# Patient Record
Sex: MALE | Race: WHITE | NOT HISPANIC OR LATINO | Employment: OTHER | URBAN - NONMETROPOLITAN AREA
[De-identification: names, ages, dates, MRNs, and addresses within clinical notes are randomized per-mention and may not be internally consistent; named-entity substitution may affect disease eponyms.]

---

## 2017-03-15 RX ORDER — BISOPROLOL FUMARATE 5 MG/1
5 TABLET, FILM COATED ORAL DAILY
Qty: 90 TABLET | Refills: 1 | Status: SHIPPED | OUTPATIENT
Start: 2017-03-15 | End: 2017-06-27 | Stop reason: SDUPTHER

## 2017-06-27 ENCOUNTER — OFFICE VISIT (OUTPATIENT)
Dept: CARDIOLOGY | Facility: CLINIC | Age: 82
End: 2017-06-27

## 2017-06-27 VITALS
HEART RATE: 59 BPM | WEIGHT: 200 LBS | DIASTOLIC BLOOD PRESSURE: 64 MMHG | HEIGHT: 69 IN | BODY MASS INDEX: 29.62 KG/M2 | SYSTOLIC BLOOD PRESSURE: 124 MMHG

## 2017-06-27 DIAGNOSIS — I25.10 CORONARY ARTERY DISEASE INVOLVING NATIVE CORONARY ARTERY OF NATIVE HEART WITHOUT ANGINA PECTORIS: Primary | ICD-10-CM

## 2017-06-27 DIAGNOSIS — I10 ESSENTIAL HYPERTENSION: ICD-10-CM

## 2017-06-27 DIAGNOSIS — Z79.899 HIGH RISK MEDICATION USE: ICD-10-CM

## 2017-06-27 DIAGNOSIS — E78.2 MIXED HYPERLIPIDEMIA: ICD-10-CM

## 2017-06-27 PROCEDURE — 99213 OFFICE O/P EST LOW 20 MIN: CPT | Performed by: INTERNAL MEDICINE

## 2017-06-27 RX ORDER — BISOPROLOL FUMARATE 5 MG/1
5 TABLET, FILM COATED ORAL DAILY
Qty: 90 TABLET | Refills: 3 | Status: SHIPPED | OUTPATIENT
Start: 2017-06-27 | End: 2018-07-31 | Stop reason: SDUPTHER

## 2017-06-27 RX ORDER — LISINOPRIL 2.5 MG/1
2.5 TABLET ORAL DAILY
Qty: 90 TABLET | Refills: 3 | Status: SHIPPED | OUTPATIENT
Start: 2017-06-27 | End: 2018-02-22 | Stop reason: SDUPTHER

## 2017-06-27 RX ORDER — TRIAMTERENE AND HYDROCHLOROTHIAZIDE 37.5; 25 MG/1; MG/1
1 TABLET ORAL DAILY
Qty: 90 TABLET | Refills: 3 | Status: SHIPPED | OUTPATIENT
Start: 2017-06-27 | End: 2019-09-18

## 2017-06-27 RX ORDER — SIMVASTATIN 20 MG
20 TABLET ORAL NIGHTLY
Qty: 90 TABLET | Refills: 2 | OUTPATIENT
Start: 2017-06-27 | End: 2021-03-18

## 2017-06-27 NOTE — PROGRESS NOTES
Ramin Zaragoza  1935  [phone]  [phone]    06/27/2017    Carolyn Sharp MD    Chief Complaint   Patient presents with   • Coronary Artery Disease     PROBLEM LIST:  1. Coronary artery disease:   a. History of stents to the LAD and PDA 1997, 2002, and 2003, data deficit.  b. Abnormal nuclear perfusion study, 04/11/2014, revealing a moderate reversible defect involving the apex and basal mid inferior wall.    c. Cardiac catheterization, 04/22/2014, by Dr. Emerson revealing normal LV systolic function of 55% to 60% with patent stents to the LAD and PDA.   2. History of PVCs.    3. Hypertension.    4. Hyperlipidemia.    5. Venous insufficiency:   a. Status post right lower extremity great saphenous vein ablation in 2013.    b. Negative DVT for thrombus, May 2015.  6. Hypothyroidism on chronic replacement therapy.    7. Lactose intolerance.    8. Lower extremity edema.    9. Surgical history:   a. Inguinal hernia repair.  b. Bilateral cataract extraction.  c. Melanoma removal from the back.    History of left elbow fracture with subsequent fixation.       Allergies   Allergen Reactions   • Lactose Intolerance (Gi)        Current Medications:      Current Outpatient Prescriptions:   •  aspirin 81 MG EC tablet, Take 81 mg by mouth daily., Disp: , Rfl:   •  bisoprolol (ZEBeta) 5 MG tablet, Take 1 tablet by mouth Daily., Disp: 90 tablet, Rfl: 1  •  fluticasone (FLONASE) 50 MCG/ACT nasal spray, 2 sprays into each nostril daily. Administer 2 sprays in each nostril for each dose., Disp: , Rfl:   •  levothyroxine (SYNTHROID, LEVOTHROID) 25 MCG tablet, Take 25 mcg by mouth daily., Disp: , Rfl:   •  lisinopril (PRINIVIL,ZESTRIL) 2.5 MG tablet, Take 2.5 mg by mouth daily., Disp: , Rfl:   •  Loratadine (CLARITIN PO), Take  by mouth as needed., Disp: , Rfl:   •  nitroglycerin (NITROSTAT) 0.4 MG SL tablet, Place 1 tablet under the tongue every 5 (five) minutes as needed for chest pain. Take no more than 3 doses in 15  "minutes., Disp: 25 tablet, Rfl: 11  •  simvastatin (ZOCOR) 20 MG tablet, Take 20 mg by mouth every night., Disp: , Rfl:     HPI    Mr. Zaragoza presents today for 12 month follow up of coronary artery disease, hypertension, and hyperlipidemia. Since last visit, patient has been feeling well overall from a cardiovascular standpoint. Patient has still been experiencing pedal edema, with his right leg being worse than his left. Patient denies chest pain, palpitations, shortness of breath, edema, PND, orthopnea, dizziness, and syncope. He reportedly has gained 13 pounds since last visit. He is remaining active by gardening. He follows up with Dr. Sharp in September.    The following portions of the patient's history were reviewed and updated as appropriate: allergies, current medications and problem list.    Pertinent positives as listed in the HPI.  All other systems reviewed are negative.    Vitals:    06/27/17 1323   BP: 124/64   BP Location: Right arm   Patient Position: Sitting   Pulse: 59   Weight: 200 lb (90.7 kg)   Height: 69\" (175.3 cm)       Physical Exam:  General: Alert and oriented  Neck: Jugular venous pressure is within normal limits. Carotids have normal upstrokes without bruits.   Cardiovascular: Regular rate and rhythm without gallop or rub.  Lungs: Clear without rales or wheezes. Equal expansion is noted.   Extremities: Show 1+ pitting edema bilateral LE, right worse than left.  Skin: warm and dry.  Neurologic: nonfocal    Diagnostic Data:    Procedures    Assessment:      ICD-10-CM ICD-9-CM   1. Coronary artery disease involving native coronary artery of native heart without angina pectoris I25.10 414.01   2. Essential hypertension I10 401.9   3. Mixed hyperlipidemia E78.2 272.2       Plan:    1. Start Maxzide 37.5-25 mg once daily.   2. Obtain labs in four weeks.  3. Continue current medications.  4. F/up in 12 months or sooner if needed.    Scribed for Vesta Marley MD by Nelson Wei. " 6/27/2017  1:30 PM        I Vesta Marley MD personally performed the services described in this documentation as scribed by the above individual in my presence, and it is both accurate and complete.    Vesta Marley MD, FACC

## 2018-02-22 RX ORDER — LISINOPRIL 2.5 MG/1
2.5 TABLET ORAL DAILY
Qty: 90 TABLET | Refills: 1 | Status: SHIPPED | OUTPATIENT
Start: 2018-02-22 | End: 2018-06-15 | Stop reason: SDUPTHER

## 2018-06-18 RX ORDER — LISINOPRIL 2.5 MG/1
TABLET ORAL
Qty: 90 TABLET | Refills: 1 | Status: SHIPPED | OUTPATIENT
Start: 2018-06-18 | End: 2023-01-21 | Stop reason: HOSPADM

## 2018-07-31 RX ORDER — BISOPROLOL FUMARATE 5 MG/1
TABLET, FILM COATED ORAL
Qty: 90 TABLET | Refills: 0 | Status: SHIPPED | OUTPATIENT
Start: 2018-07-31 | End: 2018-11-28 | Stop reason: SDUPTHER

## 2018-11-28 RX ORDER — BISOPROLOL FUMARATE 5 MG/1
5 TABLET, FILM COATED ORAL DAILY
Qty: 90 TABLET | Refills: 0 | Status: SHIPPED | OUTPATIENT
Start: 2018-11-28 | End: 2019-09-18

## 2019-09-16 NOTE — PROGRESS NOTES
[facility] Card[facility]    Subjective:     Encounter Date:09/18/2019      Patient ID: Ramin Zaragoza is a 83 y.o. male.    Reason for consultation: Coronary artery disease    Problem List:  1. Coronary artery disease:   a. History of stents to the LAD and PDA 1997, 2002, 2003. Data deficit.  b. Abnormal nuclear perfusion study, 04/11/2014, revealing a moderate reversible defect involving the apex and basal mid inferior wall.    c. Cardiac catheterization, 04/22/2014, by Dr. Emerson revealing normal LV systolic function of 55% to 60% with patent stents to the LAD and PDA.   2. History of PVCs.    3. Hypertension.    4. Hyperlipidemia.    a. FLP 8/6/19: Trig 77, , LDL 53, HDL 43.   5. Venous insufficiency:   a. Status post right lower extremity great saphenous vein ablation in 2013.    b. Negative DVT for thrombus, May 2015.  6. Hypothyroidism on chronic replacement therapy.    7. Lactose intolerance.    8. Lower extremity edema.    9. Surgical history:   a. Inguinal hernia repair.  b. Bilateral cataract extraction.  c. Melanoma removal from the back.    d. History of left elbow fracture with subsequent fixation.         No problems updated.    Allergies   Allergen Reactions   • Lactose Intolerance (Gi)          Current Outpatient Medications:   •  aspirin 81 MG EC tablet, Take 81 mg by mouth daily., Disp: , Rfl:   •  fluticasone (FLONASE) 50 MCG/ACT nasal spray, 2 sprays into each nostril daily. Administer 2 sprays in each nostril for each dose., Disp: , Rfl:   •  levothyroxine (SYNTHROID, LEVOTHROID) 25 MCG tablet, Take 25 mcg by mouth daily., Disp: , Rfl:   •  lisinopril (PRINIVIL,ZESTRIL) 2.5 MG tablet, TAKE 1 TABLET BY MOUTH ONCE DAILY, Disp: 90 tablet, Rfl: 1  •  Loratadine (CLARITIN PO), Take  by mouth as needed., Disp: , Rfl:   •  nitroglycerin (NITROSTAT) 0.4 MG SL tablet, Place 1 tablet under the tongue every 5 (five) minutes as needed for chest pain. Take no more than 3 doses  in 15 minutes., Disp: 25 tablet, Rfl: 11  •  simvastatin (ZOCOR) 20 MG tablet, Take 1 tablet by mouth Every Night., Disp: 90 tablet, Rfl: 2    HPI:      Ramin Zaragoza is a 83 y.o. male who present today to establish care for his CAD, HTN, HLD. His BP is pretty well controlled. Systolic is usually in the low 100's/60's. Denies chest pain, SOB, GM, PND, orthopnea. He walks 30 minutes a day. Very active. No issues.     Cardiac Risk Factors:    Social History     Socioeconomic History   • Marital status:      Spouse name: Not on file   • Number of children: Not on file   • Years of education: Not on file   • Highest education level: Not on file   Tobacco Use   • Smoking status: Never Smoker   • Smokeless tobacco: Never Used   Substance and Sexual Activity   • Alcohol use: No   • Drug use: No   • Sexual activity: Defer     Family History   Problem Relation Age of Onset   • No Known Problems Mother    • No Known Problems Father        Review of Systems:  The following portions of the patient's history were reviewed and updated as appropriate: allergies, current medications and problem list.    Pertinent positives as listed in the HPI.  All other systems reviewed are negative.    Objective:        Vitals:    09/18/19 1301   BP: 108/68   Pulse: 59   SpO2: 96%       GENERAL: This is a well-developed, well-nourished, male who is in no acute distress. Alert and oriented x3. Normal mood and affect.   SKIN: Pink and warm without rash or abnormality noted.   HEENT: Head is normocephalic and atraumatic. Pupils are equal and reactive to light bilaterally. Mucous membranes are pink and moist.   NECK: Supple without lymphadenopathy or thyromegaly. There is no jugular venous distention at 30°.  LUNGS: Clear to auscultation bilaterally without wheezing, rhonchi, or rales noted.   CARDIOVASCULAR: The heart has a regular rate with a normal S1 and S2. There is no murmur, gallop, rub, or click appreciated. The PMI is nondisplaced.  Carotid upstrokes are 2+ and symmetrical without bruits.   ABDOMEN: Soft and nondistended with positive bowel sounds x4. The patient denies tenderness of palpitation.   MUSCULOSKELETAL: There are no obvious bony abnormalities. Normal range of tenderness to palpation.   NEUROLOGICAL: Nonfocal.   PERIPHERAL VASCULAR: Femoral pulses are 2+ and symmetrical without bruits. Posterior tibial and dorsalis pedis pulses are 2+ and symmetrical. There is trace peripheral edema.        No results found for any previous visit.     Diagnostic Data:          ECG 12 Lead  Date/Time: 9/18/2019 1:31 PM  Performed by: Betty Whitaker APRN  Authorized by: Betty Whitaker APRN   Comparison: compared with previous ECG   Rhythm: sinus bradycardia  BPM: 59              Assessment:       ICD-10-CM ICD-9-CM   1. Coronary artery disease involving native coronary artery of native heart without angina pectoris I25.10 414.01   2. Premature ventricular contraction I49.3 427.69   3. Essential hypertension I10 401.9   4. Mixed hyperlipidemia E78.2 272.2          Plan:       1. Continue simvastatin, lisinopril and ASA. No BB secondary to bradycardia.    2. Follow-up in 1 year, sooner as needed.        Scribed for Vesta Marley MD by DARA Salguero. 9/18/2019  1:14 PM

## 2019-09-18 ENCOUNTER — OFFICE VISIT (OUTPATIENT)
Dept: CARDIOLOGY | Facility: CLINIC | Age: 84
End: 2019-09-18

## 2019-09-18 VITALS
WEIGHT: 180 LBS | SYSTOLIC BLOOD PRESSURE: 108 MMHG | HEART RATE: 59 BPM | HEIGHT: 70 IN | DIASTOLIC BLOOD PRESSURE: 68 MMHG | BODY MASS INDEX: 25.77 KG/M2 | OXYGEN SATURATION: 96 %

## 2019-09-18 DIAGNOSIS — I10 ESSENTIAL HYPERTENSION: ICD-10-CM

## 2019-09-18 DIAGNOSIS — I25.10 CORONARY ARTERY DISEASE INVOLVING NATIVE CORONARY ARTERY OF NATIVE HEART WITHOUT ANGINA PECTORIS: Primary | ICD-10-CM

## 2019-09-18 DIAGNOSIS — I49.3 PREMATURE VENTRICULAR CONTRACTION: ICD-10-CM

## 2019-09-18 DIAGNOSIS — E78.2 MIXED HYPERLIPIDEMIA: ICD-10-CM

## 2019-09-18 PROCEDURE — 93000 ELECTROCARDIOGRAM COMPLETE: CPT | Performed by: INTERNAL MEDICINE

## 2019-09-18 PROCEDURE — 99213 OFFICE O/P EST LOW 20 MIN: CPT | Performed by: INTERNAL MEDICINE

## 2020-02-12 ENCOUNTER — APPOINTMENT (OUTPATIENT)
Dept: CT IMAGING | Facility: HOSPITAL | Age: 85
End: 2020-02-12

## 2020-02-12 ENCOUNTER — HOSPITAL ENCOUNTER (EMERGENCY)
Facility: HOSPITAL | Age: 85
Discharge: HOME | End: 2020-02-12
Attending: EMERGENCY MEDICINE | Admitting: EMERGENCY MEDICINE

## 2020-02-12 VITALS
DIASTOLIC BLOOD PRESSURE: 65 MMHG | HEART RATE: 51 BPM | BODY MASS INDEX: 27.25 KG/M2 | SYSTOLIC BLOOD PRESSURE: 139 MMHG | OXYGEN SATURATION: 96 % | RESPIRATION RATE: 20 BRPM | TEMPERATURE: 97.5 F | HEIGHT: 69 IN | WEIGHT: 184 LBS

## 2020-02-12 DIAGNOSIS — K44.9 HIATAL HERNIA: ICD-10-CM

## 2020-02-12 DIAGNOSIS — K21.9 GASTROESOPHAGEAL REFLUX DISEASE, ESOPHAGITIS PRESENCE NOT SPECIFIED: Primary | ICD-10-CM

## 2020-02-12 LAB
ALBUMIN SERPL-MCNC: 3.8 G/DL (ref 3.5–5.2)
ALBUMIN/GLOB SERPL: 1.5 G/DL
ALP SERPL-CCNC: 58 U/L (ref 39–117)
ALT SERPL W P-5'-P-CCNC: 15 U/L (ref 1–41)
ANION GAP SERPL CALCULATED.3IONS-SCNC: 9 MMOL/L (ref 5–15)
AST SERPL-CCNC: 19 U/L (ref 1–40)
BACTERIA UR QL AUTO: NORMAL /HPF
BASOPHILS # BLD AUTO: 0.02 10*3/MM3 (ref 0–0.2)
BASOPHILS NFR BLD AUTO: 0.3 % (ref 0–1.5)
BILIRUB SERPL-MCNC: 0.4 MG/DL (ref 0.2–1.2)
BILIRUB UR QL STRIP: NEGATIVE
BUN BLD-MCNC: 16 MG/DL (ref 8–23)
BUN/CREAT SERPL: 20 (ref 7–25)
CALCIUM SPEC-SCNC: 9.4 MG/DL (ref 8.6–10.5)
CHLORIDE SERPL-SCNC: 99 MMOL/L (ref 98–107)
CLARITY UR: ABNORMAL
CLUMPED PLATELETS: PRESENT
CO2 SERPL-SCNC: 24 MMOL/L (ref 22–29)
COLOR UR: YELLOW
CREAT BLD-MCNC: 0.8 MG/DL (ref 0.76–1.27)
D-LACTATE SERPL-SCNC: 1.5 MMOL/L (ref 0.5–2)
DEPRECATED RDW RBC AUTO: 42.5 FL (ref 37–54)
EOSINOPHIL # BLD AUTO: 0.09 10*3/MM3 (ref 0–0.4)
EOSINOPHIL NFR BLD AUTO: 1.2 % (ref 0.3–6.2)
ERYTHROCYTE [DISTWIDTH] IN BLOOD BY AUTOMATED COUNT: 12.2 % (ref 12.3–15.4)
GFR SERPL CREATININE-BSD FRML MDRD: 92 ML/MIN/1.73
GLOBULIN UR ELPH-MCNC: 2.6 GM/DL
GLUCOSE BLD-MCNC: 166 MG/DL (ref 65–99)
GLUCOSE UR STRIP-MCNC: NEGATIVE MG/DL
HCT VFR BLD AUTO: 41.3 % (ref 37.5–51)
HGB BLD-MCNC: 14.2 G/DL (ref 13–17.7)
HGB UR QL STRIP.AUTO: NEGATIVE
HOLD SPECIMEN: NORMAL
HOLD SPECIMEN: NORMAL
HYALINE CASTS UR QL AUTO: NORMAL /LPF
IMM GRANULOCYTES # BLD AUTO: 0.03 10*3/MM3 (ref 0–0.05)
IMM GRANULOCYTES NFR BLD AUTO: 0.4 % (ref 0–0.5)
KETONES UR QL STRIP: NEGATIVE
LEUKOCYTE ESTERASE UR QL STRIP.AUTO: NEGATIVE
LIPASE SERPL-CCNC: 13 U/L (ref 13–60)
LYMPHOCYTES # BLD AUTO: 0.88 10*3/MM3 (ref 0.7–3.1)
LYMPHOCYTES NFR BLD AUTO: 11.3 % (ref 19.6–45.3)
MCH RBC QN AUTO: 32 PG (ref 26.6–33)
MCHC RBC AUTO-ENTMCNC: 34.4 G/DL (ref 31.5–35.7)
MCV RBC AUTO: 93 FL (ref 79–97)
MONOCYTES # BLD AUTO: 0.6 10*3/MM3 (ref 0.1–0.9)
MONOCYTES NFR BLD AUTO: 7.7 % (ref 5–12)
NEUTROPHILS # BLD AUTO: 6.15 10*3/MM3 (ref 1.7–7)
NEUTROPHILS NFR BLD AUTO: 79.1 % (ref 42.7–76)
NITRITE UR QL STRIP: NEGATIVE
NRBC BLD AUTO-RTO: 0 /100 WBC (ref 0–0.2)
PH UR STRIP.AUTO: 7 [PH] (ref 5–8)
PLATELET # BLD AUTO: 137 10*3/MM3 (ref 140–450)
PMV BLD AUTO: 9.9 FL (ref 6–12)
POTASSIUM BLD-SCNC: 4.2 MMOL/L (ref 3.5–5.2)
PROT SERPL-MCNC: 6.4 G/DL (ref 6–8.5)
PROT UR QL STRIP: NEGATIVE
RBC # BLD AUTO: 4.44 10*6/MM3 (ref 4.14–5.8)
RBC # UR: NORMAL /HPF
RBC MORPH BLD: NORMAL
REF LAB TEST METHOD: NORMAL
SODIUM BLD-SCNC: 132 MMOL/L (ref 136–145)
SP GR UR STRIP: 1.01 (ref 1–1.03)
SQUAMOUS #/AREA URNS HPF: NORMAL /HPF
TROPONIN T SERPL-MCNC: <0.01 NG/ML (ref 0–0.03)
UROBILINOGEN UR QL STRIP: ABNORMAL
WBC MORPH BLD: NORMAL
WBC NRBC COR # BLD: 7.77 10*3/MM3 (ref 3.4–10.8)
WBC UR QL AUTO: NORMAL /HPF
WHOLE BLOOD HOLD SPECIMEN: NORMAL
WHOLE BLOOD HOLD SPECIMEN: NORMAL

## 2020-02-12 PROCEDURE — 85025 COMPLETE CBC W/AUTO DIFF WBC: CPT | Performed by: EMERGENCY MEDICINE

## 2020-02-12 PROCEDURE — 83690 ASSAY OF LIPASE: CPT | Performed by: EMERGENCY MEDICINE

## 2020-02-12 PROCEDURE — 84484 ASSAY OF TROPONIN QUANT: CPT | Performed by: PHYSICIAN ASSISTANT

## 2020-02-12 PROCEDURE — 74176 CT ABD & PELVIS W/O CONTRAST: CPT

## 2020-02-12 PROCEDURE — 80053 COMPREHEN METABOLIC PANEL: CPT | Performed by: EMERGENCY MEDICINE

## 2020-02-12 PROCEDURE — 81001 URINALYSIS AUTO W/SCOPE: CPT | Performed by: EMERGENCY MEDICINE

## 2020-02-12 PROCEDURE — 96374 THER/PROPH/DIAG INJ IV PUSH: CPT

## 2020-02-12 PROCEDURE — 85007 BL SMEAR W/DIFF WBC COUNT: CPT | Performed by: EMERGENCY MEDICINE

## 2020-02-12 PROCEDURE — 99284 EMERGENCY DEPT VISIT MOD MDM: CPT

## 2020-02-12 PROCEDURE — 83605 ASSAY OF LACTIC ACID: CPT | Performed by: EMERGENCY MEDICINE

## 2020-02-12 PROCEDURE — 25010000002 ONDANSETRON PER 1 MG: Performed by: PHYSICIAN ASSISTANT

## 2020-02-12 PROCEDURE — 63710000001 ONDANSETRON ODT 4 MG TABLET DISPERSIBLE: Performed by: EMERGENCY MEDICINE

## 2020-02-12 PROCEDURE — 93005 ELECTROCARDIOGRAM TRACING: CPT | Performed by: PHYSICIAN ASSISTANT

## 2020-02-12 RX ORDER — ALUMINA, MAGNESIA, AND SIMETHICONE 2400; 2400; 240 MG/30ML; MG/30ML; MG/30ML
15 SUSPENSION ORAL ONCE
Status: COMPLETED | OUTPATIENT
Start: 2020-02-12 | End: 2020-02-12

## 2020-02-12 RX ORDER — ONDANSETRON 2 MG/ML
4 INJECTION INTRAMUSCULAR; INTRAVENOUS ONCE
Status: COMPLETED | OUTPATIENT
Start: 2020-02-12 | End: 2020-02-12

## 2020-02-12 RX ORDER — OMEPRAZOLE 20 MG/1
20 CAPSULE, DELAYED RELEASE ORAL DAILY
Qty: 14 CAPSULE | Refills: 0 | OUTPATIENT
Start: 2020-02-12 | End: 2021-03-18

## 2020-02-12 RX ORDER — LIDOCAINE HYDROCHLORIDE 20 MG/ML
15 SOLUTION OROPHARYNGEAL ONCE
Status: COMPLETED | OUTPATIENT
Start: 2020-02-12 | End: 2020-02-12

## 2020-02-12 RX ORDER — SODIUM CHLORIDE 0.9 % (FLUSH) 0.9 %
10 SYRINGE (ML) INJECTION AS NEEDED
Status: DISCONTINUED | OUTPATIENT
Start: 2020-02-12 | End: 2020-02-12 | Stop reason: HOSPADM

## 2020-02-12 RX ORDER — ONDANSETRON 4 MG/1
4 TABLET, ORALLY DISINTEGRATING ORAL ONCE
Status: COMPLETED | OUTPATIENT
Start: 2020-02-12 | End: 2020-02-12

## 2020-02-12 RX ADMIN — LIDOCAINE HYDROCHLORIDE 15 ML: 20 SOLUTION ORAL; TOPICAL at 17:07

## 2020-02-12 RX ADMIN — ALUMINUM HYDROXIDE, MAGNESIUM HYDROXIDE, AND DIMETHICONE 15 ML: 400; 400; 40 SUSPENSION ORAL at 17:07

## 2020-02-12 RX ADMIN — SODIUM CHLORIDE 500 ML: 9 INJECTION, SOLUTION INTRAVENOUS at 15:10

## 2020-02-12 RX ADMIN — ONDANSETRON 4 MG: 4 TABLET, ORALLY DISINTEGRATING ORAL at 15:39

## 2020-02-12 RX ADMIN — ONDANSETRON 4 MG: 2 INJECTION INTRAMUSCULAR; INTRAVENOUS at 13:59

## 2020-02-12 NOTE — PROGRESS NOTES
Discharge Planning Assessment  [facility]     Patient Name: Ramin Zaragoza  MRN: [id number]  Today's Date: 2/12/2020    Admit Date: 2/12/2020    Discharge Needs Assessment    No documentation.       Discharge Plan     Row Name 02/12/20 1742       Plan    Plan  DISCHARGE    Plan Comments  Pt came in by EMS is being discharged home  set-up LYFT transport to [facility] to  his car. His permission was obtained for ride. He was visiting his wife earlier today        Destination      Coordination has not been started for this encounter.      Durable Medical Equipment      Coordination has not been started for this encounter.      Dialysis/Infusion      Coordination has not been started for this encounter.      Home Medical Care      Coordination has not been started for this encounter.      Therapy      Coordination has not been started for this encounter.      Community Resources      Coordination has not been started for this encounter.          Demographic Summary    No documentation.       Functional Status    No documentation.       Psychosocial    No documentation.       Abuse/Neglect    No documentation.       Legal    No documentation.       Substance Abuse    No documentation.       Patient Forms    No documentation.           Geovanna Melendez RN

## 2020-02-12 NOTE — ED PROVIDER NOTES
"Rodolfo Zaragoza is a 84 y.o. male who presents to the ED with c/o nausea. Per EMS, the patient was at the nursing home visiting his wife when was brought to the ED due to feeling ill and \"not acting like himself.\" The patient complains of nausea for one day and states that he was not able to eat lunch today. He also complains of sleep disturbance and increased urinary frequency but he denies abdominal pain, chest pain, vomiting, and diarrhea. He notes that he has been feeling a lot of anxiety because his wife was moved to a nursing home and he states that \"they took my wife away from me.\" He reports a surgical history of two cardiac stents at [facility] but he denies any other surgeries. The patient notes that his last cardiac catheterization was approximately 10 years ago. The patient reportedly lives by himself in his house. There are no other acute complaints at this time.      History provided by:  Patient and EMS personnel  Nausea   The primary symptoms include nausea. Primary symptoms do not include abdominal pain, vomiting or diarrhea. The illness began yesterday. The onset was sudden. The problem has not changed since onset.  Nausea began yesterday.       Review of Systems   Cardiovascular: Negative for chest pain.   Gastrointestinal: Positive for nausea. Negative for abdominal pain, diarrhea and vomiting.   Genitourinary: Positive for frequency.   Psychiatric/Behavioral: Positive for sleep disturbance. The patient is nervous/anxious.    All other systems reviewed and are negative.      Past Medical History:   Diagnosis Date   • CAD (coronary artery disease)    • Hyperlipidemia    • Hypertension    • Hypothyroidism     on chronic replacement therapy   • Lactose intolerance    • Lower extremity edema    • PVC's (premature ventricular contractions)     History of   • Thyroid disorder    • Venous insufficiency        Allergies   Allergen Reactions   • Lactose Intolerance (Gi)        Past Surgical " History:   Procedure Laterality Date   • CARDIAC CATHETERIZATION     • CORONARY STENT PLACEMENT     • EYE SURGERY      Bilateral cataract extraction   • HERNIA REPAIR      Inguinal hernia repair   • OTHER SURGICAL HISTORY      Melanoma removed from back   • OTHER SURGICAL HISTORY      History of left elbow fracture with sunsequent fixation       Family History   Problem Relation Age of Onset   • No Known Problems Mother    • No Known Problems Father        Social History     Socioeconomic History   • Marital status:      Spouse name: Not on file   • Number of children: Not on file   • Years of education: Not on file   • Highest education level: Not on file   Tobacco Use   • Smoking status: Never Smoker   • Smokeless tobacco: Never Used   Substance and Sexual Activity   • Alcohol use: No   • Drug use: No   • Sexual activity: Defer         Objective   Physical Exam   Constitutional: He is oriented to person, place, and time. He appears well-developed and well-nourished. No distress.   HENT:   Head: Normocephalic and atraumatic.   Nose: Nose normal.   Eyes: Conjunctivae are normal. No scleral icterus.   Neck: Normal range of motion. Neck supple.   Cardiovascular: Regular rhythm and normal heart sounds. Bradycardia present.   Pulmonary/Chest: Effort normal and breath sounds normal. No respiratory distress.   Abdominal: Soft. Bowel sounds are normal. He exhibits no mass. There is tenderness. There is no rebound and no guarding.   Mild epigastric discomfort. No guarding, rebound, rigidity, or masses. Positive bowel sounds.   Musculoskeletal: Normal range of motion.   Neurological: He is alert and oriented to person, place, and time.   Skin: Skin is warm and dry.   Psychiatric: He has a normal mood and affect. His behavior is normal.   Nursing note and vitals reviewed.      Procedures         ED Course  ED Course as of Feb 14 2335 Wed Feb 12, 2020   1642 Kaushal Garcia is at bedside updating and reevaluating the  "patient.     [SK]      ED Course User Index  [SK] Arabella Huddleston     No results found for this or any previous visit (from the past 24 hour(s)).  Note: In addition to lab results from this visit, the labs listed above may include labs taken at another facility or during a different encounter within the last 24 hours. Please correlate lab times with ED admission and discharge times for further clarification of the services performed during this visit.    CT Abdomen Pelvis Without Contrast   Final Result   Patulous appearance of the partially visualized distal   esophagus with moderate hiatal hernia with fluid-filled distal esophagus   and hiatal hernia as well as moderate distention of the gastric lumen   with air-fluid level however small bowel is nondilated without evidence   for mechanical obstructive process, GI tract or focal inflammatory   thickening. No free fluid or loculated fluid collection.       DICTATED:   02/12/2020   EDITED/ls :   02/12/2020        This report was finalized on 2/13/2020 8:38 AM by Dr. Jitendra Pace.            Vitals:    02/12/20 1315 02/12/20 1505 02/12/20 1530 02/12/20 1630   BP: 159/70 137/67 150/69 139/65   BP Location: Right arm      Patient Position: Sitting      Pulse: (!) 45 (!) 49 51    Resp: 20      Temp: 97.5 °F (36.4 °C)      TempSrc: Oral      SpO2: 98% 96% 98% 96%   Weight: 83.5 kg (184 lb)      Height: 175.3 cm (69\")        Medications   ondansetron (ZOFRAN) injection 4 mg (4 mg Intravenous Given 2/12/20 1359)   sodium chloride 0.9 % bolus 500 mL (0 mL Intravenous Stopped 2/12/20 1559)   ondansetron ODT (ZOFRAN-ODT) disintegrating tablet 4 mg (4 mg Oral Given 2/12/20 1539)   aluminum-magnesium hydroxide-simethicone (MAALOX MAX) 400-400-40 MG/5ML suspension 15 mL (15 mL Oral Given 2/12/20 1707)   Lidocaine Viscous HCl (XYLOCAINE) 2 % mouth solution 15 mL (15 mL Mouth/Throat Given 2/12/20 1707)     ECG/EMG Results (last 24 hours)     ** No results found for the last 24 " hours. **        ECG 12 Lead   Final Result   Test Reason : nausea   Blood Pressure : **/** mmHG   Vent. Rate : 052 BPM     Atrial Rate : 052 BPM      P-R Int : 190 ms          QRS Dur : 096 ms       QT Int : 448 ms       P-R-T Axes : 038 -34 034 degrees      QTc Int : 416 ms      Sinus bradycardia   Left axis deviation   Abnormal ECG   No previous ECGs available   Confirmed by MD PINEDO CORY ([id number]) on 2/13/2020 5:03:32 PM      Referred By:  KHRIS           Confirmed By:CARLOS PINEDO MD                                                      MDM  Number of Diagnoses or Management Options  Gastroesophageal reflux disease, esophagitis presence not specified: new and requires workup  Hiatal hernia: new and requires workup     Amount and/or Complexity of Data Reviewed  Clinical lab tests: reviewed and ordered  Tests in the radiology section of CPT®: reviewed and ordered  Tests in the medicine section of CPT®: ordered and reviewed  Discuss the patient with other providers: yes    Patient Progress  Patient progress: stable      Final diagnoses:   Gastroesophageal reflux disease, esophagitis presence not specified   Hiatal hernia       Documentation assistance provided by jeremias Huddleston.  Information recorded by the jeremias was done at my direction and has been verified and validated by me.     Arabella Huddleston  02/12/20 1346       Arabella Huddleston  02/12/20 1700       Rubens Garcia PA  02/14/20 [id number]

## 2021-03-18 ENCOUNTER — APPOINTMENT (OUTPATIENT)
Dept: CT IMAGING | Facility: HOSPITAL | Age: 86
End: 2021-03-18

## 2021-03-18 ENCOUNTER — HOSPITAL ENCOUNTER (EMERGENCY)
Facility: HOSPITAL | Age: 86
Discharge: HOME | End: 2021-03-18
Attending: EMERGENCY MEDICINE | Admitting: EMERGENCY MEDICINE

## 2021-03-18 VITALS
OXYGEN SATURATION: 96 % | RESPIRATION RATE: 20 BRPM | BODY MASS INDEX: 27.11 KG/M2 | HEIGHT: 69 IN | WEIGHT: 183 LBS | HEART RATE: 60 BPM | TEMPERATURE: 98 F | DIASTOLIC BLOOD PRESSURE: 77 MMHG | SYSTOLIC BLOOD PRESSURE: 101 MMHG

## 2021-03-18 DIAGNOSIS — R10.31 CHRONIC RIGHT LOWER QUADRANT PAIN: Primary | ICD-10-CM

## 2021-03-18 DIAGNOSIS — G89.29 CHRONIC RIGHT LOWER QUADRANT PAIN: Primary | ICD-10-CM

## 2021-03-18 LAB
ALBUMIN SERPL-MCNC: 3.8 G/DL (ref 3.5–5.2)
ALBUMIN/GLOB SERPL: 1.4 G/DL
ALP SERPL-CCNC: 58 U/L (ref 39–117)
ALT SERPL W P-5'-P-CCNC: 18 U/L (ref 1–41)
ANION GAP SERPL CALCULATED.3IONS-SCNC: 5 MMOL/L (ref 5–15)
AST SERPL-CCNC: 24 U/L (ref 1–40)
BASOPHILS # BLD AUTO: 0.03 10*3/MM3 (ref 0–0.2)
BASOPHILS NFR BLD AUTO: 0.5 % (ref 0–1.5)
BILIRUB SERPL-MCNC: 0.5 MG/DL (ref 0–1.2)
BILIRUB UR QL STRIP: NEGATIVE
BUN SERPL-MCNC: 20 MG/DL (ref 8–23)
BUN/CREAT SERPL: 23.8 (ref 7–25)
CALCIUM SPEC-SCNC: 9.5 MG/DL (ref 8.6–10.5)
CHLORIDE SERPL-SCNC: 105 MMOL/L (ref 98–107)
CLARITY UR: CLEAR
CO2 SERPL-SCNC: 27 MMOL/L (ref 22–29)
COLOR UR: YELLOW
CREAT SERPL-MCNC: 0.84 MG/DL (ref 0.76–1.27)
D-LACTATE SERPL-SCNC: 1.3 MMOL/L (ref 0.5–2)
DEPRECATED RDW RBC AUTO: 43.2 FL (ref 37–54)
EOSINOPHIL # BLD AUTO: 0.18 10*3/MM3 (ref 0–0.4)
EOSINOPHIL NFR BLD AUTO: 2.9 % (ref 0.3–6.2)
ERYTHROCYTE [DISTWIDTH] IN BLOOD BY AUTOMATED COUNT: 12.5 % (ref 12.3–15.4)
GFR SERPL CREATININE-BSD FRML MDRD: 87 ML/MIN/1.73
GLOBULIN UR ELPH-MCNC: 2.8 GM/DL
GLUCOSE SERPL-MCNC: 101 MG/DL (ref 65–99)
GLUCOSE UR STRIP-MCNC: NEGATIVE MG/DL
HCT VFR BLD AUTO: 42.5 % (ref 37.5–51)
HGB BLD-MCNC: 14.4 G/DL (ref 13–17.7)
HGB UR QL STRIP.AUTO: NEGATIVE
HOLD SPECIMEN: NORMAL
IMM GRANULOCYTES # BLD AUTO: 0.02 10*3/MM3 (ref 0–0.05)
IMM GRANULOCYTES NFR BLD AUTO: 0.3 % (ref 0–0.5)
KETONES UR QL STRIP: NEGATIVE
LEUKOCYTE ESTERASE UR QL STRIP.AUTO: NEGATIVE
LIPASE SERPL-CCNC: 16 U/L (ref 13–60)
LYMPHOCYTES # BLD AUTO: 1.83 10*3/MM3 (ref 0.7–3.1)
LYMPHOCYTES NFR BLD AUTO: 29.9 % (ref 19.6–45.3)
MCH RBC QN AUTO: 31.9 PG (ref 26.6–33)
MCHC RBC AUTO-ENTMCNC: 33.9 G/DL (ref 31.5–35.7)
MCV RBC AUTO: 94.2 FL (ref 79–97)
MONOCYTES # BLD AUTO: 0.78 10*3/MM3 (ref 0.1–0.9)
MONOCYTES NFR BLD AUTO: 12.7 % (ref 5–12)
NEUTROPHILS NFR BLD AUTO: 3.28 10*3/MM3 (ref 1.7–7)
NEUTROPHILS NFR BLD AUTO: 53.7 % (ref 42.7–76)
NITRITE UR QL STRIP: NEGATIVE
NRBC BLD AUTO-RTO: 0 /100 WBC (ref 0–0.2)
PH UR STRIP.AUTO: 7.5 [PH] (ref 5–8)
PLATELET # BLD AUTO: 151 10*3/MM3 (ref 140–450)
PMV BLD AUTO: 8.7 FL (ref 6–12)
POTASSIUM SERPL-SCNC: 4.4 MMOL/L (ref 3.5–5.2)
PROT SERPL-MCNC: 6.6 G/DL (ref 6–8.5)
PROT UR QL STRIP: NEGATIVE
RBC # BLD AUTO: 4.51 10*6/MM3 (ref 4.14–5.8)
SODIUM SERPL-SCNC: 137 MMOL/L (ref 136–145)
SP GR UR STRIP: 1.01 (ref 1–1.03)
UROBILINOGEN UR QL STRIP: NORMAL
WBC # BLD AUTO: 6.12 10*3/MM3 (ref 3.4–10.8)
WHOLE BLOOD HOLD SPECIMEN: NORMAL
WHOLE BLOOD HOLD SPECIMEN: NORMAL

## 2021-03-18 PROCEDURE — 83690 ASSAY OF LIPASE: CPT

## 2021-03-18 PROCEDURE — 74177 CT ABD & PELVIS W/CONTRAST: CPT

## 2021-03-18 PROCEDURE — 80053 COMPREHEN METABOLIC PANEL: CPT

## 2021-03-18 PROCEDURE — 25010000002 IOPAMIDOL 61 % SOLUTION: Performed by: EMERGENCY MEDICINE

## 2021-03-18 PROCEDURE — 99283 EMERGENCY DEPT VISIT LOW MDM: CPT

## 2021-03-18 PROCEDURE — 83605 ASSAY OF LACTIC ACID: CPT

## 2021-03-18 PROCEDURE — 81003 URINALYSIS AUTO W/O SCOPE: CPT

## 2021-03-18 PROCEDURE — 85025 COMPLETE CBC W/AUTO DIFF WBC: CPT

## 2021-03-18 RX ORDER — SODIUM CHLORIDE 9 MG/ML
10 INJECTION INTRAVENOUS AS NEEDED
Status: DISCONTINUED | OUTPATIENT
Start: 2021-03-18 | End: 2021-03-18 | Stop reason: HOSPADM

## 2021-03-18 RX ADMIN — IOPAMIDOL 90 ML: 612 INJECTION, SOLUTION INTRAVENOUS at 12:28

## 2021-03-18 NOTE — DISCHARGE INSTRUCTIONS
Rest.  Avoid straining or heavy lifting.  Use a stool softener to help reduce straining with bowel movements.  Call Dr. Killian for follow up appointment.  Return to the ER if acutely worse.

## 2021-03-18 NOTE — ED PROVIDER NOTES
Subjective   85-year-old male presents to the emergency department with complaints of right lower quadrant abdominal pain.  The patient states that he has a hernia in the right lower abdomen that has been present for several months.  He states that when he strains, the hernia protrudes.  He has had some increasing symptoms in recent days.  No associated nausea or vomiting.  Normal bowel movements.  Normal urination.  No fever or chills.  The patient states that he just thinks it is time to get it checked out.  He has never been seen for it before.  He states that he had bilateral inguinal hernia repairs back in the 1990s.  He denies any other health issues.  No other complaints.  His PCP is Irene Coley at the [facility].          Review of Systems   Constitutional: Negative for chills and fever.   HENT: Negative for sore throat.    Respiratory: Negative for cough and shortness of breath.    Cardiovascular: Negative for chest pain.   Gastrointestinal: Positive for abdominal pain. Negative for blood in stool, constipation, diarrhea, nausea and vomiting.   Genitourinary: Negative for dysuria.   Musculoskeletal: Negative for back pain.   Skin: Negative for pallor and rash.   Neurological: Negative for light-headedness and headaches.   Hematological: Negative.    Psychiatric/Behavioral: Negative.        Past Medical History:   Diagnosis Date   • CAD (coronary artery disease)    • Hyperlipidemia    • Hypertension    • Hypothyroidism     on chronic replacement therapy   • Lactose intolerance    • Lower extremity edema    • PVC's (premature ventricular contractions)     History of   • Thyroid disorder    • Venous insufficiency        Allergies   Allergen Reactions   • Lactose Intolerance (Gi)        Past Surgical History:   Procedure Laterality Date   • CARDIAC CATHETERIZATION     • CORONARY STENT PLACEMENT     • EYE SURGERY      Bilateral cataract extraction   • HERNIA REPAIR      Inguinal hernia repair   •  OTHER SURGICAL HISTORY      Melanoma removed from back   • OTHER SURGICAL HISTORY      History of left elbow fracture with sunsequent fixation       Family History   Problem Relation Age of Onset   • No Known Problems Mother    • No Known Problems Father        Social History     Socioeconomic History   • Marital status:      Spouse name: Not on file   • Number of children: Not on file   • Years of education: Not on file   • Highest education level: Not on file   Tobacco Use   • Smoking status: Never Smoker   • Smokeless tobacco: Never Used   Substance and Sexual Activity   • Alcohol use: No   • Drug use: No   • Sexual activity: Defer           Objective   Physical Exam  Constitutional:       General: He is not in acute distress.     Appearance: He is well-developed.   HENT:      Head: Normocephalic.      Nose: Nose normal.      Mouth/Throat:      Mouth: Mucous membranes are moist.   Eyes:      General: No scleral icterus.     Extraocular Movements: Extraocular movements intact.      Conjunctiva/sclera: Conjunctivae normal.      Pupils: Pupils are equal, round, and reactive to light.   Cardiovascular:      Rate and Rhythm: Normal rate and regular rhythm.      Heart sounds: Normal heart sounds.   Pulmonary:      Effort: Pulmonary effort is normal. No respiratory distress.      Breath sounds: No wheezing, rhonchi or rales.   Abdominal:      Comments: Moderate tenderness in the right lower abdomen with some hernia defect felt.  No current incarceration.  There is also an umbilical hernia with no incarceration.  Normal bowel sounds.   Musculoskeletal:         General: Normal range of motion.      Cervical back: Normal range of motion and neck supple.   Skin:     General: Skin is dry.   Neurological:      General: No focal deficit present.      Mental Status: He is alert.   Psychiatric:         Mood and Affect: Mood normal.         Procedures           ED Course      No concerning labs.  CT shows fat-containing  stable umbilical hernia and a hiatal hernia but no evidence of hernia in the RLQ where the patient's pain is.                                      MDM    Final diagnoses:   Chronic right lower quadrant pain       ED Disposition  ED Disposition     ED Disposition Condition Comment    Discharge Stable           Aravind Killian MD  [address] [address]  [phone]      call tomorrow to schedule follow up in office to evaluate chronic right lower abdominal pain.    [facility] Emergency Department  [address] [address] [phone]    If symptoms worsen         Medication List      Stop    CLARITIN PO     omeprazole 20 MG capsule  Commonly known as: priLOSEC     simvastatin 20 MG tablet  Commonly known as: Mark Santana, PA  03/18/21 1373

## 2021-03-26 ENCOUNTER — APPOINTMENT (OUTPATIENT)
Dept: CT IMAGING | Facility: HOSPITAL | Age: 86
End: 2021-03-26

## 2021-03-26 ENCOUNTER — HOSPITAL ENCOUNTER (EMERGENCY)
Facility: HOSPITAL | Age: 86
Discharge: HOME | End: 2021-03-26
Attending: EMERGENCY MEDICINE | Admitting: EMERGENCY MEDICINE

## 2021-03-26 ENCOUNTER — APPOINTMENT (OUTPATIENT)
Dept: ULTRASOUND IMAGING | Facility: HOSPITAL | Age: 86
End: 2021-03-26

## 2021-03-26 VITALS
BODY MASS INDEX: 27.11 KG/M2 | SYSTOLIC BLOOD PRESSURE: 140 MMHG | HEART RATE: 53 BPM | RESPIRATION RATE: 18 BRPM | WEIGHT: 183 LBS | HEIGHT: 69 IN | TEMPERATURE: 98.1 F | DIASTOLIC BLOOD PRESSURE: 74 MMHG | OXYGEN SATURATION: 95 %

## 2021-03-26 DIAGNOSIS — S76.211D STRAIN OF GROIN, RIGHT, SUBSEQUENT ENCOUNTER: Primary | ICD-10-CM

## 2021-03-26 LAB
ALBUMIN SERPL-MCNC: 4 G/DL (ref 3.5–5.2)
ALBUMIN/GLOB SERPL: 1.6 G/DL
ALP SERPL-CCNC: 66 U/L (ref 39–117)
ALT SERPL W P-5'-P-CCNC: 17 U/L (ref 1–41)
ANION GAP SERPL CALCULATED.3IONS-SCNC: 8 MMOL/L (ref 5–15)
AST SERPL-CCNC: 24 U/L (ref 1–40)
BASOPHILS # BLD AUTO: 0.03 10*3/MM3 (ref 0–0.2)
BASOPHILS NFR BLD AUTO: 0.5 % (ref 0–1.5)
BILIRUB SERPL-MCNC: 0.5 MG/DL (ref 0–1.2)
BILIRUB UR QL STRIP: NEGATIVE
BUN SERPL-MCNC: 21 MG/DL (ref 8–23)
BUN/CREAT SERPL: 22.3 (ref 7–25)
CALCIUM SPEC-SCNC: 8.8 MG/DL (ref 8.6–10.5)
CHLORIDE SERPL-SCNC: 102 MMOL/L (ref 98–107)
CK SERPL-CCNC: 137 U/L (ref 20–200)
CLARITY UR: CLEAR
CO2 SERPL-SCNC: 25 MMOL/L (ref 22–29)
COLOR UR: YELLOW
CREAT SERPL-MCNC: 0.94 MG/DL (ref 0.76–1.27)
DEPRECATED RDW RBC AUTO: 43.5 FL (ref 37–54)
EOSINOPHIL # BLD AUTO: 0.13 10*3/MM3 (ref 0–0.4)
EOSINOPHIL NFR BLD AUTO: 2.4 % (ref 0.3–6.2)
ERYTHROCYTE [DISTWIDTH] IN BLOOD BY AUTOMATED COUNT: 12.6 % (ref 12.3–15.4)
GFR SERPL CREATININE-BSD FRML MDRD: 76 ML/MIN/1.73
GLOBULIN UR ELPH-MCNC: 2.5 GM/DL
GLUCOSE SERPL-MCNC: 101 MG/DL (ref 65–99)
GLUCOSE UR STRIP-MCNC: NEGATIVE MG/DL
HCT VFR BLD AUTO: 42.9 % (ref 37.5–51)
HGB BLD-MCNC: 14.6 G/DL (ref 13–17.7)
HGB UR QL STRIP.AUTO: NEGATIVE
HOLD SPECIMEN: NORMAL
IMM GRANULOCYTES # BLD AUTO: 0.02 10*3/MM3 (ref 0–0.05)
IMM GRANULOCYTES NFR BLD AUTO: 0.4 % (ref 0–0.5)
KETONES UR QL STRIP: NEGATIVE
LEUKOCYTE ESTERASE UR QL STRIP.AUTO: NEGATIVE
LIPASE SERPL-CCNC: 16 U/L (ref 13–60)
LYMPHOCYTES # BLD AUTO: 1.63 10*3/MM3 (ref 0.7–3.1)
LYMPHOCYTES NFR BLD AUTO: 29.5 % (ref 19.6–45.3)
MAGNESIUM SERPL-MCNC: 1.9 MG/DL (ref 1.6–2.4)
MCH RBC QN AUTO: 32.1 PG (ref 26.6–33)
MCHC RBC AUTO-ENTMCNC: 34 G/DL (ref 31.5–35.7)
MCV RBC AUTO: 94.3 FL (ref 79–97)
MONOCYTES # BLD AUTO: 0.71 10*3/MM3 (ref 0.1–0.9)
MONOCYTES NFR BLD AUTO: 12.8 % (ref 5–12)
NEUTROPHILS NFR BLD AUTO: 3.01 10*3/MM3 (ref 1.7–7)
NEUTROPHILS NFR BLD AUTO: 54.4 % (ref 42.7–76)
NITRITE UR QL STRIP: NEGATIVE
NRBC BLD AUTO-RTO: 0 /100 WBC (ref 0–0.2)
PH UR STRIP.AUTO: 7.5 [PH] (ref 5–8)
PLATELET # BLD AUTO: 162 10*3/MM3 (ref 140–450)
PMV BLD AUTO: 9.1 FL (ref 6–12)
POTASSIUM SERPL-SCNC: 4.3 MMOL/L (ref 3.5–5.2)
PROT SERPL-MCNC: 6.5 G/DL (ref 6–8.5)
PROT UR QL STRIP: NEGATIVE
RBC # BLD AUTO: 4.55 10*6/MM3 (ref 4.14–5.8)
SODIUM SERPL-SCNC: 135 MMOL/L (ref 136–145)
SP GR UR STRIP: 1.01 (ref 1–1.03)
UROBILINOGEN UR QL STRIP: NORMAL
WBC # BLD AUTO: 5.53 10*3/MM3 (ref 3.4–10.8)
WHOLE BLOOD HOLD SPECIMEN: NORMAL
WHOLE BLOOD HOLD SPECIMEN: NORMAL

## 2021-03-26 PROCEDURE — 99283 EMERGENCY DEPT VISIT LOW MDM: CPT

## 2021-03-26 PROCEDURE — 80053 COMPREHEN METABOLIC PANEL: CPT | Performed by: NURSE PRACTITIONER

## 2021-03-26 PROCEDURE — 83735 ASSAY OF MAGNESIUM: CPT | Performed by: NURSE PRACTITIONER

## 2021-03-26 PROCEDURE — 83690 ASSAY OF LIPASE: CPT | Performed by: NURSE PRACTITIONER

## 2021-03-26 PROCEDURE — 82550 ASSAY OF CK (CPK): CPT | Performed by: NURSE PRACTITIONER

## 2021-03-26 PROCEDURE — 25010000002 IOPAMIDOL 61 % SOLUTION: Performed by: EMERGENCY MEDICINE

## 2021-03-26 PROCEDURE — 76870 US EXAM SCROTUM: CPT

## 2021-03-26 PROCEDURE — 74177 CT ABD & PELVIS W/CONTRAST: CPT

## 2021-03-26 PROCEDURE — 85025 COMPLETE CBC W/AUTO DIFF WBC: CPT | Performed by: NURSE PRACTITIONER

## 2021-03-26 PROCEDURE — 81003 URINALYSIS AUTO W/O SCOPE: CPT | Performed by: NURSE PRACTITIONER

## 2021-03-26 RX ORDER — SODIUM CHLORIDE 0.9 % (FLUSH) 0.9 %
10 SYRINGE (ML) INJECTION AS NEEDED
Status: DISCONTINUED | OUTPATIENT
Start: 2021-03-26 | End: 2021-03-26 | Stop reason: HOSPADM

## 2021-03-26 RX ADMIN — IOPAMIDOL 85 ML: 612 INJECTION, SOLUTION INTRAVENOUS at 12:29

## 2021-03-26 NOTE — ED PROVIDER NOTES
"Rodolfo Zaragoza is an 85 y.o. male who presents to the ED with c/o abdominal pain. The patient reports he has a history of hernias in his abdomen for the past 30 years with it recurring intermittently since its initial diagnosis. For the past month he has been experiencing gradually worsening right lower quadrant abdominal pain. The patient presented to the ED on 3/18/2021 for his symptoms and was diagnosed with chronic right lower quadrant pain then discharged. He was referred to a surgeon for hernia repair upon discharged but he cancelled this appointment as he did not believe his diagnosis was accurate. The patient complains of right flank pain, right groin pain, and right inner thigh pain but denies abnormal bowel movements, abnormal urinary symptoms, nausea, and vomiting. He is not currently experiencing any pain while in the ED. The patient states \"my stomach is dumping acid into my colon.\" He has a past medical history of CAD, hyperlipidemia, hypertension, venous insufficiency, and hypothyroidism. His surgical history includes coronary stent placement, cardiac catheterization, and hernia repair. There are no other acute complaints at this time.      History provided by:  Patient  Abdominal Pain  Pain location:  RLQ  Pain quality: aching    Pain radiates to:  Does not radiate  Pain severity:  Moderate  Onset quality:  Gradual  Duration:  4 weeks  Timing:  Constant  Progression:  Worsening  Chronicity:  Recurrent  Context: not alcohol use, not eating, not sick contacts and not trauma    Relieved by:  None tried  Worsened by:  Nothing  Ineffective treatments:  None tried  Associated symptoms: no constipation, no diarrhea, no dysuria, no fever, no hematochezia, no hematuria, no melena, no nausea and no vomiting    Risk factors: being elderly    Risk factors: no alcohol abuse, has not had multiple surgeries and not obese        Review of Systems   Constitutional: Negative for fever.   Gastrointestinal: " Positive for abdominal pain. Negative for blood in stool, constipation, diarrhea, hematochezia, melena, nausea and vomiting.   Genitourinary: Positive for flank pain. Negative for decreased urine volume, difficulty urinating, dysuria, frequency, hematuria and urgency.        The patient complains of right groin pain.   Musculoskeletal:        He complains of right inner thigh pain.   All other systems reviewed and are negative.      Past Medical History:   Diagnosis Date   • CAD (coronary artery disease)    • Hyperlipidemia    • Hypertension    • Hypothyroidism     on chronic replacement therapy   • Lactose intolerance    • Lower extremity edema    • PVC's (premature ventricular contractions)     History of   • Thyroid disorder    • Venous insufficiency        Allergies   Allergen Reactions   • Lactose Intolerance (Gi)        Past Surgical History:   Procedure Laterality Date   • CARDIAC CATHETERIZATION     • CORONARY STENT PLACEMENT     • EYE SURGERY      Bilateral cataract extraction   • HERNIA REPAIR      Inguinal hernia repair   • OTHER SURGICAL HISTORY      Melanoma removed from back   • OTHER SURGICAL HISTORY      History of left elbow fracture with sunsequent fixation       Family History   Problem Relation Age of Onset   • No Known Problems Mother    • No Known Problems Father        Social History     Socioeconomic History   • Marital status:      Spouse name: Not on file   • Number of children: Not on file   • Years of education: Not on file   • Highest education level: Not on file   Tobacco Use   • Smoking status: Never Smoker   • Smokeless tobacco: Never Used   Substance and Sexual Activity   • Alcohol use: No   • Drug use: No   • Sexual activity: Defer         Objective   Physical Exam  Vitals and nursing note reviewed.   Constitutional:       General: He is not in acute distress.     Appearance: He is well-developed.      Comments: The patient is a good historian with normal vital signs.   HENT:       Head: Normocephalic and atraumatic.   Eyes:      General: No scleral icterus.     Conjunctiva/sclera: Conjunctivae normal.   Neck:      Vascular: No JVD.   Cardiovascular:      Rate and Rhythm: Normal rate and regular rhythm.      Heart sounds: Normal heart sounds. No murmur heard.        Comments: No peripheral edema.  Pulmonary:      Effort: Pulmonary effort is normal. No respiratory distress.      Breath sounds: Normal breath sounds.   Abdominal:      Palpations: Abdomen is soft.      Tenderness: There is abdominal tenderness.      Hernia: No hernia is present. There is no hernia in the left inguinal area or right inguinal area.      Comments: The abdomen is non-tender to palpation. Abdominal wall at a right inguinal hernia incision is mildly tender to palpation but there is no present inguinal hernia on the right or the left.   Genitourinary:     Testes:         Right: Tenderness not present.         Left: Tenderness not present.      Comments: Tenderness to palpation to the right groin area. No inguinal lymphadenopathy or erythema. No skin disruption.  No scrotal wall tenderness to palpation. No soft tissue swelling.  Musculoskeletal:         General: No swelling or tenderness. Normal range of motion.      Cervical back: Normal range of motion and neck supple.      Right lower leg: No edema.      Left lower leg: No edema.   Lymphadenopathy:      Lower Body: No right inguinal adenopathy. No left inguinal adenopathy.   Skin:     General: Skin is warm and dry.   Neurological:      Mental Status: He is alert and oriented to person, place, and time.   Psychiatric:         Behavior: Behavior normal.         Procedures         ED Course  ED Course as of Mar 26 1420   Fri Mar 26, 2021   1417 Mr. Zaragoza's work-up is reassuring.  His physical exam is not consistent with inguinal hernia strangulation.  His imaging is without acute symptoms.  Ultrasound of the testicles also negative.  I have spoken to him in detail  about potential etiologies of his discomfort which bothers him greatest in the mornings and this muscular type presentation of discomfort.  I encouraged him to make and keep an appointment with general surgery as he associates his discomfort in the site of his former inguinal surgery repair.  We discussed parameters for concern that would warrant return to the emergency department.  Mr. Zaragoza understands and concurs with this outpatient plan of care close follow-up    [MS]      ED Course User Index  [MS] Traci Matias, DARA     Recent Results (from the past 24 hour(s))   Light Blue Top    Collection Time: 03/26/21 10:47 AM   Result Value Ref Range    Extra Tube hold for add-on    Lavender Top    Collection Time: 03/26/21 10:47 AM   Result Value Ref Range    Extra Tube     Gold Top - SST    Collection Time: 03/26/21 10:47 AM   Result Value Ref Range    Extra Tube Hold for add-ons.    Gray Top - Ice    Collection Time: 03/26/21 10:47 AM   Result Value Ref Range    Extra Tube Hold for add-ons.    CBC Auto Differential    Collection Time: 03/26/21 10:47 AM    Specimen: Blood   Result Value Ref Range    WBC 5.53 3.40 - 10.80 10*3/mm3    RBC 4.55 4.14 - 5.80 10*6/mm3    Hemoglobin 14.6 13.0 - 17.7 g/dL    Hematocrit 42.9 37.5 - 51.0 %    MCV 94.3 79.0 - 97.0 fL    MCH 32.1 26.6 - 33.0 pg    MCHC 34.0 31.5 - 35.7 g/dL    RDW 12.6 12.3 - 15.4 %    RDW-SD 43.5 37.0 - 54.0 fl    MPV 9.1 6.0 - 12.0 fL    Platelets 162 140 - 450 10*3/mm3    Neutrophil % 54.4 42.7 - 76.0 %    Lymphocyte % 29.5 19.6 - 45.3 %    Monocyte % 12.8 (H) 5.0 - 12.0 %    Eosinophil % 2.4 0.3 - 6.2 %    Basophil % 0.5 0.0 - 1.5 %    Immature Grans % 0.4 0.0 - 0.5 %    Neutrophils, Absolute 3.01 1.70 - 7.00 10*3/mm3    Lymphocytes, Absolute 1.63 0.70 - 3.10 10*3/mm3    Monocytes, Absolute 0.71 0.10 - 0.90 10*3/mm3    Eosinophils, Absolute 0.13 0.00 - 0.40 10*3/mm3    Basophils, Absolute 0.03 0.00 - 0.20 10*3/mm3    Immature Grans, Absolute 0.02 0.00 -  0.05 10*3/mm3    nRBC 0.0 0.0 - 0.2 /100 WBC   Green Top (Gel)    Collection Time: 03/26/21 11:10 AM   Result Value Ref Range    Extra Tube Hold for add-ons.    Comprehensive Metabolic Panel    Collection Time: 03/26/21 11:10 AM    Specimen: Blood   Result Value Ref Range    Glucose 101 (H) 65 - 99 mg/dL    BUN 21 8 - 23 mg/dL    Creatinine 0.94 0.76 - 1.27 mg/dL    Sodium 135 (L) 136 - 145 mmol/L    Potassium 4.3 3.5 - 5.2 mmol/L    Chloride 102 98 - 107 mmol/L    CO2 25.0 22.0 - 29.0 mmol/L    Calcium 8.8 8.6 - 10.5 mg/dL    Total Protein 6.5 6.0 - 8.5 g/dL    Albumin 4.00 3.50 - 5.20 g/dL    ALT (SGPT) 17 1 - 41 U/L    AST (SGOT) 24 1 - 40 U/L    Alkaline Phosphatase 66 39 - 117 U/L    Total Bilirubin 0.5 0.0 - 1.2 mg/dL    eGFR Non African Amer 76 >60 mL/min/1.73    Globulin 2.5 gm/dL    A/G Ratio 1.6 g/dL    BUN/Creatinine Ratio 22.3 7.0 - 25.0    Anion Gap 8.0 5.0 - 15.0 mmol/L   Lipase    Collection Time: 03/26/21 11:10 AM    Specimen: Blood   Result Value Ref Range    Lipase 16 13 - 60 U/L   CK    Collection Time: 03/26/21 11:10 AM    Specimen: Blood   Result Value Ref Range    Creatine Kinase 137 20 - 200 U/L   Magnesium    Collection Time: 03/26/21 11:10 AM    Specimen: Blood   Result Value Ref Range    Magnesium 1.9 1.6 - 2.4 mg/dL   Urinalysis With Microscopic If Indicated (No Culture) - Urine, Clean Catch    Collection Time: 03/26/21 11:26 AM    Specimen: Urine, Clean Catch   Result Value Ref Range    Color, UA Yellow Yellow, Straw    Appearance, UA Clear Clear    pH, UA 7.5 5.0 - 8.0    Specific Gravity, UA 1.013 1.001 - 1.030    Glucose, UA Negative Negative    Ketones, UA Negative Negative    Bilirubin, UA Negative Negative    Blood, UA Negative Negative    Protein, UA Negative Negative    Leuk Esterase, UA Negative Negative    Nitrite, UA Negative Negative    Urobilinogen, UA 0.2 E.U./dL 0.2 - 1.0 E.U./dL     Note: In addition to lab results from this visit, the labs listed above may include labs  "taken at another facility or during a different encounter within the last 24 hours. Please correlate lab times with ED admission and discharge times for further clarification of the services performed during this visit.    US Scrotum & Testicles   Preliminary Result   Small hydrocele on the left. The remainder of the testicular   ultrasound is unremarkable in appearance. No underlying mass or lesion.   Small left hydrocele.       D:  03/26/2021   E:  03/26/2021              CT Abdomen Pelvis With Contrast   Preliminary Result   No CT evidence of acute intra-abdominal or pelvic   abnormality. The appendix is normal. There are cysts seen in the kidneys   bilaterally. Enlarged prostate and moderate size hiatal hernia. Small   umbilical hernia containing fat only. No new findings in the interval.       D:  03/26/2021   E:  03/26/2021            Vitals:    03/26/21 1028 03/26/21 1045 03/26/21 1200 03/26/21 1330   BP: 127/50  111/71 138/70   Pulse: 56      Resp: 18      Temp: 98.1 °F (36.7 °C)      SpO2: 95% 94% 94%    Weight: 83 kg (183 lb)      Height: 175.3 cm (69\")        Medications   sodium chloride 0.9 % flush 10 mL (has no administration in time range)   sodium chloride 0.9 % flush 10 mL (has no administration in time range)   iopamidol (ISOVUE-300) 61 % injection 100 mL (85 mL Intravenous Given 3/26/21 1229)     ECG/EMG Results (last 24 hours)     ** No results found for the last 24 hours. **        No orders to display                                              MDM    Final diagnoses:   Strain of groin, right, subsequent encounter     DISCHARGE    Patient discharged in stable condition.    Reviewed implications of results, diagnosis, meds, responsibility to follow up, warning signs and symptoms of possible worsening, potential complications and reasons to return to ER.    Patient/Family voiced understanding of above instructions.    Discussed plan for discharge, as there is no emergent indication for " admission.  Pt/family is agreeable and understands need for follow up and possible repeat testing.  Pt/family is aware that discharge does not mean that nothing is wrong but that it indicates no emergency is currently present that requires admission and they must continue care with follow-up as given below or with a physician of their choice.     FOLLOW-UP  Aravind Killian MD  [address] [address]  [phone]               Medication List      No changes were made to your prescriptions during this visit.         Documentation assistance provided by jeremias Medley.  Information recorded by the jeremias was done at my direction and has been verified and validated by me.     Alvarado Medley  03/26/21 1129       Traci Matias APRN  03/26/21 1426

## 2021-03-26 NOTE — DISCHARGE INSTRUCTIONS
Home to rest.  Warm compresses can be helpful.  Tylenol as needed.  Call the office of  to monitor your recovery.  Thank you

## 2021-11-23 ENCOUNTER — HOSPITAL ENCOUNTER (EMERGENCY)
Facility: HOSPITAL | Age: 86
Discharge: HOME | End: 2021-11-23
Attending: EMERGENCY MEDICINE | Admitting: EMERGENCY MEDICINE

## 2021-11-23 ENCOUNTER — APPOINTMENT (OUTPATIENT)
Dept: GENERAL RADIOLOGY | Facility: HOSPITAL | Age: 86
End: 2021-11-23

## 2021-11-23 VITALS
TEMPERATURE: 97.8 F | HEART RATE: 46 BPM | DIASTOLIC BLOOD PRESSURE: 59 MMHG | HEIGHT: 69 IN | BODY MASS INDEX: 28.58 KG/M2 | OXYGEN SATURATION: 98 % | SYSTOLIC BLOOD PRESSURE: 132 MMHG | RESPIRATION RATE: 18 BRPM | WEIGHT: 193 LBS

## 2021-11-23 DIAGNOSIS — R06.00 DYSPNEA, UNSPECIFIED TYPE: Primary | ICD-10-CM

## 2021-11-23 LAB
ALBUMIN SERPL-MCNC: 3.8 G/DL (ref 3.5–5.2)
ALBUMIN/GLOB SERPL: 1.7 G/DL
ALP SERPL-CCNC: 67 U/L (ref 39–117)
ALT SERPL W P-5'-P-CCNC: 28 U/L (ref 1–41)
ANION GAP SERPL CALCULATED.3IONS-SCNC: 8 MMOL/L (ref 5–15)
AST SERPL-CCNC: 33 U/L (ref 1–40)
B PARAPERT DNA SPEC QL NAA+PROBE: NOT DETECTED
B PERT DNA SPEC QL NAA+PROBE: NOT DETECTED
BASOPHILS # BLD AUTO: 0.01 10*3/MM3 (ref 0–0.2)
BASOPHILS NFR BLD AUTO: 0.2 % (ref 0–1.5)
BILIRUB SERPL-MCNC: 0.3 MG/DL (ref 0–1.2)
BUN SERPL-MCNC: 26 MG/DL (ref 8–23)
BUN/CREAT SERPL: 32.1 (ref 7–25)
C PNEUM DNA NPH QL NAA+NON-PROBE: NOT DETECTED
CALCIUM SPEC-SCNC: 9.1 MG/DL (ref 8.6–10.5)
CHLORIDE SERPL-SCNC: 101 MMOL/L (ref 98–107)
CO2 SERPL-SCNC: 27 MMOL/L (ref 22–29)
CREAT SERPL-MCNC: 0.81 MG/DL (ref 0.76–1.27)
DEPRECATED RDW RBC AUTO: 43.7 FL (ref 37–54)
EOSINOPHIL # BLD AUTO: 0.09 10*3/MM3 (ref 0–0.4)
EOSINOPHIL NFR BLD AUTO: 1.5 % (ref 0.3–6.2)
ERYTHROCYTE [DISTWIDTH] IN BLOOD BY AUTOMATED COUNT: 12.6 % (ref 12.3–15.4)
FLUAV SUBTYP SPEC NAA+PROBE: NOT DETECTED
FLUBV RNA ISLT QL NAA+PROBE: NOT DETECTED
GFR SERPL CREATININE-BSD FRML MDRD: 90 ML/MIN/1.73
GLOBULIN UR ELPH-MCNC: 2.2 GM/DL
GLUCOSE SERPL-MCNC: 102 MG/DL (ref 65–99)
HADV DNA SPEC NAA+PROBE: NOT DETECTED
HCOV 229E RNA SPEC QL NAA+PROBE: NOT DETECTED
HCOV HKU1 RNA SPEC QL NAA+PROBE: NOT DETECTED
HCOV NL63 RNA SPEC QL NAA+PROBE: NOT DETECTED
HCOV OC43 RNA SPEC QL NAA+PROBE: NOT DETECTED
HCT VFR BLD AUTO: 38 % (ref 37.5–51)
HGB BLD-MCNC: 13 G/DL (ref 13–17.7)
HMPV RNA NPH QL NAA+NON-PROBE: NOT DETECTED
HOLD SPECIMEN: NORMAL
HPIV1 RNA ISLT QL NAA+PROBE: NOT DETECTED
HPIV2 RNA SPEC QL NAA+PROBE: NOT DETECTED
HPIV3 RNA NPH QL NAA+PROBE: NOT DETECTED
HPIV4 P GENE NPH QL NAA+PROBE: NOT DETECTED
IMM GRANULOCYTES # BLD AUTO: 0.02 10*3/MM3 (ref 0–0.05)
IMM GRANULOCYTES NFR BLD AUTO: 0.3 % (ref 0–0.5)
LYMPHOCYTES # BLD AUTO: 1.13 10*3/MM3 (ref 0.7–3.1)
LYMPHOCYTES NFR BLD AUTO: 19.1 % (ref 19.6–45.3)
M PNEUMO IGG SER IA-ACNC: NOT DETECTED
MCH RBC QN AUTO: 32 PG (ref 26.6–33)
MCHC RBC AUTO-ENTMCNC: 34.2 G/DL (ref 31.5–35.7)
MCV RBC AUTO: 93.6 FL (ref 79–97)
MONOCYTES # BLD AUTO: 0.65 10*3/MM3 (ref 0.1–0.9)
MONOCYTES NFR BLD AUTO: 11 % (ref 5–12)
NEUTROPHILS NFR BLD AUTO: 4.02 10*3/MM3 (ref 1.7–7)
NEUTROPHILS NFR BLD AUTO: 67.9 % (ref 42.7–76)
NRBC BLD AUTO-RTO: 0 /100 WBC (ref 0–0.2)
NT-PROBNP SERPL-MCNC: 115.5 PG/ML (ref 0–1800)
PLATELET # BLD AUTO: 170 10*3/MM3 (ref 140–450)
PMV BLD AUTO: 8.7 FL (ref 6–12)
POTASSIUM SERPL-SCNC: 4.4 MMOL/L (ref 3.5–5.2)
PROT SERPL-MCNC: 6 G/DL (ref 6–8.5)
QT INTERVAL: 414 MS
QTC INTERVAL: 392 MS
RBC # BLD AUTO: 4.06 10*6/MM3 (ref 4.14–5.8)
RHINOVIRUS RNA SPEC NAA+PROBE: NOT DETECTED
RSV RNA NPH QL NAA+NON-PROBE: NOT DETECTED
SARS-COV-2 RNA NPH QL NAA+NON-PROBE: NOT DETECTED
SODIUM SERPL-SCNC: 136 MMOL/L (ref 136–145)
TROPONIN T SERPL-MCNC: <0.01 NG/ML (ref 0–0.03)
WBC NRBC COR # BLD: 5.92 10*3/MM3 (ref 3.4–10.8)
WHOLE BLOOD HOLD SPECIMEN: NORMAL
WHOLE BLOOD HOLD SPECIMEN: NORMAL

## 2021-11-23 PROCEDURE — 93005 ELECTROCARDIOGRAM TRACING: CPT | Performed by: EMERGENCY MEDICINE

## 2021-11-23 PROCEDURE — 85025 COMPLETE CBC W/AUTO DIFF WBC: CPT | Performed by: EMERGENCY MEDICINE

## 2021-11-23 PROCEDURE — 83880 ASSAY OF NATRIURETIC PEPTIDE: CPT | Performed by: EMERGENCY MEDICINE

## 2021-11-23 PROCEDURE — 80053 COMPREHEN METABOLIC PANEL: CPT | Performed by: EMERGENCY MEDICINE

## 2021-11-23 PROCEDURE — 71045 X-RAY EXAM CHEST 1 VIEW: CPT

## 2021-11-23 PROCEDURE — 93005 ELECTROCARDIOGRAM TRACING: CPT

## 2021-11-23 PROCEDURE — 0202U NFCT DS 22 TRGT SARS-COV-2: CPT | Performed by: EMERGENCY MEDICINE

## 2021-11-23 PROCEDURE — 99283 EMERGENCY DEPT VISIT LOW MDM: CPT

## 2021-11-23 PROCEDURE — 84484 ASSAY OF TROPONIN QUANT: CPT | Performed by: EMERGENCY MEDICINE

## 2021-11-23 RX ORDER — SODIUM CHLORIDE 0.9 % (FLUSH) 0.9 %
10 SYRINGE (ML) INJECTION AS NEEDED
Status: DISCONTINUED | OUTPATIENT
Start: 2021-11-23 | End: 2021-11-23 | Stop reason: HOSPADM

## 2021-11-23 RX ORDER — ARIPIPRAZOLE 2 MG/1
2 TABLET ORAL DAILY
COMMUNITY
End: 2021-12-13 | Stop reason: HOSPADM

## 2021-11-23 RX ORDER — SIMVASTATIN 20 MG
20 TABLET ORAL NIGHTLY
COMMUNITY
End: 2022-11-30

## 2021-11-23 RX ORDER — OMEPRAZOLE 20 MG/1
20 CAPSULE, DELAYED RELEASE ORAL DAILY
COMMUNITY

## 2021-11-23 RX ORDER — SUCRALFATE 1 G/1
1 TABLET ORAL 2 TIMES DAILY
COMMUNITY
End: 2022-03-02

## 2021-11-23 RX ORDER — TAMSULOSIN HYDROCHLORIDE 0.4 MG/1
1 CAPSULE ORAL DAILY
COMMUNITY
End: 2021-12-13 | Stop reason: HOSPADM

## 2021-11-23 NOTE — ED PROVIDER NOTES
Subjective   86 year old male with history of intermittent dyspnea for last 6 months. More prominent over last 2 weeks, and per patient reports was really significant last night. The patients son was made aware today and called 911 to bring patient to ER. The patient denies fever, body aches, or chills. No reported chest pain. No abdominal pain or change in bowel or urinary function. He reports baseline urinary frequentcy and gets up 4-5 times a night due to prostate problems. The patient reports intermittently he feels the urge to take a deep breath. This urge has been more prominent for last month.  That he does not typically feel increase shortness of breath with activity.  He is more likely to feel his episodes of shortness of breath in the middle the night or with rest.  He states that he has been sleeping with his head elevated over the last 1 to 2 months.          Review of Systems   Constitutional: Positive for fatigue. Negative for chills and fever.   HENT: Negative for congestion, ear pain, postnasal drip, sinus pressure and sore throat.    Eyes: Negative for pain, redness and visual disturbance.   Respiratory: Positive for shortness of breath. Negative for cough and chest tightness.    Cardiovascular: Negative for chest pain, palpitations and leg swelling.   Gastrointestinal: Negative for abdominal pain, anal bleeding, blood in stool, diarrhea, nausea and vomiting.   Endocrine: Negative for polydipsia and polyuria.   Genitourinary: Negative for difficulty urinating, dysuria, frequency and urgency.   Musculoskeletal: Negative for arthralgias, back pain and neck pain.   Skin: Negative for pallor and rash.   Allergic/Immunologic: Negative for environmental allergies and immunocompromised state.   Neurological: Negative for dizziness, weakness and headaches.   Hematological: Negative for adenopathy.   Psychiatric/Behavioral: Negative for confusion, self-injury and suicidal ideas. The patient is not  nervous/anxious.    All other systems reviewed and are negative.      Past Medical History:   Diagnosis Date   • CAD (coronary artery disease)    • Hyperlipidemia    • Hypertension    • Hypothyroidism     on chronic replacement therapy   • Lactose intolerance    • Lower extremity edema    • PVC's (premature ventricular contractions)     History of   • Thyroid disorder    • Venous insufficiency        Allergies   Allergen Reactions   • Lactose Intolerance (Gi)        Past Surgical History:   Procedure Laterality Date   • CARDIAC CATHETERIZATION     • CORONARY STENT PLACEMENT     • EYE SURGERY      Bilateral cataract extraction   • HERNIA REPAIR      Inguinal hernia repair   • OTHER SURGICAL HISTORY      Melanoma removed from back   • OTHER SURGICAL HISTORY      History of left elbow fracture with sunsequent fixation       Family History   Problem Relation Age of Onset   • No Known Problems Mother    • No Known Problems Father        Social History     Socioeconomic History   • Marital status:    Tobacco Use   • Smoking status: Never Smoker   • Smokeless tobacco: Never Used   Substance and Sexual Activity   • Alcohol use: No   • Drug use: No   • Sexual activity: Defer           Objective   Physical Exam  Vitals and nursing note reviewed.   Constitutional:       General: He is not in acute distress.     Appearance: Normal appearance. He is well-developed. He is not toxic-appearing or diaphoretic.   HENT:      Head: Normocephalic and atraumatic.      Right Ear: External ear normal.      Left Ear: External ear normal.      Nose: Nose normal.   Eyes:      General: Lids are normal.      Pupils: Pupils are equal, round, and reactive to light.   Neck:      Trachea: No tracheal deviation.   Cardiovascular:      Rate and Rhythm: Normal rate and regular rhythm.      Pulses: No decreased pulses.      Heart sounds: Normal heart sounds. No murmur heard.  No friction rub. No gallop.    Pulmonary:      Effort: Pulmonary  effort is normal. No respiratory distress.      Breath sounds: Normal breath sounds. No decreased breath sounds, wheezing, rhonchi or rales.   Abdominal:      General: Bowel sounds are normal.      Palpations: Abdomen is soft.      Tenderness: There is no abdominal tenderness. There is no guarding or rebound.   Musculoskeletal:         General: No deformity. Normal range of motion.      Cervical back: Normal range of motion and neck supple.   Lymphadenopathy:      Cervical: No cervical adenopathy.   Skin:     General: Skin is warm and dry.      Findings: No rash.   Neurological:      Mental Status: He is alert and oriented to person, place, and time.      Cranial Nerves: No cranial nerve deficit.      Sensory: No sensory deficit.   Psychiatric:         Speech: Speech normal.         Behavior: Behavior normal.         Thought Content: Thought content normal.         Judgment: Judgment normal.         Procedures           ED Course                                           MDM  Number of Diagnoses or Management Options  Dyspnea, unspecified type: new and requires workup  Diagnosis management comments: The patient reports intermittent episodes of feeling short of breath as if he has to take an big deep breath.  He has always appeared well throughout the ER course lungs are clear diffusely.  Oxygen saturations have remained 97% or better.  BNP is normal.  Covid and other respiratory panel testing is negative for acute infection.  Chest x-ray shows chronic findings but does not show any acute abnormalities.    Vital signs show bradycardia which is consistent with the patient's previous known baseline.    When discussing his results on follow-up he reports feeling well and strongly desires to go home at this given time.    I will discharge the patient with the advice to follow-up primary care physician for recheck in 1 week and return to the ER with any further concern.       Amount and/or Complexity of Data  Reviewed  Clinical lab tests: ordered and reviewed  Tests in the radiology section of CPT®: ordered and reviewed  Decide to obtain previous medical records or to obtain history from someone other than the patient: yes  Review and summarize past medical records: yes  Independent visualization of images, tracings, or specimens: yes        Final diagnoses:   Dyspnea, unspecified type       ED Disposition  ED Disposition     ED Disposition Condition Comment    Discharge Stable           Irene Coley MD  [address] [facility] [id number] [phone]    In 1 week           Medication List      No changes were made to your prescriptions during this visit.          Rashad Dejesus MD  11/23/21 7228

## 2021-11-23 NOTE — DISCHARGE INSTRUCTIONS
Patient is advised to keep outpatient follow-up with primary care physician within the next week.    Return to the ER with any further concern.

## 2021-11-27 ENCOUNTER — APPOINTMENT (OUTPATIENT)
Dept: GENERAL RADIOLOGY | Facility: HOSPITAL | Age: 86
End: 2021-11-27

## 2021-11-27 ENCOUNTER — APPOINTMENT (OUTPATIENT)
Dept: CT IMAGING | Facility: HOSPITAL | Age: 86
End: 2021-11-27

## 2021-11-27 ENCOUNTER — HOSPITAL ENCOUNTER (INPATIENT)
Facility: HOSPITAL | Age: 86
LOS: 12 days | Discharge: NURSING, REHAB OR LONG-TERM CARE | End: 2021-12-13
Attending: EMERGENCY MEDICINE | Admitting: HOSPITALIST

## 2021-11-27 DIAGNOSIS — R41.82 ALTERED MENTAL STATUS, UNSPECIFIED ALTERED MENTAL STATUS TYPE: ICD-10-CM

## 2021-11-27 DIAGNOSIS — R41.0 CONFUSION: ICD-10-CM
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
## 2021-11-27 DIAGNOSIS — R53.1 WEAKNESS: Primary | ICD-10-CM

## 2021-11-27 LAB
ALBUMIN SERPL-MCNC: 4.2 G/DL (ref 3.5–5.2)
ALBUMIN/GLOB SERPL: 1.4 G/DL
ALP SERPL-CCNC: 66 U/L (ref 39–117)
ALT SERPL W P-5'-P-CCNC: 26 U/L (ref 1–41)
ANION GAP SERPL CALCULATED.3IONS-SCNC: 8 MMOL/L (ref 5–15)
AST SERPL-CCNC: 33 U/L (ref 1–40)
BASOPHILS # BLD AUTO: 0.02 10*3/MM3 (ref 0–0.2)
BASOPHILS NFR BLD AUTO: 0.3 % (ref 0–1.5)
BILIRUB SERPL-MCNC: 0.5 MG/DL (ref 0–1.2)
BILIRUB UR QL STRIP: NEGATIVE
BUN SERPL-MCNC: 18 MG/DL (ref 8–23)
BUN/CREAT SERPL: 26.1 (ref 7–25)
CALCIUM SPEC-SCNC: 9.7 MG/DL (ref 8.6–10.5)
CHLORIDE SERPL-SCNC: 105 MMOL/L (ref 98–107)
CK SERPL-CCNC: 157 U/L (ref 20–200)
CLARITY UR: CLEAR
CO2 SERPL-SCNC: 28 MMOL/L (ref 22–29)
COLOR UR: YELLOW
CREAT SERPL-MCNC: 0.69 MG/DL (ref 0.76–1.27)
DEPRECATED RDW RBC AUTO: 45.2 FL (ref 37–54)
EOSINOPHIL # BLD AUTO: 0.23 10*3/MM3 (ref 0–0.4)
EOSINOPHIL NFR BLD AUTO: 3.3 % (ref 0.3–6.2)
ERYTHROCYTE [DISTWIDTH] IN BLOOD BY AUTOMATED COUNT: 12.7 % (ref 12.3–15.4)
FLUAV RNA RESP QL NAA+PROBE: NOT DETECTED
FLUBV RNA RESP QL NAA+PROBE: NOT DETECTED
GFR SERPL CREATININE-BSD FRML MDRD: 109 ML/MIN/1.73
GLOBULIN UR ELPH-MCNC: 3 GM/DL
GLUCOSE SERPL-MCNC: 93 MG/DL (ref 65–99)
GLUCOSE UR STRIP-MCNC: NEGATIVE MG/DL
HCT VFR BLD AUTO: 43.6 % (ref 37.5–51)
HGB BLD-MCNC: 14.5 G/DL (ref 13–17.7)
HGB UR QL STRIP.AUTO: NEGATIVE
HOLD SPECIMEN: NORMAL
IMM GRANULOCYTES # BLD AUTO: 0.02 10*3/MM3 (ref 0–0.05)
IMM GRANULOCYTES NFR BLD AUTO: 0.3 % (ref 0–0.5)
KETONES UR QL STRIP: NEGATIVE
LEUKOCYTE ESTERASE UR QL STRIP.AUTO: NEGATIVE
LYMPHOCYTES # BLD AUTO: 1.66 10*3/MM3 (ref 0.7–3.1)
LYMPHOCYTES NFR BLD AUTO: 23.5 % (ref 19.6–45.3)
MAGNESIUM SERPL-MCNC: 2.1 MG/DL (ref 1.6–2.4)
MCH RBC QN AUTO: 31.9 PG (ref 26.6–33)
MCHC RBC AUTO-ENTMCNC: 33.3 G/DL (ref 31.5–35.7)
MCV RBC AUTO: 96 FL (ref 79–97)
MONOCYTES # BLD AUTO: 0.97 10*3/MM3 (ref 0.1–0.9)
MONOCYTES NFR BLD AUTO: 13.8 % (ref 5–12)
NEUTROPHILS NFR BLD AUTO: 4.15 10*3/MM3 (ref 1.7–7)
NEUTROPHILS NFR BLD AUTO: 58.8 % (ref 42.7–76)
NITRITE UR QL STRIP: NEGATIVE
NRBC BLD AUTO-RTO: 0 /100 WBC (ref 0–0.2)
PH UR STRIP.AUTO: 6 [PH] (ref 5–8)
PLATELET # BLD AUTO: 152 10*3/MM3 (ref 140–450)
PMV BLD AUTO: 8.7 FL (ref 6–12)
POTASSIUM SERPL-SCNC: 4.2 MMOL/L (ref 3.5–5.2)
PROT SERPL-MCNC: 7.2 G/DL (ref 6–8.5)
PROT UR QL STRIP: NEGATIVE
QT INTERVAL: 430 MS
QTC INTERVAL: 414 MS
RBC # BLD AUTO: 4.54 10*6/MM3 (ref 4.14–5.8)
SARS-COV-2 RNA RESP QL NAA+PROBE: NOT DETECTED
SODIUM SERPL-SCNC: 141 MMOL/L (ref 136–145)
SP GR UR STRIP: 1.02 (ref 1–1.03)
TROPONIN T SERPL-MCNC: <0.01 NG/ML (ref 0–0.03)
UROBILINOGEN UR QL STRIP: NORMAL
WBC NRBC COR # BLD: 7.05 10*3/MM3 (ref 3.4–10.8)
WHOLE BLOOD HOLD SPECIMEN: NORMAL
WHOLE BLOOD HOLD SPECIMEN: NORMAL

## 2021-11-27 PROCEDURE — 82550 ASSAY OF CK (CPK): CPT | Performed by: NURSE PRACTITIONER

## 2021-11-27 PROCEDURE — 99222 1ST HOSP IP/OBS MODERATE 55: CPT | Performed by: INTERNAL MEDICINE

## 2021-11-27 PROCEDURE — G0378 HOSPITAL OBSERVATION PER HR: HCPCS

## 2021-11-27 PROCEDURE — 85025 COMPLETE CBC W/AUTO DIFF WBC: CPT | Performed by: EMERGENCY MEDICINE

## 2021-11-27 PROCEDURE — 70450 CT HEAD/BRAIN W/O DYE: CPT

## 2021-11-27 PROCEDURE — 84484 ASSAY OF TROPONIN QUANT: CPT | Performed by: EMERGENCY MEDICINE

## 2021-11-27 PROCEDURE — 93005 ELECTROCARDIOGRAM TRACING: CPT | Performed by: EMERGENCY MEDICINE

## 2021-11-27 PROCEDURE — 87636 SARSCOV2 & INF A&B AMP PRB: CPT | Performed by: NURSE PRACTITIONER

## 2021-11-27 PROCEDURE — 83735 ASSAY OF MAGNESIUM: CPT | Performed by: EMERGENCY MEDICINE

## 2021-11-27 PROCEDURE — 80053 COMPREHEN METABOLIC PANEL: CPT | Performed by: EMERGENCY MEDICINE

## 2021-11-27 PROCEDURE — 36415 COLL VENOUS BLD VENIPUNCTURE: CPT

## 2021-11-27 PROCEDURE — 81003 URINALYSIS AUTO W/O SCOPE: CPT | Performed by: NURSE PRACTITIONER

## 2021-11-27 PROCEDURE — 71045 X-RAY EXAM CHEST 1 VIEW: CPT

## 2021-11-27 PROCEDURE — P9612 CATHETERIZE FOR URINE SPEC: HCPCS

## 2021-11-27 PROCEDURE — 99285 EMERGENCY DEPT VISIT HI MDM: CPT

## 2021-11-27 RX ORDER — SODIUM CHLORIDE 0.9 % (FLUSH) 0.9 %
10 SYRINGE (ML) INJECTION AS NEEDED
Status: DISCONTINUED | OUTPATIENT
Start: 2021-11-27 | End: 2021-12-13 | Stop reason: HOSPADM

## 2021-11-28 ENCOUNTER — APPOINTMENT (OUTPATIENT)
Dept: MRI IMAGING | Facility: HOSPITAL | Age: 86
End: 2021-11-28

## 2021-11-28 LAB
ANION GAP SERPL CALCULATED.3IONS-SCNC: 11 MMOL/L (ref 5–15)
BASOPHILS # BLD AUTO: 0.02 10*3/MM3 (ref 0–0.2)
BASOPHILS NFR BLD AUTO: 0.3 % (ref 0–1.5)
BUN SERPL-MCNC: 17 MG/DL (ref 8–23)
BUN/CREAT SERPL: 23.9 (ref 7–25)
CALCIUM SPEC-SCNC: 8.5 MG/DL (ref 8.6–10.5)
CHLORIDE SERPL-SCNC: 105 MMOL/L (ref 98–107)
CO2 SERPL-SCNC: 23 MMOL/L (ref 22–29)
CREAT SERPL-MCNC: 0.71 MG/DL (ref 0.76–1.27)
DEPRECATED RDW RBC AUTO: 44.1 FL (ref 37–54)
EOSINOPHIL # BLD AUTO: 0.26 10*3/MM3 (ref 0–0.4)
EOSINOPHIL NFR BLD AUTO: 4.3 % (ref 0.3–6.2)
ERYTHROCYTE [DISTWIDTH] IN BLOOD BY AUTOMATED COUNT: 12.5 % (ref 12.3–15.4)
GFR SERPL CREATININE-BSD FRML MDRD: 105 ML/MIN/1.73
GLUCOSE SERPL-MCNC: 91 MG/DL (ref 65–99)
HBA1C MFR BLD: 5.9 % (ref 4.8–5.6)
HCT VFR BLD AUTO: 38.1 % (ref 37.5–51)
HGB BLD-MCNC: 13 G/DL (ref 13–17.7)
IMM GRANULOCYTES # BLD AUTO: 0.02 10*3/MM3 (ref 0–0.05)
IMM GRANULOCYTES NFR BLD AUTO: 0.3 % (ref 0–0.5)
LYMPHOCYTES # BLD AUTO: 1.44 10*3/MM3 (ref 0.7–3.1)
LYMPHOCYTES NFR BLD AUTO: 23.8 % (ref 19.6–45.3)
MAGNESIUM SERPL-MCNC: 1.8 MG/DL (ref 1.6–2.4)
MCH RBC QN AUTO: 32.9 PG (ref 26.6–33)
MCHC RBC AUTO-ENTMCNC: 34.1 G/DL (ref 31.5–35.7)
MCV RBC AUTO: 96.5 FL (ref 79–97)
MONOCYTES # BLD AUTO: 0.8 10*3/MM3 (ref 0.1–0.9)
MONOCYTES NFR BLD AUTO: 13.2 % (ref 5–12)
NEUTROPHILS NFR BLD AUTO: 3.5 10*3/MM3 (ref 1.7–7)
NEUTROPHILS NFR BLD AUTO: 58.1 % (ref 42.7–76)
NRBC BLD AUTO-RTO: 0 /100 WBC (ref 0–0.2)
PLATELET # BLD AUTO: 133 10*3/MM3 (ref 140–450)
PMV BLD AUTO: 9.3 FL (ref 6–12)
POTASSIUM SERPL-SCNC: 3.5 MMOL/L (ref 3.5–5.2)
RBC # BLD AUTO: 3.95 10*6/MM3 (ref 4.14–5.8)
SODIUM SERPL-SCNC: 139 MMOL/L (ref 136–145)
T3FREE SERPL-MCNC: 2.52 PG/ML (ref 2–4.4)
T4 FREE SERPL-MCNC: 0.83 NG/DL (ref 0.93–1.7)
TSH SERPL DL<=0.05 MIU/L-ACNC: 7.83 UIU/ML (ref 0.27–4.2)
VIT B12 BLD-MCNC: 465 PG/ML (ref 211–946)
WBC NRBC COR # BLD: 6.04 10*3/MM3 (ref 3.4–10.8)

## 2021-11-28 PROCEDURE — 97530 THERAPEUTIC ACTIVITIES: CPT

## 2021-11-28 PROCEDURE — 82607 VITAMIN B-12: CPT | Performed by: NURSE PRACTITIONER

## 2021-11-28 PROCEDURE — 25010000002 LORAZEPAM PER 2 MG: Performed by: NURSE PRACTITIONER

## 2021-11-28 PROCEDURE — 97166 OT EVAL MOD COMPLEX 45 MIN: CPT

## 2021-11-28 PROCEDURE — 99222 1ST HOSP IP/OBS MODERATE 55: CPT | Performed by: PSYCHIATRY & NEUROLOGY

## 2021-11-28 PROCEDURE — 97162 PT EVAL MOD COMPLEX 30 MIN: CPT

## 2021-11-28 PROCEDURE — 70551 MRI BRAIN STEM W/O DYE: CPT

## 2021-11-28 PROCEDURE — 83036 HEMOGLOBIN GLYCOSYLATED A1C: CPT | Performed by: NURSE PRACTITIONER

## 2021-11-28 PROCEDURE — G0378 HOSPITAL OBSERVATION PER HR: HCPCS

## 2021-11-28 PROCEDURE — 84481 FREE ASSAY (FT-3): CPT | Performed by: INTERNAL MEDICINE

## 2021-11-28 PROCEDURE — 97116 GAIT TRAINING THERAPY: CPT

## 2021-11-28 PROCEDURE — 83735 ASSAY OF MAGNESIUM: CPT | Performed by: NURSE PRACTITIONER

## 2021-11-28 PROCEDURE — 0 MAGNESIUM SULFATE 4 GM/100ML SOLUTION: Performed by: INTERNAL MEDICINE

## 2021-11-28 PROCEDURE — 84443 ASSAY THYROID STIM HORMONE: CPT | Performed by: NURSE PRACTITIONER

## 2021-11-28 PROCEDURE — 99233 SBSQ HOSP IP/OBS HIGH 50: CPT | Performed by: INTERNAL MEDICINE

## 2021-11-28 PROCEDURE — 85025 COMPLETE CBC W/AUTO DIFF WBC: CPT | Performed by: NURSE PRACTITIONER

## 2021-11-28 PROCEDURE — 84439 ASSAY OF FREE THYROXINE: CPT | Performed by: INTERNAL MEDICINE

## 2021-11-28 PROCEDURE — 25010000002 ENOXAPARIN PER 10 MG: Performed by: NURSE PRACTITIONER

## 2021-11-28 PROCEDURE — 80048 BASIC METABOLIC PNL TOTAL CA: CPT | Performed by: NURSE PRACTITIONER

## 2021-11-28 PROCEDURE — 97110 THERAPEUTIC EXERCISES: CPT

## 2021-11-28 RX ORDER — ASPIRIN 81 MG/1
81 TABLET ORAL DAILY
Status: DISCONTINUED | OUTPATIENT
Start: 2021-11-28 | End: 2021-12-13 | Stop reason: HOSPADM

## 2021-11-28 RX ORDER — LEVOTHYROXINE SODIUM 0.05 MG/1
50 TABLET ORAL
Status: DISCONTINUED | OUTPATIENT
Start: 2021-11-29 | End: 2021-12-13 | Stop reason: HOSPADM

## 2021-11-28 RX ORDER — FLUTICASONE PROPIONATE 50 MCG
2 SPRAY, SUSPENSION (ML) NASAL DAILY
Status: DISCONTINUED | OUTPATIENT
Start: 2021-11-28 | End: 2021-12-13 | Stop reason: HOSPADM

## 2021-11-28 RX ORDER — POTASSIUM CHLORIDE 750 MG/1
40 CAPSULE, EXTENDED RELEASE ORAL AS NEEDED
Status: DISCONTINUED | OUTPATIENT
Start: 2021-11-28 | End: 2021-12-13 | Stop reason: HOSPADM

## 2021-11-28 RX ORDER — POLYETHYLENE GLYCOL 3350 17 G/17G
17 POWDER, FOR SOLUTION ORAL DAILY PRN
Status: DISCONTINUED | OUTPATIENT
Start: 2021-11-28 | End: 2021-12-13 | Stop reason: HOSPADM

## 2021-11-28 RX ORDER — MAGNESIUM SULFATE HEPTAHYDRATE 40 MG/ML
4 INJECTION, SOLUTION INTRAVENOUS AS NEEDED
Status: DISCONTINUED | OUTPATIENT
Start: 2021-11-28 | End: 2021-12-13 | Stop reason: HOSPADM

## 2021-11-28 RX ORDER — SODIUM CHLORIDE 0.9 % (FLUSH) 0.9 %
10 SYRINGE (ML) INJECTION AS NEEDED
Status: DISCONTINUED | OUTPATIENT
Start: 2021-11-28 | End: 2021-12-13 | Stop reason: HOSPADM

## 2021-11-28 RX ORDER — ACETAMINOPHEN 325 MG/1
650 TABLET ORAL EVERY 4 HOURS PRN
Status: DISCONTINUED | OUTPATIENT
Start: 2021-11-28 | End: 2021-12-13 | Stop reason: HOSPADM

## 2021-11-28 RX ORDER — QUETIAPINE FUMARATE 25 MG/1
12.5 TABLET, FILM COATED ORAL NIGHTLY
Status: DISCONTINUED | OUTPATIENT
Start: 2021-11-28 | End: 2021-11-29

## 2021-11-28 RX ORDER — ACETAMINOPHEN 650 MG/1
650 SUPPOSITORY RECTAL EVERY 4 HOURS PRN
Status: DISCONTINUED | OUTPATIENT
Start: 2021-11-28 | End: 2021-12-13 | Stop reason: HOSPADM

## 2021-11-28 RX ORDER — PANTOPRAZOLE SODIUM 40 MG/1
40 TABLET, DELAYED RELEASE ORAL DAILY
Status: DISCONTINUED | OUTPATIENT
Start: 2021-11-28 | End: 2021-12-13 | Stop reason: HOSPADM

## 2021-11-28 RX ORDER — SUCRALFATE 1 G/1
1 TABLET ORAL
Status: DISCONTINUED | OUTPATIENT
Start: 2021-11-28 | End: 2021-12-13 | Stop reason: HOSPADM

## 2021-11-28 RX ORDER — MAGNESIUM SULFATE HEPTAHYDRATE 40 MG/ML
2 INJECTION, SOLUTION INTRAVENOUS AS NEEDED
Status: DISCONTINUED | OUTPATIENT
Start: 2021-11-28 | End: 2021-12-13 | Stop reason: HOSPADM

## 2021-11-28 RX ORDER — LORAZEPAM 2 MG/ML
0.5 INJECTION INTRAMUSCULAR ONCE
Status: COMPLETED | OUTPATIENT
Start: 2021-11-28 | End: 2021-11-28

## 2021-11-28 RX ORDER — AMOXICILLIN 250 MG
2 CAPSULE ORAL 2 TIMES DAILY PRN
Status: DISCONTINUED | OUTPATIENT
Start: 2021-11-28 | End: 2021-12-13 | Stop reason: HOSPADM

## 2021-11-28 RX ORDER — POTASSIUM CHLORIDE 7.45 MG/ML
10 INJECTION INTRAVENOUS
Status: DISCONTINUED | OUTPATIENT
Start: 2021-11-28 | End: 2021-12-13 | Stop reason: HOSPADM

## 2021-11-28 RX ORDER — BISACODYL 10 MG
10 SUPPOSITORY, RECTAL RECTAL DAILY PRN
Status: DISCONTINUED | OUTPATIENT
Start: 2021-11-28 | End: 2021-12-13 | Stop reason: HOSPADM

## 2021-11-28 RX ORDER — DIVALPROEX SODIUM 250 MG/1
250 TABLET, DELAYED RELEASE ORAL EVERY 12 HOURS SCHEDULED
Status: DISCONTINUED | OUTPATIENT
Start: 2021-11-29 | End: 2021-12-13 | Stop reason: HOSPADM

## 2021-11-28 RX ORDER — LISINOPRIL 2.5 MG/1
2.5 TABLET ORAL DAILY
Status: DISCONTINUED | OUTPATIENT
Start: 2021-11-28 | End: 2021-12-13 | Stop reason: HOSPADM

## 2021-11-28 RX ORDER — ARIPIPRAZOLE 2 MG/1
2 TABLET ORAL DAILY
Status: DISCONTINUED | OUTPATIENT
Start: 2021-11-28 | End: 2021-11-28

## 2021-11-28 RX ORDER — ACETAMINOPHEN 160 MG/5ML
650 SOLUTION ORAL EVERY 4 HOURS PRN
Status: DISCONTINUED | OUTPATIENT
Start: 2021-11-28 | End: 2021-12-13 | Stop reason: HOSPADM

## 2021-11-28 RX ORDER — BISACODYL 5 MG/1
5 TABLET, DELAYED RELEASE ORAL DAILY PRN
Status: DISCONTINUED | OUTPATIENT
Start: 2021-11-28 | End: 2021-12-13 | Stop reason: HOSPADM

## 2021-11-28 RX ORDER — ATORVASTATIN CALCIUM 10 MG/1
10 TABLET, FILM COATED ORAL DAILY
Status: DISCONTINUED | OUTPATIENT
Start: 2021-11-28 | End: 2021-12-13 | Stop reason: HOSPADM

## 2021-11-28 RX ORDER — LORAZEPAM 2 MG/ML
0.5 INJECTION INTRAMUSCULAR ONCE
Status: DISCONTINUED | OUTPATIENT
Start: 2021-11-28 | End: 2021-11-28

## 2021-11-28 RX ORDER — POTASSIUM CHLORIDE 1.5 G/1.77G
40 POWDER, FOR SOLUTION ORAL AS NEEDED
Status: DISCONTINUED | OUTPATIENT
Start: 2021-11-28 | End: 2021-12-13 | Stop reason: HOSPADM

## 2021-11-28 RX ORDER — SODIUM CHLORIDE 0.9 % (FLUSH) 0.9 %
10 SYRINGE (ML) INJECTION EVERY 12 HOURS SCHEDULED
Status: DISCONTINUED | OUTPATIENT
Start: 2021-11-28 | End: 2021-12-13 | Stop reason: HOSPADM

## 2021-11-28 RX ORDER — CHOLECALCIFEROL (VITAMIN D3) 125 MCG
5 CAPSULE ORAL NIGHTLY PRN
Status: DISCONTINUED | OUTPATIENT
Start: 2021-11-28 | End: 2021-12-13 | Stop reason: HOSPADM

## 2021-11-28 RX ADMIN — SUCRALFATE 1 G: 1 TABLET ORAL at 10:03

## 2021-11-28 RX ADMIN — SODIUM CHLORIDE, PRESERVATIVE FREE 10 ML: 5 INJECTION INTRAVENOUS at 20:28

## 2021-11-28 RX ADMIN — QUETIAPINE FUMARATE 12.5 MG: 25 TABLET ORAL at 20:28

## 2021-11-28 RX ADMIN — LORAZEPAM 0.5 MG: 2 INJECTION INTRAMUSCULAR; INTRAVENOUS at 22:09

## 2021-11-28 RX ADMIN — ATORVASTATIN CALCIUM 10 MG: 10 TABLET, FILM COATED ORAL at 10:03

## 2021-11-28 RX ADMIN — FLUTICASONE PROPIONATE 2 SPRAY: 50 SPRAY, METERED NASAL at 10:04

## 2021-11-28 RX ADMIN — POTASSIUM CHLORIDE 40 MEQ: 750 CAPSULE, EXTENDED RELEASE ORAL at 18:04

## 2021-11-28 RX ADMIN — ARIPIPRAZOLE 2 MG: 2 TABLET ORAL at 10:03

## 2021-11-28 RX ADMIN — ASPIRIN 81 MG: 81 TABLET, COATED ORAL at 10:03

## 2021-11-28 RX ADMIN — LISINOPRIL 2.5 MG: 2.5 TABLET ORAL at 10:03

## 2021-11-28 RX ADMIN — SODIUM CHLORIDE, PRESERVATIVE FREE 10 ML: 5 INJECTION INTRAVENOUS at 03:36

## 2021-11-28 RX ADMIN — PANTOPRAZOLE SODIUM 40 MG: 40 TABLET, DELAYED RELEASE ORAL at 10:03

## 2021-11-28 RX ADMIN — SODIUM CHLORIDE, PRESERVATIVE FREE 10 ML: 5 INJECTION INTRAVENOUS at 10:04

## 2021-11-28 RX ADMIN — ENOXAPARIN SODIUM 40 MG: 40 INJECTION SUBCUTANEOUS at 10:02

## 2021-11-28 RX ADMIN — MAGNESIUM SULFATE HEPTAHYDRATE 4 G: 40 INJECTION, SOLUTION INTRAVENOUS at 18:04

## 2021-11-29 PROCEDURE — 25010000002 ENOXAPARIN PER 10 MG: Performed by: NURSE PRACTITIONER

## 2021-11-29 PROCEDURE — G0378 HOSPITAL OBSERVATION PER HR: HCPCS

## 2021-11-29 PROCEDURE — 99232 SBSQ HOSP IP/OBS MODERATE 35: CPT | Performed by: PSYCHIATRY & NEUROLOGY

## 2021-11-29 PROCEDURE — 99232 SBSQ HOSP IP/OBS MODERATE 35: CPT | Performed by: INTERNAL MEDICINE

## 2021-11-29 RX ORDER — QUETIAPINE FUMARATE 25 MG/1
50 TABLET, FILM COATED ORAL NIGHTLY
Status: DISCONTINUED | OUTPATIENT
Start: 2021-11-29 | End: 2021-12-13 | Stop reason: HOSPADM

## 2021-11-29 RX ADMIN — ENOXAPARIN SODIUM 40 MG: 40 INJECTION SUBCUTANEOUS at 08:13

## 2021-11-29 RX ADMIN — DIVALPROEX SODIUM 250 MG: 250 TABLET, DELAYED RELEASE ORAL at 20:35

## 2021-11-29 RX ADMIN — QUETIAPINE FUMARATE 50 MG: 25 TABLET ORAL at 20:35

## 2021-11-29 RX ADMIN — SUCRALFATE 1 G: 1 TABLET ORAL at 08:12

## 2021-11-29 RX ADMIN — ATORVASTATIN CALCIUM 10 MG: 10 TABLET, FILM COATED ORAL at 08:12

## 2021-11-29 RX ADMIN — DIVALPROEX SODIUM 250 MG: 250 TABLET, DELAYED RELEASE ORAL at 08:12

## 2021-11-29 RX ADMIN — ASPIRIN 81 MG: 81 TABLET, COATED ORAL at 08:12

## 2021-11-29 RX ADMIN — FLUTICASONE PROPIONATE 2 SPRAY: 50 SPRAY, METERED NASAL at 08:14

## 2021-11-29 RX ADMIN — SODIUM CHLORIDE, PRESERVATIVE FREE 10 ML: 5 INJECTION INTRAVENOUS at 20:35

## 2021-11-29 RX ADMIN — SODIUM CHLORIDE, PRESERVATIVE FREE 10 ML: 5 INJECTION INTRAVENOUS at 08:13

## 2021-11-29 RX ADMIN — PANTOPRAZOLE SODIUM 40 MG: 40 TABLET, DELAYED RELEASE ORAL at 08:12

## 2021-11-29 RX ADMIN — Medication 5 MG: at 20:35

## 2021-11-29 RX ADMIN — SUCRALFATE 1 G: 1 TABLET ORAL at 17:56

## 2021-11-29 RX ADMIN — LISINOPRIL 2.5 MG: 2.5 TABLET ORAL at 08:12

## 2021-11-30 ENCOUNTER — APPOINTMENT (OUTPATIENT)
Dept: GENERAL RADIOLOGY | Facility: HOSPITAL | Age: 86
End: 2021-11-30

## 2021-11-30 PROCEDURE — 97530 THERAPEUTIC ACTIVITIES: CPT | Performed by: OCCUPATIONAL THERAPIST

## 2021-11-30 PROCEDURE — G0378 HOSPITAL OBSERVATION PER HR: HCPCS

## 2021-11-30 PROCEDURE — 73610 X-RAY EXAM OF ANKLE: CPT

## 2021-11-30 PROCEDURE — 99232 SBSQ HOSP IP/OBS MODERATE 35: CPT | Performed by: INTERNAL MEDICINE

## 2021-11-30 PROCEDURE — 97535 SELF CARE MNGMENT TRAINING: CPT | Performed by: OCCUPATIONAL THERAPIST

## 2021-11-30 PROCEDURE — 99232 SBSQ HOSP IP/OBS MODERATE 35: CPT | Performed by: PSYCHIATRY & NEUROLOGY

## 2021-11-30 PROCEDURE — 25010000002 ENOXAPARIN PER 10 MG: Performed by: NURSE PRACTITIONER

## 2021-11-30 RX ORDER — DONEPEZIL HYDROCHLORIDE 10 MG/1
5 TABLET, FILM COATED ORAL NIGHTLY
Status: DISCONTINUED | OUTPATIENT
Start: 2021-11-30 | End: 2021-12-02

## 2021-11-30 RX ADMIN — DIVALPROEX SODIUM 250 MG: 250 TABLET, DELAYED RELEASE ORAL at 09:33

## 2021-11-30 RX ADMIN — FLUTICASONE PROPIONATE 2 SPRAY: 50 SPRAY, METERED NASAL at 09:34

## 2021-11-30 RX ADMIN — Medication 5 MG: at 20:05

## 2021-11-30 RX ADMIN — PANTOPRAZOLE SODIUM 40 MG: 40 TABLET, DELAYED RELEASE ORAL at 09:33

## 2021-11-30 RX ADMIN — ENOXAPARIN SODIUM 40 MG: 40 INJECTION SUBCUTANEOUS at 09:32

## 2021-11-30 RX ADMIN — LISINOPRIL 2.5 MG: 2.5 TABLET ORAL at 09:33

## 2021-11-30 RX ADMIN — QUETIAPINE FUMARATE 50 MG: 25 TABLET ORAL at 20:05

## 2021-11-30 RX ADMIN — SUCRALFATE 1 G: 1 TABLET ORAL at 17:41

## 2021-11-30 RX ADMIN — DONEPEZIL HYDROCHLORIDE 5 MG: 10 TABLET, FILM COATED ORAL at 20:05

## 2021-11-30 RX ADMIN — ASPIRIN 81 MG: 81 TABLET, COATED ORAL at 09:33

## 2021-11-30 RX ADMIN — SODIUM CHLORIDE, PRESERVATIVE FREE 10 ML: 5 INJECTION INTRAVENOUS at 20:06

## 2021-11-30 RX ADMIN — DIVALPROEX SODIUM 250 MG: 250 TABLET, DELAYED RELEASE ORAL at 20:05

## 2021-11-30 RX ADMIN — SODIUM CHLORIDE, PRESERVATIVE FREE 10 ML: 5 INJECTION INTRAVENOUS at 09:33

## 2021-11-30 RX ADMIN — ATORVASTATIN CALCIUM 10 MG: 10 TABLET, FILM COATED ORAL at 09:33

## 2021-11-30 RX ADMIN — LEVOTHYROXINE SODIUM 50 MCG: 50 TABLET ORAL at 05:16

## 2021-11-30 RX ADMIN — SUCRALFATE 1 G: 1 TABLET ORAL at 05:16

## 2021-12-01 LAB — POTASSIUM SERPL-SCNC: 4.9 MMOL/L (ref 3.5–5.2)

## 2021-12-01 PROCEDURE — 99231 SBSQ HOSP IP/OBS SF/LOW 25: CPT | Performed by: PSYCHIATRY & NEUROLOGY

## 2021-12-01 PROCEDURE — 84132 ASSAY OF SERUM POTASSIUM: CPT | Performed by: INTERNAL MEDICINE

## 2021-12-01 PROCEDURE — 0 MAGNESIUM SULFATE 4 GM/100ML SOLUTION: Performed by: INTERNAL MEDICINE

## 2021-12-01 PROCEDURE — 25010000002 ENOXAPARIN PER 10 MG: Performed by: NURSE PRACTITIONER

## 2021-12-01 PROCEDURE — 99232 SBSQ HOSP IP/OBS MODERATE 35: CPT | Performed by: INTERNAL MEDICINE

## 2021-12-01 RX ADMIN — QUETIAPINE FUMARATE 50 MG: 25 TABLET ORAL at 18:20

## 2021-12-01 RX ADMIN — MAGNESIUM SULFATE HEPTAHYDRATE 4 G: 40 INJECTION, SOLUTION INTRAVENOUS at 09:34

## 2021-12-01 RX ADMIN — SUCRALFATE 1 G: 1 TABLET ORAL at 18:20

## 2021-12-01 RX ADMIN — POTASSIUM CHLORIDE 40 MEQ: 750 CAPSULE, EXTENDED RELEASE ORAL at 09:32

## 2021-12-01 RX ADMIN — FLUTICASONE PROPIONATE 2 SPRAY: 50 SPRAY, METERED NASAL at 09:32

## 2021-12-01 RX ADMIN — DICLOFENAC 2 G: 10 GEL TOPICAL at 15:29

## 2021-12-01 RX ADMIN — ASPIRIN 81 MG: 81 TABLET, COATED ORAL at 09:33

## 2021-12-01 RX ADMIN — ENOXAPARIN SODIUM 40 MG: 40 INJECTION SUBCUTANEOUS at 09:32

## 2021-12-01 RX ADMIN — SUCRALFATE 1 G: 1 TABLET ORAL at 06:39

## 2021-12-01 RX ADMIN — DICLOFENAC 2 G: 10 GEL TOPICAL at 18:20

## 2021-12-01 RX ADMIN — POTASSIUM CHLORIDE 40 MEQ: 750 CAPSULE, EXTENDED RELEASE ORAL at 13:46

## 2021-12-01 RX ADMIN — DONEPEZIL HYDROCHLORIDE 5 MG: 10 TABLET, FILM COATED ORAL at 21:12

## 2021-12-01 RX ADMIN — PANTOPRAZOLE SODIUM 40 MG: 40 TABLET, DELAYED RELEASE ORAL at 09:33

## 2021-12-01 RX ADMIN — SODIUM CHLORIDE, PRESERVATIVE FREE 10 ML: 5 INJECTION INTRAVENOUS at 09:36

## 2021-12-01 RX ADMIN — SODIUM CHLORIDE, PRESERVATIVE FREE 10 ML: 5 INJECTION INTRAVENOUS at 21:12

## 2021-12-01 RX ADMIN — ATORVASTATIN CALCIUM 10 MG: 10 TABLET, FILM COATED ORAL at 09:34

## 2021-12-01 RX ADMIN — DIVALPROEX SODIUM 250 MG: 250 TABLET, DELAYED RELEASE ORAL at 21:12

## 2021-12-01 RX ADMIN — DICLOFENAC 2 G: 10 GEL TOPICAL at 21:21

## 2021-12-01 RX ADMIN — LEVOTHYROXINE SODIUM 50 MCG: 50 TABLET ORAL at 06:39

## 2021-12-01 RX ADMIN — Medication 5 MG: at 21:12

## 2021-12-01 RX ADMIN — DIVALPROEX SODIUM 250 MG: 250 TABLET, DELAYED RELEASE ORAL at 09:32

## 2021-12-01 RX ADMIN — LISINOPRIL 2.5 MG: 2.5 TABLET ORAL at 09:33

## 2021-12-01 RX ADMIN — ACETAMINOPHEN 650 MG: 325 TABLET, FILM COATED ORAL at 15:29

## 2021-12-01 NOTE — CASE MANAGEMENT/SOCIAL WORK
Continued Stay Note  [facility]     Patient Name: Ramin Zaragoza  MRN: [id number]  Today's Date: 12/1/2021    Admit Date: 11/27/2021     Discharge Plan     Row Name 12/01/21 1700       Plan    Plan SNF    Patient/Family in Agreement with Plan yes    Plan Comments Spoke with patient's daughter at bedside. Plan is for discharge to a SNF, possibly long term. Daughter stated [facility] was able to accept, left VM with them to confirm. Will continue to place referrals. Updated AUGUSTO Wilkins regarding daughter working on guardianship.     Final Discharge Disposition Code 03 - skilled nursing facility (SNF)               Discharge Codes    No documentation.                     Carlota Chavez RN

## 2021-12-01 NOTE — PAYOR COMM NOTE
"Id # [id number]  12/01/21 0917  Inpatient Admission  Once     Completed     Level of Care: Telemetry    Diagnosis: Weakness [569771]    Admitting Physician: YASMIN RICHMOND [[id number]]    Attending Physician: YASMIN RICHMOND [[id number]]    Certification: I Certify That Inpatient Hospital Services Are Medically Necessary For Greater Than 2 Midnights        12/01/21 0917       Meg Yoder RN, BSN  Phone # [phone]  Fax # [phone]  Raymond Zaragoza (86 y.o. Male)             Date of Birth Social Security Number Address Home Phone MRN    1935  [address] [facility]   [facility] [address] [phone] [id number]    [place] Marital Status             Religious        Admission Date Admission Type Admitting Provider Attending Provider Department, Room/Bed     Emergency Yasmin Richmond MD Mini, Jocelyn, MD [facility] 4H, S482/1    Discharge Date Discharge Disposition Discharge Destination                         Attending Provider: Yasmin Richmond MD    Allergies: Lactose Intolerance (Gi)    Isolation: None   Infection: None   Code Status: CPR   Advance Care Planning Activity    Ht: 175.3 cm (69\")   Wt: 86.2 kg (190 lb)    Admission Cmt: None   Principal Problem: None                Active Insurance as of 11/27/2021     Primary Coverage     Payor Plan Insurance Group Employer/Plan Group    [facility] MEDICARE REPLACEMENT WELL[employer] MEDICARE REPLACEMENT      Payor Plan Address Payor Plan Phone Number Payor Plan Fax Number Effective Dates    [address] [address] [phone]  11/1/2021 - None Entered    [facility] [address]       Subscriber Name Subscriber Birth Date Member ID       RAYMOND ZARAGOZA 1935 [id number]           Secondary Coverage     Payor Plan Insurance Group Employer/Plan Group    ANTHEM BLUE CROSS ANTHEM [facility] SUPP [id number]     Payor Plan Address Payor Plan Phone Number Payor Plan Fax Number Effective Dates    [address]   12/1/2016 - None Entered    [facility] [id number]       " Subscriber Name Subscriber Birth Date Member ID       RAYMOND OLIVER 1935 [id number]                   Current Facility-Administered Medications   Medication Dose Route Frequency Provider Last Rate Last Admin   • acetaminophen (TYLENOL) tablet 650 mg  650 mg Oral Q4H PRN Leslie Batres APRN        Or   • acetaminophen (TYLENOL) 160 MG/5ML solution 650 mg  650 mg Oral Q4H PRN Leslie Batres APRN        Or   • acetaminophen (TYLENOL) suppository 650 mg  650 mg Rectal Q4H PRN Leslie Batres APRN       • aspirin EC tablet 81 mg  81 mg Oral Daily Leslie Batres APRN   81 mg at 11/30/21 0933   • atorvastatin (LIPITOR) tablet 10 mg  10 mg Oral Daily Leslie Batres APRN   10 mg at 11/30/21 0933   • sennosides-docusate (PERICOLACE) 8.6-50 MG per tablet 2 tablet  2 tablet Oral BID PRN Leslie Batres APRN        And   • polyethylene glycol (MIRALAX) packet 17 g  17 g Oral Daily PRN Leslie Batres APRN        And   • bisacodyl (DULCOLAX) EC tablet 5 mg  5 mg Oral Daily PRN Leslie Batres APRN        And   • bisacodyl (DULCOLAX) suppository 10 mg  10 mg Rectal Daily PRN Leslie Batres APRN       • divalproex (DEPAKOTE) DR tablet 250 mg  250 mg Oral Q12H Mehreen Muhammad MD   250 mg at 11/30/21 2005   • donepezil (ARICEPT) tablet 5 mg  5 mg Oral Nightly Cody Singh MD   5 mg at 11/30/21 2005   • enoxaparin (LOVENOX) syringe 40 mg  40 mg Subcutaneous Q24H Leslie Batres APRN   40 mg at 11/30/21 0932   • fluticasone (FLONASE) 50 MCG/ACT nasal spray 2 spray  2 spray Nasal Daily Leslie Batres APRN   2 spray at 11/30/21 0934   • levothyroxine (SYNTHROID, LEVOTHROID) tablet 50 mcg  50 mcg Oral Q AM Ming Rodriguez MD   50 mcg at 12/01/21 0639   • lisinopril (PRINIVIL,ZESTRIL) tablet 2.5 mg  2.5 mg Oral Daily Leslie Batres, DARA   2.5 mg at 11/30/21 0980   • Magnesium Sulfate 2 gram Bolus, followed by 8 gram infusion (total Mg dose 10 grams)- Mg less than or equal to 1mg/dL   2 g Intravenous PRN Ming Rodriguez MD        Or   • Magnesium Sulfate 2 gram / 50mL Infusion (GIVE X 3 BAGS TO EQUAL 6GM TOTAL DOSE) - Mg 1.1 - 1.5 mg/dl  2 g Intravenous PRN Ming Rodriguez MD        Or   • Magnesium Sulfate 4 gram infusion- Mg 1.6-1.9 mg/dL  4 g Intravenous PRN Ming Rodriguez MD 25 mL/hr at 11/28/21 1804 4 g at 11/28/21 1804   • melatonin tablet 5 mg  5 mg Oral Nightly PRN Leslie Batres APRN   5 mg at 11/30/21 2005   • pantoprazole (PROTONIX) EC tablet 40 mg  40 mg Oral Daily Leslie Batres APRN   40 mg at 11/30/21 0933   • potassium chloride (MICRO-K) CR capsule 40 mEq  40 mEq Oral PRN Ming Rodriguez MD   40 mEq at 11/28/21 1804    Or   • potassium chloride (KLOR-CON) packet 40 mEq  40 mEq Oral PRN Ming Rodriguez MD        Or   • potassium chloride 10 mEq in 100 mL IVPB  10 mEq Intravenous Q1H PRN Ming Rodriguez MD       • QUEtiapine (SEROquel) tablet 50 mg  50 mg Oral Nightly Mehreen Muhammad MD   50 mg at 11/30/21 2005   • sodium chloride 0.9 % flush 10 mL  10 mL Intravenous PRN Octavio Maciel MD       • sodium chloride 0.9 % flush 10 mL  10 mL Intravenous Q12H Leslie Batres APRN   10 mL at 11/30/21 2006   • sodium chloride 0.9 % flush 10 mL  10 mL Intravenous PRN Leslie Batres APRN       • sucralfate (CARAFATE) tablet 1 g  1 g Oral BID AC Leslie Batres APRN   1 g at 12/01/21 0639     Lab Results (last 72 hours)     Procedure Component Value Units Date/Time    T3, Free [[id number]]  (Normal) Collected: 11/28/21 0353    Specimen: Blood Updated: 11/28/21 2205     T3, Free 2.52 pg/mL     Narrative:      Results may be falsely increased if patient taking Biotin.      Vitamin B12 [[id number]]  (Normal) Collected: 11/28/21 0353    Specimen: Blood Updated: 11/28/21 1308     Vitamin B-12 465 pg/mL     Narrative:      Results may be falsely increased if patient taking Biotin.      T4, Free [[id number]]  (Abnormal) Collected: 11/28/21 0353    Specimen:  Blood Updated: 11/28/21 1056     Free T4 0.83 ng/dL     Narrative:      Results may be falsely increased if patient taking Biotin.            Imaging Results (Last 72 Hours)     Procedure Component Value Units Date/Time    XR Ankle 3+ View Right [[id number]] Collected: 12/01/21 0611     Updated: 12/01/21 0617    Narrative:      EXAMINATION: XR ANKLE 3+ VW RIGHT-      INDICATION: pain; R53.1-Weakness; W19.XXXA-Unspecified fall, initial  encounter; R41.0-Disorientation, unspecified; R41.82-Altered mental  status, unspecified     TECHNIQUE: 3 images of the right ankle     COMPARISON: NONE     FINDINGS:      There is no conspicuous soft tissue swelling. No acute fracture or  malalignment. Ankle mortise joint space is symmetric and preserved. The  talar dome appears unremarkable. Minimal ossification within the distal  Achilles tendon and proximal plantar fascia.       Impression:         No acute osseous findings.     This report was finalized on 12/1/2021 6:14 AM by Octavio Adamson MD.       CT Head Without Contrast [[id number]] Collected: 11/27/21 1716     Updated: 11/29/21 2154    Narrative:      EXAMINATION: CT HEAD WO CONTRAST- 11/27/2021     INDICATION: ataxia     TECHNIQUE: 5 mm unenhanced images through the brain     The radiation dose reduction device was turned on for each scan per the  ALARA (As Low as Reasonably Achievable) protocol.     COMPARISON: NONE     FINDINGS: The calvarium appears intact. Included paranasal sinuses and  mastoids appear clear. Soft tissue window images show advanced  generalized cerebral atrophy, and ventriculomegaly. Ventricles are  relatively large for the degree of atrophy present, and in addition to  central brain atrophy as etiology, the possibility of normal pressure  hydrocephalus should be considered.     There is no evidence of hemorrhage, contusion, or edema, no evidence of  mass or mass effect, or abnormal extra-axial collection. No evidence of  acute or old infarct is  seen.       Impression:      Advanced generalized cerebral atrophy, and ventriculomegaly.  Ventriculomegaly may reflect central brain atrophy, or possibly normal  pressure hydrocephalus. No other evidence of acute intracranial disease  is seen.     D: 2021  E: 2021     This report was finalized on 2021 9:51 PM by Dr. Alonzo Mcguire MD.           Operative/Procedure Notes (last 72 hours)  Notes from 21 09 through 21   No notes of this type exist for this encounter.            Physician Progress Notes (last 48 hours)      Ming Rodriguez MD at 21 2152              [facility] Medicine Services  PROGRESS NOTE    Patient Name: Ramin Zaragoza  : 1935  MRN: [id number]    Date of Admission: 2021  Primary Care Physician: Irene Coley MD    Subjective   Subjective     CC:  Weakness, falls  HPI:  Resting in a chair in no acute distress. Denies any fever or chills.  No chest pain or palpitation or shortness of breath.  No nausea or vomiting or diarrhea or abdominal pain.    ROS:  As above    Objective   Objective     Vital Signs:   Temp:  [98 °F (36.7 °C)-98.7 °F (37.1 °C)] 98.7 °F (37.1 °C)  Heart Rate:  [51-77] 62  Resp:  [16-18] 16  BP: (117-147)/(57-71) 147/71     Physical Exam:  Constitutional: No acute distress, looks comfortable  HENT: NCAT, mucous membranes moist  Respiratory: Clear to auscultation bilaterally, respiratory effort normal   Cardiovascular: RRR, no murmurs, rubs, or gallops  Gastrointestinal: Positive bowel sounds, soft, nontender, nondistended  Musculoskeletal:  bilateral ankle edema  Psychiatric: Appropriate affect, cooperative  Neurologic: Awake, alert, confused but follows commands, no focality appreciated, speech clear  Skin: No rashes    Results Reviewed:  LAB RESULTS:      Lab 21  0353 21  1601   WBC 6.04 7.05   HEMOGLOBIN 13.0 14.5   HEMATOCRIT 38.1 43.6   PLATELETS 133* 152   NEUTROS ABS 3.50 4.15    IMMATURE GRANS (ABS) 0.02 0.02   LYMPHS ABS 1.44 1.66   MONOS ABS 0.80 0.97*   EOS ABS 0.26 0.23   MCV 96.5 96.0         Lab 11/28/21  0353 11/27/21  1601   SODIUM 139 141   POTASSIUM 3.5 4.2   CHLORIDE 105 105   CO2 23.0 28.0   ANION GAP 11.0 8.0   BUN 17 18   CREATININE 0.71* 0.69*   GLUCOSE 91 93   CALCIUM 8.5* 9.7   MAGNESIUM 1.8 2.1   HEMOGLOBIN A1C 5.90*  --    TSH 7.830*  --          Lab 11/27/21  1601   TOTAL PROTEIN 7.2   ALBUMIN 4.20   GLOBULIN 3.0   ALT (SGPT) 26   AST (SGOT) 33   BILIRUBIN 0.5   ALK PHOS 66         Lab 11/27/21  1601   TROPONIN T <0.010             Lab 11/28/21  0353   VITAMIN B 12 465         Brief Urine Lab Results  (Last result in the past 365 days)      Color   Clarity   Blood   Leuk Est   Nitrite   Protein   CREAT   Urine HCG        11/27/21 1931 Yellow   Clear   Negative   Negative   Negative   Negative                 Microbiology Results Abnormal     Procedure Component Value - Date/Time    COVID PRE-OP / PRE-PROCEDURE SCREENING ORDER (NO ISOLATION) - Swab, Nasopharynx [[id number]]  (Normal) Collected: 11/27/21 1724    Lab Status: Final result Specimen: Swab from Nasopharynx Updated: 11/27/21 1801    Narrative:      The following orders were created for panel order COVID PRE-OP / PRE-PROCEDURE SCREENING ORDER (NO ISOLATION) - Swab, Nasopharynx.  Procedure                               Abnormality         Status                     ---------                               -----------         ------                     COVID-19 and FLU A/B PCR...[[id number]]  Normal              Final result                 Please view results for these tests on the individual orders.    COVID-19 and FLU A/B PCR - Swab, Nasopharynx [[id number]]  (Normal) Collected: 11/27/21 1724    Lab Status: Final result Specimen: Swab from Nasopharynx Updated: 11/27/21 1801     COVID19 Not Detected     Influenza A PCR Not Detected     Influenza B PCR Not Detected    Narrative:      Fact sheet for providers:  https://www.fda.gov/media/043947/download    Fact sheet for patients: https://www.fda.gov/media/411401/download    Test performed by PCR.          No radiology results from the last 24 hrs        I have reviewed the medications:  Scheduled Meds:aspirin, 81 mg, Oral, Daily  atorvastatin, 10 mg, Oral, Daily  divalproex, 250 mg, Oral, Q12H  donepezil, 5 mg, Oral, Nightly  enoxaparin, 40 mg, Subcutaneous, Q24H  fluticasone, 2 spray, Nasal, Daily  levothyroxine, 50 mcg, Oral, Q AM  lisinopril, 2.5 mg, Oral, Daily  pantoprazole, 40 mg, Oral, Daily  QUEtiapine, 50 mg, Oral, Nightly  sodium chloride, 10 mL, Intravenous, Q12H  sucralfate, 1 g, Oral, BID AC      Continuous Infusions:   PRN Meds:.•  acetaminophen **OR** acetaminophen **OR** acetaminophen  •  senna-docusate sodium **AND** polyethylene glycol **AND** bisacodyl **AND** bisacodyl  •  magnesium sulfate **OR** magnesium sulfate **OR** magnesium sulfate  •  melatonin  •  potassium chloride **OR** potassium chloride **OR** potassium chloride  •  sodium chloride  •  sodium chloride    Assessment/Plan   Assessment & Plan     Active Hospital Problems    Diagnosis  POA   • Weakness [R53.1]  Yes   • Fall [W19.XXXA]  Yes   • Hypothyroidism [E03.9]  Yes   • Mixed hyperlipidemia [E78.2]  Yes   • Coronary artery disease involving native coronary artery of native heart without angina pectoris [I25.10]  Yes   • Essential hypertension [I10]  Yes      Resolved Hospital Problems   No resolved problems to display.        Brief Hospital Course to date:  Ramin Zaragoza is a 86 y.o. male  with PMH significant for CAD, HLD, HTN, hypothyroid, chronic lower extremity edema, who presents to the ED with complaint of weakness and fall.     Weakness and multiple Fall  -Neurology is following.  --B12 is normal     Cognitive impairment patient with episodes of agitation and aggressive behavior.  -PCP has noted concern for dementia.  Patient does have dementia.  -Patient recently started  Abilify  -Daughter has started process for emergency guardianship  --Currently on Seroquel at night     Hypothyroid  -Reports no longer taking levothyroxine  -TSH, free T4 or decreased.  Currently TSH is at 7.830  -started low-dose Synthroid     Hyperlipidemia  Hypertension  CAD  -Continue daily ASA  -Continue lisinopril  -Continue simvastatin     BPH  -No longer taking Flomax     GERD  -Continue sucralfate    PLAN:  - continue current care  - rehab placement        DVT prophylaxis:  Medical DVT prophylaxis orders are present.       AM-PAC 6 Clicks Score (PT): 14 (11/30/21 0805)    Disposition: TBD.    CODE STATUS:   Code Status and Medical Interventions:   Ordered at: 11/27/21 8135     Code Status (Patient has no pulse and is not breathing):    CPR (Attempt to Resuscitate)     Medical Interventions (Patient has pulse or is breathing):    Full Support       Ming Rodriguez MD  11/30/21                Electronically signed by Ming Rodriguez MD at 11/30/21 0265     oCdy Singh MD at 11/30/21 1512          Neurology       Patient Care Team:  Irene Coley MD as PCP - General (Family Medicine)    Chief complaint: Dementia    History: Patient is slept well and is behaving nicely after Seroquel 50 mg at bedtime.    Talk to his daughter who indicated he was taking Aricept to the point in the past but has not been taking it lately reasons that seem to be related to the patient's inability to care for himself.    He fell and injured his right ankle which was x-rayed today and pending results.      Past Medical History:   Diagnosis Date   • CAD (coronary artery disease)    • Hyperlipidemia    • Hypertension    • Hypothyroidism     on chronic replacement therapy   • Lactose intolerance    • Lower extremity edema    • PVC's (premature ventricular contractions)     History of   • Thyroid disorder    • Venous insufficiency        Vital Signs   Vitals:    11/30/21 0600 11/30/21 0726 11/30/21 0933 11/30/21 1130    BP:  117/57 117/57 132/59   BP Location:  Left arm  Left arm   Patient Position:  Lying  Sitting   Pulse: 56 58  66   Resp:  18  18   Temp:  98.6 °F (37 °C)  98 °F (36.7 °C)   TempSrc:  Oral  Oral   SpO2: (!) 89%   91%   Weight:       Height:           Physical Exam:   General: Is right ankle shows minimal swelling and little if any the hematoma.              Neuro: Pleasant and alert good remote memory short-term memory is deficient.    Motor testing shows normal power and tone no cogwheel rigidity.        Results Review:  Reviewed  Results from last 7 days   Lab Units 11/28/21  0353   WBC 10*3/mm3 6.04   HEMOGLOBIN g/dL 13.0   HEMATOCRIT % 38.1   PLATELETS 10*3/mm3 133*     Results from last 7 days   Lab Units 11/28/21  0353 11/27/21  1601   SODIUM mmol/L 139 141   POTASSIUM mmol/L 3.5 4.2   CHLORIDE mmol/L 105 105   CO2 mmol/L 23.0 28.0   BUN mg/dL 17 18   CREATININE mg/dL 0.71* 0.69*   CALCIUM mg/dL 8.5* 9.7   BILIRUBIN mg/dL  --  0.5   ALK PHOS U/L  --  66   ALT (SGPT) U/L  --  26   AST (SGOT) U/L  --  33   GLUCOSE mg/dL 91 93       Imaging Results (Last 24 Hours)     Procedure Component Value Units Date/Time    XR Ankle 3+ View Right [[id number]] Resulted: 11/30/21 1445     Updated: 11/30/21 1503    CT Head Without Contrast [[id number]] Collected: 11/27/21 1716     Updated: 11/29/21 2154    Narrative:      EXAMINATION: CT HEAD WO CONTRAST- 11/27/2021     INDICATION: ataxia     TECHNIQUE: 5 mm unenhanced images through the brain     The radiation dose reduction device was turned on for each scan per the  ALARA (As Low as Reasonably Achievable) protocol.     COMPARISON: NONE     FINDINGS: The calvarium appears intact. Included paranasal sinuses and  mastoids appear clear. Soft tissue window images show advanced  generalized cerebral atrophy, and ventriculomegaly. Ventricles are  relatively large for the degree of atrophy present, and in addition to  central brain atrophy as etiology, the possibility of normal  pressure  hydrocephalus should be considered.     There is no evidence of hemorrhage, contusion, or edema, no evidence of  mass or mass effect, or abnormal extra-axial collection. No evidence of  acute or old infarct is seen.       Impression:      Advanced generalized cerebral atrophy, and ventriculomegaly.  Ventriculomegaly may reflect central brain atrophy, or possibly normal  pressure hydrocephalus. No other evidence of acute intracranial disease  is seen.     D: 2021  E: 2021     This report was finalized on 2021 9:51 PM by Dr. Alonzo Mcguire MD.             Assessment:  Dementia    Fall    Plan:  Add Aricept 5 mg which the patient said helped in the past and increase to 10 mg as tolerated.    Likely skilled nursing in institutionalization long-term.    Comment:  Doing reasonably well         I discussed the patients findings and my recommendations with patient, family and nursing staff    Cody Singh MD  21  15:12 EST          Electronically signed by Cody Singh MD at 21 1514     Ming Rodriguez MD at 21 1638              [facility] Medicine Services  PROGRESS NOTE    Patient Name: Ramin Zaragoza  : 1935  MRN: [id number]    Date of Admission: 2021  Primary Care Physician: Irene Coley MD    Subjective   Subjective     CC:  Weakness, falls    HPI: Last night patient was agitated and had to be restrained.  Resting in bed in no acute distress.  He is very confused and is in restraints.  Denies any fever or chills.  No chest pain or palpitation or shortness of breath.  No nausea or vomiting or diarrhea or abdominal pain.  No headache.   ROS:  As above    Objective   Objective     Vital Signs:   Temp:  [97.2 °F (36.2 °C)-98.2 °F (36.8 °C)] 97.7 °F (36.5 °C)  Heart Rate:  [50-80] 56  Resp:  [16-18] 16  BP: (123-157)/(57-94) 132/76     Physical Exam:  Constitutional: No acute distress, looks comfortable  HENT: NCAT, mucous  membranes moist  Respiratory: Clear to auscultation bilaterally, respiratory effort normal   Cardiovascular: RRR, no murmurs, rubs, or gallops  Gastrointestinal: Positive bowel sounds, soft, nontender, nondistended  Musculoskeletal:  bilateral ankle edema  Psychiatric: Appropriate affect, cooperative  Neurologic: Awake, alert, follows commands, no focality appreciated, speech clear  Skin: No rashes    Results Reviewed:  LAB RESULTS:      Lab 11/28/21  0353 11/27/21  1601 11/23/21  1240   WBC 6.04 7.05 5.92   HEMOGLOBIN 13.0 14.5 13.0   HEMATOCRIT 38.1 43.6 38.0   PLATELETS 133* 152 170   NEUTROS ABS 3.50 4.15 4.02   IMMATURE GRANS (ABS) 0.02 0.02 0.02   LYMPHS ABS 1.44 1.66 1.13   MONOS ABS 0.80 0.97* 0.65   EOS ABS 0.26 0.23 0.09   MCV 96.5 96.0 93.6         Lab 11/28/21  0353 11/27/21  1601 11/23/21  1240   SODIUM 139 141 136   POTASSIUM 3.5 4.2 4.4   CHLORIDE 105 105 101   CO2 23.0 28.0 27.0   ANION GAP 11.0 8.0 8.0   BUN 17 18 26*   CREATININE 0.71* 0.69* 0.81   GLUCOSE 91 93 102*   CALCIUM 8.5* 9.7 9.1   MAGNESIUM 1.8 2.1  --    HEMOGLOBIN A1C 5.90*  --   --    TSH 7.830*  --   --          Lab 11/27/21  1601 11/23/21  1240   TOTAL PROTEIN 7.2 6.0   ALBUMIN 4.20 3.80   GLOBULIN 3.0 2.2   ALT (SGPT) 26 28   AST (SGOT) 33 33   BILIRUBIN 0.5 0.3   ALK PHOS 66 67         Lab 11/27/21  1601 11/23/21  1240   PROBNP  --  115.5   TROPONIN T <0.010 <0.010             Lab 11/28/21  0353   VITAMIN B 12 465         Brief Urine Lab Results  (Last result in the past 365 days)      Color   Clarity   Blood   Leuk Est   Nitrite   Protein   CREAT   Urine HCG        11/27/21 1931 Yellow   Clear   Negative   Negative   Negative   Negative                 Microbiology Results Abnormal     Procedure Component Value - Date/Time    COVID PRE-OP / PRE-PROCEDURE SCREENING ORDER (NO ISOLATION) - Swab, Nasopharynx [[id number]]  (Normal) Collected: 11/27/21 4644    Lab Status: Final result Specimen: Swab from Nasopharynx Updated: 11/27/21  1801    Narrative:      The following orders were created for panel order COVID PRE-OP / PRE-PROCEDURE SCREENING ORDER (NO ISOLATION) - Swab, Nasopharynx.  Procedure                               Abnormality         Status                     ---------                               -----------         ------                     COVID-19 and FLU A/B PCR...[[id number]]  Normal              Final result                 Please view results for these tests on the individual orders.    COVID-19 and FLU A/B PCR - Swab, Nasopharynx [[id number]]  (Normal) Collected: 11/27/21 1724    Lab Status: Final result Specimen: Swab from Nasopharynx Updated: 11/27/21 1801     COVID19 Not Detected     Influenza A PCR Not Detected     Influenza B PCR Not Detected    Narrative:      Fact sheet for providers: https://www.fda.gov/media/418625/download    Fact sheet for patients: https://www.fda.gov/media/611836/download    Test performed by PCR.          MRI Brain Without Contrast    Result Date: 11/28/2021  MRI Brain WO INDICATION: Weakness and worsening confusion. TECHNIQUE: MRI of the brain without IV contrast. COMPARISON:  CT head 11/27/2021 FINDINGS: There is some artifact on the diffusion series in the inferior posterior fossa. Allowing for this, there is no evidence of restricted diffusion identified to suggest acute to subacute infarct. There is generalized atrophy. Ventricular size is prominent, possibly due to centralized atrophy. Normal pressure hydrocephalus is not excluded. Mild chronic small vessel ischemic changes are present in the white matter. No acute hemorrhage is seen. There is a small round focus of hemosiderin in the right frontal lobe subcortical white matter, as well as in the right side of the superior cerebellum. These may reflect small cavernoma's or old hemorrhagic lacunar infarcts. Craniovertebral junction is normal. No masses are identified. Major intracranial flow voids are maintained. Bilateral mastoid  effusions are present.     Impression: 1. Allowing for some artifact in the posterior fossa, no evidence of acute or subacute infarct. No acute hemorrhage. 2. Atrophy with mild chronic small vessel ischemic disease in the white matter. 3. Ventriculomegaly which may be due to prominent central atrophy or possibly normal pressure hydrocephalus. 4. Bilateral mastoid effusions. Signer Name: Nacho Jimenez MD  Signed: 11/28/2021 12:47 AM  Workstation Name: [facility]  [facility]    XR Chest 1 View    Result Date: 11/27/2021  EXAMINATION: XR CHEST 1 VW-  INDICATION: Weak/Dizzy/AMS triage protocol  TECHNIQUE: Frontal view of the chest  COMPARISON: Chest x-ray 11/23/2021, CT abdomen and pelvis 3/26/2021  FINDINGS:  Stable cardiomediastinal silhouette within normal limits. Mild decrease in lung volumes from prior. Similar vague bibasilar parenchymal opacities from prior exam; these may reflect chronic interstitial appearing changes and peripheral reticulations seen at the lung bases on CT abdomen and pelvis from 3/26/2021. No pneumothorax. No pleural effusion.      Impression:  Decreased lung volumes from most recent chest x-ray. Otherwise stable chest with redemonstration of vague interstitial bibasilar parenchymal opacities possibly corresponding to chronic interstitial changes and peripheral reticulations noted on lung bases from CT abdomen and pelvis dated 3/26/2021.  This report was finalized on 11/27/2021 6:17 PM by Octavio Adamson MD.            I have reviewed the medications:  Scheduled Meds:aspirin, 81 mg, Oral, Daily  atorvastatin, 10 mg, Oral, Daily  divalproex, 250 mg, Oral, Q12H  enoxaparin, 40 mg, Subcutaneous, Q24H  fluticasone, 2 spray, Nasal, Daily  levothyroxine, 50 mcg, Oral, Q AM  lisinopril, 2.5 mg, Oral, Daily  pantoprazole, 40 mg, Oral, Daily  QUEtiapine, 50 mg, Oral, Nightly  sodium chloride, 10 mL, Intravenous, Q12H  sucralfate, 1 g, Oral, BID AC      Continuous Infusions:    PRN Meds:.•  acetaminophen **OR** acetaminophen **OR** acetaminophen  •  senna-docusate sodium **AND** polyethylene glycol **AND** bisacodyl **AND** bisacodyl  •  magnesium sulfate **OR** magnesium sulfate **OR** magnesium sulfate  •  melatonin  •  potassium chloride **OR** potassium chloride **OR** potassium chloride  •  sodium chloride  •  sodium chloride    Assessment/Plan   Assessment & Plan     Active Hospital Problems    Diagnosis  POA   • Weakness [R53.1]  Yes   • Fall [W19.XXXA]  Yes   • Hypothyroidism [E03.9]  Yes   • Mixed hyperlipidemia [E78.2]  Yes   • Coronary artery disease involving native coronary artery of native heart without angina pectoris [I25.10]  Yes   • Essential hypertension [I10]  Yes      Resolved Hospital Problems   No resolved problems to display.        Brief Hospital Course to date:  Ramin Zaragoza is a 86 y.o. male  with PMH significant for CAD, HLD, HTN, hypothyroid, chronic lower extremity edema, who presents to the ED with complaint of weakness and fall.     Weakness and multiple Fall  -Neurology is following.  --B12 is normal     Cognitive impairment patient with episodes of agitation and aggressive behavior.  -PCP has noted concern for dementia  -Patient recently started Abilify  -Daughter has started process for emergency guardianship  --Currently on Seroquel at night     Hypothyroid  -Reports no longer taking levothyroxine  -TSH, free T4 or decreased.  Currently TSH is at 7.830  -Will start low-dose Synthroid     Hyperlipidemia  Hypertension  CAD  -Continue daily ASA  -Continue lisinopril  -Continue simvastatin     BPH  -No longer taking Flomax     GERD  -Continue sucralfate        DVT prophylaxis:  Medical DVT prophylaxis orders are present.       AM-PAC 6 Clicks Score (PT): 19 (11/29/21 0485)    Disposition: TBD.    CODE STATUS:   Code Status and Medical Interventions:   Ordered at: 11/27/21 7614     Code Status (Patient has no pulse and is not breathing):    CPR (Attempt to  "Resuscitate)     Medical Interventions (Patient has pulse or is breathing):    Full Support       Ming Rodriguez MD  11/29/21                Electronically signed by Ming Rodriguez MD at 11/29/21 1641     Mehreen Muhammad MD at 11/29/21 1556        Neurology       Patient Care Team:  Irene Coley MD as PCP - General (Family Medicine)    Chief complaint weakness, falls, dementia      Subjective .     History:    Patient was agitated overnight, had to be put in restraints after he was lashing out at staff    This afternoon he is quite calm and there is a sitter at bedside    Objective     Vital Signs   Blood pressure 132/76, pulse 56, temperature 97.7 °F (36.5 °C), temperature source Axillary, resp. rate 16, height 175.3 cm (69\"), weight 86.2 kg (190 lb), SpO2 96 %.    Physical Exam:  Elderly man, sitting in bed.  He is out of restraints but they were still wrapped around his hand in case they are needed.  He is oriented to place, self.  He is able to follow commands.  Speech is clear.  He is able to track me around the room, has no facial weakness.  On room air, no distress.      Results Review:    B12 485, TSH 7.8, free T4 0.83  Assessment/Plan   85 y/o man with history of dementia, mood swings, htn, hld, cad, who is admitted for weakness/falls.     Per report, patient had recently started abilify which can cause excessive sedation and this medication is on hold. It is unclear why he stopped aricept; he has an appt tomorrow with his neurologist Dr Michael. Dr Michael has not seen patient in years according to dgtr but would defer medication changes to him as he will follow in clinic.     MRI brain is reassuring, no stroke. While he does have enlarged ventricles, this is due to atrophy. He does not have clinical symptoms of NPH (no psychomotor slowing, magnetic gait, incontinence).      There is no evidence of u/l infection or metabolic derangement on labs.     Overall, patient seems to be improving " in level of alertness. Suspect he was oversedated and falling due to new medication.      I do not believe he has capacity to make his own medical decisions. He has no memory of reason for admission, has poor short term memory, and needs supervision for his medication. I do not believe he is safe to return home alone.     He is at high risk for delirium, will start seroquel tonight.     11/29  Patient was very agitated overnight, hitting out at staff.  He had to be restrained and a sitter is now at bedside.  We will increase Seroquel tonight to 50.  Continue to monitor.         I discussed the patient's findings and my recommendations with patient, patient's dgtr and primary team            Mehreen Muhammad MD  11/29/21  15:54 EST      Electronically signed by Mehreen Muhammad MD at 11/29/21 6840

## 2021-12-01 NOTE — PROGRESS NOTES
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  Notes that his R ankle is bruised from his fall at home.      ROS:  No fevers or chills  No dyspnea  No chest pain  No nausea     Objective   Objective     Vital Signs:   Temp:  [98 °F (36.7 °C)-98.7 °F (37.1 °C)] 98.2 °F (36.8 °C)  Heart Rate:  [54-77] 62  Resp:  [16-18] 18  BP: (117-147)/(57-71) 141/70     Physical Exam:  Constitutional: No acute distress, looks comfortable in bed, pleasantly greets me and tells me about his work.   HENT: NCAT, mucous membranes moist  Respiratory: Clear to auscultation bilaterally, respiratory effort normal   Cardiovascular: RRR, no murmurs, rubs, or gallops  Gastrointestinal: Positive bowel sounds, soft, nontender, nondistended  Musculoskeletal:  Trace edema bilaterally.  R ankle w some ROM but tender to palpation.  Minimal bruising.   Psychiatric: Appropriate affect, cooperative  Neurologic: Awake, alert, converses appropriately about his career, follows commands  Skin: No rashes    Results Reviewed:  LAB RESULTS:      Lab 21  0353 21  1601   WBC 6.04 7.05   HEMOGLOBIN 13.0 14.5   HEMATOCRIT 38.1 43.6   PLATELETS 133* 152   NEUTROS ABS 3.50 4.15   IMMATURE GRANS (ABS) 0.02 0.02   LYMPHS ABS 1.44 1.66   MONOS ABS 0.80 0.97*   EOS ABS 0.26 0.23   MCV 96.5 96.0         Lab 21  0353 21  1601   SODIUM 139 141   POTASSIUM 3.5 4.2   CHLORIDE 105 105   CO2 23.0 28.0   ANION GAP 11.0 8.0   BUN 17 18   CREATININE 0.71* 0.69*   GLUCOSE 91 93   CALCIUM 8.5* 9.7   MAGNESIUM 1.8 2.1   HEMOGLOBIN A1C 5.90*  --    TSH 7.830*  --          Lab 21  1601   TOTAL PROTEIN 7.2   ALBUMIN 4.20   GLOBULIN 3.0   ALT (SGPT) 26   AST  (SGOT) 33   BILIRUBIN 0.5   ALK PHOS 66         Lab 11/27/21  1601   TROPONIN T <0.010             Lab 11/28/21  0353   VITAMIN B 12 465         Brief Urine Lab Results  (Last result in the past 365 days)      Color   Clarity   Blood   Leuk Est   Nitrite   Protein   CREAT   Urine HCG        11/27/21 1931 Yellow   Clear   Negative   Negative   Negative   Negative                 Microbiology Results Abnormal     Procedure Component Value - Date/Time    COVID PRE-OP / PRE-PROCEDURE SCREENING ORDER (NO ISOLATION) - Swab, Nasopharynx [[id number]]  (Normal) Collected: 11/27/21 1724    Lab Status: Final result Specimen: Swab from Nasopharynx Updated: 11/27/21 1801    Narrative:      The following orders were created for panel order COVID PRE-OP / PRE-PROCEDURE SCREENING ORDER (NO ISOLATION) - Swab, Nasopharynx.  Procedure                               Abnormality         Status                     ---------                               -----------         ------                     COVID-19 and FLU A/B PCR...[[id number]]  Normal              Final result                 Please view results for these tests on the individual orders.    COVID-19 and FLU A/B PCR - Swab, Nasopharynx [[id number]]  (Normal) Collected: 11/27/21 1724    Lab Status: Final result Specimen: Swab from Nasopharynx Updated: 11/27/21 1801     COVID19 Not Detected     Influenza A PCR Not Detected     Influenza B PCR Not Detected    Narrative:      Fact sheet for providers: https://www.fda.gov/media/186432/download    Fact sheet for patients: https://www.fda.gov/media/289537/download    Test performed by PCR.          XR Ankle 3+ View Right    Result Date: 12/1/2021  EXAMINATION: XR ANKLE 3+ VW RIGHT-  INDICATION: pain; R53.1-Weakness; W19.XXXA-Unspecified fall, initial encounter; R41.0-Disorientation, unspecified; R41.82-Altered mental status, unspecified  TECHNIQUE: 3 images of the right ankle  COMPARISON: NONE  FINDINGS:  There is no conspicuous soft  tissue swelling. No acute fracture or malalignment. Ankle mortise joint space is symmetric and preserved. The talar dome appears unremarkable. Minimal ossification within the distal Achilles tendon and proximal plantar fascia.      Impression:  No acute osseous findings.  This report was finalized on 12/1/2021 6:14 AM by Octavio Adamson MD.            I have reviewed the medications:  Scheduled Meds:aspirin, 81 mg, Oral, Daily  atorvastatin, 10 mg, Oral, Daily  divalproex, 250 mg, Oral, Q12H  donepezil, 5 mg, Oral, Nightly  enoxaparin, 40 mg, Subcutaneous, Q24H  fluticasone, 2 spray, Nasal, Daily  levothyroxine, 50 mcg, Oral, Q AM  lisinopril, 2.5 mg, Oral, Daily  pantoprazole, 40 mg, Oral, Daily  QUEtiapine, 50 mg, Oral, Nightly  sodium chloride, 10 mL, Intravenous, Q12H  sucralfate, 1 g, Oral, BID AC      Continuous Infusions:   PRN Meds:.•  acetaminophen **OR** acetaminophen **OR** acetaminophen  •  senna-docusate sodium **AND** polyethylene glycol **AND** bisacodyl **AND** bisacodyl  •  magnesium sulfate **OR** magnesium sulfate **OR** magnesium sulfate  •  melatonin  •  potassium chloride **OR** potassium chloride **OR** potassium chloride  •  sodium chloride  •  sodium chloride    Assessment/Plan   Assessment & Plan     Active Hospital Problems    Diagnosis  POA   • Weakness [R53.1]  Yes   • Fall [W19.XXXA]  Yes   • Hypothyroidism [E03.9]  Yes   • Mixed hyperlipidemia [E78.2]  Yes   • Coronary artery disease involving native coronary artery of native heart without angina pectoris [I25.10]  Yes   • Essential hypertension [I10]  Yes      Resolved Hospital Problems   No resolved problems to display.        Brief Hospital Course to date:  Ramin Zaragoza is a 86 y.o. male  with PMH significant for CAD, HLD, HTN, hypothyroid, chronic lower extremity edema, who presents to the ED with complaint of weakness and fall.     Weakness and multiple Fall  -Neurology is following.  --B12 is normal     Cognitive impairment  patient with episodes of agitation and aggressive behavior.  -dementia, neurologist consulted.  Aricept.  -Patient recently started Abilify  -Daughter has started process for emergency guardianship  --Currently on Seroquel at night     Hypothyroid  -Reports no longer taking levothyroxine  -TSH, free T4 or decreased.  Currently TSH is at 7.830  -started low-dose Synthroid     Hyperlipidemia  Hypertension  CAD  -Continue daily ASA  -Continue lisinopril  -Continue simvastatin     BPH  -No longer taking Flomax     GERD  -Continue sucralfate    PLAN:  - continue current care  - rehab placement        DVT prophylaxis:  Medical DVT prophylaxis orders are present.       AM-PAC 6 Clicks Score (PT): 14 (11/30/21 0805)    Disposition: TBD.    CODE STATUS:   Code Status and Medical Interventions:   Ordered at: 11/27/21 1650     Code Status (Patient has no pulse and is not breathing):    CPR (Attempt to Resuscitate)     Medical Interventions (Patient has pulse or is breathing):    Full Support       Valery Richmond MD  12/01/21

## 2021-12-01 NOTE — PROGRESS NOTES
Neurology       Patient Care Team:  Irene Coley MD as PCP - General (Family Medicine)    Chief complaint: Dementia    History: Patient tells me that he feels better but is having trouble walking because of his right ankle.    He denies any GI distress with the Aricept.      Past Medical History:   Diagnosis Date   • CAD (coronary artery disease)    • Hyperlipidemia    • Hypertension    • Hypothyroidism     on chronic replacement therapy   • Lactose intolerance    • Lower extremity edema    • PVC's (premature ventricular contractions)     History of   • Thyroid disorder    • Venous insufficiency        Vital Signs   Vitals:    12/01/21 0600 12/01/21 0714 12/01/21 0900 12/01/21 0933   BP:  141/70     BP Location:  Left arm     Patient Position:  Lying     Pulse: 58 62 66 70   Resp:  18     Temp:  98.2 °F (36.8 °C)     TempSrc:  Oral     SpO2: 93% 93%     Weight:       Height:           Physical Exam:   General: Awake and alert              Neuro: Oriented to person.        Results Review:  Ankle x-ray was unremarkable for fracture  Results from last 7 days   Lab Units 11/28/21  0353   WBC 10*3/mm3 6.04   HEMOGLOBIN g/dL 13.0   HEMATOCRIT % 38.1   PLATELETS 10*3/mm3 133*     Results from last 7 days   Lab Units 11/28/21  0353 11/27/21  1601   SODIUM mmol/L 139 141   POTASSIUM mmol/L 3.5 4.2   CHLORIDE mmol/L 105 105   CO2 mmol/L 23.0 28.0   BUN mg/dL 17 18   CREATININE mg/dL 0.71* 0.69*   CALCIUM mg/dL 8.5* 9.7   BILIRUBIN mg/dL  --  0.5   ALK PHOS U/L  --  66   ALT (SGPT) U/L  --  26   AST (SGOT) U/L  --  33   GLUCOSE mg/dL 91 93       Imaging Results (Last 24 Hours)     Procedure Component Value Units Date/Time    XR Ankle 3+ View Right [[id number]] Collected: 12/01/21 0611     Updated: 12/01/21 0617    Narrative:      EXAMINATION: XR ANKLE 3+ VW RIGHT-      INDICATION: pain; R53.1-Weakness; W19.XXXA-Unspecified fall, initial  encounter; R41.0-Disorientation, unspecified; R41.82-Altered mental  status,  unspecified     TECHNIQUE: 3 images of the right ankle     COMPARISON: NONE     FINDINGS:      There is no conspicuous soft tissue swelling. No acute fracture or  malalignment. Ankle mortise joint space is symmetric and preserved. The  talar dome appears unremarkable. Minimal ossification within the distal  Achilles tendon and proximal plantar fascia.       Impression:         No acute osseous findings.     This report was finalized on 12/1/2021 6:14 AM by Octavio Adamson MD.             Assessment:  Dementia    Falls.    Right ankle pain.      Plan:  Diclofenac gel right ankle 4 times daily    Increase Aricept tomorrow if continues to tolerate it well    Comment:  His rehab is limited by his sore ankle which is probably a mild sprain based on the x-ray.         I discussed the patients findings and my recommendations with patient    Cody Singh MD  12/01/21  11:27 EST

## 2021-12-01 NOTE — PROGRESS NOTES
[facility] Medicine Services  PROGRESS NOTE    Patient Name: Ramin Zaragoza  : 1935  MRN: [id number]    Date of Admission: 2021  Primary Care Physician: Irene Coley MD    Subjective   Subjective     CC:  Weakness, falls  HPI:  Resting in a chair in no acute distress. Denies any fever or chills.  No chest pain or palpitation or shortness of breath.  No nausea or vomiting or diarrhea or abdominal pain.    ROS:  As above    Objective   Objective     Vital Signs:   Temp:  [98 °F (36.7 °C)-98.7 °F (37.1 °C)] 98.7 °F (37.1 °C)  Heart Rate:  [51-77] 62  Resp:  [16-18] 16  BP: (117-147)/(57-71) 147/71     Physical Exam:  Constitutional: No acute distress, looks comfortable  HENT: NCAT, mucous membranes moist  Respiratory: Clear to auscultation bilaterally, respiratory effort normal   Cardiovascular: RRR, no murmurs, rubs, or gallops  Gastrointestinal: Positive bowel sounds, soft, nontender, nondistended  Musculoskeletal:  bilateral ankle edema  Psychiatric: Appropriate affect, cooperative  Neurologic: Awake, alert, confused but follows commands, no focality appreciated, speech clear  Skin: No rashes    Results Reviewed:  LAB RESULTS:      Lab 21  0353 21  1601   WBC 6.04 7.05   HEMOGLOBIN 13.0 14.5   HEMATOCRIT 38.1 43.6   PLATELETS 133* 152   NEUTROS ABS 3.50 4.15   IMMATURE GRANS (ABS) 0.02 0.02   LYMPHS ABS 1.44 1.66   MONOS ABS 0.80 0.97*   EOS ABS 0.26 0.23   MCV 96.5 96.0         Lab 21  0353 21  1601   SODIUM 139 141   POTASSIUM 3.5 4.2   CHLORIDE 105 105   CO2 23.0 28.0   ANION GAP 11.0 8.0   BUN 17 18   CREATININE 0.71* 0.69*   GLUCOSE 91 93   CALCIUM 8.5* 9.7   MAGNESIUM 1.8 2.1   HEMOGLOBIN A1C 5.90*  --    TSH 7.830*  --          Lab 21  1601   TOTAL PROTEIN 7.2   ALBUMIN 4.20   GLOBULIN 3.0   ALT (SGPT) 26   AST (SGOT) 33   BILIRUBIN 0.5   ALK PHOS 66         Lab 21  1601   TROPONIN T <0.010             Lab 21  7443    VITAMIN B 12 465         Brief Urine Lab Results  (Last result in the past 365 days)      Color   Clarity   Blood   Leuk Est   Nitrite   Protein   CREAT   Urine HCG        11/27/21 1931 Yellow   Clear   Negative   Negative   Negative   Negative                 Microbiology Results Abnormal     Procedure Component Value - Date/Time    COVID PRE-OP / PRE-PROCEDURE SCREENING ORDER (NO ISOLATION) - Swab, Nasopharynx [[id number]]  (Normal) Collected: 11/27/21 1724    Lab Status: Final result Specimen: Swab from Nasopharynx Updated: 11/27/21 1801    Narrative:      The following orders were created for panel order COVID PRE-OP / PRE-PROCEDURE SCREENING ORDER (NO ISOLATION) - Swab, Nasopharynx.  Procedure                               Abnormality         Status                     ---------                               -----------         ------                     COVID-19 and FLU A/B PCR...[[id number]]  Normal              Final result                 Please view results for these tests on the individual orders.    COVID-19 and FLU A/B PCR - Swab, Nasopharynx [[id number]]  (Normal) Collected: 11/27/21 1724    Lab Status: Final result Specimen: Swab from Nasopharynx Updated: 11/27/21 1801     COVID19 Not Detected     Influenza A PCR Not Detected     Influenza B PCR Not Detected    Narrative:      Fact sheet for providers: https://www.fda.gov/media/530785/download    Fact sheet for patients: https://www.fda.gov/media/462310/download    Test performed by PCR.          No radiology results from the last 24 hrs        I have reviewed the medications:  Scheduled Meds:aspirin, 81 mg, Oral, Daily  atorvastatin, 10 mg, Oral, Daily  divalproex, 250 mg, Oral, Q12H  donepezil, 5 mg, Oral, Nightly  enoxaparin, 40 mg, Subcutaneous, Q24H  fluticasone, 2 spray, Nasal, Daily  levothyroxine, 50 mcg, Oral, Q AM  lisinopril, 2.5 mg, Oral, Daily  pantoprazole, 40 mg, Oral, Daily  QUEtiapine, 50 mg, Oral, Nightly  sodium chloride, 10 mL,  Intravenous, Q12H  sucralfate, 1 g, Oral, BID AC      Continuous Infusions:   PRN Meds:.•  acetaminophen **OR** acetaminophen **OR** acetaminophen  •  senna-docusate sodium **AND** polyethylene glycol **AND** bisacodyl **AND** bisacodyl  •  magnesium sulfate **OR** magnesium sulfate **OR** magnesium sulfate  •  melatonin  •  potassium chloride **OR** potassium chloride **OR** potassium chloride  •  sodium chloride  •  sodium chloride    Assessment/Plan   Assessment & Plan     Active Hospital Problems    Diagnosis  POA   • Weakness [R53.1]  Yes   • Fall [W19.XXXA]  Yes   • Hypothyroidism [E03.9]  Yes   • Mixed hyperlipidemia [E78.2]  Yes   • Coronary artery disease involving native coronary artery of native heart without angina pectoris [I25.10]  Yes   • Essential hypertension [I10]  Yes      Resolved Hospital Problems   No resolved problems to display.        Brief Hospital Course to date:  Ramin Zaragoza is a 86 y.o. male  with PMH significant for CAD, HLD, HTN, hypothyroid, chronic lower extremity edema, who presents to the ED with complaint of weakness and fall.     Weakness and multiple Fall  -Neurology is following.  --B12 is normal     Cognitive impairment patient with episodes of agitation and aggressive behavior.  -PCP has noted concern for dementia.  Patient does have dementia.  -Patient recently started Abilify  -Daughter has started process for emergency guardianship  --Currently on Seroquel at night     Hypothyroid  -Reports no longer taking levothyroxine  -TSH, free T4 or decreased.  Currently TSH is at 7.830  -started low-dose Synthroid     Hyperlipidemia  Hypertension  CAD  -Continue daily ASA  -Continue lisinopril  -Continue simvastatin     BPH  -No longer taking Flomax     GERD  -Continue sucralfate    PLAN:  - continue current care  - rehab placement        DVT prophylaxis:  Medical DVT prophylaxis orders are present.       AM-PAC 6 Clicks Score (PT): 14 (11/30/21 0805)    Disposition: TBD.    CODE  STATUS:   Code Status and Medical Interventions:   Ordered at: 11/27/21 2335     Code Status (Patient has no pulse and is not breathing):    CPR (Attempt to Resuscitate)     Medical Interventions (Patient has pulse or is breathing):    Full Support       Ming Rodriguez MD  11/30/21

## 2021-12-02 LAB — MAGNESIUM SERPL-MCNC: 2.1 MG/DL (ref 1.6–2.4)

## 2021-12-02 PROCEDURE — 97116 GAIT TRAINING THERAPY: CPT

## 2021-12-02 PROCEDURE — 99232 SBSQ HOSP IP/OBS MODERATE 35: CPT | Performed by: NURSE PRACTITIONER

## 2021-12-02 PROCEDURE — 97535 SELF CARE MNGMENT TRAINING: CPT

## 2021-12-02 PROCEDURE — 83735 ASSAY OF MAGNESIUM: CPT | Performed by: INTERNAL MEDICINE

## 2021-12-02 PROCEDURE — 99231 SBSQ HOSP IP/OBS SF/LOW 25: CPT | Performed by: PSYCHIATRY & NEUROLOGY

## 2021-12-02 PROCEDURE — 25010000002 ENOXAPARIN PER 10 MG: Performed by: NURSE PRACTITIONER

## 2021-12-02 PROCEDURE — 97110 THERAPEUTIC EXERCISES: CPT

## 2021-12-02 RX ORDER — DONEPEZIL HYDROCHLORIDE 10 MG/1
10 TABLET, FILM COATED ORAL NIGHTLY
Status: DISCONTINUED | OUTPATIENT
Start: 2021-12-02 | End: 2021-12-13 | Stop reason: HOSPADM

## 2021-12-02 RX ADMIN — SODIUM CHLORIDE, PRESERVATIVE FREE 10 ML: 5 INJECTION INTRAVENOUS at 09:11

## 2021-12-02 RX ADMIN — DIVALPROEX SODIUM 250 MG: 250 TABLET, DELAYED RELEASE ORAL at 21:17

## 2021-12-02 RX ADMIN — PANTOPRAZOLE SODIUM 40 MG: 40 TABLET, DELAYED RELEASE ORAL at 09:10

## 2021-12-02 RX ADMIN — ATORVASTATIN CALCIUM 10 MG: 10 TABLET, FILM COATED ORAL at 09:10

## 2021-12-02 RX ADMIN — ASPIRIN 81 MG: 81 TABLET, COATED ORAL at 09:10

## 2021-12-02 RX ADMIN — ENOXAPARIN SODIUM 40 MG: 40 INJECTION SUBCUTANEOUS at 09:10

## 2021-12-02 RX ADMIN — DICLOFENAC 2 G: 10 GEL TOPICAL at 20:00

## 2021-12-02 RX ADMIN — DIVALPROEX SODIUM 250 MG: 250 TABLET, DELAYED RELEASE ORAL at 09:10

## 2021-12-02 RX ADMIN — SUCRALFATE 1 G: 1 TABLET ORAL at 06:41

## 2021-12-02 RX ADMIN — Medication 5 MG: at 21:17

## 2021-12-02 RX ADMIN — ACETAMINOPHEN 650 MG: 325 TABLET, FILM COATED ORAL at 21:17

## 2021-12-02 RX ADMIN — DONEPEZIL HYDROCHLORIDE 10 MG: 10 TABLET, FILM COATED ORAL at 21:18

## 2021-12-02 RX ADMIN — SODIUM CHLORIDE, PRESERVATIVE FREE 10 ML: 5 INJECTION INTRAVENOUS at 21:18

## 2021-12-02 RX ADMIN — DICLOFENAC 2 G: 10 GEL TOPICAL at 21:18

## 2021-12-02 RX ADMIN — LISINOPRIL 2.5 MG: 2.5 TABLET ORAL at 09:10

## 2021-12-02 RX ADMIN — FLUTICASONE PROPIONATE 2 SPRAY: 50 SPRAY, METERED NASAL at 09:11

## 2021-12-02 RX ADMIN — QUETIAPINE FUMARATE 50 MG: 25 TABLET ORAL at 21:17

## 2021-12-02 RX ADMIN — DICLOFENAC 2 G: 10 GEL TOPICAL at 09:11

## 2021-12-02 RX ADMIN — LEVOTHYROXINE SODIUM 50 MCG: 50 TABLET ORAL at 06:41

## 2021-12-02 NOTE — THERAPY TREATMENT NOTE
Patient Name: Ramin Zaragoza  : 1935    MRN: [id number]                              Today's Date: 2021       Admit Date: 2021    Visit Dx:     ICD-10-CM ICD-9-CM   1. Weakness  R53.1 780.79   2. Fall, initial encounter  W19.XXXA E888.9   3. Confusion  R41.0 298.9   4. Altered mental status, unspecified altered mental status type  R41.82 780.97     Patient Active Problem List   Diagnosis   • Coronary artery disease involving native coronary artery of native heart without angina pectoris   • Premature ventricular contraction   • Essential hypertension   • Mixed hyperlipidemia   • Venous insufficiency   • Hypothyroidism   • Lactose intolerance   • Weakness   • Fall     Past Medical History:   Diagnosis Date   • CAD (coronary artery disease)    • Hyperlipidemia    • Hypertension    • Hypothyroidism     on chronic replacement therapy   • Lactose intolerance    • Lower extremity edema    • PVC's (premature ventricular contractions)     History of   • Thyroid disorder    • Venous insufficiency      Past Surgical History:   Procedure Laterality Date   • CARDIAC CATHETERIZATION     • CORONARY STENT PLACEMENT     • EYE SURGERY      Bilateral cataract extraction   • HERNIA REPAIR      Inguinal hernia repair   • OTHER SURGICAL HISTORY      Melanoma removed from back   • OTHER SURGICAL HISTORY      History of left elbow fracture with sunsequent fixation      General Information     Row Name 21 1025          Physical Therapy Time and Intention    Document Type therapy note (daily note)  -KG     Mode of Treatment physical therapy  -KG     Row Name 21 1025          General Information    Patient Profile Reviewed yes  -KG     Existing Precautions/Restrictions fall; other (see comments)  Bilateral ankle pain  -KG     Row Name 21 1025          Cognition    Orientation Status (Cognition) oriented to; person; place; situation; time  -KG     Row Name 21 1025          Safety Issues, Functional  Mobility    Impairments Affecting Function (Mobility) balance; cognition; endurance/activity tolerance; strength; postural/trunk control; pain  -KG           User Key  (r) = Recorded By, (t) = Taken By, (c) = Cosigned By    Initials Name Provider Type    Emily Montoya Physical Therapist               Mobility     Row Name 12/02/21 1046          Bed Mobility    Comment (Bed Mobility) sitting EOB  -KG     Row Name 12/02/21 1046          Transfers    Comment (Transfers) cues safe HP  -KG     Row Name 12/02/21 1046          Sit-Stand Transfer    Sit-Stand Angelina (Transfers) minimum assist (75% patient effort); verbal cues; 1 person assist  -KG     Assistive Device (Sit-Stand Transfers) walker, front-wheeled  -KG     Row Name 12/02/21 1046          Gait/Stairs (Locomotion)    Angelina Level (Gait) verbal cues; minimum assist (75% patient effort)  -KG     Assistive Device (Gait) walker, front-wheeled  -KG     Distance in Feet (Gait) 15, 10  -KG     Deviations/Abnormal Patterns (Gait) gait speed decreased; festinating/shuffling; carley decreased  -KG     Bilateral Gait Deviations forward flexed posture; heel strike decreased  -KG     Comment (Gait/Stairs) Pt able to ambulate 15' in room, then standing activities at sink, then 10' further with RW, min-A for AD management.  Pt c/o bilateral ankle pain that improves with increased ankle mobility.  -KG           User Key  (r) = Recorded By, (t) = Taken By, (c) = Cosigned By    Initials Name Provider Type    [place] Emily Quiroz Physical Therapist               Obj/Interventions     Row Name 12/02/21 1050          Motor Skills    Therapeutic Exercise knee; ankle  -KG     Row Name 12/02/21 1050          Knee (Therapeutic Exercise)    Knee AROM (Therapeutic Exercise) bilateral; SLR (straight leg raise); LAQ (long arc quad); 10 repetitions; 2 sets  -KG     Row Name 12/02/21 1050          Ankle (Therapeutic Exercise)    Ankle AROM (Therapeutic Exercise) bilateral;  dorsiflexion; plantarflexion; 2 sets; 10 repetitions  -KG     Row Name 12/02/21 1050          Balance    Static Standing Balance supported; standing; mild impairment  -KG     Dynamic Standing Balance mild impairment; supported; standing  -KG     Balance Interventions standing; sit to stand; weight shifting activity  -KG           User Key  (r) = Recorded By, (t) = Taken By, (c) = Cosigned By    Initials Name Provider Type    Emily Montoya Physical Therapist               Goals/Plan    No documentation.                Clinical Impression     Row Name 12/02/21 1052          Pain Scale: Numbers Pre/Post-Treatment    Pretreatment Pain Rating 0/10 - no pain  -KG     Posttreatment Pain Rating 0/10 - no pain  -KG     Pre/Posttreatment Pain Comment bilateral ankle pain with mobility  -KG     Row Name 12/02/21 1052          Plan of Care Review    Plan of Care Reviewed With patient  Simultaneous filing. User may be unaware of other data.  -KG     Progress improving  Simultaneous filing. User may be unaware of other data.  -KG     Outcome Summary Pt with improving functional mobility.  Pt able to ambulate 15' in room, then standing activities at sink, then 10' further with RW, min-A for AD management.  Pt c/o bilateral ankle pain that improves with increased ankle mobility.  Simultaneous filing. User may be unaware of other data.  -KG     Row Name 12/02/21 1052          Positioning and Restraints    Pre-Treatment Position in bed  Simultaneous filing. User may be unaware of other data.  -KG     Post Treatment Position chair  Simultaneous filing. User may be unaware of other data.  -KG     In Chair call light within reach; encouraged to call for assist; exit alarm on; waffle cushion; legs elevated  Simultaneous filing. User may be unaware of other data.  -KG           User Key  (r) = Recorded By, (t) = Taken By, (c) = Cosigned By    Initials Name Provider Type    Emily Montoya Physical Therapist                Outcome Measures     Row Name 12/02/21 1053          How much help from another person do you currently need...    Turning from your back to your side while in flat bed without using bedrails? 4  -KG     Moving from lying on back to sitting on the side of a flat bed without bedrails? 3  -KG     Moving to and from a bed to a chair (including a wheelchair)? 2  -KG     Standing up from a chair using your arms (e.g., wheelchair, bedside chair)? 3  -KG     Climbing 3-5 steps with a railing? 2  -KG     To walk in hospital room? 3  -KG     AM-PAC 6 Clicks Score (PT) 17  -KG     Row Name 12/02/21 1054 12/02/21 1053       Functional Assessment    Outcome Measure Options AM-PAC 6 Clicks Daily Activity (OT)  -KF AM-PAC 6 Clicks Basic Mobility (PT)  -KG          User Key  (r) = Recorded By, (t) = Taken By, (c) = Cosigned By    Initials Name Provider Type    Yuliet Tony OT Occupational Therapist    Emily Montoya Physical Therapist                             Physical Therapy Education                 Title: PT OT SLP Therapies (In Progress)     Topic: Physical Therapy (Done)     Point: Mobility training (Done)     Learning Progress Summary           Patient Acceptance, E,TB, VU by KG at 12/2/2021 1056    Eager, E,D, NR by DM at 11/28/2021 1116                   Point: Home exercise program (Done)     Learning Progress Summary           Patient Acceptance, E,TB, VU by KG at 12/2/2021 1056    Eager, E,D, NR by DM at 11/28/2021 1116                   Point: Body mechanics (Done)     Learning Progress Summary           Patient Acceptance, E,TB, VU by KG at 12/2/2021 1056    Eager, E,D, NR by DM at 11/28/2021 1116                   Point: Precautions (Done)     Learning Progress Summary           Patient Acceptance, E,TB, VU by KG at 12/2/2021 1056    Eager, E,D, NR by DM at 11/28/2021 1116                               User Key     Initials Effective Dates Name Provider Type Discipline    KASH 06/16/21 -  Tr  Yakelin HARRIS, PT Physical Therapist PT    KG 06/16/21 -  Emily Quiroz Physical Therapist PT              PT Recommendation and Plan     Plan of Care Reviewed With: patient (Simultaneous filing. User may be unaware of other data.)  Progress: improving (Simultaneous filing. User may be unaware of other data.)  Outcome Summary: Pt with improving functional mobility.  Pt able to ambulate 15' in room, then standing activities at sink, then 10' further with RW, min-A for AD management.  Pt c/o bilateral ankle pain that improves with increased ankle mobility. (Simultaneous filing. User may be unaware of other data.)     Time Calculation:    PT Charges     Row Name 12/02/21 1058             Time Calculation    Start Time 1020  -KG      PT Received On 12/02/21  -KG      PT Goal Re-Cert Due Date 12/08/21  -KG              Time Calculation- PT    Total Timed Code Minutes- PT 25 minute(s)  -KG              Timed Charges    40027 - PT Therapeutic Exercise Minutes 10  -KG      69745 - Gait Training Minutes  15  -KG              Total Minutes    Timed Charges Total Minutes 25  -KG       Total Minutes 25  -KG            User Key  (r) = Recorded By, (t) = Taken By, (c) = Cosigned By    Initials Name Provider Type    CAMILLE Emily Quiroz Physical Therapist              Therapy Charges for Today     Code Description Service Date Service Provider Modifiers Qty    60085500127 HC PT THER PROC EA 15 MIN 12/2/2021 Emily Quiroz GP 1    53888657520 HC GAIT TRAINING EA 15 MIN 12/2/2021 Emily Quiroz GP 1          PT G-Codes  Outcome Measure Options: AM-PAC 6 Clicks Daily Activity (OT)  AM-PAC 6 Clicks Score (PT): 17  AM-PAC 6 Clicks Score (OT): 17    Emily Quiroz  12/2/2021

## 2021-12-02 NOTE — PROGRESS NOTES
Neurology       Patient Care Team:  Irene Coley MD as PCP - General (Family Medicine)    Chief complaint: Fall    History: The patient is feeling a bit better regarding his sore ankle with diclofenac.    He was able to walk 15feet.    He is apparently been accepted to skilled nursing pending insurance approval.    He remains mildly confused.      Past Medical History:   Diagnosis Date   • CAD (coronary artery disease)    • Hyperlipidemia    • Hypertension    • Hypothyroidism     on chronic replacement therapy   • Lactose intolerance    • Lower extremity edema    • PVC's (premature ventricular contractions)     History of   • Thyroid disorder    • Venous insufficiency        Vital Signs   Vitals:    12/02/21 1100 12/02/21 1200 12/02/21 1400 12/02/21 1500   BP: 148/69   139/77   BP Location: Left arm   Left arm   Patient Position: Sitting   Sitting   Pulse: 68 65 76 80   Resp: 18   16   Temp: 97.4 °F (36.3 °C)   98.3 °F (36.8 °C)   TempSrc: Oral   Oral   SpO2: 97% 96% 96% 97%   Weight:       Height:           Physical Exam:   General: Pleasant and alert              Neuro: Somewhat rambling with failure to recognize that he see me for the last several days    Results Review:  No new results  Results from last 7 days   Lab Units 11/28/21  0353   WBC 10*3/mm3 6.04   HEMOGLOBIN g/dL 13.0   HEMATOCRIT % 38.1   PLATELETS 10*3/mm3 133*     Results from last 7 days   Lab Units 12/01/21  1923 11/28/21  0353 11/27/21  1601   SODIUM mmol/L  --  139 141   POTASSIUM mmol/L 4.9 3.5 4.2   CHLORIDE mmol/L  --  105 105   CO2 mmol/L  --  23.0 28.0   BUN mg/dL  --  17 18   CREATININE mg/dL  --  0.71* 0.69*   CALCIUM mg/dL  --  8.5* 9.7   BILIRUBIN mg/dL  --   --  0.5   ALK PHOS U/L  --   --  66   ALT (SGPT) U/L  --   --  26   AST (SGOT) U/L  --   --  33   GLUCOSE mg/dL  --  91 93       Imaging Results (Last 24 Hours)     ** No results found for the last 24 hours. **          Assessment:  Dementia    Falls    Right ankle  pain    Plan:  Increase Aricept to 10 mg daily    Comment:  Better overall         I discussed the patients findings and my recommendations with patient and family    Cody Singh MD  12/02/21  15:31 EST

## 2021-12-02 NOTE — CASE MANAGEMENT/SOCIAL WORK
Continued Stay Note  [facility]     Patient Name: Ramin Zaragoza  MRN: [id number]  Today's Date: 12/2/2021    Admit Date: 11/27/2021     Discharge Plan     Row Name 12/02/21 1628       Plan    Plan SW    Plan Comments SW called daughter Isatu [phone] to discuss questions regarding a POA/Guardianship documentation, no answer, left a message. Awaiting return call. POLLO available               Discharge Codes    No documentation.                     AUGUSTO Parikh (Kay)

## 2021-12-02 NOTE — CASE MANAGEMENT/SOCIAL WORK
Continued Stay Note  [facility]     Patient Name: Ramin Zaragoza  MRN: [id number]  Today's Date: 12/2/2021    Admit Date: 11/27/2021     Discharge Plan     Row Name 12/02/21 1659       Plan    Plan SW    Plan Comments SW received a call from daughter Isatu [phone]. Discuss questions regarding a POA/Guardianship documentation. SW available                   Discharge Codes    No documentation.                     AUGUSTO Parikh (Kay)

## 2021-12-02 NOTE — THERAPY TREATMENT NOTE
Patient Name: Ramin Zaragoza  : 1935    MRN: [id number]                              Today's Date: 2021       Admit Date: 2021    Visit Dx:     ICD-10-CM ICD-9-CM   1. Weakness  R53.1 780.79   2. Fall, initial encounter  W19.XXXA E888.9   3. Confusion  R41.0 298.9   4. Altered mental status, unspecified altered mental status type  R41.82 780.97     Patient Active Problem List   Diagnosis   • Coronary artery disease involving native coronary artery of native heart without angina pectoris   • Premature ventricular contraction   • Essential hypertension   • Mixed hyperlipidemia   • Venous insufficiency   • Hypothyroidism   • Lactose intolerance   • Weakness   • Fall     Past Medical History:   Diagnosis Date   • CAD (coronary artery disease)    • Hyperlipidemia    • Hypertension    • Hypothyroidism     on chronic replacement therapy   • Lactose intolerance    • Lower extremity edema    • PVC's (premature ventricular contractions)     History of   • Thyroid disorder    • Venous insufficiency      Past Surgical History:   Procedure Laterality Date   • CARDIAC CATHETERIZATION     • CORONARY STENT PLACEMENT     • EYE SURGERY      Bilateral cataract extraction   • HERNIA REPAIR      Inguinal hernia repair   • OTHER SURGICAL HISTORY      Melanoma removed from back   • OTHER SURGICAL HISTORY      History of left elbow fracture with sunsequent fixation      General Information     Row Name 21 1048          OT Time and Intention    Document Type therapy note (daily note)  -KF     Mode of Treatment occupational therapy  -KF     Row Name 21 1048          General Information    Patient Profile Reviewed yes  -KF     Existing Precautions/Restrictions fall; other (see comments)  B ankle pain  -KF     Barriers to Rehab medically complex; hearing deficit; cognitive status  -KF     Row Name 21 1048          Cognition    Orientation Status (Cognition) oriented to; person; place; situation; disoriented  to; time; verbal cues/prompts needed for orientation  -KF     Row Name 12/02/21 1048          Safety Issues, Functional Mobility    Safety Issues Affecting Function (Mobility) insight into deficits/self-awareness; awareness of need for assistance; safety precaution awareness  -KF     Impairments Affecting Function (Mobility) balance; cognition; endurance/activity tolerance; strength; postural/trunk control; pain  -KF     Cognitive Impairments, Mobility Safety/Performance awareness, need for assistance; insight into deficits/self-awareness; judgment; problem-solving/reasoning  -KF           User Key  (r) = Recorded By, (t) = Taken By, (c) = Cosigned By    Initials Name Provider Type    KF Yuliet Juarez, OT Occupational Therapist                 Mobility/ADL's     Row Name 12/02/21 1049          Bed Mobility    Bed Mobility scooting/bridging; supine-sit  -KF     Scooting/Bridging [place] (Bed Mobility) contact guard; verbal cues; 1 person assist  -KF     Supine-Sit [place] (Bed Mobility) contact guard; 1 person assist; verbal cues  -KF     Assistive Device (Bed Mobility) bed rails; head of bed elevated  -KF     Comment (Bed Mobility) posterior lean intermittently requires CGA  -KF     Row Name 12/02/21 1049          Transfers    Transfers sit-stand transfer; bed-chair transfer  -KF     Comment (Transfers) cues for seq and HP  -KF     Bed-Chair [place] (Transfers) minimum assist (75% patient effort); verbal cues; 1 person assist  -KF     Assistive Device (Bed-Chair Transfers) walker, front-wheeled  -KF     Sit-Stand [place] (Transfers) minimum assist (75% patient effort); verbal cues; 1 person assist  -KF     Row Name 12/02/21 1049          Sit-Stand Transfer    Assistive Device (Sit-Stand Transfers) walker, front-wheeled  -KF     Row Name 12/02/21 1049          Functional Mobility    Functional Mobility- Ind. Level minimum assist (75% patient effort); verbal cues required; 1 person  -KF      Functional Mobility- Device rolling walker  -KF     Functional Mobility-Distance (Feet) 15  -KF     Functional Mobility- Safety Issues sequencing ability decreased; weight-shifting ability decreased  -KF     Functional Mobility- Comment steps to sinkside for ADL completion extended time required  -     Row Name 12/02/21 1049          Activities of Daily Living    BADL Assessment/Intervention lower body dressing; grooming; bathing  -[facility] Name 12/02/21 1049          Lower Body Dressing Assessment/Training    [place] Level (Lower Body Dressing) pants/bottoms; don; maximum assist (25% patient effort)  -KF     Position (Lower Body Dressing) supported standing  -[facility] Name 12/02/21 1049          Upper Body Dressing Assessment/Training    Treasure Level (Upper Body Dressing) doff; don; front opening garment; minimum assist (75% patient effort); verbal cues; nonverbal cues (demo/gesture)  -KF     Position (Upper Body Dressing) edge of bed sitting  -[facility] Name 12/02/21 1049          Grooming Assessment/Training    [place] Level (Grooming) hair care, combing/brushing; wash face, hands; contact guard assist; verbal cues  -KF     Position (Grooming) sink side; supported standing  -[facility] Name 12/02/21 1049          Bathing Assessment/Intervention    Tre[place] Level (Bathing) bathing skills; upper body; maximum assist (25% patient effort)  -KF     Position (Bathing) sink side; supported standing  -KF     Comment (Bathing) hair at sink side in supported standing  -KF           User Key  (r) = Recorded By, (t) = Taken By, (c) = Cosigned By    Initials Name Provider Type     Yuliet Juarez, OT Occupational Therapist               Obj/Interventions     [facility] Name 12/02/21 1052          Balance    Balance Assessment sitting static balance; sitting dynamic balance; standing static balance; standing dynamic balance  -     Static Sitting Balance unsupported; mild impairment; WFL; sitting,  edge of bed  -KF     Dynamic Sitting Balance unsupported; mild impairment; sitting, edge of bed  -KF     Static Standing Balance WFL; supported  -KF     Dynamic Standing Balance mild impairment; supported  -KF     Balance Interventions sit to stand; standing; sitting; occupation based/functional task  -KF     Comment, Balance posterior lean intermittently in sitting improved with cues  -KF           User Key  (r) = Recorded By, (t) = Taken By, (c) = Cosigned By    Initials Name Provider Type    KF Yuliet Juarez, OT Occupational Therapist               Goals/Plan    No documentation.                Clinical Impression     Row Name 12/02/21 1052          Pain Assessment    Additional Documentation Pain Scale: FACES Pre/Post-Treatment (Group)  -KF     Row Name 12/02/21 1052          Pain Scale: FACES Pre/Post-Treatment    Pain: FACES Scale, Pretreatment 4-->hurts little more  -KF     Posttreatment Pain Rating 4-->hurts little more  -KF     Pain Location - Side Bilateral  -KF     Pain Location - Orientation generalized  -KF     Pain Location ankle  -KF     Pre/Posttreatment Pain Comment tolerated  -KF     Row Name 12/02/21 1055          Plan of Care Review    Plan of Care Reviewed With patient  -KF     Progress improving  -KF     Outcome Summary Pt completed washing hair maxA standing sink side, completed 15 ft functional mob for sinkside ADL completion with improving standing tolerance, CGA hair care, Reji washing hands in standing, progressed with time, cont IPOT per POC  -KF     Row Name 12/02/21 1055 12/02/21 1052       Therapy Assessment/Plan (OT)    Rehab Potential (OT) good, to achieve stated therapy goals  -KF good, to achieve stated therapy goals  -KF    Criteria for Skilled Therapeutic Interventions Met (OT) yes; meets criteria; skilled treatment is necessary  -KF yes; meets criteria; skilled treatment is necessary  -KF    Therapy Frequency (OT) daily  -KF daily  -KF    Row Name 12/02/21 1055 12/02/21  1052       Therapy Plan Review/Discharge Plan (OT)    Anticipated Discharge Disposition (OT) skilled nursing facility  -KF skilled nursing facility  -KF    Row Name 12/02/21 1055 12/02/21 1052       Vital Signs    Pre Systolic BP Rehab -- --  RN cleared VSS  -KF    Pre Patient Position Supine  -KF Supine  -KF    Intra Patient Position Standing  -KF Standing  -KF    Post Patient Position Sitting  -KF Sitting  -KF    Rest Breaks  -- 1  -KF    Row Name 12/02/21 1055          Positioning and Restraints    Pre-Treatment Position in bed  -KF     Post Treatment Position chair  -KF     In Chair notified nsg; reclined; sitting; call light within reach; encouraged to call for assist; exit alarm on; with PT; waffle cushion; legs elevated  -KF           User Key  (r) = Recorded By, (t) = Taken By, (c) = Cosigned By    Initials Name Provider Type    KF Yuliet Juarez, OT Occupational Therapist               Outcome Measures     Row Name 12/02/21 1054          How much help from another is currently needed...    Putting on and taking off regular lower body clothing? 2  -KF     Bathing (including washing, rinsing, and drying) 2  -KF     Toileting (which includes using toilet bed pan or urinal) 3  -KF     Putting on and taking off regular upper body clothing 3  -KF     Taking care of personal grooming (such as brushing teeth) 3  -KF     Eating meals 4  -KF     AM-PAC 6 Clicks Score (OT) 17  -KF     Row Name 12/02/21 1053          How much help from another person do you currently need...    Turning from your back to your side while in flat bed without using bedrails? 4  -KG     Moving from lying on back to sitting on the side of a flat bed without bedrails? 3  -KG     Moving to and from a bed to a chair (including a wheelchair)? 2  -KG     Standing up from a chair using your arms (e.g., wheelchair, bedside chair)? 3  -KG     Climbing 3-5 steps with a railing? 2  -KG     To walk in hospital room? 3  -KG     AM-PAC 6 Clicks Score  (PT) 17  -KG     Row Name 12/02/21 1054 12/02/21 1053       Functional Assessment    Outcome Measure Options AM-PAC 6 Clicks Daily Activity (OT)  -KF AM-PAC 6 Clicks Basic Mobility (PT)  -KG          User Key  (r) = Recorded By, (t) = Taken By, (c) = Cosigned By    Initials Name Provider Type    KF Yuliet Juarez, OT Occupational Therapist    Emily Montoya Physical Therapist                Occupational Therapy Education                 Title: PT OT SLP Therapies (In Progress)     Topic: Occupational Therapy (In Progress)     Point: ADL training (Done)     Description:   Instruct learner(s) on proper safety adaptation and remediation techniques during self care or transfers.   Instruct in proper use of assistive devices.              Learning Progress Summary           Patient Acceptance, E,TB,D, VU,DU by  at 12/2/2021 1055    Acceptance, E,TB,D, VU,DU,NR by  at 11/28/2021 1422   Family Acceptance, E,TB,D, VU,DU,NR by  at 11/28/2021 1422                   Point: Home exercise program (Not Started)     Description:   Instruct learner(s) on appropriate technique for monitoring, assisting and/or progressing therapeutic exercises/activities.              Learner Progress:  Not documented in this visit.          Point: Precautions (Done)     Description:   Instruct learner(s) on prescribed precautions during self-care and functional transfers.              Learning Progress Summary           Patient Acceptance, E,TB,D, VU,DU by  at 12/2/2021 1055    Acceptance, E,TB,D, VU,DU,NR by  at 11/28/2021 1422   Family Acceptance, E,TB,D, VU,DU,NR by  at 11/28/2021 1422                   Point: Body mechanics (Done)     Description:   Instruct learner(s) on proper positioning and spine alignment during self-care, functional mobility activities and/or exercises.              Learning Progress Summary           Patient Acceptance, E,TB,D, VU,DU by  at 12/2/2021 1055    Acceptance, E,TB,D, VU,DU,NR by  at  11/28/2021 1422   Family Acceptance, E,TB,D, VU,DU,NR by KF at 11/28/2021 1422                               User Key     Initials Effective Dates Name Provider Type Discipline     06/16/21 -  Yuliet Juarez OT Occupational Therapist OT              OT Recommendation and Plan  Planned Therapy Interventions (OT): activity tolerance training, adaptive equipment training, BADL retraining, cognitive/visual perception retraining, occupation/activity based interventions, strengthening exercise, ROM/therapeutic exercise, patient/caregiver education/training, transfer/mobility retraining, functional balance retraining  Therapy Frequency (OT): daily  Plan of Care Review  Plan of Care Reviewed With: patient  Progress: improving  Outcome Summary: Pt completed washing hair maxA standing sink side, completed 15 ft functional mob for sinkside ADL completion with improving standing tolerance, CGA hair care, Reji washing hands in standing, progressed with time, cont IPOT per POC     Time Calculation:    Time Calculation- OT     Row Name 12/02/21 1058 12/02/21 0952          Time Calculation- OT    OT Start Time -- 0952  -KF     OT Received On -- 12/02/21  -KF            Timed Charges    35302 - Gait Training Minutes  15  -KG --     30975 - OT Self Care/Mgmt Minutes -- 23  -KF            Total Minutes    Timed Charges Total Minutes 15  -KG 23  -KF      Total Minutes 15  -KG 23  -KF           User Key  (r) = Recorded By, (t) = Taken By, (c) = Cosigned By    Initials Name Provider Type     Yuliet Juarez, OT Occupational Therapist    KG Emily Quiroz Physical Therapist              Therapy Charges for Today     Code Description Service Date Service Provider Modifiers Qty    [id number]  OT SELF CARE/MGMT/TRAIN EA 15 MIN 12/2/2021 Yuliet Juarez OT GO 2               Yuliet Juarez OT  12/2/2021

## 2021-12-02 NOTE — PLAN OF CARE
Goal Outcome Evaluation:  Plan of Care Reviewed With: patient (Simultaneous filing. User may be unaware of other data.)        Progress: improving (Simultaneous filing. User may be unaware of other data.)  Outcome Summary: Pt with improving functional mobility.  Pt able to ambulate 15' in room, then standing activities at sink, then 10' further with RW, min-A for AD management.  Pt c/o bilateral ankle pain that improves with increased ankle mobility. (Simultaneous filing. User may be unaware of other data.)

## 2021-12-02 NOTE — DISCHARGE PLACEMENT REQUEST
"Raymond Zaragoza (86 y.o. Male)             Date of Birth Social Security Number Address Home Phone MRN    [id number]  [id number]   [facility] [address] [phone] [id number]    Caodaism Marital Status             Anglican        Admission Date Admission Type Admitting Provider Attending Provider Department, Room/Bed    21 Emergency Valery Richmond MD Mini, Jocelyn, MD [facility] 4H, S482/1    Discharge Date Discharge Disposition Discharge Destination                         Attending Provider: Valery Richmond MD    Allergies: Lactose Intolerance (Gi)    Isolation: None   Infection: None   Code Status: CPR   Advance Care Planning Activity    Ht: 175.3 cm (69\")   Wt: 86.2 kg (190 lb)    Admission Cmt: None   Principal Problem: None                Active Insurance as of 2021     Primary Coverage     Payor Plan Insurance Group Employer/Plan Group    [facility] MEDICARE REPLACEMENT WELLCARE MEDICARE REPLACEMENT      Payor Plan Address Payor Plan Phone Number Payor Plan Fax Number Effective Dates    [address] [phone]  2021 - None Entered    [facility] [id number]       Subscriber Name Subscriber Birth Date Member ID       RAYMOND ZARAGOZA 1935 [id number]           Secondary Coverage     Payor Plan Insurance Group Employer/Plan Group    [employer] BLUE CROSS [facility] SUPP [id number]     Payor Plan Address Payor Plan Phone Number Payor Plan Fax Number Effective Dates    [address]   2016 - None Entered    [facility] [id number]       Subscriber Name Subscriber Birth Date Member ID       RAYMOND ZARAGOZA 1935 [id number]                 Emergency Contacts      (Rel.) Home Phone Work Phone Mobile Phone    ISABELLA MEYER (Daughter) [phone] -- --               History & Physical      Shaun Murray MD at 21 [facility]              [facility] Medicine Services  HISTORY AND PHYSICAL    Patient Name: Raymond Zaragoza  : " 1935  MRN: [id number]  Primary Care Physician: Irene Coley MD  Date of admission: 11/27/2021    Subjective   Subjective     Chief Complaint:  Fall, weakness    HPI:  Ramin Zaragoza is a 86 y.o. male with PMH significant for CAD, HLD, HTN, hypothyroid, chronic lower extremity edema, who presents to the ED with complaint of weakness and fall.  Daughter who is at bedside helps provide HPI.  She notes that he has been having general decline with an acute decreased over the past week.  She states that he has had multiple falls.  He is unable to safely stay by himself.  She notes that they have recently tried to get patient into assisted living and at the last minute he refused to go.  She states that she is started the process for emergency guardianship.  Upon arrival to the ED, he does note that he fell today.  He states that he uses 2 canes to walk.  He states that he just feels unsteady.  He denies dizziness or lightheadedness prior to his fall.  He also denies any falls prior to today.  CT head notes advanced generalized cerebral atrophy in ventriculomegaly.  MRI is pending.  Labs are benign.  He will be admitted to hospital medicine for further evaluation.      COVID Details:        Symptoms: [x] NONE [] Fever []  Cough [] Shortness of breath [] Change in taste or smell  The patient has a COVID HM Topic on their chart, and they are fully vaccinated.    Review of Systems   Constitutional: Negative for activity change, appetite change, chills, diaphoresis, fatigue, fever and unexpected weight change.   HENT: Negative.    Eyes: Negative.  Negative for visual disturbance.   Respiratory: Negative.  Negative for cough, chest tightness, shortness of breath and wheezing.    Cardiovascular: Positive for leg swelling. Negative for chest pain and palpitations.   Gastrointestinal: Negative.  Negative for abdominal distention, abdominal pain, blood in stool, constipation, diarrhea, nausea and vomiting.   Endocrine:  Negative.    Genitourinary: Positive for frequency. Negative for difficulty urinating, dysuria and urgency.   Musculoskeletal: Positive for gait problem. Negative for arthralgias, back pain and myalgias.   Skin: Negative for color change, pallor, rash and wound.   Allergic/Immunologic: Negative.    Neurological: Positive for weakness. Negative for dizziness, syncope, speech difficulty, light-headedness, numbness and headaches.   Hematological: Negative.    Psychiatric/Behavioral: Positive for confusion. The patient is not nervous/anxious.       All other systems reviewed and are negative.     Personal History     Past Medical History:   Diagnosis Date   • CAD (coronary artery disease)    • Hyperlipidemia    • Hypertension    • Hypothyroidism     on chronic replacement therapy   • Lactose intolerance    • Lower extremity edema    • PVC's (premature ventricular contractions)     History of   • Thyroid disorder    • Venous insufficiency        Past Surgical History:   Procedure Laterality Date   • CARDIAC CATHETERIZATION     • CORONARY STENT PLACEMENT     • EYE SURGERY      Bilateral cataract extraction   • HERNIA REPAIR      Inguinal hernia repair   • OTHER SURGICAL HISTORY      Melanoma removed from back   • OTHER SURGICAL HISTORY      History of left elbow fracture with sunsequent fixation       Family History:  family history includes No Known Problems in his father and mother. Otherwise pertinent FHx was reviewed and unremarkable.     Social History:  reports that he has never smoked. He has never used smokeless tobacco. He reports that he does not drink alcohol and does not use drugs.  Social History     Social History Narrative   • Not on file       Medications:  ARIPiprazole, aspirin, fluticasone, levothyroxine, lisinopril, nitroglycerin, omeprazole, simvastatin, sucralfate, and tamsulosin    Allergies   Allergen Reactions   • Lactose Intolerance (Gi)        Objective   Objective     Vital Signs:   Temp:  [98.8  °F (37.1 °C)] 98.8 °F (37.1 °C)  Heart Rate:  [53-58] 53  Resp:  [16-18] 16  BP: (133-144)/(77-81) 139/81    Physical Exam   Constitutional: Awake, alert  Eyes: PERRLA, sclerae anicteric, no conjunctival injection  HENT: NCAT, mucous membranes moist  Neck: Supple, no thyromegaly, no lymphadenopathy, trachea midline  Respiratory: Clear to auscultation bilaterally, nonlabored respirations   Cardiovascular: Bradycardic, no murmurs, rubs, or gallops, palpable pedal pulses bilaterally  Gastrointestinal: Positive bowel sounds, soft, nontender, nondistended  Musculoskeletal:  bilateral ankle edema R>L, no clubbing or cyanosis to extremities  Psychiatric: Appropriate affect, cooperative  Neurologic: Oriented to person and place, poor historian, strength symmetric in all extremities, Cranial Nerves grossly intact to confrontation, speech clear  Skin: No rashes      Result Review:  I have personally reviewed the results from the time of this admission to 11/27/21 11:15 PM EST and agree with these findings:  [x]  Laboratory  []  Microbiology  [x]  Radiology  [x]  EKG/Telemetry   []  Cardiology/Vascular   []  Pathology  [x]  Old records  []  Other:  Most notable findings include:       LAB RESULTS:      Lab 11/27/21  1601 11/23/21  1240   WBC 7.05 5.92   HEMOGLOBIN 14.5 13.0   HEMATOCRIT 43.6 38.0   PLATELETS 152 170   NEUTROS ABS 4.15 4.02   IMMATURE GRANS (ABS) 0.02 0.02   LYMPHS ABS 1.66 1.13   MONOS ABS 0.97* 0.65   EOS ABS 0.23 0.09   MCV 96.0 93.6         Lab 11/27/21  1601 11/23/21  1240   SODIUM 141 136   POTASSIUM 4.2 4.4   CHLORIDE 105 101   CO2 28.0 27.0   ANION GAP 8.0 8.0   BUN 18 26*   CREATININE 0.69* 0.81   GLUCOSE 93 102*   CALCIUM 9.7 9.1   MAGNESIUM 2.1  --          Lab 11/27/21  1601 11/23/21  1240   TOTAL PROTEIN 7.2 6.0   ALBUMIN 4.20 3.80   GLOBULIN 3.0 2.2   ALT (SGPT) 26 28   AST (SGOT) 33 33   BILIRUBIN 0.5 0.3   ALK PHOS 66 67         Lab 11/27/21  1601 11/23/21  1240   PROBNP  --  115.5   TROPONIN T  <0.010 <0.010                 UA    Urinalysis 3/18/21 3/26/21 11/27/21   Specific [place], UA 1.014 1.013 1.022   Ketones, UA Negative Negative Negative   Blood, UA Negative Negative Negative   Leukocytes, UA Negative Negative Negative   Nitrite, UA Negative Negative Negative           Microbiology Results (last 10 days)     Procedure Component Value - Date/Time    COVID PRE-OP / PRE-PROCEDURE SCREENING ORDER (NO ISOLATION) - Swab, Nasopharynx [[id number]]  (Normal) Collected: 11/27/21 1724    Lab Status: Final result Specimen: Swab from Nasopharynx Updated: 11/27/21 1801    Narrative:      The following orders were created for panel order COVID PRE-OP / PRE-PROCEDURE SCREENING ORDER (NO ISOLATION) - Swab, Nasopharynx.  Procedure                               Abnormality         Status                     ---------                               -----------         ------                     COVID-19 and FLU A/B PCR...[[id number]]  Normal              Final result                 Please view results for these tests on the individual orders.    COVID-19 and FLU A/B PCR - Swab, Nasopharynx [[id number]]  (Normal) Collected: 11/27/21 1724    Lab Status: Final result Specimen: Swab from Nasopharynx Updated: 11/27/21 1801     COVID19 Not Detected     Influenza A PCR Not Detected     Influenza B PCR Not Detected    Narrative:      Fact sheet for providers: https://www.fda.gov/media/334800/download    Fact sheet for patients: https://www.fda.gov/media/685313/download    Test performed by PCR.    COVID PRE-OP / PRE-PROCEDURE SCREENING ORDER (NO ISOLATION) - Swab, Nasopharynx [[id number]]  (Normal) Collected: 11/23/21 1350    Lab Status: Final result Specimen: Swab from Nasopharynx Updated: 11/23/21 1453    Narrative:      The following orders were created for panel order COVID PRE-OP / PRE-PROCEDURE SCREENING ORDER (NO ISOLATION) - Swab, Nasopharynx.  Procedure                               Abnormality         Status                      ---------                               -----------         ------                     Respiratory Panel PCR w/...[[id number]]  Normal              Final result                 Please view results for these tests on the individual orders.    Respiratory Panel PCR w/COVID-19(SARS-CoV-2) DEEP/JOSEPH/ESTHER/PAD/COR/MAD/MARLINE In-House, NP Swab in UTM/VTM, 3-4 HR TAT - Swab, Nasopharynx [[id number]]  (Normal) Collected: 11/23/21 1350    Lab Status: Final result Specimen: Swab from Nasopharynx Updated: 11/23/21 6568     ADENOVIRUS, PCR Not Detected     Coronavirus 229E Not Detected     Coronavirus HKU1 Not Detected     Coronavirus NL63 Not Detected     Coronavirus OC43 Not Detected     COVID19 Not Detected     Human Metapneumovirus Not Detected     Human Rhinovirus/Enterovirus Not Detected     Influenza A PCR Not Detected     Influenza B PCR Not Detected     Parainfluenza Virus 1 Not Detected     Parainfluenza Virus 2 Not Detected     Parainfluenza Virus 3 Not Detected     Parainfluenza Virus 4 Not Detected     RSV, PCR Not Detected     Bordetella pertussis pcr Not Detected     Bordetella parapertussis PCR Not Detected     Chlamydophila pneumoniae PCR Not Detected     Mycoplasma pneumo by PCR Not Detected    Narrative:      In the setting of a positive respiratory panel with a viral infection PLUS a negative procalcitonin without other underlying concern for bacterial infection, consider observing off antibiotics or discontinuation of antibiotics and continue supportive care. If the respiratory panel is positive for atypical bacterial infection (Bordetella pertussis, Chlamydophila pneumoniae, or Mycoplasma pneumoniae), consider antibiotic de-escalation to target atypical bacterial infection.          CT Head Without Contrast    Result Date: 11/27/2021  EXAMINATION: CT HEAD WO CONTRAST-  INDICATION: ataxia  TECHNIQUE: 5 mm unenhanced images through the brain  The radiation dose reduction device was turned on for each scan per  the ALARA (As Low as Reasonably Achievable) protocol.  COMPARISON: NONE  FINDINGS: The calvarium appears intact. Included paranasal sinuses and mastoids appear clear. Soft tissue images show advanced generalized cerebral atrophy, and ventriculomegaly. Ventricles are relatively large for the degree of atrophy present, and in addition to central brain atrophy as in etiology, the possibility of normal pressure hydrocephalus should be considered.  There is no evidence of hemorrhage, contusion, or edema, no evidence of mass or mass effect, or abnormal extra-axial collection. No evidence of acute or old infarct is seen.        Impression: Advanced generalized cerebral atrophy, and ventriculomegaly. Ventriculomegaly may reflect central brain atrophy, or possibly normal pressure hydrocephalus. No other evidence of acute intracranial disease is seen.       XR Chest 1 View    Result Date: 11/27/2021  EXAMINATION: XR CHEST 1 VW-  INDICATION: Weak/Dizzy/AMS triage protocol  TECHNIQUE: Frontal view of the chest  COMPARISON: Chest x-ray 11/23/2021, CT abdomen and pelvis 3/26/2021  FINDINGS:  Stable cardiomediastinal silhouette within normal limits. Mild decrease in lung volumes from prior. Similar vague bibasilar parenchymal opacities from prior exam; these may reflect chronic interstitial appearing changes and peripheral reticulations seen at the lung bases on CT abdomen and pelvis from 3/26/2021. No pneumothorax. No pleural effusion.      Impression:  Decreased lung volumes from most recent chest x-ray. Otherwise stable chest with redemonstration of vague interstitial bibasilar parenchymal opacities possibly corresponding to chronic interstitial changes and peripheral reticulations noted on lung bases from CT abdomen and pelvis dated 3/26/2021.  This report was finalized on 11/27/2021 6:17 PM by Octavio Adamson MD.            Assessment/Plan   Assessment & Plan       Coronary artery disease involving native coronary artery of  native heart without angina pectoris    Essential hypertension    Mixed hyperlipidemia    Hypothyroidism    Weakness    Fall   86 y.o. male with PMH significant for CAD, HLD, HTN, hypothyroid, chronic lower extremity edema, who presents to the ED with complaint of weakness and fall.    Weakness  Fall  -PT/OT consult in the a.m.  -B12 in the a.m.  -Fall precautions    Cognitive impairment  -PCP has noted concern for dementia  -Patient recently started Abilify  -Daughter has started process for emergency guardianship  -Case management consult in the a.m. for assistance with discharge planning  -Consider neurology consult in the a.m.    Hypothyroid  -Reports no longer taking levothyroxine  -TSH    Hyperlipidemia  Hypertension  CAD  -Continue daily ASA  -Continue lisinopril  -Continue simvastatin    BPH  -No longer taking Flomax    GERD  -Continue sucralfate      DVT prophylaxis: Lovenox    CODE STATUS:    Code Status (Patient has no pulse and is not breathing): CPR (Attempt to Resuscitate)  Medical Interventions (Patient has pulse or is breathing): Full Support      This note has been completed as part of a split-shared workflow.   Signature:   Electronically signed by DARA Cody, 11/27/21, 11:31 PM EST.      Brief Attending Admission Attestation     I have seen and examined the patient, performing an independent face-to-face diagnostic evaluation with plan of care reviewed and developed with the advanced practice clinician (APC).    Old records reviewed and summarized in PM hx.    Brief Summary Statement:  86 Y M, from home(lives alone), here with second ED visit over last 1 week with complaint of generalized decline, patient requiring now walker-from his cane, with fall today am.  No lightheadedness.    No complaint of syncope noted.  No focal weakness noted.  Do not complain of any overt pain.  Recently started on Abilify over past week.  On ED work-up patient , troponin less than  0.01  Glucose-93  BUN/creatinine 18/0.6, albumin 4.2, magnesium 2.1  WBC-7, hemoglobin 14, platelet 152, UA-negative Covid-negative, chest x-ray-no acute abnormality  UA-negative  MRI-no acute hemorrhage, ventriculomegaly,  Normal pressure hydrocephalus bilateral mastoid effusion.    Remainder of detailed HPI is as noted above and has been reviewed and/or edited by me for completeness.    PM HX:  Dementia/with some behavioral disturbances, mood disorder-Abilify 2 mg daily  CAD-multiple stents, last cardiac cath in 2014 with patent stents, aspirin 81 mg  Hypertension-lisinopril 2.5 mg daily  GERD/HH-Prilosec 20 mg daily  Hyperlipidemia-Zocor 20 mg daily  BPH-Flomax 0.4 mg,   hypothyroid-Synthroid 25 mcg daily    Chronic lower extremity edema.    Vitals:    11/27/21 2315   BP: 125/72   Pulse: 53   Resp: 16   Temp:  Afebrile   SpO2: 95%       Attending Physical Exam:  RS- CTA-BL, ,  No wheezing , no crackles, good effort.  CVS- s1s2 regular, no murmur.  ABD- soft, non tender, not distended,no ABD tenderness, no organomegaly.  EXT- no edema.  NEURO- AAO-to place, person, no focal defecit.      LABS:  As above   EKG-sinus bradycardia 56 bpm,  no acute ST-T wave changes.poor R wave progression on Lateral leads.    Brief Assessment/Plan  ??2nd to Abilify,  Normal pressure hydrocephalus -? On MRI- neurology consult.  Pt/ot consult    See above for further detailed assessment and plan developed with APC which I have reviewed and/or edited for completeness.      Admission Status: I believe that this patient meets observation status.            Electronically signed by Shaun Murray MD at 11/28/21 0142          Physician Progress Notes (most recent note)      Cody Singh MD at 12/02/21 1531          Neurology       Patient Care Team:  Irene Coley MD as PCP - General (Family Medicine)    Chief complaint: Fall    History: The patient is feeling a bit better regarding his sore ankle with  diclofenac.    He was able to walk 15feet.    He is apparently been accepted to skilled nursing pending insurance approval.    He remains mildly confused.      Past Medical History:   Diagnosis Date   • CAD (coronary artery disease)    • Hyperlipidemia    • Hypertension    • Hypothyroidism     on chronic replacement therapy   • Lactose intolerance    • Lower extremity edema    • PVC's (premature ventricular contractions)     History of   • Thyroid disorder    • Venous insufficiency        Vital Signs   Vitals:    12/02/21 1100 12/02/21 1200 12/02/21 1400 12/02/21 1500   BP: 148/69   139/77   BP Location: Left arm   Left arm   Patient Position: Sitting   Sitting   Pulse: 68 65 76 80   Resp: 18   16   Temp: 97.4 °F (36.3 °C)   98.3 °F (36.8 °C)   TempSrc: Oral   Oral   SpO2: 97% 96% 96% 97%   Weight:       Height:           Physical Exam:   General: Pleasant and alert              Neuro: Somewhat rambling with failure to recognize that he see me for the last several days    Results Review:  No new results  Results from last 7 days   Lab Units 11/28/21  0353   WBC 10*3/mm3 6.04   HEMOGLOBIN g/dL 13.0   HEMATOCRIT % 38.1   PLATELETS 10*3/mm3 133*     Results from last 7 days   Lab Units 12/01/21  1923 11/28/21  0353 11/27/21  1601   SODIUM mmol/L  --  139 141   POTASSIUM mmol/L 4.9 3.5 4.2   CHLORIDE mmol/L  --  105 105   CO2 mmol/L  --  23.0 28.0   BUN mg/dL  --  17 18   CREATININE mg/dL  --  0.71* 0.69*   CALCIUM mg/dL  --  8.5* 9.7   BILIRUBIN mg/dL  --   --  0.5   ALK PHOS U/L  --   --  66   ALT (SGPT) U/L  --   --  26   AST (SGOT) U/L  --   --  33   GLUCOSE mg/dL  --  91 93       Imaging Results (Last 24 Hours)     ** No results found for the last 24 hours. **          Assessment:  Dementia    Falls    Right ankle pain    Plan:  Increase Aricept to 10 mg daily    Comment:  Better overall         I discussed the patients findings and my recommendations with patient and family    Cody Singh  MD  12/02/21  15:31 EST          Electronically signed by Cody Singh MD at 12/02/21 1532          Consult Notes (most recent note)      Mehreen Muhammad MD at 11/28/21 1358      Consult Orders    1. Inpatient Neurology Consult General [[id number]] ordered by Shaun Murray MD at 11/28/21 0112                   Referring Provider: Trevor  Reason for Consultation: weakness, falls    Patient Care Team:  Irene Coley MD as PCP - General (Family Medicine)    Chief complaint weakness,falls     Subjective .     History of present illness:   Mr Ramin Zaragoza is an 86-year-old man who has a history of dementia, coronary artery disease, untreated hypothyroidism, hypertension, hyperlipidemia, urinary frequency who was admitted for weakness and falls.    Daughter helps with the history as patient is a very poor historian.  He does not remember why he was brought to the hospital.  According to the daughter she and her brother found him at home on the ground.  He has been falling lately and has had to call the 911 twice in the last 2 weeks for shortness of breath.    Daughter and son went to check on patient yesterday and found him on the floor of his kitchen. They were able to get him into a chair but he was very lethargic. She reports patient is improving since admission and not getting abilify.    Daughter tells me that patient has had a history of cognitive decline and has been diagnosed with dementia.  Back in 2018 she was awarded conservatorship after he was scammed out of thousands of dollars.  She said he did not understand he was being scammed and will just give money to people.  She now takes care of his bills.         He had been on aricept but stopped taking it for some reason. She tells me he was recently started on Abilify to help with mood swings a couple of weeks ago.  She tells me that he is always had a quick temper.    Patient lives alone and is about 45 minutes away from near his  family.    I have reviewed his last available note from [facility] dated 10/14/2021. Dr Coley saw patient and note states that he had been concerned about his uncontrolled blood pressure. She was able to determine he had been out his lisinopril for months. She also noted that he was found urinating in a trash can in the exam room when left alone.         Review of Systems  He denies any pain, nausea, vomiting, fevers, chills, but does endorse mild back pain. He denies feeling depressed. Remaining ROS is negative except as noted in HPI     History  Past Medical History:   Diagnosis Date   • CAD (coronary artery disease)    • Hyperlipidemia    • Hypertension    • Hypothyroidism     on chronic replacement therapy   • Lactose intolerance    • Lower extremity edema    • PVC's (premature ventricular contractions)     History of   • Thyroid disorder    • Venous insufficiency    ,   Past Surgical History:   Procedure Laterality Date   • CARDIAC CATHETERIZATION     • CORONARY STENT PLACEMENT     • EYE SURGERY      Bilateral cataract extraction   • HERNIA REPAIR      Inguinal hernia repair   • OTHER SURGICAL HISTORY      Melanoma removed from back   • OTHER SURGICAL HISTORY      History of left elbow fracture with sunsequent fixation   ,   Family History   Problem Relation Age of Onset   • No Known Problems Mother    • No Known Problems Father    ,   Social History     Socioeconomic History   • Marital status:    Tobacco Use   • Smoking status: Never Smoker   • Smokeless tobacco: Never Used   Substance and Sexual Activity   • Alcohol use: No   • Drug use: No   • Sexual activity: Defer     E-cigarette/Vaping     E-cigarette/Vaping Substances     E-cigarette/Vaping Devices       ,   Medications Prior to Admission   Medication Sig Dispense Refill Last Dose   • aspirin 81 MG EC tablet Take 81 mg by mouth daily.      • fluticasone (FLONASE) 50 MCG/ACT nasal spray 2 sprays into each nostril daily. Administer 2  "sprays in each nostril for each dose.      • lisinopril (PRINIVIL,ZESTRIL) 2.5 MG tablet TAKE 1 TABLET BY MOUTH ONCE DAILY 90 tablet 1    • nitroglycerin (NITROSTAT) 0.4 MG SL tablet Place 1 tablet under the tongue every 5 (five) minutes as needed for chest pain. Take no more than 3 doses in 15 minutes. 25 tablet 11    • omeprazole (priLOSEC) 20 MG capsule Take 20 mg by mouth Daily.      • simvastatin (Zocor) 20 MG tablet Take 20 mg by mouth Every Night.      • sucralfate (CARAFATE) 1 g tablet Take 1 g by mouth 2 (Two) Times a Day.      • ARIPiprazole (ABILIFY) 2 MG tablet Take 2 mg by mouth Daily.   Unknown at Unknown time   • levothyroxine (SYNTHROID, LEVOTHROID) 25 MCG tablet Take 25 mcg by mouth daily.      • tamsulosin (FLOMAX) 0.4 MG capsule 24 hr capsule Take 1 capsule by mouth Daily.      , Scheduled Meds:  ARIPiprazole, 2 mg, Oral, Daily  aspirin, 81 mg, Oral, Daily  atorvastatin, 10 mg, Oral, Daily  enoxaparin, 40 mg, Subcutaneous, Q24H  fluticasone, 2 spray, Nasal, Daily  lisinopril, 2.5 mg, Oral, Daily  pantoprazole, 40 mg, Oral, Daily  sodium chloride, 10 mL, Intravenous, Q12H  sucralfate, 1 g, Oral, BID AC    , Continuous Infusions:   , PRN Meds:  •  acetaminophen **OR** acetaminophen **OR** acetaminophen  •  senna-docusate sodium **AND** polyethylene glycol **AND** bisacodyl **AND** bisacodyl  •  melatonin  •  sodium chloride  •  sodium chloride and Allergies:  Lactose intolerance (gi)    Objective     Vital Signs   Blood pressure 127/68, pulse 54, temperature 97.3 °F (36.3 °C), temperature source Oral, resp. rate 18, height 175.3 cm (69\"), weight 86.2 kg (190 lb), SpO2 99 %.    Physical Exam:  Elderly man, sitting in recliner  Head/eyes: atraumatic, perrl  ENT poor dentition, moist membrane  Neck supple, trachea midline  Respiratory on room air, even respirations  Cardiac rrr, mild edema in LE  Extremities flat feet, normal temperature  Skin dry, intact  Speech no dysarthria or aphasia  CN perrl, " eomi, vf intact, no facial weakness, hard of hearing, sensation intact   Mental status oriented to person, place time but has no memory of events leading up to admission   Cognitive status attends to me, responds quickly, no psychomotor slowing, has poor short term memory but has exquisite long term memory, follows commands  Motor nml tone and bulk, globally weak but mild 4+ throughout  Sensation intact to light touch and vibration  Reflexes down going toes, patellar 2+ bilaterally   Cerebellar fnf nml, no nystagmus  Gait shuffling gait but with encouragement can walk with long strides     Results Review:  TSH 7.83  Ft4 0.83  Cr 0.71   MRI brain personally reviewed/ atrophy causing ventriculomegaly, no acute changes, chronic ischemic changes  UA negative for infection     Assessment/Plan       Coronary artery disease involving native coronary artery of native heart without angina pectoris    Essential hypertension    Mixed hyperlipidemia    Hypothyroidism    Weakness    Fall      87 y/o man with history of dementia, mood swings, htn, hld, cad, who is admitted for weakness/falls.    Per report, patient had recently started abilify which can cause excessive sedation and this medication is on hold. It is unclear why he stopped aricept; he has an appt tomorrow with his neurologist Dr Michael. Dr Michael has not seen patient in years according to dgtr but would defer medication changes to him as he will follow in clinic.    MRI brain is reassuring, no stroke. While he does have enlarged ventricles, this is due to atrophy. He does not have clinical symptoms of NPH (no psychomotor slowing, magnetic gait, incontinence).     There is no evidence of u/l infection or metabolic derangement on labs.    Overall, patient seems to be improving in level of alertness. Suspect he was oversedated and falling due to new medication.     I do not believe he has capacity to make his own medical decisions. He has no memory of reason for  admission, has poor short term memory, and needs supervision for his medication. I do not believe he is safe to return home alone.    He is at high risk for delirium, will start seroquel tonight.     I discussed the patient's findings and my recommendations with patient, patient's dgtr and primary team     Mehreen Muhammad MD  21  13:58 EST    Update 245pm  Spoke w/ dgtr again. Advised he does not have capacity to make medical decisions. She understands; will change his appt w/ Dr Michael for later in the next week or so. Will have case management help her find a SNF/LTACH.         Electronically signed by Mehreen Muhammad MD at 21 0659          Physical Therapy Notes (most recent note)      Emily Quiroz at 21 1020  Version 1 of 1         Patient Name: Ramin Zaragoza  : 1935    MRN: [id number]                              Today's Date: 2021       Admit Date: 2021    Visit Dx:     ICD-10-CM ICD-9-CM   1. Weakness  R53.1 780.79   2. Fall, initial encounter  W19.XXXA E888.9   3. Confusion  R41.0 298.9   4. Altered mental status, unspecified altered mental status type  R41.82 780.97     Patient Active Problem List   Diagnosis   • Coronary artery disease involving native coronary artery of native heart without angina pectoris   • Premature ventricular contraction   • Essential hypertension   • Mixed hyperlipidemia   • Venous insufficiency   • Hypothyroidism   • Lactose intolerance   • Weakness   • Fall     Past Medical History:   Diagnosis Date   • CAD (coronary artery disease)    • Hyperlipidemia    • Hypertension    • Hypothyroidism     on chronic replacement therapy   • Lactose intolerance    • Lower extremity edema    • PVC's (premature ventricular contractions)     History of   • Thyroid disorder    • Venous insufficiency      Past Surgical History:   Procedure Laterality Date   • CARDIAC CATHETERIZATION     • CORONARY STENT PLACEMENT     • EYE SURGERY       Bilateral cataract extraction   • HERNIA REPAIR      Inguinal hernia repair   • OTHER SURGICAL HISTORY      Melanoma removed from back   • OTHER SURGICAL HISTORY      History of left elbow fracture with sunsequent fixation      General Information     Row Name 12/02/21 1025          Physical Therapy Time and Intention    Document Type therapy note (daily note)  -KG     Mode of Treatment physical therapy  -KG     Row Name 12/02/21 1025          General Information    Patient Profile Reviewed yes  -KG     Existing Precautions/Restrictions fall; other (see comments)  Bilateral ankle pain  -KG     Row Name 12/02/21 1025          Cognition    Orientation Status (Cognition) oriented to; person; place; situation; time  -KG     Row Name 12/02/21 1025          Safety Issues, Functional Mobility    Impairments Affecting Function (Mobility) balance; cognition; endurance/activity tolerance; strength; postural/trunk control; pain  -KG           User Key  (r) = Recorded By, (t) = Taken By, (c) = Cosigned By    Initials Name Provider Type    [place] Emily Quiroz Physical Therapist               Mobility     Row Name 12/02/21 1046          Bed Mobility    Comment (Bed Mobility) sitting EOB  -KG     Row Name 12/02/21 1046          Transfers    Comment (Transfers) cues safe HP  -KG     Row Name 12/02/21 1046          Sit-Stand Transfer    Sit-Stand [place] (Transfers) minimum assist (75% patient effort); verbal cues; 1 person assist  -KG     Assistive Device (Sit-Stand Transfers) walker, front-wheeled  -KG     Row Name 12/02/21 1046          Gait/Stairs (Locomotion)    [place] Level (Gait) verbal cues; minimum assist (75% patient effort)  -KG     Assistive Device (Gait) walker, front-wheeled  -KG     Distance in Feet (Gait) 15, 10  -KG     Deviations/Abnormal Patterns (Gait) gait speed decreased; festinating/shuffling; [place]ley decreased  -KG     Bilateral Gait Deviations forward flexed posture; heel strike decreased  -KG      Comment (Gait/Stairs) Pt able to ambulate 15' in room, then standing activities at sink, then 10' further with RW, min-A for AD management.  Pt c/o bilateral ankle pain that improves with increased ankle mobility.  -KG           User Key  (r) = Recorded By, (t) = Taken By, (c) = Cosigned By    Initials Name Provider Type    Emily Montoya Physical Therapist               Obj/Interventions     Row Name 12/02/21 1050          Motor Skills    Therapeutic Exercise knee; ankle  -KG     Row Name 12/02/21 1050          Knee (Therapeutic Exercise)    Knee AROM (Therapeutic Exercise) bilateral; SLR (straight leg raise); LAQ (long arc quad); 10 repetitions; 2 sets  -KG     Row Name 12/02/21 1050          Ankle (Therapeutic Exercise)    Ankle AROM (Therapeutic Exercise) bilateral; dorsiflexion; plantarflexion; 2 sets; 10 repetitions  -KG     Row Name 12/02/21 1050          Balance    Static Standing Balance supported; standing; mild impairment  -KG     Dynamic Standing Balance mild impairment; supported; standing  -KG     Balance Interventions standing; sit to stand; weight shifting activity  -KG           User Key  (r) = Recorded By, (t) = Taken By, (c) = Cosigned By    Initials Name Provider Type    Emily Montoya Physical Therapist               Goals/Plan    No documentation.                Clinical Impression     Row Name 12/02/21 1052          Pain Scale: Numbers Pre/Post-Treatment    Pretreatment Pain Rating 0/10 - no pain  -KG     Posttreatment Pain Rating 0/10 - no pain  -KG     Pre/Posttreatment Pain Comment bilateral ankle pain with mobility  -KG     Row Name 12/02/21 1052          Plan of Care Review    Plan of Care Reviewed With patient  Simultaneous filing. User may be unaware of other data.  -KG     Progress improving  Simultaneous filing. User may be unaware of other data.  -KG     Outcome Summary Pt with improving functional mobility.  Pt able to ambulate 15' in room, then standing activities at  sink, then 10' further with RW, min-A for AD management.  Pt c/o bilateral ankle pain that improves with increased ankle mobility.  Simultaneous filing. User may be unaware of other data.  -KG     Row Name 12/02/21 1052          Positioning and Restraints    Pre-Treatment Position in bed  Simultaneous filing. User may be unaware of other data.  -KG     Post Treatment Position chair  Simultaneous filing. User may be unaware of other data.  -KG     In Chair call light within reach; encouraged to call for assist; exit alarm on; waffle cushion; legs elevated  Simultaneous filing. User may be unaware of other data.  -KG           User Key  (r) = Recorded By, (t) = Taken By, (c) = Cosigned By    Initials Name Provider Type    Emily Montoya Physical Therapist               Outcome Measures     Row Name 12/02/21 1053          How much help from another person do you currently need...    Turning from your back to your side while in flat bed without using bedrails? 4  -KG     Moving from lying on back to sitting on the side of a flat bed without bedrails? 3  -KG     Moving to and from a bed to a chair (including a wheelchair)? 2  -KG     Standing up from a chair using your arms (e.g., wheelchair, bedside chair)? 3  -KG     Climbing 3-5 steps with a railing? 2  -KG     To walk in hospital room? 3  -KG     AM-PAC 6 Clicks Score (PT) 17  -KG     Row Name 12/02/21 1054 12/02/21 1053       Functional Assessment    Outcome Measure Options AM-PAC 6 Clicks Daily Activity (OT)  -KF AM-PAC 6 Clicks Basic Mobility (PT)  -KG          User Key  (r) = Recorded By, (t) = Taken By, (c) = Cosigned By    Initials Name Provider Type    Yuliet Tony OT Occupational Therapist    Emily Montoya Physical Therapist                             Physical Therapy Education                 Title: PT OT SLP Therapies (In Progress)     Topic: Physical Therapy (Done)     Point: Mobility training (Done)     Learning Progress Summary            Patient Acceptance, E,TB, VU by KG at 12/2/2021 1056    Eager, E,D, NR by DM at 11/28/2021 1116                   Point: Home exercise program (Done)     Learning Progress Summary           Patient Acceptance, E,TB, VU by KG at 12/2/2021 1056    Eager, E,D, NR by DM at 11/28/2021 1116                   Point: Body mechanics (Done)     Learning Progress Summary           Patient Acceptance, E,TB, VU by KG at 12/2/2021 1056    Eager, E,D, NR by DM at 11/28/2021 1116                   Point: Precautions (Done)     Learning Progress Summary           Patient Acceptance, E,TB, VU by KG at 12/2/2021 1056    Eager, E,D, NR by DM at 11/28/2021 1116                               User Key     Initials Effective Dates Name Provider Type Discipline    DM 06/16/21 -  Yakelin Armando, PT Physical Therapist PT    KG 06/16/21 -  Emily Quiroz Physical Therapist PT              PT Recommendation and Plan     Plan of Care Reviewed With: patient (Simultaneous filing. User may be unaware of other data.)  Progress: improving (Simultaneous filing. User may be unaware of other data.)  Outcome Summary: Pt with improving functional mobility.  Pt able to ambulate 15' in room, then standing activities at sink, then 10' further with RW, min-A for AD management.  Pt c/o bilateral ankle pain that improves with increased ankle mobility. (Simultaneous filing. User may be unaware of other data.)     Time Calculation:    PT Charges     Row Name 12/02/21 1058             Time Calculation    Start Time 1020  -KG      PT Received On 12/02/21  -KG      PT Goal Re-Cert Due Date 12/08/21  -KG              Time Calculation- PT    Total Timed Code Minutes- PT 25 minute(s)  -KG              Timed Charges    58198 - PT Therapeutic Exercise Minutes 10  -KG      43281 - Gait Training Minutes  15  -KG              Total Minutes    Timed Charges Total Minutes 25  -KG       Total Minutes 25  -KG            User Key  (r) = Recorded By, (t) = Taken By,  (c) = Cosigned By    Initials Name Provider Type    KG Emily Quiroz Physical Therapist              Therapy Charges for Today     Code Description Service Date Service Provider Modifiers Qty    92224581837 HC PT THER PROC EA 15 MIN 2021 Emily Quiroz GP 1    34127116181 HC GAIT TRAINING EA 15 MIN 2021 Emily Quiroz GP 1          PT G-Codes  Outcome Measure Options: AM-PAC 6 Clicks Daily Activity (OT)  AM-PAC 6 Clicks Score (PT): 17  AM-PAC 6 Clicks Score (OT): 17    Emily Quiroz  2021      Electronically signed by Emily Quiroz at 21 1059          Occupational Therapy Notes (most recent note)      Yuliet Juarez, OT at 21 0952          Patient Name: Ramin Zaragoza  : 1935    MRN: [id number]                              Today's Date: 2021       Admit Date: 2021    Visit Dx:     ICD-10-CM ICD-9-CM   1. Weakness  R53.1 780.79   2. Fall, initial encounter  W19.XXXA E888.9   3. Confusion  R41.0 298.9   4. Altered mental status, unspecified altered mental status type  R41.82 780.97     Patient Active Problem List   Diagnosis   • Coronary artery disease involving native coronary artery of native heart without angina pectoris   • Premature ventricular contraction   • Essential hypertension   • Mixed hyperlipidemia   • Venous insufficiency   • Hypothyroidism   • Lactose intolerance   • Weakness   • Fall     Past Medical History:   Diagnosis Date   • CAD (coronary artery disease)    • Hyperlipidemia    • Hypertension    • Hypothyroidism     on chronic replacement therapy   • Lactose intolerance    • Lower extremity edema    • PVC's (premature ventricular contractions)     History of   • Thyroid disorder    • Venous insufficiency      Past Surgical History:   Procedure Laterality Date   • CARDIAC CATHETERIZATION     • CORONARY STENT PLACEMENT     • EYE SURGERY      Bilateral cataract extraction   • HERNIA REPAIR      Inguinal hernia repair   • OTHER SURGICAL  HISTORY      Melanoma removed from back   • OTHER SURGICAL HISTORY      History of left elbow fracture with sunsequent fixation      General Information     Row Name 12/02/21 1048          OT Time and Intention    Document Type therapy note (daily note)  -KF     Mode of Treatment occupational therapy  -     Row Name 12/02/21 1048          General Information    Patient Profile Reviewed yes  -KF     Existing Precautions/Restrictions fall; other (see comments)  B ankle pain  -KF     Barriers to Rehab medically complex; hearing deficit; cognitive status  -     Row Name 12/02/21 1048          Cognition    Orientation Status (Cognition) oriented to; person; place; situation; disoriented to; time; verbal cues/prompts needed for orientation  -KF     Row Name 12/02/21 1048          Safety Issues, Functional Mobility    Safety Issues Affecting Function (Mobility) insight into deficits/self-awareness; awareness of need for assistance; safety precaution awareness  -KF     Impairments Affecting Function (Mobility) balance; cognition; endurance/activity tolerance; strength; postural/trunk control; pain  -KF     Cognitive Impairments, Mobility Safety/Performance awareness, need for assistance; insight into deficits/self-awareness; judgment; problem-solving/reasoning  -KF           User Key  (r) = Recorded By, (t) = Taken By, (c) = Cosigned By    Initials Name Provider Type    KF Yuliet Juarez OT Occupational Therapist                 Mobility/ADL's     Row Name 12/02/21 1049          Bed Mobility    Bed Mobility scooting/bridging; supine-sit  -KF     Scooting/Bridging [place] (Bed Mobility) contact guard; verbal cues; 1 person assist  -KF     Supine-Sit [place] (Bed Mobility) contact guard; 1 person assist; verbal cues  -KF     Assistive Device (Bed Mobility) bed rails; head of bed elevated  -KF     Comment (Bed Mobility) posterior lean intermittently requires CGA  -KF     Row Name 12/02/21 1044           Transfers    Transfers sit-stand transfer; bed-chair transfer  -KF     Comment (Transfers) cues for seq and HP  -KF     Bed-Chair [place] (Transfers) minimum assist (75% patient effort); verbal cues; 1 person assist  -KF     Assistive Device (Bed-Chair Transfers) walker, front-wheeled  -KF     Sit-Stand [place] (Transfers) minimum assist (75% patient effort); verbal cues; 1 person assist  -KF     Row Name 12/02/21 1049          Sit-Stand Transfer    Assistive Device (Sit-Stand Transfers) walker, front-wheeled  -KF     Row Name 12/02/21 1049          Functional Mobility    Functional Mobility- Ind. Level minimum assist (75% patient effort); verbal cues required; 1 person  -KF     Functional Mobility- Device rolling walker  -KF     Functional Mobility-Distance (Feet) 15  -KF     Functional Mobility- Safety Issues sequencing ability decreased; weight-shifting ability decreased  -KF     Functional Mobility- Comment steps to sinkside for ADL completion extended time required  -     Row Name 12/02/21 1049          Activities of Daily Living    BADL Assessment/Intervention lower body dressing; grooming; bathing  -     Row Name 12/02/21 1049          Lower Body Dressing Assessment/Training    [place] Level (Lower Body Dressing) pants/bottoms; don; maximum assist (25% patient effort)  -KF     Position (Lower Body Dressing) supported standing  -     Row Name 12/02/21 1049          Upper Body Dressing Assessment/Training    [place] Level (Upper Body Dressing) doff; don; front opening garment; minimum assist (75% patient effort); verbal cues; nonverbal cues (demo/gesture)  -     Position (Upper Body Dressing) edge of bed sitting  -     Row Name 12/02/21 1049          Grooming Assessment/Training    [place] Level (Grooming) hair care, combing/brushing; wash face, hands; contact guard assist; verbal cues  -     Position (Grooming) sink side; supported standing  -     Row Name 12/02/21 1049           Bathing Assessment/Intervention    [place] Level (Bathing) bathing skills; upper body; maximum assist (25% patient effort)  -KF     Position (Bathing) sink side; supported standing  -KF     Comment (Bathing) hair at sink side in supported standing  -KF           User Key  (r) = Recorded By, (t) = Taken By, (c) = Cosigned By    Initials Name Provider Type    Yuliet Tony OT Occupational Therapist               Obj/Interventions     Row Name 12/02/21 1052          Balance    Balance Assessment sitting static balance; sitting dynamic balance; standing static balance; standing dynamic balance  -KF     Static Sitting Balance unsupported; mild impairment; WFL; sitting, edge of bed  -KF     Dynamic Sitting Balance unsupported; mild impairment; sitting, edge of bed  -KF     Static Standing Balance WFL; supported  -KF     Dynamic Standing Balance mild impairment; supported  -KF     Balance Interventions sit to stand; standing; sitting; occupation based/functional task  -KF     Comment, Balance posterior lean intermittently in sitting improved with cues  -KF           User Key  (r) = Recorded By, (t) = Taken By, (c) = Cosigned By    Initials Name Provider Type    Yuliet Tony, MACRINA Occupational Therapist               Goals/Plan    No documentation.                Clinical Impression     Row Name 12/02/21 1052          Pain Assessment    Additional Documentation Pain Scale: FACES Pre/Post-Treatment (Group)  -KF     Row Name 12/02/21 1052          Pain Scale: FACES Pre/Post-Treatment    Pain: FACES Scale, Pretreatment 4-->hurts little more  -KF     Posttreatment Pain Rating 4-->hurts little more  -KF     Pain Location - Side Bilateral  -KF     Pain Location - Orientation generalized  -KF     Pain Location ankle  -KF     Pre/Posttreatment Pain Comment tolerated  -KF     Row Name 12/02/21 1053          Plan of Care Review    Plan of Care Reviewed With patient  -KF     Progress improving  -KF      Outcome Summary Pt completed washing hair maxA standing sink side, completed 15 ft functional mob for sinkside ADL completion with improving standing tolerance, CGA hair care, Reji washing hands in standing, progressed with time, cont IPOT per POC  -KF     Row Name 12/02/21 1055 12/02/21 1052       Therapy Assessment/Plan (OT)    Rehab Potential (OT) good, to achieve stated therapy goals  -KF good, to achieve stated therapy goals  -KF    Criteria for Skilled Therapeutic Interventions Met (OT) yes; meets criteria; skilled treatment is necessary  -KF yes; meets criteria; skilled treatment is necessary  -KF    Therapy Frequency (OT) daily  -KF daily  -KF    Row Name 12/02/21 1055 12/02/21 1052       Therapy Plan Review/Discharge Plan (OT)    Anticipated Discharge Disposition (OT) skilled nursing facility  -KF skilled nursing facility  -KF    Row Name 12/02/21 1055 12/02/21 1052       Vital Signs    Pre Systolic BP Rehab -- --  RN cleared VSS  -KF    Pre Patient Position Supine  -KF Supine  -KF    Intra Patient Position Standing  -KF Standing  -KF    Post Patient Position Sitting  -KF Sitting  -KF    Rest Breaks  -- 1  -KF    Row Name 12/02/21 1055          Positioning and Restraints    Pre-Treatment Position in bed  -KF     Post Treatment Position chair  -KF     In Chair notified nsg; reclined; sitting; call light within reach; encouraged to call for assist; exit alarm on; with PT; waffle cushion; legs elevated  -KF           User Key  (r) = Recorded By, (t) = Taken By, (c) = Cosigned By    Initials Name Provider Type    KF Yuliet Juarez, OT Occupational Therapist               Outcome Measures     Row Name 12/02/21 1054          How much help from another is currently needed...    Putting on and taking off regular lower body clothing? 2  -KF     Bathing (including washing, rinsing, and drying) 2  -KF     Toileting (which includes using toilet bed pan or urinal) 3  -KF     Putting on and taking off regular upper  body clothing 3  -KF     Taking care of personal grooming (such as brushing teeth) 3  -KF     Eating meals 4  -KF     AM-PAC 6 Clicks Score (OT) 17  -KF     Row Name 12/02/21 1053          How much help from another person do you currently need...    Turning from your back to your side while in flat bed without using bedrails? 4  -KG     Moving from lying on back to sitting on the side of a flat bed without bedrails? 3  -KG     Moving to and from a bed to a chair (including a wheelchair)? 2  -KG     Standing up from a chair using your arms (e.g., wheelchair, bedside chair)? 3  -KG     Climbing 3-5 steps with a railing? 2  -KG     To walk in hospital room? 3  -KG     AM-PAC 6 Clicks Score (PT) 17  -KG     Row Name 12/02/21 1054 12/02/21 1053       Functional Assessment    Outcome Measure Options AM-PAC 6 Clicks Daily Activity (OT)  -KF AM-PAC 6 Clicks Basic Mobility (PT)  -KG          User Key  (r) = Recorded By, (t) = Taken By, (c) = Cosigned By    Initials Name Provider Type    KF Yuliet Juarez, OT Occupational Therapist    KG Emily Quiroz Physical Therapist                Occupational Therapy Education                 Title: PT OT SLP Therapies (In Progress)     Topic: Occupational Therapy (In Progress)     Point: ADL training (Done)     Description:   Instruct learner(s) on proper safety adaptation and remediation techniques during self care or transfers.   Instruct in proper use of assistive devices.              Learning Progress Summary           Patient Acceptance, E,TB,D, VU,DU by  at 12/2/2021 1055    Acceptance, E,TB,D, VU,DU,NR by  at 11/28/2021 1422   Family Acceptance, E,TB,D, VU,DU,NR by  at 11/28/2021 1422                   Point: Home exercise program (Not Started)     Description:   Instruct learner(s) on appropriate technique for monitoring, assisting and/or progressing therapeutic exercises/activities.              Learner Progress:  Not documented in this visit.          Point:  Precautions (Done)     Description:   Instruct learner(s) on prescribed precautions during self-care and functional transfers.              Learning Progress Summary           Patient Acceptance, E,TB,D, VU,DU by  at 12/2/2021 1055    Acceptance, E,TB,D, VU,DU,NR by  at 11/28/2021 1422   Family Acceptance, E,TB,D, VU,DU,NR by  at 11/28/2021 1422                   Point: Body mechanics (Done)     Description:   Instruct learner(s) on proper positioning and spine alignment during self-care, functional mobility activities and/or exercises.              Learning Progress Summary           Patient Acceptance, E,TB,D, VU,DU by  at 12/2/2021 1055    Acceptance, E,TB,D, VU,DU,NR by  at 11/28/2021 1422   Family Acceptance, E,TB,D, VU,DU,NR by  at 11/28/2021 1422                               User Key     Initials Effective Dates Name Provider Type Discipline     06/16/21 -  Yuliet Juarez, OT Occupational Therapist OT              OT Recommendation and Plan  Planned Therapy Interventions (OT): activity tolerance training, adaptive equipment training, BADL retraining, cognitive/visual perception retraining, occupation/activity based interventions, strengthening exercise, ROM/therapeutic exercise, patient/caregiver education/training, transfer/mobility retraining, functional balance retraining  Therapy Frequency (OT): daily  Plan of Care Review  Plan of Care Reviewed With: patient  Progress: improving  Outcome Summary: Pt completed washing hair maxA standing sink side, completed 15 ft functional mob for sinkside ADL completion with improving standing tolerance, CGA hair care, Reji washing hands in standing, progressed with time, cont IPOT per POC     Time Calculation:    Time Calculation- OT     Row Name 12/02/21 1058 12/02/21 0952          Time Calculation- OT    OT Start Time -- 0952  -     OT Received On -- 12/02/21  -            Timed Charges    19019 - Gait Training Minutes  15  -KG --     03691 - OT  Self Care/Mgmt Minutes -- 23  -KF            Total Minutes    Timed Charges Total Minutes 15  -KG 23  -KF      Total Minutes 15  -KG 23  -KF           User Key  (r) = Recorded By, (t) = Taken By, (c) = Cosigned By    Initials Name Provider Type    Yuliet Tony, OT Occupational Therapist    KG Emily Quiroz Physical Therapist              Therapy Charges for Today     Code Description Service Date Service Provider Modifiers Qty    [id number]  OT SELF CARE/MGMT/TRAIN EA 15 MIN 12/2/2021 Yuliet Juarez OT GO 2               Yuliet Juarez OT  12/2/2021    Electronically signed by Yuliet Juarez OT at 12/02/21 1102

## 2021-12-02 NOTE — PLAN OF CARE
Problem: Adult Inpatient Plan of Care  Goal: Plan of Care Review  Recent Flowsheet Documentation  Taken 12/2/2021 1055 by Yuliet Juarez, OT  Progress: improving  Plan of Care Reviewed With: patient  Outcome Summary: Pt completed washing hair maxA standing sink side, completed 15 ft functional mob for sinkside ADL completion with improving standing tolerance, CGA hair care, Reji washing hands in standing, progressed with time, cont IPOT per POC   Goal Outcome Evaluation:  Plan of Care Reviewed With: patient        Progress: improving  Outcome Summary: Pt completed washing hair maxA standing sink side, completed 15 ft functional mob for sinkside ADL completion with improving standing tolerance, CGA hair care, Reji washing hands in standing, progressed with time, cont IPOT per POC

## 2021-12-02 NOTE — PROGRESS NOTES
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 No overnight issues. Feels well overall. Ankle better with voltaren gel.  Anxious to get started with rehab      ROS:  No fevers or chills  No dyspnea  No chest pain  No nausea     Objective   Objective     Vital Signs:   Temp:  [97.4 °F (36.3 °C)-98 °F (36.7 °C)] 97.4 °F (36.3 °C)  Heart Rate:  [65-95] 65  Resp:  [18-20] 18  BP: (138-157)/(57-79) 148/69     Physical Exam:  Constitutional: No acute distress, sitting up in chair. Daughter at   HENT: NCAT, mucous membranes moist  Respiratory: Clear to auscultation bilaterally, respiratory effort normal   Cardiovascular: RRR, no murmurs, rubs, or gallops  Gastrointestinal: Positive bowel sounds, soft, nontender, nondistended  Musculoskeletal:  no edema bilaterally.  R ankle w some Minimal bruising.   Psychiatric: Appropriate affect, cooperative  Neurologic: Awake, alert, converses appropriately about his career, follows commands  Skin: No rashes    Results Reviewed:  LAB RESULTS:      Lab 21  0353 21  1601   WBC 6.04 7.05   HEMOGLOBIN 13.0 14.5   HEMATOCRIT 38.1 43.6   PLATELETS 133* 152   NEUTROS ABS 3.50 4.15   IMMATURE GRANS (ABS) 0.02 0.02   LYMPHS ABS 1.44 1.66   MONOS ABS 0.80 0.97*   EOS ABS 0.26 0.23   MCV 96.5 96.0         Lab 21  0328 21  1923 21  0353 21  1601   SODIUM  --   --  139 141   POTASSIUM  --  4.9 3.5 4.2   CHLORIDE  --   --  105 105   CO2  --   --  23.0 28.0   ANION GAP  --   --  11.0 8.0   BUN  --   --  17 18   CREATININE  --   --  0.71* 0.69*   GLUCOSE  --   --  91 93   CALCIUM  --   --  8.5* 9.7   MAGNESIUM 2.1  --  1.8 2.1   HEMOGLOBIN A1C  --   --  5.90*  --    TSH  --   --  7.830*  --          Lab  11/27/21  1601   TOTAL PROTEIN 7.2   ALBUMIN 4.20   GLOBULIN 3.0   ALT (SGPT) 26   AST (SGOT) 33   BILIRUBIN 0.5   ALK PHOS 66         Lab 11/27/21  1601   TROPONIN T <0.010             Lab 11/28/21  0353   VITAMIN B 12 465         Brief Urine Lab Results  (Last result in the past 365 days)      Color   Clarity   Blood   Leuk Est   Nitrite   Protein   CREAT   Urine HCG        11/27/21 1931 Yellow   Clear   Negative   Negative   Negative   Negative                 Microbiology Results Abnormal     Procedure Component Value - Date/Time    COVID PRE-OP / PRE-PROCEDURE SCREENING ORDER (NO ISOLATION) - Swab, Nasopharynx [[id number]]  (Normal) Collected: 11/27/21 1724    Lab Status: Final result Specimen: Swab from Nasopharynx Updated: 11/27/21 1801    Narrative:      The following orders were created for panel order COVID PRE-OP / PRE-PROCEDURE SCREENING ORDER (NO ISOLATION) - Swab, Nasopharynx.  Procedure                               Abnormality         Status                     ---------                               -----------         ------                     COVID-19 and FLU A/B PCR...[[id number]]  Normal              Final result                 Please view results for these tests on the individual orders.    COVID-19 and FLU A/B PCR - Swab, Nasopharynx [[id number]]  (Normal) Collected: 11/27/21 1724    Lab Status: Final result Specimen: Swab from Nasopharynx Updated: 11/27/21 1801     COVID19 Not Detected     Influenza A PCR Not Detected     Influenza B PCR Not Detected    Narrative:      Fact sheet for providers: https://www.fda.gov/media/668754/download    Fact sheet for patients: https://www.fda.gov/media/314395/download    Test performed by PCR.          XR Ankle 3+ View Right    Result Date: 12/1/2021  EXAMINATION: XR ANKLE 3+ VW RIGHT-  INDICATION: pain; R53.1-Weakness; W19.XXXA-Unspecified fall, initial encounter; R41.0-Disorientation, unspecified; R41.82-Altered mental status, unspecified  TECHNIQUE: 3  images of the right ankle  COMPARISON: NONE  FINDINGS:  There is no conspicuous soft tissue swelling. No acute fracture or malalignment. Ankle mortise joint space is symmetric and preserved. The talar dome appears unremarkable. Minimal ossification within the distal Achilles tendon and proximal plantar fascia.      Impression:  No acute osseous findings.  This report was finalized on 12/1/2021 6:14 AM by Octavio Adamson MD.            I have reviewed the medications:  Scheduled Meds:aspirin, 81 mg, Oral, Daily  atorvastatin, 10 mg, Oral, Daily  Diclofenac Sodium, 2 g, Topical, 4x Daily  divalproex, 250 mg, Oral, Q12H  donepezil, 5 mg, Oral, Nightly  enoxaparin, 40 mg, Subcutaneous, Q24H  fluticasone, 2 spray, Nasal, Daily  levothyroxine, 50 mcg, Oral, Q AM  lisinopril, 2.5 mg, Oral, Daily  pantoprazole, 40 mg, Oral, Daily  QUEtiapine, 50 mg, Oral, Nightly  sodium chloride, 10 mL, Intravenous, Q12H  sucralfate, 1 g, Oral, BID AC      Continuous Infusions:   PRN Meds:.•  acetaminophen **OR** acetaminophen **OR** acetaminophen  •  senna-docusate sodium **AND** polyethylene glycol **AND** bisacodyl **AND** bisacodyl  •  magnesium sulfate **OR** magnesium sulfate **OR** magnesium sulfate  •  melatonin  •  potassium chloride **OR** potassium chloride **OR** potassium chloride  •  sodium chloride  •  sodium chloride    Assessment/Plan   Assessment & Plan     Active Hospital Problems    Diagnosis  POA   • Weakness [R53.1]  Yes   • Fall [W19.XXXA]  Yes   • Hypothyroidism [E03.9]  Yes   • Mixed hyperlipidemia [E78.2]  Yes   • Coronary artery disease involving native coronary artery of native heart without angina pectoris [I25.10]  Yes   • Essential hypertension [I10]  Yes      Resolved Hospital Problems   No resolved problems to display.        Brief Hospital Course to date:  Ramin Zaragoza is a 86 y.o. male  with PMH significant for CAD, HLD, HTN, hypothyroid, chronic lower extremity edema, who presents to the ED with  complaint of weakness and fall.    This patient's problems and plans were partially entered by my partner and updated as appropriate by me 12/02/21.      Weakness and multiple Fall  -Neurology is following.  --B12 is normal  -- planning rehab     Cognitive impairment patient with episodes of agitation and aggressive behavior.  -dementia, aricept started per Dr. Singh. Will increase today to 10mg as he is tolerating ok  -Patient recently started Abilify- stopped this admission  -Daughter has started process for emergency guardianship  --Currently on Seroquel at night- continue     Hypothyroid  -Reports no longer taking levothyroxine PTA  -TSH, free T4 or decreased.  Currently TSH is at 7.830  -started low-dose Synthroid. Will need recheck ~ 1 month     Hyperlipidemia  Hypertension  CAD  -Continue daily ASA  -Continue lisinopril  -Continue simvastatin     BPH  -No longer taking Flomax     GERD  -Continue sucralfate      DVT prophylaxis:  Medical DVT prophylaxis orders are present.       AM-PAC 6 Clicks Score (PT): 17 (12/02/21 1053)    Disposition: To rehab when bed available.    CODE STATUS:   Code Status and Medical Interventions:   Ordered at: 11/27/21 4006     Code Status (Patient has no pulse and is not breathing):    CPR (Attempt to Resuscitate)     Medical Interventions (Patient has pulse or is breathing):    Full Support       Geovanna Price, APRN  12/02/21

## 2021-12-02 NOTE — CASE MANAGEMENT/SOCIAL WORK
Discharge Planning Assessment  [facility]     Patient Name: Ramin Zaragoza  MRN: [id number]  Today's Date: 12/2/2021    Admit Date: 11/27/2021     Discharge Needs Assessment    No documentation.                Discharge Plan     Row Name 12/02/21 1633       Plan    Plan SNF - long term care    Patient/Family in Agreement with Plan yes    Plan Comments Spoke with patient's daughter over the phone. Plan is for short term rehab, transitioning to LTC. Spoke with Sissy [phone] at [facility] memory care. Daughter has been trying to get patient to memory care prior to hospitalization but he refused. [facility] is on admissions hold, Sissy to discuss with admissions coordinator at [facility] skilled nursing and follow up with me. More referrals placed.    Final Discharge Disposition Code 03 - skilled nursing facility (SNF)    Row Name 12/02/21 1628       Plan    Plan SW    Plan Comments  called daughter Isatu [phone] to discuss questions regarding a POA/Guardianship documentation, no answer, left a message. Awaiting return call. SW available              Continued Care and Services - Admitted Since 11/27/2021     Destination     Service Provider Request Status Selected Services Address Phone Fax Patient Preferred    [facility]  Pending - Request Sent N/A [address], [facility] [address] [phone] --    [place] [place]  Pending - Request Sent N/A [address] [address] [phone] --    [facility]  Pending - No Request Sent N/A [address] [facility] [address] [phone] --    [facility] [place]  Declined  No Bed Available N/A [address] [facility] [address] [phone] --    [facility]  Declined  Patient Insurance Not Accepted N/A [address], [facility] [address] [phone] --    [facility]   Declined  Patient Insurance Not Accepted N/A [address], [facility] [address] [phone] --    [facility] [place]  Declined  Patient Insurance Not Accepted N/A [address], [facility] [address] [phone] --                 Demographic Summary    No documentation.                Functional Status    No documentation.                Psychosocial    No documentation.                Abuse/Neglect    No documentation.                Legal    No documentation.                Substance Abuse    No documentation.                Patient Forms    No documentation.                   Carlota Chavez RN

## 2021-12-03 PROCEDURE — 25010000002 ENOXAPARIN PER 10 MG: Performed by: NURSE PRACTITIONER

## 2021-12-03 PROCEDURE — 99231 SBSQ HOSP IP/OBS SF/LOW 25: CPT | Performed by: PSYCHIATRY & NEUROLOGY

## 2021-12-03 PROCEDURE — 99232 SBSQ HOSP IP/OBS MODERATE 35: CPT | Performed by: NURSE PRACTITIONER

## 2021-12-03 RX ADMIN — SODIUM CHLORIDE, PRESERVATIVE FREE 10 ML: 5 INJECTION INTRAVENOUS at 10:58

## 2021-12-03 RX ADMIN — ENOXAPARIN SODIUM 40 MG: 40 INJECTION SUBCUTANEOUS at 10:58

## 2021-12-03 RX ADMIN — ASPIRIN 81 MG: 81 TABLET, COATED ORAL at 10:57

## 2021-12-03 RX ADMIN — ACETAMINOPHEN 650 MG: 325 TABLET, FILM COATED ORAL at 10:57

## 2021-12-03 RX ADMIN — PANTOPRAZOLE SODIUM 40 MG: 40 TABLET, DELAYED RELEASE ORAL at 10:56

## 2021-12-03 RX ADMIN — SUCRALFATE 1 G: 1 TABLET ORAL at 10:57

## 2021-12-03 RX ADMIN — DICLOFENAC 2 G: 10 GEL TOPICAL at 10:58

## 2021-12-03 RX ADMIN — ACETAMINOPHEN 650 MG: 325 TABLET, FILM COATED ORAL at 05:49

## 2021-12-03 RX ADMIN — DIVALPROEX SODIUM 250 MG: 250 TABLET, DELAYED RELEASE ORAL at 21:50

## 2021-12-03 RX ADMIN — LEVOTHYROXINE SODIUM 50 MCG: 50 TABLET ORAL at 05:46

## 2021-12-03 RX ADMIN — QUETIAPINE FUMARATE 50 MG: 25 TABLET ORAL at 21:52

## 2021-12-03 RX ADMIN — DICLOFENAC 2 G: 10 GEL TOPICAL at 18:33

## 2021-12-03 RX ADMIN — DICLOFENAC 2 G: 10 GEL TOPICAL at 21:51

## 2021-12-03 RX ADMIN — ATORVASTATIN CALCIUM 10 MG: 10 TABLET, FILM COATED ORAL at 10:56

## 2021-12-03 RX ADMIN — DICLOFENAC 2 G: 10 GEL TOPICAL at 13:17

## 2021-12-03 RX ADMIN — DIVALPROEX SODIUM 250 MG: 250 TABLET, DELAYED RELEASE ORAL at 11:00

## 2021-12-03 RX ADMIN — SODIUM CHLORIDE, PRESERVATIVE FREE 10 ML: 5 INJECTION INTRAVENOUS at 21:51

## 2021-12-03 RX ADMIN — DONEPEZIL HYDROCHLORIDE 10 MG: 10 TABLET, FILM COATED ORAL at 21:50

## 2021-12-03 NOTE — CASE MANAGEMENT/SOCIAL WORK
Continued Stay Note  [facility]     Patient Name: Ramin Zaragoza  MRN: [id number]  Today's Date: 12/3/2021    Admit Date: 11/27/2021     Discharge Plan     Row Name 12/03/21 1558       Plan    Plan [facility] vs SNF    Patient/Family in Agreement with Plan yes    Plan Comments Spoke with patient's daughter over the phone and patient at bedside. Patient has intermittent confusion, frequently repeating his professional and educational history.  Regardless of the topic of conversation he repeats his history. Per Sissy with [facility], they will need to assess the patient before he can come there. Social work helping daughter obtain emergency guardianship. It would be ideal to discharge to [facility] to minimize changes in staff and facilities. CM will follow up Monday.    Final Discharge Disposition Code 30 - still a patient               Discharge Codes    No documentation.                     Carlota Chavez RN

## 2021-12-03 NOTE — PROGRESS NOTES
[facility] Medicine Services  PROGRESS NOTE    Patient Name: Ramin Zaragoza  : 1935  MRN: [id number]    Date of Admission: 2021  Primary Care Physician: Irene Coley MD    Subjective   Subjective     CC:  Weakness, falls    HPI: Patient up in bed. Wiggling toes and tells me feeling much better. More mobility. No overnight issues. Feels ready for rehab and asking when he's going      ROS:  Gen- No fevers, chills  CV- No chest pain, palpitations  Resp- No cough, dyspnea  GI- No N/V/D, abd pain        Objective   Objective     Vital Signs:   Temp:  [97.2 °F (36.2 °C)-98.7 °F (37.1 °C)] 97.2 °F (36.2 °C)  Heart Rate:  [56-90] 56  Resp:  [16-18] 16  BP: (116-141)/(58-77) 127/62     Physical Exam:  Constitutional: No acute distress, sitting up in bed   HENT: NCAT, mucous membranes moist  Respiratory: Clear to auscultation bilaterally, respiratory effort normal   Cardiovascular: RRR, no murmurs, rubs, or gallops  Gastrointestinal: Positive bowel sounds, soft, nontender, nondistended  Musculoskeletal:  no edema bilaterally.  R ankle w resolving bruising.   Psychiatric: Appropriate affect, cooperative  Neurologic: Awake, alert,  follows commands, MC freely, speech clear  Skin: No rashes    Results Reviewed:  LAB RESULTS:      Lab 21  0353 21  1601   WBC 6.04 7.05   HEMOGLOBIN 13.0 14.5   HEMATOCRIT 38.1 43.6   PLATELETS 133* 152   NEUTROS ABS 3.50 4.15   IMMATURE GRANS (ABS) 0.02 0.02   LYMPHS ABS 1.44 1.66   MONOS ABS 0.80 0.97*   EOS ABS 0.26 0.23   MCV 96.5 96.0         Lab 21  0328 21  1923 21  0353 21  1601   SODIUM  --   --  139 141   POTASSIUM  --  4.9 3.5 4.2   CHLORIDE  --   --  105 105   CO2  --   --  23.0 28.0   ANION GAP  --   --  11.0 8.0   BUN  --   --  17 18   CREATININE  --   --  0.71* 0.69*   GLUCOSE  --   --  91 93   CALCIUM  --   --  8.5* 9.7   MAGNESIUM 2.1  --  1.8 2.1   HEMOGLOBIN A1C  --   --  5.90*  --    TSH  --    --  7.830*  --          Lab 11/27/21  1601   TOTAL PROTEIN 7.2   ALBUMIN 4.20   GLOBULIN 3.0   ALT (SGPT) 26   AST (SGOT) 33   BILIRUBIN 0.5   ALK PHOS 66         Lab 11/27/21  1601   TROPONIN T <0.010             Lab 11/28/21  0353   VITAMIN B 12 465         Brief Urine Lab Results  (Last result in the past 365 days)      Color   Clarity   Blood   Leuk Est   Nitrite   Protein   CREAT   Urine HCG        11/27/21 1931 Yellow   Clear   Negative   Negative   Negative   Negative                 Microbiology Results Abnormal     Procedure Component Value - Date/Time    COVID PRE-OP / PRE-PROCEDURE SCREENING ORDER (NO ISOLATION) - Swab, Nasopharynx [[id number]]  (Normal) Collected: 11/27/21 1724    Lab Status: Final result Specimen: Swab from Nasopharynx Updated: 11/27/21 1801    Narrative:      The following orders were created for panel order COVID PRE-OP / PRE-PROCEDURE SCREENING ORDER (NO ISOLATION) - Swab, Nasopharynx.  Procedure                               Abnormality         Status                     ---------                               -----------         ------                     COVID-19 and FLU A/B PCR...[[id number]]  Normal              Final result                 Please view results for these tests on the individual orders.    COVID-19 and FLU A/B PCR - Swab, Nasopharynx [[id number]]  (Normal) Collected: 11/27/21 1724    Lab Status: Final result Specimen: Swab from Nasopharynx Updated: 11/27/21 1801     COVID19 Not Detected     Influenza A PCR Not Detected     Influenza B PCR Not Detected    Narrative:      Fact sheet for providers: https://www.fda.gov/media/388479/download    Fact sheet for patients: https://www.fda.gov/media/198264/download    Test performed by PCR.          No radiology results from the last 24 hrs        I have reviewed the medications:  Scheduled Meds:aspirin, 81 mg, Oral, Daily  atorvastatin, 10 mg, Oral, Daily  Diclofenac Sodium, 2 g, Topical, 4x Daily  divalproex, 250 mg, Oral,  Q12H  donepezil, 10 mg, Oral, Nightly  enoxaparin, 40 mg, Subcutaneous, Q24H  fluticasone, 2 spray, Nasal, Daily  levothyroxine, 50 mcg, Oral, Q AM  lisinopril, 2.5 mg, Oral, Daily  pantoprazole, 40 mg, Oral, Daily  QUEtiapine, 50 mg, Oral, Nightly  sodium chloride, 10 mL, Intravenous, Q12H  sucralfate, 1 g, Oral, BID AC      Continuous Infusions:   PRN Meds:.•  acetaminophen **OR** acetaminophen **OR** acetaminophen  •  senna-docusate sodium **AND** polyethylene glycol **AND** bisacodyl **AND** bisacodyl  •  magnesium sulfate **OR** magnesium sulfate **OR** magnesium sulfate  •  melatonin  •  potassium chloride **OR** potassium chloride **OR** potassium chloride  •  sodium chloride  •  sodium chloride    Assessment/Plan   Assessment & Plan     Active Hospital Problems    Diagnosis  POA   • Weakness [R53.1]  Yes   • Fall [W19.XXXA]  Yes   • Hypothyroidism [E03.9]  Yes   • Mixed hyperlipidemia [E78.2]  Yes   • Coronary artery disease involving native coronary artery of native heart without angina pectoris [I25.10]  Yes   • Essential hypertension [I10]  Yes      Resolved Hospital Problems   No resolved problems to display.        Brief Hospital Course to date:  Ramin Zaragoza is a 86 y.o. male  with PMH significant for CAD, HLD, HTN, hypothyroid, chronic lower extremity edema, who presents to the ED with complaint of weakness and fall.    This patient's problems and plans were partially entered by my partner and updated as appropriate by me 12/03/21.      Weakness and multiple Fall  -Neurology has followed, now s/o  --B12 is normal  -- planning rehab     Cognitive impairment patient with episodes of agitation and aggressive behavior.  -dementia, aricept started per Dr. Singh. Increased to 10mg 12/2  -Patient recently started Abilify- stopped this admission  -Daughter has started process for emergency guardianship. Papers signed by Dr. Singh  --Currently on Seroquel at night- continue     Hypothyroid  -Reports no  longer taking levothyroxine PTA  -TSH, free T4 or decreased.  Currently TSH is at 7.830  -started low-dose Synthroid. Will need recheck ~ 1 month     Hyperlipidemia  Hypertension  CAD  -Continue daily ASA  -Continue lisinopril  -Continue simvastatin     BPH  -No longer taking Flomax     GERD  -Continue sucralfate      DVT prophylaxis:  Medical DVT prophylaxis orders are present.       AM-PAC 6 Clicks Score (PT): 17 (12/02/21 1053)    Disposition: To rehab when bed available.    CODE STATUS:   Code Status and Medical Interventions:   Ordered at: 11/27/21 6509     Code Status (Patient has no pulse and is not breathing):    CPR (Attempt to Resuscitate)     Medical Interventions (Patient has pulse or is breathing):    Full Support       Geovanna Price, APRN  12/03/21

## 2021-12-03 NOTE — CASE MANAGEMENT/SOCIAL WORK
Continued Stay Note  [facility]     Patient Name: Ramin Zaragoza  MRN: [id number]  Today's Date: 12/3/2021    Admit Date: 11/27/2021     Discharge Plan     Row Name 12/03/21 1134       Plan    Plan SW    Plan Comments POLLO obtained guardianship letter signed by Neurology Physician: Cody Singh MD. POLLO will place letter in patients chart for daughter to . SW attempted to contact patients daughter, no answer, left a message awaiting return call. SW available.               Discharge Codes    No documentation.                     AUGUSTO Parikh (Kay)

## 2021-12-03 NOTE — PROGRESS NOTES
Neurology       Patient Care Team:  Irene Coley MD as PCP - General (Family Medicine)    Chief complaint: Confusion    History: Patient denies problems with nausea vomiting or diarrhea.    He is tolerating the Aricept 10 mg daily without difficulty.      Past Medical History:   Diagnosis Date   • CAD (coronary artery disease)    • Hyperlipidemia    • Hypertension    • Hypothyroidism     on chronic replacement therapy   • Lactose intolerance    • Lower extremity edema    • PVC's (premature ventricular contractions)     History of   • Thyroid disorder    • Venous insufficiency        Vital Signs   Vitals:    12/02/21 1907 12/02/21 2100 12/03/21 0351 12/03/21 0700   BP: 141/70  134/71 116/58   BP Location: Left arm  Right arm Left arm   Patient Position: Lying  Lying Lying   Pulse: 88 86 59 58   Resp: 18  16 16   Temp: 98.7 °F (37.1 °C)  98.1 °F (36.7 °C) 97.4 °F (36.3 °C)   TempSrc: Oral  Oral Axillary   SpO2: 96%  93% 96%   Weight:       Height:           Physical Exam:   General: Awake and alert              Neuro: Confused about how to turn the television on.    Results Review:  Reviewed  Results from last 7 days   Lab Units 11/28/21  0353   WBC 10*3/mm3 6.04   HEMOGLOBIN g/dL 13.0   HEMATOCRIT % 38.1   PLATELETS 10*3/mm3 133*     Results from last 7 days   Lab Units 12/01/21  1923 11/28/21  0353 11/27/21  1601   SODIUM mmol/L  --  139 141   POTASSIUM mmol/L 4.9 3.5 4.2   CHLORIDE mmol/L  --  105 105   CO2 mmol/L  --  23.0 28.0   BUN mg/dL  --  17 18   CREATININE mg/dL  --  0.71* 0.69*   CALCIUM mg/dL  --  8.5* 9.7   BILIRUBIN mg/dL  --   --  0.5   ALK PHOS U/L  --   --  66   ALT (SGPT) U/L  --   --  26   AST (SGOT) U/L  --   --  33   GLUCOSE mg/dL  --  91 93       Imaging Results (Last 24 Hours)     ** No results found for the last 24 hours. **          Assessment:  Alzheimer's disease    Plan:  Continue current medication schedule.    Inpatient rehab.    Comment:  We will see the patient on request          I discussed the patients findings and my recommendations with patient and primary care team    Cody Singh MD  12/03/21  10:13 EST

## 2021-12-04 PROCEDURE — 25010000002 ENOXAPARIN PER 10 MG: Performed by: NURSE PRACTITIONER

## 2021-12-04 PROCEDURE — 99232 SBSQ HOSP IP/OBS MODERATE 35: CPT | Performed by: NURSE PRACTITIONER

## 2021-12-04 RX ADMIN — DIVALPROEX SODIUM 250 MG: 250 TABLET, DELAYED RELEASE ORAL at 12:30

## 2021-12-04 RX ADMIN — PANTOPRAZOLE SODIUM 40 MG: 40 TABLET, DELAYED RELEASE ORAL at 08:16

## 2021-12-04 RX ADMIN — ENOXAPARIN SODIUM 40 MG: 40 INJECTION SUBCUTANEOUS at 08:16

## 2021-12-04 RX ADMIN — DICLOFENAC 2 G: 10 GEL TOPICAL at 17:04

## 2021-12-04 RX ADMIN — DONEPEZIL HYDROCHLORIDE 10 MG: 10 TABLET, FILM COATED ORAL at 20:19

## 2021-12-04 RX ADMIN — LEVOTHYROXINE SODIUM 50 MCG: 50 TABLET ORAL at 06:00

## 2021-12-04 RX ADMIN — SODIUM CHLORIDE, PRESERVATIVE FREE 10 ML: 5 INJECTION INTRAVENOUS at 20:19

## 2021-12-04 RX ADMIN — ATORVASTATIN CALCIUM 10 MG: 10 TABLET, FILM COATED ORAL at 08:16

## 2021-12-04 RX ADMIN — SODIUM CHLORIDE, PRESERVATIVE FREE 10 ML: 5 INJECTION INTRAVENOUS at 08:16

## 2021-12-04 RX ADMIN — DICLOFENAC 2 G: 10 GEL TOPICAL at 08:16

## 2021-12-04 RX ADMIN — DICLOFENAC 2 G: 10 GEL TOPICAL at 20:19

## 2021-12-04 RX ADMIN — SUCRALFATE 1 G: 1 TABLET ORAL at 08:16

## 2021-12-04 RX ADMIN — FLUTICASONE PROPIONATE 2 SPRAY: 50 SPRAY, METERED NASAL at 08:16

## 2021-12-04 RX ADMIN — LISINOPRIL 2.5 MG: 2.5 TABLET ORAL at 08:16

## 2021-12-04 RX ADMIN — SUCRALFATE 1 G: 1 TABLET ORAL at 17:04

## 2021-12-04 RX ADMIN — DIVALPROEX SODIUM 250 MG: 250 TABLET, DELAYED RELEASE ORAL at 20:19

## 2021-12-04 RX ADMIN — QUETIAPINE FUMARATE 50 MG: 25 TABLET ORAL at 20:19

## 2021-12-04 RX ADMIN — ASPIRIN 81 MG: 81 TABLET, COATED ORAL at 08:16

## 2021-12-04 NOTE — PROGRESS NOTES
[facility] Medicine Services  PROGRESS NOTE    Patient Name: Ramin Zaragoza  : 1935  MRN: [id number]    Date of Admission: 2021  Primary Care Physician: Irene Coley MD    Subjective   Subjective     CC:  Weakness, falls    HPI: Patient up in bed. States he's coming along and doing better. Asking about leaving soon. States mobility and pain are better. No overnight issues reported.     ROS:  Gen- No fevers, chills  CV- No chest pain, palpitations  Resp- No cough, dyspnea  GI- No N/V/D, abd pain        Objective   Objective     Vital Signs:   Temp:  [97.5 °F (36.4 °C)-98 °F (36.7 °C)] 97.8 °F (36.6 °C)  Heart Rate:  [56-86] 77  Resp:  [16-18] 16  BP: (118-139)/(66-85) 124/85     Physical Exam:  Constitutional: No acute distress, sitting up in bed   HENT: NCAT, mucous membranes moist  Respiratory: Clear to auscultation bilaterally, respiratory effort normal   Cardiovascular: RRR, no murmurs, rubs, or gallops  Gastrointestinal: Positive bowel sounds, soft, nontender, nondistended  Musculoskeletal:  no edema bilaterally.  R ankle no edema/ redness or bruising  Psychiatric: Appropriate affect, cooperative  Neurologic: Awake, alert,  follows commands, appropriate conversation. Mild confusion, MC freely, speech clear  Skin: No rashes    Results Reviewed:  LAB RESULTS:      Lab 21  0353 21  1601   WBC 6.04 7.05   HEMOGLOBIN 13.0 14.5   HEMATOCRIT 38.1 43.6   PLATELETS 133* 152   NEUTROS ABS 3.50 4.15   IMMATURE GRANS (ABS) 0.02 0.02   LYMPHS ABS 1.44 1.66   MONOS ABS 0.80 0.97*   EOS ABS 0.26 0.23   MCV 96.5 96.0         Lab 21  0328 21  1923 21  0353 21  1601   SODIUM  --   --  139 141   POTASSIUM  --  4.9 3.5 4.2   CHLORIDE  --   --  105 105   CO2  --   --  23.0 28.0   ANION GAP  --   --  11.0 8.0   BUN  --   --  17 18   CREATININE  --   --  0.71* 0.69*   GLUCOSE  --   --  91 93   CALCIUM  --   --  8.5* 9.7   MAGNESIUM 2.1  --  1.8 2.1    HEMOGLOBIN A1C  --   --  5.90*  --    TSH  --   --  7.830*  --          Lab 11/27/21  1601   TOTAL PROTEIN 7.2   ALBUMIN 4.20   GLOBULIN 3.0   ALT (SGPT) 26   AST (SGOT) 33   BILIRUBIN 0.5   ALK PHOS 66         Lab 11/27/21  1601   TROPONIN T <0.010             Lab 11/28/21  0353   VITAMIN B 12 465         Brief Urine Lab Results  (Last result in the past 365 days)      Color   Clarity   Blood   Leuk Est   Nitrite   Protein   CREAT   Urine HCG        11/27/21 1931 Yellow   Clear   Negative   Negative   Negative   Negative                 Microbiology Results Abnormal     Procedure Component Value - Date/Time    COVID PRE-OP / PRE-PROCEDURE SCREENING ORDER (NO ISOLATION) - Swab, Nasopharynx [[id number]]  (Normal) Collected: 11/27/21 1724    Lab Status: Final result Specimen: Swab from Nasopharynx Updated: 11/27/21 1801    Narrative:      The following orders were created for panel order COVID PRE-OP / PRE-PROCEDURE SCREENING ORDER (NO ISOLATION) - Swab, Nasopharynx.  Procedure                               Abnormality         Status                     ---------                               -----------         ------                     COVID-19 and FLU A/B PCR...[[id number]]  Normal              Final result                 Please view results for these tests on the individual orders.    COVID-19 and FLU A/B PCR - Swab, Nasopharynx [[id number]]  (Normal) Collected: 11/27/21 1724    Lab Status: Final result Specimen: Swab from Nasopharynx Updated: 11/27/21 1801     COVID19 Not Detected     Influenza A PCR Not Detected     Influenza B PCR Not Detected    Narrative:      Fact sheet for providers: https://www.fda.gov/media/837128/download    Fact sheet for patients: https://www.fda.gov/media/526732/download    Test performed by PCR.          No radiology results from the last 24 hrs        I have reviewed the medications:  Scheduled Meds:aspirin, 81 mg, Oral, Daily  atorvastatin, 10 mg, Oral, Daily  Diclofenac Sodium, 2  g, Topical, 4x Daily  divalproex, 250 mg, Oral, Q12H  donepezil, 10 mg, Oral, Nightly  enoxaparin, 40 mg, Subcutaneous, Q24H  fluticasone, 2 spray, Nasal, Daily  levothyroxine, 50 mcg, Oral, Q AM  lisinopril, 2.5 mg, Oral, Daily  pantoprazole, 40 mg, Oral, Daily  QUEtiapine, 50 mg, Oral, Nightly  sodium chloride, 10 mL, Intravenous, Q12H  sucralfate, 1 g, Oral, BID AC      Continuous Infusions:   PRN Meds:.•  acetaminophen **OR** acetaminophen **OR** acetaminophen  •  senna-docusate sodium **AND** polyethylene glycol **AND** bisacodyl **AND** bisacodyl  •  magnesium sulfate **OR** magnesium sulfate **OR** magnesium sulfate  •  melatonin  •  potassium chloride **OR** potassium chloride **OR** potassium chloride  •  sodium chloride  •  sodium chloride    Assessment/Plan   Assessment & Plan     Active Hospital Problems    Diagnosis  POA   • Weakness [R53.1]  Yes   • Fall [W19.XXXA]  Yes   • Hypothyroidism [E03.9]  Yes   • Mixed hyperlipidemia [E78.2]  Yes   • Coronary artery disease involving native coronary artery of native heart without angina pectoris [I25.10]  Yes   • Essential hypertension [I10]  Yes      Resolved Hospital Problems   No resolved problems to display.        Brief Hospital Course to date:  Ramin Zaragoza is a 86 y.o. male  with PMH significant for CAD, HLD, HTN, hypothyroid, chronic lower extremity edema, who presents to the ED with complaint of weakness and fall.    This patient's problems and plans were partially entered by my partner and updated as appropriate by me 12/04/21.      Weakness and multiple Fall  -Neurology has followed, now s/o  --B12 is normal  --tsh elevated- started on synthroid  --ankle (right) and foot pain (left)- voltaren gel helping  -- planning rehab, continue PT/OT inpatient     Cognitive impairment patient with episodes of agitation and aggressive behavior.  -dementia, aricept started per Dr. Singh. Increased to 10mg 12/2  -Patient recently started Abilify- stopped this  admission  -Daughter has started process for emergency guardianship. Papers signed by Dr. Singh  --Currently on Seroquel at night- continue     Hypothyroid  -Reports no longer taking levothyroxine PTA  -TSH, free T4 or decreased.  Currently TSH is at 7.830  -started low-dose Synthroid. Will need recheck ~ 1 month     Hyperlipidemia  Hypertension  CAD  -Continue daily ASA  -Continue lisinopril  -Continue simvastatin     BPH  -No longer taking Flomax     GERD  -Continue sucralfate      DVT prophylaxis:  Medical DVT prophylaxis orders are present.       AM-PAC 6 Clicks Score (PT): 17 (12/04/21 6918)    Disposition: To rehab when bed available. ? Monday    CODE STATUS:   Code Status and Medical Interventions:   Ordered at: 11/27/21 7473     Code Status (Patient has no pulse and is not breathing):    CPR (Attempt to Resuscitate)     Medical Interventions (Patient has pulse or is breathing):    Full Support       Geovanna Price, APRN  12/04/21

## 2021-12-05 LAB
ANION GAP SERPL CALCULATED.3IONS-SCNC: 11 MMOL/L (ref 5–15)
BUN SERPL-MCNC: 14 MG/DL (ref 8–23)
BUN/CREAT SERPL: 17.9 (ref 7–25)
CALCIUM SPEC-SCNC: 8.9 MG/DL (ref 8.6–10.5)
CHLORIDE SERPL-SCNC: 98 MMOL/L (ref 98–107)
CO2 SERPL-SCNC: 23 MMOL/L (ref 22–29)
CREAT SERPL-MCNC: 0.78 MG/DL (ref 0.76–1.27)
DEPRECATED RDW RBC AUTO: 42.5 FL (ref 37–54)
ERYTHROCYTE [DISTWIDTH] IN BLOOD BY AUTOMATED COUNT: 12.2 % (ref 12.3–15.4)
GFR SERPL CREATININE-BSD FRML MDRD: 94 ML/MIN/1.73
GLUCOSE SERPL-MCNC: 123 MG/DL (ref 65–99)
HCT VFR BLD AUTO: 44.2 % (ref 37.5–51)
HGB BLD-MCNC: 15 G/DL (ref 13–17.7)
MCH RBC QN AUTO: 32.1 PG (ref 26.6–33)
MCHC RBC AUTO-ENTMCNC: 33.9 G/DL (ref 31.5–35.7)
MCV RBC AUTO: 94.4 FL (ref 79–97)
PLATELET # BLD AUTO: 176 10*3/MM3 (ref 140–450)
PMV BLD AUTO: 8.8 FL (ref 6–12)
POTASSIUM SERPL-SCNC: 4.1 MMOL/L (ref 3.5–5.2)
RBC # BLD AUTO: 4.68 10*6/MM3 (ref 4.14–5.8)
SODIUM SERPL-SCNC: 132 MMOL/L (ref 136–145)
WBC NRBC COR # BLD: 11.31 10*3/MM3 (ref 3.4–10.8)

## 2021-12-05 PROCEDURE — 99232 SBSQ HOSP IP/OBS MODERATE 35: CPT | Performed by: NURSE PRACTITIONER

## 2021-12-05 PROCEDURE — 85027 COMPLETE CBC AUTOMATED: CPT | Performed by: NURSE PRACTITIONER

## 2021-12-05 PROCEDURE — 80048 BASIC METABOLIC PNL TOTAL CA: CPT | Performed by: NURSE PRACTITIONER

## 2021-12-05 PROCEDURE — 25010000002 ENOXAPARIN PER 10 MG: Performed by: NURSE PRACTITIONER

## 2021-12-05 RX ADMIN — LISINOPRIL 2.5 MG: 2.5 TABLET ORAL at 09:51

## 2021-12-05 RX ADMIN — DICLOFENAC 2 G: 10 GEL TOPICAL at 17:04

## 2021-12-05 RX ADMIN — DIVALPROEX SODIUM 250 MG: 250 TABLET, DELAYED RELEASE ORAL at 11:10

## 2021-12-05 RX ADMIN — DICLOFENAC 2 G: 10 GEL TOPICAL at 12:27

## 2021-12-05 RX ADMIN — LEVOTHYROXINE SODIUM 50 MCG: 50 TABLET ORAL at 05:06

## 2021-12-05 RX ADMIN — PANTOPRAZOLE SODIUM 40 MG: 40 TABLET, DELAYED RELEASE ORAL at 09:51

## 2021-12-05 RX ADMIN — DONEPEZIL HYDROCHLORIDE 10 MG: 10 TABLET, FILM COATED ORAL at 21:11

## 2021-12-05 RX ADMIN — SODIUM CHLORIDE, PRESERVATIVE FREE 10 ML: 5 INJECTION INTRAVENOUS at 21:11

## 2021-12-05 RX ADMIN — ATORVASTATIN CALCIUM 10 MG: 10 TABLET, FILM COATED ORAL at 09:50

## 2021-12-05 RX ADMIN — FLUTICASONE PROPIONATE 2 SPRAY: 50 SPRAY, METERED NASAL at 11:10

## 2021-12-05 RX ADMIN — SUCRALFATE 1 G: 1 TABLET ORAL at 17:04

## 2021-12-05 RX ADMIN — DICLOFENAC 2 G: 10 GEL TOPICAL at 21:11

## 2021-12-05 RX ADMIN — SUCRALFATE 1 G: 1 TABLET ORAL at 09:50

## 2021-12-05 RX ADMIN — ASPIRIN 81 MG: 81 TABLET, COATED ORAL at 09:51

## 2021-12-05 RX ADMIN — ENOXAPARIN SODIUM 40 MG: 40 INJECTION SUBCUTANEOUS at 09:50

## 2021-12-05 RX ADMIN — DIVALPROEX SODIUM 250 MG: 250 TABLET, DELAYED RELEASE ORAL at 21:11

## 2021-12-05 RX ADMIN — QUETIAPINE FUMARATE 50 MG: 25 TABLET ORAL at 21:11

## 2021-12-05 NOTE — PROGRESS NOTES
[facility] Medicine Services  PROGRESS NOTE    Patient Name: Ramin Zaragoza  : 1935  MRN: [id number]    Date of Admission: 2021  Primary Care Physician: Irene Coley MD    Subjective   Subjective     CC:  Weakness, falls    HPI:   Doing well this morning. NAD. Ate very few bites of his breakfast. Complains of neck pain.      ROS:  Gen- No fevers, chills  CV- No chest pain, palpitations  Resp- No cough, dyspnea  GI- No N/V/D, abd pain        Objective   Objective     Vital Signs:   Temp:  [97.9 °F (36.6 °C)-99.5 °F (37.5 °C)] 98.4 °F (36.9 °C)  Heart Rate:  [] 98  Resp:  [16-18] 16  BP: (126-154)/(70-76) 154/76     Physical Exam:  Constitutional: No acute distress, awake, alert  HENT: NCAT, mucous membranes moist  Respiratory: Clear to auscultation bilaterally, respiratory effort normal   Cardiovascular: RRR, no murmurs, rubs, or gallops  Gastrointestinal: Positive bowel sounds, soft, nontender, nondistended  Musculoskeletal: No bilateral ankle edema  Psychiatric: Appropriate affect, cooperative  Neurologic: Oriented to person, strength symmetric in all extremities, Cranial Nerves grossly intact to confrontation, speech clear  Skin: No rashes     Results Reviewed:  LAB RESULTS:      Lab 21   WBC 11.31*   HEMOGLOBIN 15.0   HEMATOCRIT 44.2   PLATELETS 176   MCV 94.4         Lab 21  0330 218 21   SODIUM 132*  --   --    POTASSIUM 4.1  --  4.9   CHLORIDE 98  --   --    CO2 23.0  --   --    ANION GAP 11.0  --   --    BUN 14  --   --    CREATININE 0.78  --   --    GLUCOSE 123*  --   --    CALCIUM 8.9  --   --    MAGNESIUM  --  2.1  --                          Brief Urine Lab Results  (Last result in the past 365 days)      Color   Clarity   Blood   Leuk Est   Nitrite   Protein   CREAT   Urine HCG        21 Yellow   Clear   Negative   Negative   Negative   Negative                 Microbiology Results Abnormal      Procedure Component Value - Date/Time    COVID PRE-OP / PRE-PROCEDURE SCREENING ORDER (NO ISOLATION) - Swab, Nasopharynx [[id number]]  (Normal) Collected: 11/27/21 1724    Lab Status: Final result Specimen: Swab from Nasopharynx Updated: 11/27/21 1801    Narrative:      The following orders were created for panel order COVID PRE-OP / PRE-PROCEDURE SCREENING ORDER (NO ISOLATION) - Swab, Nasopharynx.  Procedure                               Abnormality         Status                     ---------                               -----------         ------                     COVID-19 and FLU A/B PCR...[[id number]]  Normal              Final result                 Please view results for these tests on the individual orders.    COVID-19 and FLU A/B PCR - Swab, Nasopharynx [[id number]]  (Normal) Collected: 11/27/21 1724    Lab Status: Final result Specimen: Swab from Nasopharynx Updated: 11/27/21 1801     COVID19 Not Detected     Influenza A PCR Not Detected     Influenza B PCR Not Detected    Narrative:      Fact sheet for providers: https://www.fda.gov/media/863224/download    Fact sheet for patients: https://www.fda.gov/media/562835/download    Test performed by PCR.          No radiology results from the last 24 hrs        I have reviewed the medications:  Scheduled Meds:aspirin, 81 mg, Oral, Daily  atorvastatin, 10 mg, Oral, Daily  Diclofenac Sodium, 2 g, Topical, 4x Daily  divalproex, 250 mg, Oral, Q12H  donepezil, 10 mg, Oral, Nightly  enoxaparin, 40 mg, Subcutaneous, Q24H  fluticasone, 2 spray, Nasal, Daily  levothyroxine, 50 mcg, Oral, Q AM  lisinopril, 2.5 mg, Oral, Daily  pantoprazole, 40 mg, Oral, Daily  QUEtiapine, 50 mg, Oral, Nightly  sodium chloride, 10 mL, Intravenous, Q12H  sucralfate, 1 g, Oral, BID AC      Continuous Infusions:   PRN Meds:.•  acetaminophen **OR** acetaminophen **OR** acetaminophen  •  senna-docusate sodium **AND** polyethylene glycol **AND** bisacodyl **AND** bisacodyl  •  magnesium  sulfate **OR** magnesium sulfate **OR** magnesium sulfate  •  melatonin  •  potassium chloride **OR** potassium chloride **OR** potassium chloride  •  sodium chloride  •  sodium chloride    Assessment/Plan   Assessment & Plan     Active Hospital Problems    Diagnosis  POA   • Weakness [R53.1]  Yes   • Fall [W19.XXXA]  Yes   • Hypothyroidism [E03.9]  Yes   • Mixed hyperlipidemia [E78.2]  Yes   • Coronary artery disease involving native coronary artery of native heart without angina pectoris [I25.10]  Yes   • Essential hypertension [I10]  Yes      Resolved Hospital Problems   No resolved problems to display.        Brief Hospital Course to date:  Ramin Zaragoza is a 86 y.o. male  with PMH significant for CAD, HLD, HTN, hypothyroid, chronic lower extremity edema, who presents to the ED with complaint of weakness and fall.    This patient's problems and plans were partially entered by my partner and updated as appropriate by me 12/05/21.      Weakness and multiple Fall  -Neurology has followed, now s/o  --B12 is normal  --tsh elevated- started on synthroid  --ankle (right) and foot pain (left)- voltaren gel helping  -- planning rehab, continue PT/OT inpatient     Cognitive impairment patient with episodes of agitation and aggressive behavior.  -dementia, aricept started per Dr. Singh. Increased to 10mg 12/2  -Patient recently started Abilify- stopped this admission  -Daughter has started process for emergency guardianship. Papers signed by Dr. Singh  --Currently on Seroquel at night- continue     Hypothyroid  -Reports no longer taking levothyroxine PTA  -TSH, free T4 or decreased.  Currently TSH is at 7.830  -started low-dose Synthroid. Will need recheck ~ 1 month     Hyperlipidemia  Hypertension  CAD  -Continue daily ASA  -Continue lisinopril  -Continue simvastatin     BPH  -No longer taking Flomax     GERD  -Continue sucralfate      DVT prophylaxis:  Medical DVT prophylaxis orders are present.       AM-PAC 6 Clicks  Score (PT): 17 (12/05/21 0950)    Disposition: To rehab when bed available. ? Monday    CODE STATUS:   Code Status and Medical Interventions:   Ordered at: 11/27/21 4872     Code Status (Patient has no pulse and is not breathing):    CPR (Attempt to Resuscitate)     Medical Interventions (Patient has pulse or is breathing):    Full Support       Regine Loja, DARA  12/05/21

## 2021-12-05 NOTE — PLAN OF CARE
"Goal Outcome Evaluation:              Outcome Summary: VSS on RA. Pt very confused through the night pulling off tele wires and pulse ox. Pt says he thinks his left hip is broken, he said the thought of it being broken \"just came to me.\" He also had some hallucinations. Will continue to monitor.  "

## 2021-12-06 PROCEDURE — 25010000002 ENOXAPARIN PER 10 MG: Performed by: NURSE PRACTITIONER

## 2021-12-06 PROCEDURE — 97530 THERAPEUTIC ACTIVITIES: CPT

## 2021-12-06 PROCEDURE — 99232 SBSQ HOSP IP/OBS MODERATE 35: CPT | Performed by: NURSE PRACTITIONER

## 2021-12-06 PROCEDURE — 97110 THERAPEUTIC EXERCISES: CPT

## 2021-12-06 RX ADMIN — LISINOPRIL 2.5 MG: 2.5 TABLET ORAL at 08:45

## 2021-12-06 RX ADMIN — LEVOTHYROXINE SODIUM 50 MCG: 50 TABLET ORAL at 08:45

## 2021-12-06 RX ADMIN — FLUTICASONE PROPIONATE 2 SPRAY: 50 SPRAY, METERED NASAL at 08:46

## 2021-12-06 RX ADMIN — SUCRALFATE 1 G: 1 TABLET ORAL at 08:45

## 2021-12-06 RX ADMIN — SUCRALFATE 1 G: 1 TABLET ORAL at 17:35

## 2021-12-06 RX ADMIN — DICLOFENAC 2 G: 10 GEL TOPICAL at 17:35

## 2021-12-06 RX ADMIN — DIVALPROEX SODIUM 250 MG: 250 TABLET, DELAYED RELEASE ORAL at 20:32

## 2021-12-06 RX ADMIN — DIVALPROEX SODIUM 250 MG: 250 TABLET, DELAYED RELEASE ORAL at 08:45

## 2021-12-06 RX ADMIN — DICLOFENAC 2 G: 10 GEL TOPICAL at 20:32

## 2021-12-06 RX ADMIN — ASPIRIN 81 MG: 81 TABLET, COATED ORAL at 08:45

## 2021-12-06 RX ADMIN — SODIUM CHLORIDE, PRESERVATIVE FREE 10 ML: 5 INJECTION INTRAVENOUS at 20:32

## 2021-12-06 RX ADMIN — ATORVASTATIN CALCIUM 10 MG: 10 TABLET, FILM COATED ORAL at 08:45

## 2021-12-06 RX ADMIN — SODIUM CHLORIDE, PRESERVATIVE FREE 10 ML: 5 INJECTION INTRAVENOUS at 08:46

## 2021-12-06 RX ADMIN — PANTOPRAZOLE SODIUM 40 MG: 40 TABLET, DELAYED RELEASE ORAL at 08:45

## 2021-12-06 RX ADMIN — QUETIAPINE FUMARATE 50 MG: 25 TABLET ORAL at 20:32

## 2021-12-06 RX ADMIN — ACETAMINOPHEN 650 MG: 325 TABLET, FILM COATED ORAL at 14:06

## 2021-12-06 RX ADMIN — DICLOFENAC 2 G: 10 GEL TOPICAL at 08:46

## 2021-12-06 RX ADMIN — DICLOFENAC 2 G: 10 GEL TOPICAL at 12:31

## 2021-12-06 RX ADMIN — DONEPEZIL HYDROCHLORIDE 10 MG: 10 TABLET, FILM COATED ORAL at 20:32

## 2021-12-06 RX ADMIN — ENOXAPARIN SODIUM 40 MG: 40 INJECTION SUBCUTANEOUS at 08:45

## 2021-12-06 NOTE — PLAN OF CARE
Goal Outcome Evaluation:  Plan of Care Reviewed With: patient        Progress: declining  Outcome Summary: Pt with decreased activity tolerance and joint pain in BLE limiting sitting EOB and standing attempts today, maxAx2 bed mob, tolerated BLE/BUE brief TA>TE supine, maxA use of urinal in sitting, recom SNF cont IPOT per POC

## 2021-12-06 NOTE — THERAPY TREATMENT NOTE
Patient Name: Ramin Zaragoza  : 1935    MRN: [id number]                              Today's Date: 2021       Admit Date: 2021    Visit Dx:     ICD-10-CM ICD-9-CM   1. Weakness  R53.1 780.79   2. Fall, initial encounter  W19.XXXA E888.9   3. Confusion  R41.0 298.9   4. Altered mental status, unspecified altered mental status type  R41.82 780.97     Patient Active Problem List   Diagnosis   • Coronary artery disease involving native coronary artery of native heart without angina pectoris   • Premature ventricular contraction   • Essential hypertension   • Mixed hyperlipidemia   • Venous insufficiency   • Hypothyroidism   • Lactose intolerance   • Weakness   • Fall     Past Medical History:   Diagnosis Date   • CAD (coronary artery disease)    • Hyperlipidemia    • Hypertension    • Hypothyroidism     on chronic replacement therapy   • Lactose intolerance    • Lower extremity edema    • PVC's (premature ventricular contractions)     History of   • Thyroid disorder    • Venous insufficiency      Past Surgical History:   Procedure Laterality Date   • CARDIAC CATHETERIZATION     • CORONARY STENT PLACEMENT     • EYE SURGERY      Bilateral cataract extraction   • HERNIA REPAIR      Inguinal hernia repair   • OTHER SURGICAL HISTORY      Melanoma removed from back   • OTHER SURGICAL HISTORY      History of left elbow fracture with sunsequent fixation      General Information     Row Name 21 1505          OT Time and Intention    Document Type therapy note (daily note)  -KF     Mode of Treatment occupational therapy  -KF     Row Name 21 1503          General Information    Patient Profile Reviewed yes  -KF     Existing Precautions/Restrictions fall  BLE pain  -KF     Barriers to Rehab medically complex; hearing deficit; cognitive status  -KF     Row Name 21 1508          Cognition    Orientation Status (Cognition) oriented to; person; disoriented to; place; time; situation  -KF     Row Name  12/06/21 1505          Safety Issues, Functional Mobility    Safety Issues Affecting Function (Mobility) awareness of need for assistance; friction/shear risk; insight into deficits/self-awareness; judgment; safety precaution awareness; safety precautions follow-through/compliance  -KF     Impairments Affecting Function (Mobility) balance; cognition; endurance/activity tolerance; strength; postural/trunk control; pain  -KF     Cognitive Impairments, Mobility Safety/Performance attention; awareness, need for assistance  -KF     Comment, Safety Issues/Impairments (Mobility) increased pain today with all BLE movements despite TE/TA for BLE supine prior which did decrease joint pain gradually however unable to tolerate more than 3 reps  -KF           User Key  (r) = Recorded By, (t) = Taken By, (c) = Cosigned By    Initials Name Provider Type    Yuliet Tony OT Occupational Therapist                 Mobility/ADL's     Row Name 12/06/21 1506          Bed Mobility    Bed Mobility scooting/bridging; supine-sit; sit-supine  -KF     Rolling Left [place] (Bed Mobility) maximum assist (25% patient effort); 2 person assist  -KF     Rolling Right [place] (Bed Mobility) maximum assist (25% patient effort); 2 person assist  -KF     Scooting/Bridging [place] (Bed Mobility) maximum assist (25% patient effort); 2 person assist  -KF     Supine-Sit [place] (Bed Mobility) maximum assist (25% patient effort); 2 person assist  -KF     Sit-Supine [place] (Bed Mobility) maximum assist (25% patient effort); 2 person assist  -KF     Bed Mobility, Safety Issues decreased use of legs for bridging/pushing; impaired trunk control for bed mobility  -KF     Assistive Device (Bed Mobility) draw sheet; bed rails; head of bed elevated  -KF     Comment (Bed Mobility) cues for seq and HP, unable to tolerate prolonged sitting 2/2 pain in hips and knees deferred STS 2/2 pain  -KF     Row Name 12/06/21 0538           Transfers    Comment (Transfers) unable at this time 2/2 pain  -KF     Bed-Chair [place] (Transfers) unable to assess  -     Sit-Stand [place] (Transfers) not tested; unable to assess  -[facility] Name 12/06/21 1506          Functional Mobility    Functional Mobility- Ind. Level unable to perform  -[facility] Name 12/06/21 1506          Activities of Daily Living    BADL Assessment/Intervention lower body dressing; toileting  -[facility] Name 12/06/21 1506          Lower Body Dressing Assessment/Training    [place] Level (Lower Body Dressing) don; doff; socks; dependent (less than 25% patient effort)  -     Position (Lower Body Dressing) supine  -[facility] Name 12/06/21 1506          Upper Body Dressing Assessment/Training    [place] Level (Upper Body Dressing) don; front opening garment; minimum assist (75% patient effort)  -     Position (Upper Body Dressing) edge of bed sitting  -[facility] Name 12/06/21 1506          Toileting Assessment/Training    [place] Level (Toileting) adjust/manage clothing; perform perineal hygiene; maximum assist (25% patient effort)  -     Assistive Devices (Toileting) urinal  -     Position (Toileting) edge of bed sitting  -           User Key  (r) = Recorded By, (t) = Taken By, (c) = Cosigned By    Initials Name Provider Type    KF Yuliet Juarez, OT Occupational Therapist               Obj/Interventions     [facility] Name 12/06/21 1512          Shoulder (Therapeutic Exercise)    Shoulder (Therapeutic Exercise) AROM (active range of motion)  -     Shoulder AROM (Therapeutic Exercise) bilateral; extension; flexion; supine; 5 repetitions; horizontal aBduction/aDduction  -[facility] Name 12/06/21 1512          Balance    Balance Assessment sitting static balance; sitting dynamic balance  -KF     Static Sitting Balance mild impairment  -     Dynamic Sitting Balance mild impairment; moderate impairment  -     Balance Interventions occupation  based/functional task; sitting; weight shifting activity; UE activity with balance activity  -     Comment, Balance limited by pain and posterior lean in sitting  -     Row Name 12/06/21 1512          Therapeutic Exercise    Therapeutic Exercise other (see comments)  BLE knee flexion/extension; BLE hip flexion/extension; 5 reps  -KF           User Key  (r) = Recorded By, (t) = Taken By, (c) = Cosigned By    Initials Name Provider Type    KF Yuliet Juarez, OT Occupational Therapist               Goals/Plan    No documentation.                Clinical Impression     Row Name 12/06/21 1517          Pain Assessment    Additional Documentation Pain Scale: FACES Pre/Post-Treatment (Group)  -     Row Name 12/06/21 1517          Pain Scale: FACES Pre/Post-Treatment    Pain: FACES Scale, Pretreatment 6-->hurts even more  -KF     Posttreatment Pain Rating 6-->hurts even more  -KF     Pain Location - Side Bilateral  -KF     Pain Location - Orientation lower  -KF     Pain Location extremity  -KF     Pre/Posttreatment Pain Comment RN notified  -     Row Name 12/06/21 1517          Plan of Care Review    Plan of Care Reviewed With patient  -KF     Progress declining  -     Outcome Summary Pt with decreased activity tolerance and joint pain in BLE limiting sitting EOB and standing attempts today, maxAx2 bed mob, tolerated BLE/BUE brief TA>TE supine, maxA use of urinal in sitting, recom SNF cont IPOT per POC  -     Row Name 12/06/21 1517          Therapy Assessment/Plan (OT)    Rehab Potential (OT) good, to achieve stated therapy goals  -     Criteria for Skilled Therapeutic Interventions Met (OT) yes; meets criteria; skilled treatment is necessary  -     Therapy Frequency (OT) daily  -     Row Name 12/06/21 1517          Therapy Plan Review/Discharge Plan (OT)    Anticipated Discharge Disposition (OT) skilled nursing facility  -     Row Name 12/06/21 1517          Vital Signs    Pre Systolic BP Rehab --   RN cleared VSS  -KF     O2 Delivery Pre Treatment room air  -KF     Pre Patient Position Supine  -KF     Intra Patient Position Sitting  -KF     Post Patient Position Supine  -KF     Rest Breaks  1  -KF     Row Name 12/06/21 1517          Positioning and Restraints    Pre-Treatment Position in bed  -KF     Post Treatment Position bed  -KF     In Bed notified nsg; supine; fowlers; call light within reach; encouraged to call for assist; exit alarm on; side rails up x2; waffle boots/both; legs elevated; with family/caregiver; with other staff  -KF           User Key  (r) = Recorded By, (t) = Taken By, (c) = Cosigned By    Initials Name Provider Type    KF Yuliet Juarez, OT Occupational Therapist               Outcome Measures     Row Name 12/06/21 1520          How much help from another is currently needed...    Putting on and taking off regular lower body clothing? 1  -KF     Bathing (including washing, rinsing, and drying) 1  -KF     Toileting (which includes using toilet bed pan or urinal) 2  -KF     Putting on and taking off regular upper body clothing 2  -KF     Taking care of personal grooming (such as brushing teeth) 3  -KF     Eating meals 3  -KF     AM-PAC 6 Clicks Score (OT) 12  -KF     Row Name 12/06/21 0915 12/06/21 0845       How much help from another person do you currently need...    Turning from your back to your side while in flat bed without using bedrails? 2  -AS 2  -KR    Moving from lying on back to sitting on the side of a flat bed without bedrails? 2  -AS 2  -KR    Moving to and from a bed to a chair (including a wheelchair)? 2  -AS 2  -KR    Standing up from a chair using your arms (e.g., wheelchair, bedside chair)? 2  -AS 2  -KR    Climbing 3-5 steps with a railing? 1  -AS 1  -KR    To walk in hospital room? 2  -AS 2  -KR    AM-PAC 6 Clicks Score (PT) 11  -AS 11  -KR    Row Name 12/06/21 1520 12/06/21 0915       Functional Assessment    Outcome Measure Options AM-PAC 6 Clicks Daily  Activity (OT)  -KF AM-PAC 6 Clicks Basic Mobility (PT)  -AS          User Key  (r) = Recorded By, (t) = Taken By, (c) = Cosigned By    Initials Name Provider Type    AS Meg Khalil PTA Physical Therapy Assistant    Yuliet Tony, OT Occupational Therapist    Pat Abdullahi RN Registered Nurse                Occupational Therapy Education                 Title: PT OT SLP Therapies (In Progress)     Topic: Occupational Therapy (Done)     Point: ADL training (Done)     Description:   Instruct learner(s) on proper safety adaptation and remediation techniques during self care or transfers.   Instruct in proper use of assistive devices.              Learning Progress Summary           Patient Acceptance, E,TB,D, VU,DU,NR by  at 12/6/2021 1520    Acceptance, E,TB,D, VU,DU by  at 12/2/2021 1055    Acceptance, E,TB,D, VU,DU,NR by  at 11/28/2021 1422   Family Acceptance, E,TB,D, VU,DU,NR by  at 11/28/2021 1422                   Point: Home exercise program (Done)     Description:   Instruct learner(s) on appropriate technique for monitoring, assisting and/or progressing therapeutic exercises/activities.              Learning Progress Summary           Patient Acceptance, E,TB,D, VU,DU,NR by  at 12/6/2021 1520                   Point: Precautions (Done)     Description:   Instruct learner(s) on prescribed precautions during self-care and functional transfers.              Learning Progress Summary           Patient Acceptance, E,TB,D, VU,DU,NR by  at 12/6/2021 1520    Acceptance, E,TB,D, VU,DU by  at 12/2/2021 1055    Acceptance, E,TB,D, VU,DU,NR by  at 11/28/2021 1422   Family Acceptance, E,TB,D, VU,DU,NR by  at 11/28/2021 1422                   Point: Body mechanics (Done)     Description:   Instruct learner(s) on proper positioning and spine alignment during self-care, functional mobility activities and/or exercises.              Learning Progress Summary           Patient  Acceptance, E,TB,D, VU,DU,NR by  at 12/6/2021 1520    Acceptance, E,TB,D, VU,DU by KF at 12/2/2021 1055    Acceptance, E,TB,D, VU,DU,NR by KF at 11/28/2021 1422   Family Acceptance, E,TB,D, VU,DU,NR by  at 11/28/2021 1422                               User Key     Initials Effective Dates Name Provider Type [facility] 06/16/21 -  Yuliet Juarez OT Occupational Therapist OT              OT Recommendation and Plan  Planned Therapy Interventions (OT): activity tolerance training, adaptive equipment training, BADL retraining, cognitive/visual perception retraining, occupation/activity based interventions, strengthening exercise, ROM/therapeutic exercise, patient/caregiver education/training, transfer/mobility retraining, functional balance retraining  Therapy Frequency (OT): daily  Plan of Care Review  Plan of Care Reviewed With: patient  Progress: declining  Outcome Summary: Pt with decreased activity tolerance and joint pain in BLE limiting sitting EOB and standing attempts today, maxAx2 bed mob, tolerated BLE/BUE brief TA>TE supine, maxA use of urinal in sitting, recom SNF cont IPOT per POC     Time Calculation:    Time Calculation- OT     Row Name 12/06/21 1417             Time Calculation- OT    OT Start Time 1417  -KF      OT Received On 12/06/21  -KF              Timed Charges    63853 - OT Therapeutic Exercise Minutes 14  -KF      20008 - OT Therapeutic Activity Minutes 10  -KF              Total Minutes    Timed Charges Total Minutes 24  -KF       Total Minutes 24  -KF            User Key  (r) = Recorded By, (t) = Taken By, (c) = Cosigned By    Initials Name Provider Type     Yuliet Juarez OT Occupational Therapist              Therapy Charges for Today     Code Description Service Date Service Provider Modifiers Qty    20144867129 HC OT THERAPEUTIC ACT EA 15 MIN 12/6/2021 Yuliet Juarez OT GO 1    25758266073 HC OT THER PROC EA 15 MIN 12/6/2021 Yuliet Juarez OT GO 1                Yuliet Juarez, OT  12/6/2021

## 2021-12-06 NOTE — PLAN OF CARE
Goal Outcome Evaluation:         Pt pleasant and cooperative, rested off and on during the night. Pt very [place] and confused to place/situation. Pulled tele leads and gown off a few times during shift- trying to get gown up to use urinal. After that, pt was able to rest. Poss d/c today to [facility] Helio Calderon, NARCISA following. VSS on RA, will cont to monitor.

## 2021-12-06 NOTE — PROGRESS NOTES
[facility] Medicine Services  PROGRESS NOTE    Patient Name: Ramin Zaragoza  : 1935  MRN: [id number]    Date of Admission: 2021  Primary Care Physician: Irene Coley MD    Subjective   Subjective     CC:  Weakness, falls    HPI:   Patient sitting up in bed. Daughter at bedside.   Patient states he is tired. Weak  No soa, LEE, f/c/s or urinary difficulty    ROS:  Gen- No fevers, chills  CV- No chest pain, palpitations  Resp- No cough, dyspnea  GI- No N/V/D, abd pain        Objective   Objective     Vital Signs:   Temp:  [97 °F (36.1 °C)-98 °F (36.7 °C)] 97 °F (36.1 °C)  Heart Rate:  [63-95] 85  Resp:  [16] 16  BP: (132-153)/(71-76) 153/76     Physical Exam:  Constitutional: No acute distress, awake, alert, sitting up in bed  HENT: NCAT, mucous membranes moist  Respiratory: Clear to auscultation bilaterally, respiratory effort normal   Cardiovascular: RRR, no murmurs, rubs, or gallops  Gastrointestinal: Positive bowel sounds, soft, nontender, nondistended  Musculoskeletal: No bilateral ankle edema  Psychiatric: Appropriate affect, cooperative  Neurologic: Oriented to person, strength symmetric in all extremities, Cranial Nerves grossly intact to confrontation, speech clear  Skin: No rashes   No change in exam from     Results Reviewed:  LAB RESULTS:      Lab 21   WBC 11.31*   HEMOGLOBIN 15.0   HEMATOCRIT 44.2   PLATELETS 176   MCV 94.4         Lab 21  0330 21   SODIUM 132*  --   --    POTASSIUM 4.1  --  4.9   CHLORIDE 98  --   --    CO2 23.0  --   --    ANION GAP 11.0  --   --    BUN 14  --   --    CREATININE 0.78  --   --    GLUCOSE 123*  --   --    CALCIUM 8.9  --   --    MAGNESIUM  --  2.1  --                          Brief Urine Lab Results  (Last result in the past 365 days)      Color   Clarity   Blood   Leuk Est   Nitrite   Protein   CREAT   Urine HCG        21 Yellow   Clear   Negative   Negative    Negative   Negative                 Microbiology Results Abnormal     Procedure Component Value - Date/Time    COVID PRE-OP / PRE-PROCEDURE SCREENING ORDER (NO ISOLATION) - Swab, Nasopharynx [[id number]]  (Normal) Collected: 11/27/21 1724    Lab Status: Final result Specimen: Swab from Nasopharynx Updated: 11/27/21 1801    Narrative:      The following orders were created for panel order COVID PRE-OP / PRE-PROCEDURE SCREENING ORDER (NO ISOLATION) - Swab, Nasopharynx.  Procedure                               Abnormality         Status                     ---------                               -----------         ------                     COVID-19 and FLU A/B PCR...[[id number]]  Normal              Final result                 Please view results for these tests on the individual orders.    COVID-19 and FLU A/B PCR - Swab, Nasopharynx [[id number]]  (Normal) Collected: 11/27/21 1724    Lab Status: Final result Specimen: Swab from Nasopharynx Updated: 11/27/21 1801     COVID19 Not Detected     Influenza A PCR Not Detected     Influenza B PCR Not Detected    Narrative:      Fact sheet for providers: https://www.fda.gov/media/346157/download    Fact sheet for patients: https://www.fda.gov/media/820044/download    Test performed by PCR.          No radiology results from the last 24 hrs        I have reviewed the medications:  Scheduled Meds:aspirin, 81 mg, Oral, Daily  atorvastatin, 10 mg, Oral, Daily  Diclofenac Sodium, 2 g, Topical, 4x Daily  divalproex, 250 mg, Oral, Q12H  donepezil, 10 mg, Oral, Nightly  enoxaparin, 40 mg, Subcutaneous, Q24H  fluticasone, 2 spray, Nasal, Daily  levothyroxine, 50 mcg, Oral, Q AM  lisinopril, 2.5 mg, Oral, Daily  pantoprazole, 40 mg, Oral, Daily  QUEtiapine, 50 mg, Oral, Nightly  sodium chloride, 10 mL, Intravenous, Q12H  sucralfate, 1 g, Oral, BID AC      Continuous Infusions:   PRN Meds:.•  acetaminophen **OR** acetaminophen **OR** acetaminophen  •  senna-docusate sodium **AND**  polyethylene glycol **AND** bisacodyl **AND** bisacodyl  •  magnesium sulfate **OR** magnesium sulfate **OR** magnesium sulfate  •  melatonin  •  potassium chloride **OR** potassium chloride **OR** potassium chloride  •  sodium chloride  •  sodium chloride    Assessment/Plan   Assessment & Plan     Active Hospital Problems    Diagnosis  POA   • Weakness [R53.1]  Yes   • Fall [W19.XXXA]  Yes   • Hypothyroidism [E03.9]  Yes   • Mixed hyperlipidemia [E78.2]  Yes   • Coronary artery disease involving native coronary artery of native heart without angina pectoris [I25.10]  Yes   • Essential hypertension [I10]  Yes      Resolved Hospital Problems   No resolved problems to display.        Brief Hospital Course to date:  Ramin Zaragoza is a 86 y.o. male  with PMH significant for CAD, HLD, HTN, hypothyroid, chronic lower extremity edema, who presents to the ED with complaint of weakness and fall.    This patient's problems and plans were partially entered by my partner and updated as appropriate by me 12/06/21.      Weakness and multiple Fall  -Neurology has followed, now s/o  --B12 is normal  --tsh elevated- started on synthroid  --ankle (right) and foot pain (left)- voltaren gel helping  -- planning rehab, continue PT/OT inpatient     Cognitive impairment patient with episodes of agitation and aggressive behavior.  -dementia, aricept started per Dr. Singh. Increased to 10mg 12/2  -Patient recently started Abilify- stopped this admission  -Daughter has started process for emergency guardianship. Papers signed by Dr. Singh  --Currently on Seroquel at night- continue     Hypothyroid  -Reports no longer taking levothyroxine PTA  -TSH, free T4 or decreased.  Currently TSH is at 7.830  -started low-dose Synthroid. Will need recheck ~ 1 month     Hyperlipidemia  Hypertension  CAD  -Continue daily ASA  -Continue lisinopril  -Continue simvastatin     BPH  -No longer taking Flomax     GERD  -Continue  sucralfate    Leukocytosis  --mild  -- afebrile and no s/s of infection  --start IS with decreased mobility    DVT prophylaxis:  Medical DVT prophylaxis orders are present.       AM-PAC 6 Clicks Score (PT): 11 (12/06/21 0915)    Disposition: To rehab/ [facility] ? Tuesday    CODE STATUS:   Code Status and Medical Interventions:   Ordered at: 11/27/21 5971     Code Status (Patient has no pulse and is not breathing):    CPR (Attempt to Resuscitate)     Medical Interventions (Patient has pulse or is breathing):    Full Support       Geovanna Price, APRN  12/06/21

## 2021-12-06 NOTE — THERAPY TREATMENT NOTE
Patient Name: Ramin Zaragoza  : 1935    MRN: [id number]                              Today's Date: 2021       Admit Date: 2021    Visit Dx:     ICD-10-CM ICD-9-CM   1. Weakness  R53.1 780.79   2. Fall, initial encounter  W19.XXXA E888.9   3. Confusion  R41.0 298.9   4. Altered mental status, unspecified altered mental status type  R41.82 780.97     Patient Active Problem List   Diagnosis   • Coronary artery disease involving native coronary artery of native heart without angina pectoris   • Premature ventricular contraction   • Essential hypertension   • Mixed hyperlipidemia   • Venous insufficiency   • Hypothyroidism   • Lactose intolerance   • Weakness   • Fall     Past Medical History:   Diagnosis Date   • CAD (coronary artery disease)    • Hyperlipidemia    • Hypertension    • Hypothyroidism     on chronic replacement therapy   • Lactose intolerance    • Lower extremity edema    • PVC's (premature ventricular contractions)     History of   • Thyroid disorder    • Venous insufficiency      Past Surgical History:   Procedure Laterality Date   • CARDIAC CATHETERIZATION     • CORONARY STENT PLACEMENT     • EYE SURGERY      Bilateral cataract extraction   • HERNIA REPAIR      Inguinal hernia repair   • OTHER SURGICAL HISTORY      Melanoma removed from back   • OTHER SURGICAL HISTORY      History of left elbow fracture with sunsequent fixation      General Information     Row Name 21          Physical Therapy Time and Intention    Document Type therapy note (daily note)  -AS     Mode of Treatment physical therapy  -AS     Row Name 21          General Information    Patient Profile Reviewed yes  -AS     Existing Precautions/Restrictions fall  B ankle pain  -AS     Barriers to Rehab medically complex; hearing deficit; cognitive status  -AS     Row Name 21          Cognition    Orientation Status (Cognition) oriented to; person; disoriented to; place; time; situation   -AS     Row Name 12/06/21 0903          Safety Issues, Functional Mobility    Safety Issues Affecting Function (Mobility) insight into deficits/self-awareness; safety precaution awareness; safety precautions follow-through/compliance; awareness of need for assistance; sequencing abilities  -AS     Impairments Affecting Function (Mobility) balance; cognition; endurance/activity tolerance; strength; postural/trunk control; pain  -AS     Cognitive Impairments, Mobility Safety/Performance awareness, need for assistance; insight into deficits/self-awareness; safety precaution follow-through; sequencing abilities; judgment; problem-solving/reasoning  -AS     Comment, Safety Issues/Impairments (Mobility) patient required increased time and effort to paticipate in therapy, increased complaints of bilateral ankle and LE pain  -AS           User Key  (r) = Recorded By, (t) = Taken By, (c) = Cosigned By    Initials Name Provider Type    AS Meg Khalil PTA Physical Therapy Assistant               Mobility     Row Name 12/06/21 0906          Bed Mobility    Scooting/Bridging [place] (Bed Mobility) verbal cues; maximum assist (25% patient effort); 2 person assist  to reposition to HOB  -AS     Supine-Sit [place] (Bed Mobility) verbal cues; moderate assist (50% patient effort); 1 person assist  -AS     Sit-Supine [place] (Bed Mobility) verbal cues; maximum assist (25% patient effort); 1 person assist  -AS     Assistive Device (Bed Mobility) draw sheet; bed rails; head of bed elevated  -AS     Comment (Bed Mobility) Patient required mod/max assist for bed mobility this date, increased time and effort required this date. deferred OOB to chair due to increased assist needed for sit<>stand transfer and increased c/o pain/stiffness.  -AS     Row Name 12/06/21 0906          Transfers    Comment (Transfers) increased cues for sequencing and hand placement. Assist to block B LE from sliding. Verbal and tactile cues  to correct posterior lean. Patient unable to take steps to recliner this date due to weakness, pain and impaired balance.  -AS     [facility] Name 12/06/21 0906          Bed-Chair Transfer    Bed-Chair [place] (Transfers) unable to assess  -AS     Row Name 12/06/21 0906          Sit-Stand Transfer    Sit-Stand [place] (Transfers) verbal cues; moderate assist (50% patient effort); maximum assist (25% patient effort); 2 person assist  -AS     Assistive Device (Sit-Stand Transfers) walker, front-wheeled  -AS     [facility] Name 12/06/21 0906          Gait/Stairs (Locomotion)    [place] (Gait) unable to assess  -AS           User Key  (r) = Recorded By, (t) = Taken By, (c) = Cosigned By    Initials Name Provider Type    AS Meg Khalil PTA Physical Therapy Assistant               Obj/Interventions     Row Name 12/06/21 0911          Motor Skills    Therapeutic Exercise knee; ankle  -AS     Row Name 12/06/21 0911          Knee (Therapeutic Exercise)    Knee (Therapeutic Exercise) strengthening exercise  -AS     Knee Strengthening (Therapeutic Exercise) bilateral; marching while seated; LAQ (long arc quad); sitting; 10 repetitions  mutliple rest breaks due to pain, patient moves slowly with all movements.  -AS     Row Name 12/06/21 0911          Ankle (Therapeutic Exercise)    Ankle (Therapeutic Exercise) AROM (active range of motion)  -AS     Ankle AROM (Therapeutic Exercise) bilateral; dorsiflexion; plantarflexion; sitting; 10 repetitions  -AS     Row Name 12/06/21 0911          Balance    Static Standing Balance moderate impairment; severe impairment; supported; standing  -AS     Dynamic Standing Balance not tested  -AS     Comment, Balance increased posterior lean in sitting and standing positions  -AS           User Key  (r) = Recorded By, (t) = Taken By, (c) = Cosigned By    Initials Name Provider Type    AS Meg Khalil PTA Physical Therapy Assistant               Goals/Plan    No  documentation.                Clinical Impression     Row Name 12/06/21 0913          Pain    Additional Documentation Pain Scale: FACES Pre/Post-Treatment (Group)  -AS     Row Name 12/06/21 0913          Pain Scale: FACES Pre/Post-Treatment    Pain: FACES Scale, Pretreatment 8-->hurts whole lot  -AS     Posttreatment Pain Rating 8-->hurts whole lot  -AS     Pain Location - Side Bilateral  -AS     Pain Location ankle  -AS     Pre/Posttreatment Pain Comment patient did not tolerate activity this date due to increased c/o pain  -AS     Row Name 12/06/21 0913          Plan of Care Review    Plan of Care Reviewed With patient  -AS     Progress no change  -AS     Outcome Summary patient required increased assist for bed mobility and transfers this date, performed sit<>stand transfers with mod/max assist x2, increased cues for sequencing and hand placement. Assist to block B LE from sliding. Verbal and tactile cues to correct posterior lean in both sitting and standing positions. Patient unable to take steps to recliner this date due to weakness, pain and impaired balance.  -AS     Row Name 12/06/21 0913          Positioning and Restraints    Pre-Treatment Position in bed  -AS     Post Treatment Position bed  -AS     In Bed supine; call light within reach; encouraged to call for assist; exit alarm on; waffle boots/both  -AS           User Key  (r) = Recorded By, (t) = Taken By, (c) = Cosigned By    Initials Name Provider Type    AS Meg Khalil PTA Physical Therapy Assistant               Outcome Measures     Row Name 12/06/21 0915          How much help from another person do you currently need...    Turning from your back to your side while in flat bed without using bedrails? 2  -AS     Moving from lying on back to sitting on the side of a flat bed without bedrails? 2  -AS     Moving to and from a bed to a chair (including a wheelchair)? 2  -AS     Standing up from a chair using your arms (e.g., wheelchair,  bedside chair)? 2  -AS     Climbing 3-5 steps with a railing? 1  -AS     To walk in hospital room? 2  -AS     AM-PAC 6 Clicks Score (PT) 11  -AS     Row Name 12/06/21 0915          Functional Assessment    Outcome Measure Options AM-PAC 6 Clicks Basic Mobility (PT)  -AS           User Key  (r) = Recorded By, (t) = Taken By, (c) = Cosigned By    Initials Name Provider Type    AS Meg Khalil, TROY Physical Therapy Assistant                             Physical Therapy Education                 Title: PT OT SLP Therapies (In Progress)     Topic: Physical Therapy (In Progress)     Point: Mobility training (In Progress)     Learning Progress Summary           Patient Acceptance, E, NR by AS at 12/6/2021 0916    Acceptance, E,TB, VU by KG at 12/2/2021 1056    Eager, E,D, NR by DM at 11/28/2021 1116                   Point: Home exercise program (In Progress)     Learning Progress Summary           Patient Acceptance, E, NR by AS at 12/6/2021 0916    Acceptance, E,TB, VU by KG at 12/2/2021 1056    Eager, E,D, NR by DM at 11/28/2021 1116                   Point: Body mechanics (In Progress)     Learning Progress Summary           Patient Acceptance, E, NR by AS at 12/6/2021 0916    Acceptance, E,TB, VU by KG at 12/2/2021 1056    Eager, E,D, NR by DM at 11/28/2021 1116                   Point: Precautions (In Progress)     Learning Progress Summary           Patient Acceptance, E, NR by AS at 12/6/2021 0916    Acceptance, E,TB, VU by KG at 12/2/2021 1056    Eager, E,D, NR by DM at 11/28/2021 1116                               User Key     Initials Effective Dates Name Provider Type Discipline    DM 06/16/21 -  Yakelin Armando, PT Physical Therapist PT    AS 06/16/21 -  Meg Khalil, TROY Physical Therapy Assistant PT    KG 06/16/21 -  Emily Quiroz Physical Therapist PT              PT Recommendation and Plan     Plan of Care Reviewed With: patient  Progress: no change  Outcome Summary: patient required  increased assist for bed mobility and transfers this date, performed sit<>stand transfers with mod/max assist x2, increased cues for sequencing and hand placement. Assist to block B LE from sliding. Verbal and tactile cues to correct posterior lean in both sitting and standing positions. Patient unable to take steps to recliner this date due to weakness, pain and impaired balance.     Time Calculation:    PT Charges     Row Name 12/06/21 0916             Time Calculation    Start Time 0803  -AS      PT Received On 12/06/21  -AS      PT Goal Re-Cert Due Date 12/08/21  -AS              Timed Charges    10293 - PT Therapeutic Exercise Minutes 15  -AS      56934 - PT Therapeutic Activity Minutes 15  -AS              Total Minutes    Timed Charges Total Minutes 30  -AS       Total Minutes 30  -AS            User Key  (r) = Recorded By, (t) = Taken By, (c) = Cosigned By    Initials Name Provider Type    AS Meg Khalil PTA Physical Therapy Assistant              Therapy Charges for Today     Code Description Service Date Service Provider Modifiers Qty    62879795657 HC PT THER PROC EA 15 MIN 12/6/2021 Meg Khalil PTA GP 1    02084082445 HC PT THERAPEUTIC ACT EA 15 MIN 12/6/2021 Meg Khalil PTA GP 1          PT G-Codes  Outcome Measure Options: AM-PAC 6 Clicks Basic Mobility (PT)  AM-PAC 6 Clicks Score (PT): 11  AM-PAC 6 Clicks Score (OT): 17    Meg Khalil PTA  12/6/2021

## 2021-12-06 NOTE — CASE MANAGEMENT/SOCIAL WORK
Continued Stay Note  [facility]     Patient Name: Ramin Zaragoza  MRN: [id number]  Today's Date: 12/6/2021    Admit Date: 11/27/2021     Discharge Plan     Row Name 12/06/21 1523       Plan    Plan SNF    Patient/Family in Agreement with Plan yes    Plan Comments Spoke with patient and his daughter at bedside. Sissy with [facility] unable to accept patient at this time as he is not able to participate with therapy due to pain. Tylenol was given prior to OT working with patient but he had little relief. Plan is now SNF, will need to get patient's pain under control for him to work with therapy. Continuing to place referrals, [facility] [place] [facility] reviewing to accept patient as well. Daughter is in agreement.    Final Discharge Disposition Code 03 - skilled nursing facility (SNF)               Discharge Codes    No documentation.                     Carlota Chavez RN

## 2021-12-06 NOTE — PLAN OF CARE
Goal Outcome Evaluation:  Plan of Care Reviewed With: patient        Progress: no change  Outcome Summary: patient required increased assist for bed mobility and transfers this date, performed sit<>stand transfers with mod/max assist x2, increased cues for sequencing and hand placement. Assist to block B LE from sliding. Verbal and tactile cues to correct posterior lean in both sitting and standing positions. Patient unable to take steps to recliner this date due to weakness, pain and impaired balance.

## 2021-12-06 NOTE — DISCHARGE PLACEMENT REQUEST
"From Carlota Chavez RN Case Manager [phone]    Raymond Zaragoza (86 y.o. Male)             Date of Birth Social Security Number Address Home Phone MRN    1935  [id number] [place] [address] [phone] [id number]    [employer] Marital Status             Judaism        Admission Date Admission Type Admitting Provider Attending Provider Department, Room/Bed    11/27/21 Emergency Ming Rodriguez MD Kalantar, Masoud, MD [facility] 4H, S482/1    Discharge Date Discharge Disposition Discharge Destination                         Attending Provider: Ming Rodriguez MD    Allergies: Lactose Intolerance (Gi)    Isolation: None   Infection: None   Code Status: CPR   Advance Care Planning Activity    Ht: 175.3 cm (69\")   Wt: 86.2 kg (190 lb)    Admission Cmt: None   Principal Problem: None                Active Insurance as of 11/27/2021     Primary Coverage     Payor Plan Insurance Group Employer/Plan Group    [facility] MEDICARE REPLACEMENT [facility] MEDICARE REPLACEMENT      Payor Plan Address Payor Plan Phone Number Payor Plan Fax Number Effective Dates    [address] [phone]  11/1/2021 - None Entered    [facility] [address]       Subscriber Name Subscriber Birth Date Member ID       RAYMOND ZARAGOZA 1935 [id number]           Secondary Coverage     Payor Plan Insurance Group Employer/Plan Group    [employer] BLUE CROSS [facility] SUPP [id number]     Payor Plan Address Payor Plan Phone Number Payor Plan Fax Number Effective Dates    [address]   12/1/2016 - None Entered    [facility] [id number]       Subscriber Name Subscriber Birth Date Member ID       RAYMOND ZARAGOZA 1935 [id number]                 Emergency Contacts      (Rel.) Home Phone Work Phone Mobile Phone    ISABELLA MEYER (Daughter) [phone] -- --               History & Physical      Shaun Murray MD at 11/27/21 2315              [facility] Medicine " Services  HISTORY AND PHYSICAL    Patient Name: Ramin Zaragoza  : 1935  MRN: [id number]  Primary Care Physician: Irene Coley MD  Date of admission: 2021    Subjective   Subjective     Chief Complaint:  Fall, weakness    HPI:  Ramin Zaragoza is a 86 y.o. male with PMH significant for CAD, HLD, HTN, hypothyroid, chronic lower extremity edema, who presents to the ED with complaint of weakness and fall.  Daughter who is at bedside helps provide HPI.  She notes that he has been having general decline with an acute decreased over the past week.  She states that he has had multiple falls.  He is unable to safely stay by himself.  She notes that they have recently tried to get patient into assisted living and at the last minute he refused to go.  She states that she is started the process for emergency guardianship.  Upon arrival to the ED, he does note that he fell today.  He states that he uses 2 canes to walk.  He states that he just feels unsteady.  He denies dizziness or lightheadedness prior to his fall.  He also denies any falls prior to today.  CT head notes advanced generalized cerebral atrophy in ventriculomegaly.  MRI is pending.  Labs are benign.  He will be admitted to hospital medicine for further evaluation.      COVID Details:        Symptoms: [x] NONE [] Fever []  Cough [] Shortness of breath [] Change in taste or smell  The patient has a COVID HM Topic on their chart, and they are fully vaccinated.    Review of Systems   Constitutional: Negative for activity change, appetite change, chills, diaphoresis, fatigue, fever and unexpected weight change.   HENT: Negative.    Eyes: Negative.  Negative for visual disturbance.   Respiratory: Negative.  Negative for cough, chest tightness, shortness of breath and wheezing.    Cardiovascular: Positive for leg swelling. Negative for chest pain and palpitations.   Gastrointestinal: Negative.  Negative for abdominal distention, abdominal pain, blood  in stool, constipation, diarrhea, nausea and vomiting.   Endocrine: Negative.    Genitourinary: Positive for frequency. Negative for difficulty urinating, dysuria and urgency.   Musculoskeletal: Positive for gait problem. Negative for arthralgias, back pain and myalgias.   Skin: Negative for color change, pallor, rash and wound.   Allergic/Immunologic: Negative.    Neurological: Positive for weakness. Negative for dizziness, syncope, speech difficulty, light-headedness, numbness and headaches.   Hematological: Negative.    Psychiatric/Behavioral: Positive for confusion. The patient is not nervous/anxious.       All other systems reviewed and are negative.     Personal History     Past Medical History:   Diagnosis Date   • CAD (coronary artery disease)    • Hyperlipidemia    • Hypertension    • Hypothyroidism     on chronic replacement therapy   • Lactose intolerance    • Lower extremity edema    • PVC's (premature ventricular contractions)     History of   • Thyroid disorder    • Venous insufficiency        Past Surgical History:   Procedure Laterality Date   • CARDIAC CATHETERIZATION     • CORONARY STENT PLACEMENT     • EYE SURGERY      Bilateral cataract extraction   • HERNIA REPAIR      Inguinal hernia repair   • OTHER SURGICAL HISTORY      Melanoma removed from back   • OTHER SURGICAL HISTORY      History of left elbow fracture with sunsequent fixation       Family History:  family history includes No Known Problems in his father and mother. Otherwise pertinent FHx was reviewed and unremarkable.     Social History:  reports that he has never smoked. He has never used smokeless tobacco. He reports that he does not drink alcohol and does not use drugs.  Social History     Social History Narrative   • Not on file       Medications:  ARIPiprazole, aspirin, fluticasone, levothyroxine, lisinopril, nitroglycerin, omeprazole, simvastatin, sucralfate, and tamsulosin    Allergies   Allergen Reactions   • Lactose  Intolerance (Gi)        Objective   Objective     Vital Signs:   Temp:  [98.8 °F (37.1 °C)] 98.8 °F (37.1 °C)  Heart Rate:  [53-58] 53  Resp:  [16-18] 16  BP: (133-144)/(77-81) 139/81    Physical Exam   Constitutional: Awake, alert  Eyes: PERRLA, sclerae anicteric, no conjunctival injection  HENT: NCAT, mucous membranes moist  Neck: Supple, no thyromegaly, no lymphadenopathy, trachea midline  Respiratory: Clear to auscultation bilaterally, nonlabored respirations   Cardiovascular: Bradycardic, no murmurs, rubs, or gallops, palpable pedal pulses bilaterally  Gastrointestinal: Positive bowel sounds, soft, nontender, nondistended  Musculoskeletal:  bilateral ankle edema R>L, no clubbing or cyanosis to extremities  Psychiatric: Appropriate affect, cooperative  Neurologic: Oriented to person and place, poor historian, strength symmetric in all extremities, Cranial Nerves grossly intact to confrontation, speech clear  Skin: No rashes      Result Review:  I have personally reviewed the results from the time of this admission to 11/27/21 11:15 PM EST and agree with these findings:  [x]  Laboratory  []  Microbiology  [x]  Radiology  [x]  EKG/Telemetry   []  Cardiology/Vascular   []  Pathology  [x]  Old records  []  Other:  Most notable findings include:       LAB RESULTS:      Lab 11/27/21  1601 11/23/21  1240   WBC 7.05 5.92   HEMOGLOBIN 14.5 13.0   HEMATOCRIT 43.6 38.0   PLATELETS 152 170   NEUTROS ABS 4.15 4.02   IMMATURE GRANS (ABS) 0.02 0.02   LYMPHS ABS 1.66 1.13   MONOS ABS 0.97* 0.65   EOS ABS 0.23 0.09   MCV 96.0 93.6         Lab 11/27/21  1601 11/23/21  1240   SODIUM 141 136   POTASSIUM 4.2 4.4   CHLORIDE 105 101   CO2 28.0 27.0   ANION GAP 8.0 8.0   BUN 18 26*   CREATININE 0.69* 0.81   GLUCOSE 93 102*   CALCIUM 9.7 9.1   MAGNESIUM 2.1  --          Lab 11/27/21  1601 11/23/21  1240   TOTAL PROTEIN 7.2 6.0   ALBUMIN 4.20 3.80   GLOBULIN 3.0 2.2   ALT (SGPT) 26 28   AST (SGOT) 33 33   BILIRUBIN 0.5 0.3   ALK PHOS 66  67         Lab 11/27/21  1601 11/23/21  1240   PROBNP  --  115.5   TROPONIN T <0.010 <0.010                 UA    Urinalysis 3/18/21 3/26/21 11/27/21   Specific [place], UA 1.014 1.013 1.022   Ketones, UA Negative Negative Negative   Blood, UA Negative Negative Negative   Leukocytes, UA Negative Negative Negative   Nitrite, UA Negative Negative Negative           Microbiology Results (last 10 days)     Procedure Component Value - Date/Time    COVID PRE-OP / PRE-PROCEDURE SCREENING ORDER (NO ISOLATION) - Swab, Nasopharynx [[id number]]  (Normal) Collected: 11/27/21 1724    Lab Status: Final result Specimen: Swab from Nasopharynx Updated: 11/27/21 1801    Narrative:      The following orders were created for panel order COVID PRE-OP / PRE-PROCEDURE SCREENING ORDER (NO ISOLATION) - Swab, Nasopharynx.  Procedure                               Abnormality         Status                     ---------                               -----------         ------                     COVID-19 and FLU A/B PCR...[[id number]]  Normal              Final result                 Please view results for these tests on the individual orders.    COVID-19 and FLU A/B PCR - Swab, Nasopharynx [[id number]]  (Normal) Collected: 11/27/21 1724    Lab Status: Final result Specimen: Swab from Nasopharynx Updated: 11/27/21 1801     COVID19 Not Detected     Influenza A PCR Not Detected     Influenza B PCR Not Detected    Narrative:      Fact sheet for providers: https://www.fda.gov/media/053690/download    Fact sheet for patients: https://www.fda.gov/media/757567/download    Test performed by PCR.    COVID PRE-OP / PRE-PROCEDURE SCREENING ORDER (NO ISOLATION) - Swab, Nasopharynx [[id number]]  (Normal) Collected: 11/23/21 1350    Lab Status: Final result Specimen: Swab from Nasopharynx Updated: 11/23/21 1453    Narrative:      The following orders were created for panel order COVID PRE-OP / PRE-PROCEDURE SCREENING ORDER (NO ISOLATION) - Swab,  Nasopharynx.  Procedure                               Abnormality         Status                     ---------                               -----------         ------                     Respiratory Panel PCR w/...[[id number]]  Normal              Final result                 Please view results for these tests on the individual orders.    Respiratory Panel PCR w/COVID-19(SARS-CoV-2) DEEP/JOSEPH/ESTHER/PAD/COR/MAD/MARLINE In-House, NP Swab in UTM/VTM, 3-4 HR TAT - Swab, Nasopharynx [[id number]]  (Normal) Collected: 11/23/21 1350    Lab Status: Final result Specimen: Swab from Nasopharynx Updated: 11/23/21 2840     ADENOVIRUS, PCR Not Detected     Coronavirus 229E Not Detected     Coronavirus HKU1 Not Detected     Coronavirus NL63 Not Detected     Coronavirus OC43 Not Detected     COVID19 Not Detected     Human Metapneumovirus Not Detected     Human Rhinovirus/Enterovirus Not Detected     Influenza A PCR Not Detected     Influenza B PCR Not Detected     Parainfluenza Virus 1 Not Detected     Parainfluenza Virus 2 Not Detected     Parainfluenza Virus 3 Not Detected     Parainfluenza Virus 4 Not Detected     RSV, PCR Not Detected     Bordetella pertussis pcr Not Detected     Bordetella parapertussis PCR Not Detected     Chlamydophila pneumoniae PCR Not Detected     Mycoplasma pneumo by PCR Not Detected    Narrative:      In the setting of a positive respiratory panel with a viral infection PLUS a negative procalcitonin without other underlying concern for bacterial infection, consider observing off antibiotics or discontinuation of antibiotics and continue supportive care. If the respiratory panel is positive for atypical bacterial infection (Bordetella pertussis, Chlamydophila pneumoniae, or Mycoplasma pneumoniae), consider antibiotic de-escalation to target atypical bacterial infection.          CT Head Without Contrast    Result Date: 11/27/2021  EXAMINATION: CT HEAD WO CONTRAST-  INDICATION: ataxia  TECHNIQUE: 5 mm unenhanced  images through the brain  The radiation dose reduction device was turned on for each scan per the ALARA (As Low as Reasonably Achievable) protocol.  COMPARISON: NONE  FINDINGS: The calvarium appears intact. Included paranasal sinuses and mastoids appear clear. Soft tissue images show advanced generalized cerebral atrophy, and ventriculomegaly. Ventricles are relatively large for the degree of atrophy present, and in addition to central brain atrophy as in etiology, the possibility of normal pressure hydrocephalus should be considered.  There is no evidence of hemorrhage, contusion, or edema, no evidence of mass or mass effect, or abnormal extra-axial collection. No evidence of acute or old infarct is seen.        Impression: Advanced generalized cerebral atrophy, and ventriculomegaly. Ventriculomegaly may reflect central brain atrophy, or possibly normal pressure hydrocephalus. No other evidence of acute intracranial disease is seen.       XR Chest 1 View    Result Date: 11/27/2021  EXAMINATION: XR CHEST 1 VW-  INDICATION: Weak/Dizzy/AMS triage protocol  TECHNIQUE: Frontal view of the chest  COMPARISON: Chest x-ray 11/23/2021, CT abdomen and pelvis 3/26/2021  FINDINGS:  Stable cardiomediastinal silhouette within normal limits. Mild decrease in lung volumes from prior. Similar vague bibasilar parenchymal opacities from prior exam; these may reflect chronic interstitial appearing changes and peripheral reticulations seen at the lung bases on CT abdomen and pelvis from 3/26/2021. No pneumothorax. No pleural effusion.      Impression:  Decreased lung volumes from most recent chest x-ray. Otherwise stable chest with redemonstration of vague interstitial bibasilar parenchymal opacities possibly corresponding to chronic interstitial changes and peripheral reticulations noted on lung bases from CT abdomen and pelvis dated 3/26/2021.  This report was finalized on 11/27/2021 6:17 PM by Octavio Adamson MD.             Assessment/Plan   Assessment & Plan       Coronary artery disease involving native coronary artery of native heart without angina pectoris    Essential hypertension    Mixed hyperlipidemia    Hypothyroidism    Weakness    Fall   86 y.o. male with PMH significant for CAD, HLD, HTN, hypothyroid, chronic lower extremity edema, who presents to the ED with complaint of weakness and fall.    Weakness  Fall  -PT/OT consult in the a.m.  -B12 in the a.m.  -Fall precautions    Cognitive impairment  -PCP has noted concern for dementia  -Patient recently started Abilify  -Daughter has started process for emergency guardianship  -Case management consult in the a.m. for assistance with discharge planning  -Consider neurology consult in the a.m.    Hypothyroid  -Reports no longer taking levothyroxine  -TSH    Hyperlipidemia  Hypertension  CAD  -Continue daily ASA  -Continue lisinopril  -Continue simvastatin    BPH  -No longer taking Flomax    GERD  -Continue sucralfate      DVT prophylaxis: Lovenox    CODE STATUS:    Code Status (Patient has no pulse and is not breathing): CPR (Attempt to Resuscitate)  Medical Interventions (Patient has pulse or is breathing): Full Support      This note has been completed as part of a split-shared workflow.   Signature:   Electronically signed by DARA Cody, 11/27/21, 11:31 PM EST.      Brief Attending Admission Attestation     I have seen and examined the patient, performing an independent face-to-face diagnostic evaluation with plan of care reviewed and developed with the advanced practice clinician (APC).    Old records reviewed and summarized in PM hx.    Brief Summary Statement:  86 Y M, from home(lives alone), here with second ED visit over last 1 week with complaint of generalized decline, patient requiring now walker-from his cane, with fall today am.  No lightheadedness.    No complaint of syncope noted.  No focal weakness noted.  Do not complain of any overt  pain.  Recently started on Abilify over past week.  On ED work-up patient , troponin less than 0.01  Glucose-93  BUN/creatinine 18/0.6, albumin 4.2, magnesium 2.1  WBC-7, hemoglobin 14, platelet 152, UA-negative Covid-negative, chest x-ray-no acute abnormality  UA-negative  MRI-no acute hemorrhage, ventriculomegaly,  Normal pressure hydrocephalus bilateral mastoid effusion.    Remainder of detailed HPI is as noted above and has been reviewed and/or edited by me for completeness.    PM HX:  Dementia/with some behavioral disturbances, mood disorder-Abilify 2 mg daily  CAD-multiple stents, last cardiac cath in  with patent stents, aspirin 81 mg  Hypertension-lisinopril 2.5 mg daily  GERD/HH-Prilosec 20 mg daily  Hyperlipidemia-Zocor 20 mg daily  BPH-Flomax 0.4 mg,   hypothyroid-Synthroid 25 mcg daily    Chronic lower extremity edema.    Vitals:    21 2315   BP: 125/72   Pulse: 53   Resp: 16   Temp:  Afebrile   SpO2: 95%       Attending Physical Exam:  RS- CTA-BL, ,  No wheezing , no crackles, good effort.  CVS- s1s2 regular, no murmur.  ABD- soft, non tender, not distended,no ABD tenderness, no organomegaly.  EXT- no edema.  NEURO- AAO-to place, person, no focal defecit.      LABS:  As above   EKG-sinus bradycardia 56 bpm,  no acute ST-T wave changes.poor R wave progression on Lateral leads.    Brief Assessment/Plan  ??2nd to Abilify,  Normal pressure hydrocephalus -? On MRI- neurology consult.  Pt/ot consult    See above for further detailed assessment and plan developed with APC which I have reviewed and/or edited for completeness.      Admission Status: I believe that this patient meets observation status.            Electronically signed by Shaun Murray MD at 21 0142          Physician Progress Notes (most recent note)      Regine Loja APRN at 21 1430              [facility] Medicine Services  PROGRESS NOTE    Patient Name: Ramin Zaragoza  :  1935  MRN: [id number]    Date of Admission: 11/27/2021  Primary Care Physician: Irene Coley MD    Subjective   Subjective     CC:  Weakness, falls    HPI:   Doing well this morning. NAD. Ate very few bites of his breakfast. Complains of neck pain.      ROS:  Gen- No fevers, chills  CV- No chest pain, palpitations  Resp- No cough, dyspnea  GI- No N/V/D, abd pain        Objective   Objective     Vital Signs:   Temp:  [97.9 °F (36.6 °C)-99.5 °F (37.5 °C)] 98.4 °F (36.9 °C)  Heart Rate:  [] 98  Resp:  [16-18] 16  BP: (126-154)/(70-76) 154/76     Physical Exam:  Constitutional: No acute distress, awake, alert  HENT: NCAT, mucous membranes moist  Respiratory: Clear to auscultation bilaterally, respiratory effort normal   Cardiovascular: RRR, no murmurs, rubs, or gallops  Gastrointestinal: Positive bowel sounds, soft, nontender, nondistended  Musculoskeletal: No bilateral ankle edema  Psychiatric: Appropriate affect, cooperative  Neurologic: Oriented to person, strength symmetric in all extremities, Cranial Nerves grossly intact to confrontation, speech clear  Skin: No rashes     Results Reviewed:  LAB RESULTS:      Lab 12/05/21 0330   WBC 11.31*   HEMOGLOBIN 15.0   HEMATOCRIT 44.2   PLATELETS 176   MCV 94.4         Lab 12/05/21  0330 12/02/21  0328 12/01/21 1923   SODIUM 132*  --   --    POTASSIUM 4.1  --  4.9   CHLORIDE 98  --   --    CO2 23.0  --   --    ANION GAP 11.0  --   --    BUN 14  --   --    CREATININE 0.78  --   --    GLUCOSE 123*  --   --    CALCIUM 8.9  --   --    MAGNESIUM  --  2.1  --                          Brief Urine Lab Results  (Last result in the past 365 days)      Color   Clarity   Blood   Leuk Est   Nitrite   Protein   CREAT   Urine HCG        11/27/21 1931 Yellow   Clear   Negative   Negative   Negative   Negative                 Microbiology Results Abnormal     Procedure Component Value - Date/Time    COVID PRE-OP / PRE-PROCEDURE SCREENING ORDER (NO ISOLATION) - Swab,  Nasopharynx [[id number]]  (Normal) Collected: 11/27/21 1724    Lab Status: Final result Specimen: Swab from Nasopharynx Updated: 11/27/21 1801    Narrative:      The following orders were created for panel order COVID PRE-OP / PRE-PROCEDURE SCREENING ORDER (NO ISOLATION) - Swab, Nasopharynx.  Procedure                               Abnormality         Status                     ---------                               -----------         ------                     COVID-19 and FLU A/B PCR...[[id number]]  Normal              Final result                 Please view results for these tests on the individual orders.    COVID-19 and FLU A/B PCR - Swab, Nasopharynx [[id number]]  (Normal) Collected: 11/27/21 1724    Lab Status: Final result Specimen: Swab from Nasopharynx Updated: 11/27/21 1801     COVID19 Not Detected     Influenza A PCR Not Detected     Influenza B PCR Not Detected    Narrative:      Fact sheet for providers: https://www.fda.gov/media/192917/download    Fact sheet for patients: https://www.fda.gov/media/620941/download    Test performed by PCR.          No radiology results from the last 24 hrs        I have reviewed the medications:  Scheduled Meds:aspirin, 81 mg, Oral, Daily  atorvastatin, 10 mg, Oral, Daily  Diclofenac Sodium, 2 g, Topical, 4x Daily  divalproex, 250 mg, Oral, Q12H  donepezil, 10 mg, Oral, Nightly  enoxaparin, 40 mg, Subcutaneous, Q24H  fluticasone, 2 spray, Nasal, Daily  levothyroxine, 50 mcg, Oral, Q AM  lisinopril, 2.5 mg, Oral, Daily  pantoprazole, 40 mg, Oral, Daily  QUEtiapine, 50 mg, Oral, Nightly  sodium chloride, 10 mL, Intravenous, Q12H  sucralfate, 1 g, Oral, BID AC      Continuous Infusions:   PRN Meds:.•  acetaminophen **OR** acetaminophen **OR** acetaminophen  •  senna-docusate sodium **AND** polyethylene glycol **AND** bisacodyl **AND** bisacodyl  •  magnesium sulfate **OR** magnesium sulfate **OR** magnesium sulfate  •  melatonin  •  potassium chloride **OR** potassium  chloride **OR** potassium chloride  •  sodium chloride  •  sodium chloride    Assessment/Plan   Assessment & Plan     Active Hospital Problems    Diagnosis  POA   • Weakness [R53.1]  Yes   • Fall [W19.XXXA]  Yes   • Hypothyroidism [E03.9]  Yes   • Mixed hyperlipidemia [E78.2]  Yes   • Coronary artery disease involving native coronary artery of native heart without angina pectoris [I25.10]  Yes   • Essential hypertension [I10]  Yes      Resolved Hospital Problems   No resolved problems to display.        Brief Hospital Course to date:  Ramin Zaragoza is a 86 y.o. male  with PMH significant for CAD, HLD, HTN, hypothyroid, chronic lower extremity edema, who presents to the ED with complaint of weakness and fall.    This patient's problems and plans were partially entered by my partner and updated as appropriate by me 12/05/21.      Weakness and multiple Fall  -Neurology has followed, now s/o  --B12 is normal  --tsh elevated- started on synthroid  --ankle (right) and foot pain (left)- voltaren gel helping  -- planning rehab, continue PT/OT inpatient     Cognitive impairment patient with episodes of agitation and aggressive behavior.  -dementia, aricept started per Dr. Singh. Increased to 10mg 12/2  -Patient recently started Abilify- stopped this admission  -Daughter has started process for emergency guardianship. Papers signed by Dr. Singh  --Currently on Seroquel at night- continue     Hypothyroid  -Reports no longer taking levothyroxine PTA  -TSH, free T4 or decreased.  Currently TSH is at 7.830  -started low-dose Synthroid. Will need recheck ~ 1 month     Hyperlipidemia  Hypertension  CAD  -Continue daily ASA  -Continue lisinopril  -Continue simvastatin     BPH  -No longer taking Flomax     GERD  -Continue sucralfate      DVT prophylaxis:  Medical DVT prophylaxis orders are present.       AM-PAC 6 Clicks Score (PT): 17 (12/05/21 9018)    Disposition: To rehab when bed available. ? Monday    CODE STATUS:   Code  Status and Medical Interventions:   Ordered at: 11/27/21 2335     Code Status (Patient has no pulse and is not breathing):    CPR (Attempt to Resuscitate)     Medical Interventions (Patient has pulse or is breathing):    Full Support       DARA Sharma  12/05/21                Electronically signed by Regine Loja APRN at 12/05/21 1431          Consult Notes (most recent note)      Mehreen Muhammad MD at 11/28/21 1358      Consult Orders    1. Inpatient Neurology Consult General [[id number]] ordered by Shaun Murray MD at 11/28/21 0112                   Referring Provider: Trevor  Reason for Consultation: weakness, falls    Patient Care Team:  Irene Coley MD as PCP - General (Family Medicine)    Chief complaint weakness,falls     Subjective .     History of present illness:   Mr Ramin Zaragoza is an 86-year-old man who has a history of dementia, coronary artery disease, untreated hypothyroidism, hypertension, hyperlipidemia, urinary frequency who was admitted for weakness and falls.    Daughter helps with the history as patient is a very poor historian.  He does not remember why he was brought to the hospital.  According to the daughter she and her brother found him at home on the ground.  He has been falling lately and has had to call the 911 twice in the last 2 weeks for shortness of breath.    Daughter and son went to check on patient yesterday and found him on the floor of his kitchen. They were able to get him into a chair but he was very lethargic. She reports patient is improving since admission and not getting abilify.    Daughter tells me that patient has had a history of cognitive decline and has been diagnosed with dementia.  Back in 2018 she was awarded conservatorship after he was scammed out of thousands of dollars.  She said he did not understand he was being scammed and will just give money to people.  She now takes care of his bills.         He had been on aricept but  stopped taking it for some reason. She tells me he was recently started on Abilify to help with mood swings a couple of weeks ago.  She tells me that he is always had a quick temper.    Patient lives alone and is about 45 minutes away from near his family.    I have reviewed his last available note from [facility] dated 10/14/2021. Dr Coley saw patient and note states that he had been concerned about his uncontrolled blood pressure. She was able to determine he had been out his lisinopril for months. She also noted that he was found urinating in a trash can in the exam room when left alone.         Review of Systems  He denies any pain, nausea, vomiting, fevers, chills, but does endorse mild back pain. He denies feeling depressed. Remaining ROS is negative except as noted in HPI     History  Past Medical History:   Diagnosis Date   • CAD (coronary artery disease)    • Hyperlipidemia    • Hypertension    • Hypothyroidism     on chronic replacement therapy   • Lactose intolerance    • Lower extremity edema    • PVC's (premature ventricular contractions)     History of   • Thyroid disorder    • Venous insufficiency    ,   Past Surgical History:   Procedure Laterality Date   • CARDIAC CATHETERIZATION     • CORONARY STENT PLACEMENT     • EYE SURGERY      Bilateral cataract extraction   • HERNIA REPAIR      Inguinal hernia repair   • OTHER SURGICAL HISTORY      Melanoma removed from back   • OTHER SURGICAL HISTORY      History of left elbow fracture with sunsequent fixation   ,   Family History   Problem Relation Age of Onset   • No Known Problems Mother    • No Known Problems Father    ,   Social History     Socioeconomic History   • Marital status:    Tobacco Use   • Smoking status: Never Smoker   • Smokeless tobacco: Never Used   Substance and Sexual Activity   • Alcohol use: No   • Drug use: No   • Sexual activity: Defer     E-cigarette/Vaping     E-cigarette/Vaping Substances     E-cigarette/Vaping  "Devices       ,   Medications Prior to Admission   Medication Sig Dispense Refill Last Dose   • aspirin 81 MG EC tablet Take 81 mg by mouth daily.      • fluticasone (FLONASE) 50 MCG/ACT nasal spray 2 sprays into each nostril daily. Administer 2 sprays in each nostril for each dose.      • lisinopril (PRINIVIL,ZESTRIL) 2.5 MG tablet TAKE 1 TABLET BY MOUTH ONCE DAILY 90 tablet 1    • nitroglycerin (NITROSTAT) 0.4 MG SL tablet Place 1 tablet under the tongue every 5 (five) minutes as needed for chest pain. Take no more than 3 doses in 15 minutes. 25 tablet 11    • omeprazole (priLOSEC) 20 MG capsule Take 20 mg by mouth Daily.      • simvastatin (Zocor) 20 MG tablet Take 20 mg by mouth Every Night.      • sucralfate (CARAFATE) 1 g tablet Take 1 g by mouth 2 (Two) Times a Day.      • ARIPiprazole (ABILIFY) 2 MG tablet Take 2 mg by mouth Daily.   Unknown at Unknown time   • levothyroxine (SYNTHROID, LEVOTHROID) 25 MCG tablet Take 25 mcg by mouth daily.      • tamsulosin (FLOMAX) 0.4 MG capsule 24 hr capsule Take 1 capsule by mouth Daily.      , Scheduled Meds:  ARIPiprazole, 2 mg, Oral, Daily  aspirin, 81 mg, Oral, Daily  atorvastatin, 10 mg, Oral, Daily  enoxaparin, 40 mg, Subcutaneous, Q24H  fluticasone, 2 spray, Nasal, Daily  lisinopril, 2.5 mg, Oral, Daily  pantoprazole, 40 mg, Oral, Daily  sodium chloride, 10 mL, Intravenous, Q12H  sucralfate, 1 g, Oral, BID AC    , Continuous Infusions:   , PRN Meds:  •  acetaminophen **OR** acetaminophen **OR** acetaminophen  •  senna-docusate sodium **AND** polyethylene glycol **AND** bisacodyl **AND** bisacodyl  •  melatonin  •  sodium chloride  •  sodium chloride and Allergies:  Lactose intolerance (gi)    Objective     Vital Signs   Blood pressure 127/68, pulse 54, temperature 97.3 °F (36.3 °C), temperature source Oral, resp. rate 18, height 175.3 cm (69\"), weight 86.2 kg (190 lb), SpO2 99 %.    Physical Exam:  Elderly man, sitting in recliner  Head/eyes: atraumatic, " perrl  ENT poor dentition, moist membrane  Neck supple, trachea midline  Respiratory on room air, even respirations  Cardiac rrr, mild edema in LE  Extremities flat feet, normal temperature  Skin dry, intact  Speech no dysarthria or aphasia  CN perrl, eomi, vf intact, no facial weakness, hard of hearing, sensation intact   Mental status oriented to person, place time but has no memory of events leading up to admission   Cognitive status attends to me, responds quickly, no psychomotor slowing, has poor short term memory but has exquisite long term memory, follows commands  Motor nml tone and bulk, globally weak but mild 4+ throughout  Sensation intact to light touch and vibration  Reflexes down going toes, patellar 2+ bilaterally   Cerebellar fnf nml, no nystagmus  Gait shuffling gait but with encouragement can walk with long strides     Results Review:  TSH 7.83  Ft4 0.83  Cr 0.71   MRI brain personally reviewed/ atrophy causing ventriculomegaly, no acute changes, chronic ischemic changes  UA negative for infection     Assessment/Plan       Coronary artery disease involving native coronary artery of native heart without angina pectoris    Essential hypertension    Mixed hyperlipidemia    Hypothyroidism    Weakness    Fall      85 y/o man with history of dementia, mood swings, htn, hld, cad, who is admitted for weakness/falls.    Per report, patient had recently started abilify which can cause excessive sedation and this medication is on hold. It is unclear why he stopped aricept; he has an appt tomorrow with his neurologist Dr Michael. Dr Michael has not seen patient in years according to dgtr but would defer medication changes to him as he will follow in clinic.    MRI brain is reassuring, no stroke. While he does have enlarged ventricles, this is due to atrophy. He does not have clinical symptoms of NPH (no psychomotor slowing, magnetic gait, incontinence).     There is no evidence of u/l infection or metabolic  derangement on labs.    Overall, patient seems to be improving in level of alertness. Suspect he was oversedated and falling due to new medication.     I do not believe he has capacity to make his own medical decisions. He has no memory of reason for admission, has poor short term memory, and needs supervision for his medication. I do not believe he is safe to return home alone.    He is at high risk for delirium, will start seroquel tonight.     I discussed the patient's findings and my recommendations with patient, patient's dgtr and primary team     Mehreen Muhammad MD  21  13:58 EST    Update 245pm  Spoke w/ dgtr again. Advised he does not have capacity to make medical decisions. She understands; will change his appt w/ Dr Michael for later in the next week or so. Will have case management help her find a SNF/LTACH.         Electronically signed by Mehreen Muhammad MD at 21 1449          Physical Therapy Notes (most recent note)      Meg Khalil PTA at 21 0803  Version 1 of 1         Patient Name: Ramin Zaragoza  : 1935    MRN: [id number]                              Today's Date: 2021       Admit Date: 2021    Visit Dx:     ICD-10-CM ICD-9-CM   1. Weakness  R53.1 780.79   2. Fall, initial encounter  W19.XXXA E888.9   3. Confusion  R41.0 298.9   4. Altered mental status, unspecified altered mental status type  R41.82 780.97     Patient Active Problem List   Diagnosis   • Coronary artery disease involving native coronary artery of native heart without angina pectoris   • Premature ventricular contraction   • Essential hypertension   • Mixed hyperlipidemia   • Venous insufficiency   • Hypothyroidism   • Lactose intolerance   • Weakness   • Fall     Past Medical History:   Diagnosis Date   • CAD (coronary artery disease)    • Hyperlipidemia    • Hypertension    • Hypothyroidism     on chronic replacement therapy   • Lactose intolerance    • Lower  extremity edema    • PVC's (premature ventricular contractions)     History of   • Thyroid disorder    • Venous insufficiency      Past Surgical History:   Procedure Laterality Date   • CARDIAC CATHETERIZATION     • CORONARY STENT PLACEMENT     • EYE SURGERY      Bilateral cataract extraction   • HERNIA REPAIR      Inguinal hernia repair   • OTHER SURGICAL HISTORY      Melanoma removed from back   • OTHER SURGICAL HISTORY      History of left elbow fracture with sunsequent fixation      General Information     Row Name 12/06/21 0903          Physical Therapy Time and Intention    Document Type therapy note (daily note)  -AS     Mode of Treatment physical therapy  -AS     Row Name 12/06/21 0903          General Information    Patient Profile Reviewed yes  -AS     Existing Precautions/Restrictions fall  B ankle pain  -AS     Barriers to Rehab medically complex; hearing deficit; cognitive status  -AS     Row Name 12/06/21 0903          Cognition    Orientation Status (Cognition) oriented to; person; disoriented to; place; time; situation  -AS     Row Name 12/06/21 0903          Safety Issues, Functional Mobility    Safety Issues Affecting Function (Mobility) insight into deficits/self-awareness; safety precaution awareness; safety precautions follow-through/compliance; awareness of need for assistance; sequencing abilities  -AS     Impairments Affecting Function (Mobility) balance; cognition; endurance/activity tolerance; strength; postural/trunk control; pain  -AS     Cognitive Impairments, Mobility Safety/Performance awareness, need for assistance; insight into deficits/self-awareness; safety precaution follow-through; sequencing abilities; judgment; problem-solving/reasoning  -AS     Comment, Safety Issues/Impairments (Mobility) patient required increased time and effort to paticipate in therapy, increased complaints of bilateral ankle and LE pain  -AS           User Key  (r) = Recorded By, (t) = Taken By, (c) =  Cosigned By    Initials Name Provider Type    AS Meg Khalil PTA Physical Therapy Assistant               Mobility     Row Name 12/06/21 0906          Bed Mobility    Scooting/Bridging [place] (Bed Mobility) verbal cues; maximum assist (25% patient effort); 2 person assist  to reposition to HOB  -AS     Supine-Sit [place] (Bed Mobility) verbal cues; moderate assist (50% patient effort); 1 person assist  -AS     Sit-Supine [place] (Bed Mobility) verbal cues; maximum assist (25% patient effort); 1 person assist  -AS     Assistive Device (Bed Mobility) draw sheet; bed rails; head of bed elevated  -AS     Comment (Bed Mobility) Patient required mod/max assist for bed mobility this date, increased time and effort required this date. deferred OOB to chair due to increased assist needed for sit<>stand transfer and increased c/o pain/stiffness.  -AS     Row Name 12/06/21 0906          Transfers    Comment (Transfers) increased cues for sequencing and hand placement. Assist to block B LE from sliding. Verbal and tactile cues to correct posterior lean. Patient unable to take steps to recliner this date due to weakness, pain and impaired balance.  -AS     Row Name 12/06/21 0906          Bed-Chair Transfer    Bed-Chair [place] (Transfers) unable to assess  -AS     Row Name 12/06/21 0906          Sit-Stand Transfer    Sit-Stand [place] (Transfers) verbal cues; moderate assist (50% patient effort); maximum assist (25% patient effort); 2 person assist  -AS     Assistive Device (Sit-Stand Transfers) walker, front-wheeled  -AS     Row Name 12/06/21 0906          Gait/Stairs (Locomotion)    [place] Level (Gait) unable to assess  -AS           User Key  (r) = Recorded By, (t) = Taken By, (c) = Cosigned By    Initials Name Provider Type    AS Meg Khalil PTA Physical Therapy Assistant               Obj/Interventions     Row Name 12/06/21 0911          Motor Skills    Therapeutic Exercise  knee; ankle  -AS     Row Name 12/06/21 0911          Knee (Therapeutic Exercise)    Knee (Therapeutic Exercise) strengthening exercise  -AS     Knee Strengthening (Therapeutic Exercise) bilateral; marching while seated; LAQ (long arc quad); sitting; 10 repetitions  mutliple rest breaks due to pain, patient moves slowly with all movements.  -AS     Row Name 12/06/21 0911          Ankle (Therapeutic Exercise)    Ankle (Therapeutic Exercise) AROM (active range of motion)  -AS     Ankle AROM (Therapeutic Exercise) bilateral; dorsiflexion; plantarflexion; sitting; 10 repetitions  -AS     Row Name 12/06/21 0911          Balance    Static Standing Balance moderate impairment; severe impairment; supported; standing  -AS     Dynamic Standing Balance not tested  -AS     Comment, Balance increased posterior lean in sitting and standing positions  -AS           User Key  (r) = Recorded By, (t) = Taken By, (c) = Cosigned By    Initials Name Provider Type    AS Meg Khalil PTA Physical Therapy Assistant               Goals/Plan    No documentation.                Clinical Impression     Row Name 12/06/21 0913          Pain    Additional Documentation Pain Scale: FACES Pre/Post-Treatment (Group)  -AS     Row Name 12/06/21 0913          Pain Scale: FACES Pre/Post-Treatment    Pain: FACES Scale, Pretreatment 8-->hurts whole lot  -AS     Posttreatment Pain Rating 8-->hurts whole lot  -AS     Pain Location - Side Bilateral  -AS     Pain Location ankle  -AS     Pre/Posttreatment Pain Comment patient did not tolerate activity this date due to increased c/o pain  -AS     Row Name 12/06/21 0913          Plan of Care Review    Plan of Care Reviewed With patient  -AS     Progress no change  -AS     Outcome Summary patient required increased assist for bed mobility and transfers this date, performed sit<>stand transfers with mod/max assist x2, increased cues for sequencing and hand placement. Assist to block B LE from sliding.  Verbal and tactile cues to correct posterior lean in both sitting and standing positions. Patient unable to take steps to recliner this date due to weakness, pain and impaired balance.  -AS     Row Name 12/06/21 0913          Positioning and Restraints    Pre-Treatment Position in bed  -AS     Post Treatment Position bed  -AS     In Bed supine; call light within reach; encouraged to call for assist; exit alarm on; waffle boots/both  -AS           User Key  (r) = Recorded By, (t) = Taken By, (c) = Cosigned By    Initials Name Provider Type    AS Meg Khalil PTA Physical Therapy Assistant               Outcome Measures     Row Name 12/06/21 0915          How much help from another person do you currently need...    Turning from your back to your side while in flat bed without using bedrails? 2  -AS     Moving from lying on back to sitting on the side of a flat bed without bedrails? 2  -AS     Moving to and from a bed to a chair (including a wheelchair)? 2  -AS     Standing up from a chair using your arms (e.g., wheelchair, bedside chair)? 2  -AS     Climbing 3-5 steps with a railing? 1  -AS     To walk in hospital room? 2  -AS     AM-PAC 6 Clicks Score (PT) 11  -AS     Row Name 12/06/21 0915          Functional Assessment    Outcome Measure Options AM-PAC 6 Clicks Basic Mobility (PT)  -AS           User Key  (r) = Recorded By, (t) = Taken By, (c) = Cosigned By    Initials Name Provider Type    AS Meg Khalil PTA Physical Therapy Assistant                             Physical Therapy Education                 Title: PT OT SLP Therapies (In Progress)     Topic: Physical Therapy (In Progress)     Point: Mobility training (In Progress)     Learning Progress Summary           Patient Acceptance, E, NR by AS at 12/6/2021 0916    Acceptance, E,TB, VU by KG at 12/2/2021 1056    Eager, E,D, NR by DM at 11/28/2021 1116                   Point: Home exercise program (In Progress)     Learning Progress Summary            Patient Acceptance, E, NR by AS at 12/6/2021 0916    Acceptance, E,TB, VU by KG at 12/2/2021 1056    Eager, E,D, NR by DM at 11/28/2021 1116                   Point: Body mechanics (In Progress)     Learning Progress Summary           Patient Acceptance, E, NR by AS at 12/6/2021 0916    Acceptance, E,TB, VU by KG at 12/2/2021 1056    Eager, E,D, NR by DM at 11/28/2021 1116                   Point: Precautions (In Progress)     Learning Progress Summary           Patient Acceptance, E, NR by AS at 12/6/2021 0916    Acceptance, E,TB, VU by KG at 12/2/2021 1056    Eager, E,D, NR by DM at 11/28/2021 1116                               User Key     Initials Effective Dates Name Provider Type Discipline    DM 06/16/21 -  Yakelin Armando, PT Physical Therapist PT    AS 06/16/21 -  Meg Khalil, TROY Physical Therapy Assistant PT    KG 06/16/21 -  Emily Quiroz Physical Therapist PT              PT Recommendation and Plan     Plan of Care Reviewed With: patient  Progress: no change  Outcome Summary: patient required increased assist for bed mobility and transfers this date, performed sit<>stand transfers with mod/max assist x2, increased cues for sequencing and hand placement. Assist to block B LE from sliding. Verbal and tactile cues to correct posterior lean in both sitting and standing positions. Patient unable to take steps to recliner this date due to weakness, pain and impaired balance.     Time Calculation:    PT Charges     Row Name 12/06/21 0916             Time Calculation    Start Time 0803  -AS      PT Received On 12/06/21  -AS      PT Goal Re-Cert Due Date 12/08/21  -AS              Timed Charges    31086 - PT Therapeutic Exercise Minutes 15  -AS      54531 - PT Therapeutic Activity Minutes 15  -AS              Total Minutes    Timed Charges Total Minutes 30  -AS       Total Minutes 30  -AS            User Key  (r) = Recorded By, (t) = Taken By, (c) = Cosigned By    Initials Name Provider  Type    AS Meg Khalil PTA Physical Therapy Assistant              Therapy Charges for Today     Code Description Service Date Service Provider Modifiers Qty    76597929812 HC PT THER PROC EA 15 MIN 2021 Meg Khalil PTA GP 1    54511074725 HC PT THERAPEUTIC ACT EA 15 MIN 2021 Meg Khalil PTA GP 1          PT G-Codes  Outcome Measure Options: AM-PAC 6 Clicks Basic Mobility (PT)  AM-PAC 6 Clicks Score (PT): 11  AM-PAC 6 Clicks Score (OT): 17    Meg Khalil PTA  2021      Electronically signed by Meg Khalil PTA at 21 0917          Occupational Therapy Notes (most recent note)      Yuliet Juarez, OT at 21 0952          Patient Name: Ramin Zaragoza  : 1935    MRN: [id number]                              Today's Date: 2021       Admit Date: 2021    Visit Dx:     ICD-10-CM ICD-9-CM   1. Weakness  R53.1 780.79   2. Fall, initial encounter  W19.XXXA E888.9   3. Confusion  R41.0 298.9   4. Altered mental status, unspecified altered mental status type  R41.82 780.97     Patient Active Problem List   Diagnosis   • Coronary artery disease involving native coronary artery of native heart without angina pectoris   • Premature ventricular contraction   • Essential hypertension   • Mixed hyperlipidemia   • Venous insufficiency   • Hypothyroidism   • Lactose intolerance   • Weakness   • Fall     Past Medical History:   Diagnosis Date   • CAD (coronary artery disease)    • Hyperlipidemia    • Hypertension    • Hypothyroidism     on chronic replacement therapy   • Lactose intolerance    • Lower extremity edema    • PVC's (premature ventricular contractions)     History of   • Thyroid disorder    • Venous insufficiency      Past Surgical History:   Procedure Laterality Date   • CARDIAC CATHETERIZATION     • CORONARY STENT PLACEMENT     • EYE SURGERY      Bilateral cataract extraction   • HERNIA REPAIR      Inguinal hernia repair   • OTHER  SURGICAL HISTORY      Melanoma removed from back   • OTHER SURGICAL HISTORY      History of left elbow fracture with sunsequent fixation      General Information     Row Name 12/02/21 1048          OT Time and Intention    Document Type therapy note (daily note)  -KF     Mode of Treatment occupational therapy  -     Row Name 12/02/21 1048          General Information    Patient Profile Reviewed yes  -KF     Existing Precautions/Restrictions fall; other (see comments)  B ankle pain  -KF     Barriers to Rehab medically complex; hearing deficit; cognitive status  -KF     Row Name 12/02/21 1048          Cognition    Orientation Status (Cognition) oriented to; person; place; situation; disoriented to; time; verbal cues/prompts needed for orientation  -KF     Row Name 12/02/21 1048          Safety Issues, Functional Mobility    Safety Issues Affecting Function (Mobility) insight into deficits/self-awareness; awareness of need for assistance; safety precaution awareness  -KF     Impairments Affecting Function (Mobility) balance; cognition; endurance/activity tolerance; strength; postural/trunk control; pain  -KF     Cognitive Impairments, Mobility Safety/Performance awareness, need for assistance; insight into deficits/self-awareness; judgment; problem-solving/reasoning  -KF           User Key  (r) = Recorded By, (t) = Taken By, (c) = Cosigned By    Initials Name Provider Type    KF Yuliet Juarez OT Occupational Therapist                 Mobility/ADL's     Row Name 12/02/21 1049          Bed Mobility    Bed Mobility scooting/bridging; supine-sit  -KF     Scooting/Bridging [place] (Bed Mobility) contact guard; verbal cues; 1 person assist  -KF     Supine-Sit [place] (Bed Mobility) contact guard; 1 person assist; verbal cues  -KF     Assistive Device (Bed Mobility) bed rails; head of bed elevated  -KF     Comment (Bed Mobility) posterior lean intermittently requires CGA  -KF     Row Name 12/02/21 1048           Transfers    Transfers sit-stand transfer; bed-chair transfer  -KF     Comment (Transfers) cues for seq and HP  -KF     Bed-Chair [place] (Transfers) minimum assist (75% patient effort); verbal cues; 1 person assist  -KF     Assistive Device (Bed-Chair Transfers) walker, front-wheeled  -KF     Sit-Stand [place] (Transfers) minimum assist (75% patient effort); verbal cues; 1 person assist  -KF     Row Name 12/02/21 1049          Sit-Stand Transfer    Assistive Device (Sit-Stand Transfers) walker, front-wheeled  -KF     Row Name 12/02/21 1049          Functional Mobility    Functional Mobility- Ind. Level minimum assist (75% patient effort); verbal cues required; 1 person  -KF     Functional Mobility- Device rolling walker  -KF     Functional Mobility-Distance (Feet) 15  -KF     Functional Mobility- Safety Issues sequencing ability decreased; weight-shifting ability decreased  -KF     Functional Mobility- Comment steps to sinkside for ADL completion extended time required  -     Row Name 12/02/21 1049          Activities of Daily Living    BADL Assessment/Intervention lower body dressing; grooming; bathing  -     Row Name 12/02/21 1049          Lower Body Dressing Assessment/Training    [place] Level (Lower Body Dressing) pants/bottoms; don; maximum assist (25% patient effort)  -KF     Position (Lower Body Dressing) supported standing  -     Row Name 12/02/21 1049          Upper Body Dressing Assessment/Training    [place] Level (Upper Body Dressing) doff; don; front opening garment; minimum assist (75% patient effort); verbal cues; nonverbal cues (demo/gesture)  -     Position (Upper Body Dressing) edge of bed sitting  -     Row Name 12/02/21 1049          Grooming Assessment/Training    [place] Level (Grooming) hair care, combing/brushing; wash face, hands; contact guard assist; verbal cues  -     Position (Grooming) sink side; supported standing  -     Row Name 12/02/21  1049          Bathing Assessment/Intervention    [place] Level (Bathing) bathing skills; upper body; maximum assist (25% patient effort)  -KF     Position (Bathing) sink side; supported standing  -KF     Comment (Bathing) hair at sink side in supported standing  -KF           User Key  (r) = Recorded By, (t) = Taken By, (c) = Cosigned By    Initials Name Provider Type    Yuliet Tony OT Occupational Therapist               Obj/Interventions     Row Name 12/02/21 1052          Balance    Balance Assessment sitting static balance; sitting dynamic balance; standing static balance; standing dynamic balance  -KF     Static Sitting Balance unsupported; mild impairment; WFL; sitting, edge of bed  -KF     Dynamic Sitting Balance unsupported; mild impairment; sitting, edge of bed  -KF     Static Standing Balance WFL; supported  -KF     Dynamic Standing Balance mild impairment; supported  -KF     Balance Interventions sit to stand; standing; sitting; occupation based/functional task  -KF     Comment, Balance posterior lean intermittently in sitting improved with cues  -KF           User Key  (r) = Recorded By, (t) = Taken By, (c) = Cosigned By    Initials Name Provider Type    Yuliet Tony, MACRINA Occupational Therapist               Goals/Plan    No documentation.                Clinical Impression     Row Name 12/02/21 1052          Pain Assessment    Additional Documentation Pain Scale: FACES Pre/Post-Treatment (Group)  -KF     Row Name 12/02/21 1052          Pain Scale: FACES Pre/Post-Treatment    Pain: FACES Scale, Pretreatment 4-->hurts little more  -KF     Posttreatment Pain Rating 4-->hurts little more  -KF     Pain Location - Side Bilateral  -KF     Pain Location - Orientation generalized  -KF     Pain Location ankle  -KF     Pre/Posttreatment Pain Comment tolerated  -KF     Row Name 12/02/21 1055          Plan of Care Review    Plan of Care Reviewed With patient  -KF     Progress improving  -KF      Outcome Summary Pt completed washing hair maxA standing sink side, completed 15 ft functional mob for sinkside ADL completion with improving standing tolerance, CGA hair care, Reji washing hands in standing, progressed with time, cont IPOT per POC  -KF     Row Name 12/02/21 1055 12/02/21 1052       Therapy Assessment/Plan (OT)    Rehab Potential (OT) good, to achieve stated therapy goals  -KF good, to achieve stated therapy goals  -KF    Criteria for Skilled Therapeutic Interventions Met (OT) yes; meets criteria; skilled treatment is necessary  -KF yes; meets criteria; skilled treatment is necessary  -KF    Therapy Frequency (OT) daily  -KF daily  -KF    Row Name 12/02/21 1055 12/02/21 1052       Therapy Plan Review/Discharge Plan (OT)    Anticipated Discharge Disposition (OT) skilled nursing facility  -KF skilled nursing facility  -KF    Row Name 12/02/21 1055 12/02/21 1052       Vital Signs    Pre Systolic BP Rehab -- --  RN cleared VSS  -KF    Pre Patient Position Supine  -KF Supine  -KF    Intra Patient Position Standing  -KF Standing  -KF    Post Patient Position Sitting  -KF Sitting  -KF    Rest Breaks  -- 1  -KF    Row Name 12/02/21 1055          Positioning and Restraints    Pre-Treatment Position in bed  -KF     Post Treatment Position chair  -KF     In Chair notified nsg; reclined; sitting; call light within reach; encouraged to call for assist; exit alarm on; with PT; waffle cushion; legs elevated  -KF           User Key  (r) = Recorded By, (t) = Taken By, (c) = Cosigned By    Initials Name Provider Type    KF Yuliet Juarez, OT Occupational Therapist               Outcome Measures     Row Name 12/02/21 1054          How much help from another is currently needed...    Putting on and taking off regular lower body clothing? 2  -KF     Bathing (including washing, rinsing, and drying) 2  -KF     Toileting (which includes using toilet bed pan or urinal) 3  -KF     Putting on and taking off regular  upper body clothing 3  -KF     Taking care of personal grooming (such as brushing teeth) 3  -KF     Eating meals 4  -KF     AM-PAC 6 Clicks Score (OT) 17  -KF     Row Name 12/02/21 1053          How much help from another person do you currently need...    Turning from your back to your side while in flat bed without using bedrails? 4  -KG     Moving from lying on back to sitting on the side of a flat bed without bedrails? 3  -KG     Moving to and from a bed to a chair (including a wheelchair)? 2  -KG     Standing up from a chair using your arms (e.g., wheelchair, bedside chair)? 3  -KG     Climbing 3-5 steps with a railing? 2  -KG     To walk in hospital room? 3  -KG     AM-PAC 6 Clicks Score (PT) 17  -KG     Row Name 12/02/21 1054 12/02/21 1053       Functional Assessment    Outcome Measure Options AM-PAC 6 Clicks Daily Activity (OT)  -KF AM-PAC 6 Clicks Basic Mobility (PT)  -KG          User Key  (r) = Recorded By, (t) = Taken By, (c) = Cosigned By    Initials Name Provider Type    KF Yuliet Juarez, OT Occupational Therapist    KG Emily Quiroz Physical Therapist                Occupational Therapy Education                 Title: PT OT SLP Therapies (In Progress)     Topic: Occupational Therapy (In Progress)     Point: ADL training (Done)     Description:   Instruct learner(s) on proper safety adaptation and remediation techniques during self care or transfers.   Instruct in proper use of assistive devices.              Learning Progress Summary           Patient Acceptance, E,TB,D, VU,DU by  at 12/2/2021 1055    Acceptance, E,TB,D, VU,DU,NR by  at 11/28/2021 1422   Family Acceptance, E,TB,D, VU,DU,NR by  at 11/28/2021 1422                   Point: Home exercise program (Not Started)     Description:   Instruct learner(s) on appropriate technique for monitoring, assisting and/or progressing therapeutic exercises/activities.              Learner Progress:  Not documented in this visit.           Point: Precautions (Done)     Description:   Instruct learner(s) on prescribed precautions during self-care and functional transfers.              Learning Progress Summary           Patient Acceptance, E,TB,D, VU,DU by  at 12/2/2021 1055    Acceptance, E,TB,D, VU,DU,NR by  at 11/28/2021 1422   Family Acceptance, E,TB,D, VU,DU,NR by  at 11/28/2021 1422                   Point: Body mechanics (Done)     Description:   Instruct learner(s) on proper positioning and spine alignment during self-care, functional mobility activities and/or exercises.              Learning Progress Summary           Patient Acceptance, E,TB,D, VU,DU by  at 12/2/2021 1055    Acceptance, E,TB,D, VU,DU,NR by  at 11/28/2021 1422   Family Acceptance, E,TB,D, VU,DU,NR by  at 11/28/2021 1422                               User Key     Initials Effective Dates Name Provider Type Discipline     06/16/21 -  Yuliet Juarez, OT Occupational Therapist OT              OT Recommendation and Plan  Planned Therapy Interventions (OT): activity tolerance training, adaptive equipment training, BADL retraining, cognitive/visual perception retraining, occupation/activity based interventions, strengthening exercise, ROM/therapeutic exercise, patient/caregiver education/training, transfer/mobility retraining, functional balance retraining  Therapy Frequency (OT): daily  Plan of Care Review  Plan of Care Reviewed With: patient  Progress: improving  Outcome Summary: Pt completed washing hair maxA standing sink side, completed 15 ft functional mob for sinkside ADL completion with improving standing tolerance, CGA hair care, Reji washing hands in standing, progressed with time, cont IPOT per POC     Time Calculation:    Time Calculation- OT     Row Name 12/02/21 1058 12/02/21 0952          Time Calculation- OT    OT Start Time -- 0952  -     OT Received On -- 12/02/21  -            Timed Charges    88845 - Gait Training Minutes  15  -KG --      30298 - OT Self Care/Mgmt Minutes -- 23  -KF            Total Minutes    Timed Charges Total Minutes 15  -KG 23  -KF      Total Minutes 15  -KG 23  -KF           User Key  (r) = Recorded By, (t) = Taken By, (c) = Cosigned By    Initials Name Provider Type    KF Yuliet Juarez, OT Occupational Therapist    Emily Montoya Physical Therapist              Therapy Charges for Today     Code Description Service Date Service Provider Modifiers Qty    [id number]  OT SELF CARE/MGMT/TRAIN EA 15 MIN 12/2/2021 Yuliet Juarez OT GO 2               Yuliet Juarez OT  12/2/2021    Electronically signed by Yuliet Juarez OT at 12/02/21 1101

## 2021-12-07 ENCOUNTER — APPOINTMENT (OUTPATIENT)
Dept: CT IMAGING | Facility: HOSPITAL | Age: 86
End: 2021-12-07

## 2021-12-07 PROCEDURE — 25010000002 ENOXAPARIN PER 10 MG: Performed by: NURSE PRACTITIONER

## 2021-12-07 PROCEDURE — 73700 CT LOWER EXTREMITY W/O DYE: CPT

## 2021-12-07 PROCEDURE — 99233 SBSQ HOSP IP/OBS HIGH 50: CPT | Performed by: NURSE PRACTITIONER

## 2021-12-07 RX ADMIN — LEVOTHYROXINE SODIUM 50 MCG: 50 TABLET ORAL at 06:24

## 2021-12-07 RX ADMIN — DICLOFENAC 2 G: 10 GEL TOPICAL at 17:58

## 2021-12-07 RX ADMIN — DIVALPROEX SODIUM 250 MG: 250 TABLET, DELAYED RELEASE ORAL at 21:37

## 2021-12-07 RX ADMIN — DICLOFENAC 2 G: 10 GEL TOPICAL at 21:38

## 2021-12-07 RX ADMIN — SUCRALFATE 1 G: 1 TABLET ORAL at 08:32

## 2021-12-07 RX ADMIN — DICLOFENAC 2 G: 10 GEL TOPICAL at 12:50

## 2021-12-07 RX ADMIN — LISINOPRIL 2.5 MG: 2.5 TABLET ORAL at 08:32

## 2021-12-07 RX ADMIN — SUCRALFATE 1 G: 1 TABLET ORAL at 17:58

## 2021-12-07 RX ADMIN — ACETAMINOPHEN 650 MG: 325 TABLET, FILM COATED ORAL at 08:32

## 2021-12-07 RX ADMIN — DONEPEZIL HYDROCHLORIDE 10 MG: 10 TABLET, FILM COATED ORAL at 21:37

## 2021-12-07 RX ADMIN — ENOXAPARIN SODIUM 40 MG: 40 INJECTION SUBCUTANEOUS at 08:33

## 2021-12-07 RX ADMIN — QUETIAPINE FUMARATE 50 MG: 25 TABLET ORAL at 21:37

## 2021-12-07 RX ADMIN — ACETAMINOPHEN 650 MG: 325 TABLET, FILM COATED ORAL at 03:37

## 2021-12-07 RX ADMIN — DIVALPROEX SODIUM 250 MG: 250 TABLET, DELAYED RELEASE ORAL at 08:32

## 2021-12-07 RX ADMIN — FLUTICASONE PROPIONATE 2 SPRAY: 50 SPRAY, METERED NASAL at 08:34

## 2021-12-07 RX ADMIN — ATORVASTATIN CALCIUM 10 MG: 10 TABLET, FILM COATED ORAL at 08:32

## 2021-12-07 RX ADMIN — DICLOFENAC 2 G: 10 GEL TOPICAL at 08:34

## 2021-12-07 RX ADMIN — ASPIRIN 81 MG: 81 TABLET, COATED ORAL at 08:32

## 2021-12-07 RX ADMIN — SODIUM CHLORIDE, PRESERVATIVE FREE 10 ML: 5 INJECTION INTRAVENOUS at 08:34

## 2021-12-07 RX ADMIN — SODIUM CHLORIDE, PRESERVATIVE FREE 10 ML: 5 INJECTION INTRAVENOUS at 21:38

## 2021-12-07 RX ADMIN — PANTOPRAZOLE SODIUM 40 MG: 40 TABLET, DELAYED RELEASE ORAL at 08:32

## 2021-12-07 NOTE — PROGRESS NOTES
[facility] Medicine Services  PROGRESS NOTE    Patient Name: Ramin Zaragoza  : 1935  MRN: [id number]    Date of Admission: 2021  Primary Care Physician: Irene Coley MD    Subjective   Subjective     CC:  Weakness, falls    HPI:   No issues overnight  No complaints    ROS:  Gen- No fevers, chills  CV- No chest pain, palpitations  Resp- No cough, dyspnea  GI- No N/V/D, abd pain      Objective   Objective     Vital Signs:   Temp:  [97 °F (36.1 °C)-98.4 °F (36.9 °C)] 97 °F (36.1 °C)  Heart Rate:  [] 68  Resp:  [16] 16  BP: (115-137)/(70-73) 128/71     Physical Exam:  Constitutional: No acute distress, awake, alert, daughter at bedside  HENT: NCAT, mucous membranes moist  Respiratory: Clear to auscultation bilaterally, respiratory effort normal   Cardiovascular: RRR, no murmurs, rubs, or gallops  Gastrointestinal: Positive bowel sounds, soft, nontender, nondistended  Musculoskeletal: No bilateral ankle edema  Psychiatric: Flat affect, cooperative  Neurologic: Oriented x 2, MC, speech clear  Skin: No rashes noted      Results Reviewed:  LAB RESULTS:      Lab 21   WBC 11.31*   HEMOGLOBIN 15.0   HEMATOCRIT 44.2   PLATELETS 176   MCV 94.4         Lab 21  0330 21  0328 21  1923   SODIUM 132*  --   --    POTASSIUM 4.1  --  4.9   CHLORIDE 98  --   --    CO2 23.0  --   --    ANION GAP 11.0  --   --    BUN 14  --   --    CREATININE 0.78  --   --    GLUCOSE 123*  --   --    CALCIUM 8.9  --   --    MAGNESIUM  --  2.1  --        Brief Urine Lab Results  (Last result in the past 365 days)      Color   Clarity   Blood   Leuk Est   Nitrite   Protein   CREAT   Urine HCG        21 Yellow   Clear   Negative   Negative   Negative   Negative                 Microbiology Results Abnormal     Procedure Component Value - Date/Time    COVID PRE-OP / PRE-PROCEDURE SCREENING ORDER (NO ISOLATION) - Swab, Nasopharynx [[id number]]  (Normal) Collected:  11/27/21 1724    Lab Status: Final result Specimen: Swab from Nasopharynx Updated: 11/27/21 1801    Narrative:      The following orders were created for panel order COVID PRE-OP / PRE-PROCEDURE SCREENING ORDER (NO ISOLATION) - Swab, Nasopharynx.  Procedure                               Abnormality         Status                     ---------                               -----------         ------                     COVID-19 and FLU A/B PCR...[[id number]]  Normal              Final result                 Please view results for these tests on the individual orders.    COVID-19 and FLU A/B PCR - Swab, Nasopharynx [[id number]]  (Normal) Collected: 11/27/21 1724    Lab Status: Final result Specimen: Swab from Nasopharynx Updated: 11/27/21 1801     COVID19 Not Detected     Influenza A PCR Not Detected     Influenza B PCR Not Detected    Narrative:      Fact sheet for providers: https://www.fda.gov/media/641338/download    Fact sheet for patients: https://www.fda.gov/media/619107/download    Test performed by PCR.          No radiology results from the last 24 hrs        I have reviewed the medications:  Scheduled Meds:aspirin, 81 mg, Oral, Daily  atorvastatin, 10 mg, Oral, Daily  Diclofenac Sodium, 2 g, Topical, 4x Daily  divalproex, 250 mg, Oral, Q12H  donepezil, 10 mg, Oral, Nightly  enoxaparin, 40 mg, Subcutaneous, Q24H  fluticasone, 2 spray, Nasal, Daily  levothyroxine, 50 mcg, Oral, Q AM  lisinopril, 2.5 mg, Oral, Daily  pantoprazole, 40 mg, Oral, Daily  QUEtiapine, 50 mg, Oral, Nightly  sodium chloride, 10 mL, Intravenous, Q12H  sucralfate, 1 g, Oral, BID AC      Continuous Infusions:   PRN Meds:.•  acetaminophen **OR** acetaminophen **OR** acetaminophen  •  senna-docusate sodium **AND** polyethylene glycol **AND** bisacodyl **AND** bisacodyl  •  magnesium sulfate **OR** magnesium sulfate **OR** magnesium sulfate  •  melatonin  •  potassium chloride **OR** potassium chloride **OR** potassium chloride  •  sodium  chloride  •  sodium chloride    Assessment/Plan   Assessment & Plan     Active Hospital Problems    Diagnosis  POA   • Weakness [R53.1]  Yes   • Fall [W19.XXXA]  Yes   • Hypothyroidism [E03.9]  Yes   • Mixed hyperlipidemia [E78.2]  Yes   • Coronary artery disease involving native coronary artery of native heart without angina pectoris [I25.10]  Yes   • Essential hypertension [I10]  Yes      Resolved Hospital Problems   No resolved problems to display.        Brief Hospital Course to date:  Ramin Zaragoza is a 86 y.o. male  with PMH significant for CAD, HLD, HTN, hypothyroid, chronic lower extremity edema, who presents to the ED with complaint of weakness and fall.    This patient's problems and plans were partially entered by my partner and updated as appropriate by me 12/07/21.      Weakness  Multiple Falls  -Neurology has followed, now s/o  --B12 is normal  --tsh elevated, started on synthroid  --ankle (right) and foot pain (left) and now left hip- voltaren gel helping.  CT extremity pending  --planning rehab, continue PT/OT inpatient     Cognitive impairment patient with episodes of agitation and aggressive behavior.  -dementia, aricept started per Dr. Singh. Increased to 10mg 12/2  -Patient recently started Abilify, stopped this admission  -Daughter has started process for emergency guardianship. Papers signed by Dr. Singh  --continue seroquel     Hypothyroid  -Reports no longer taking levothyroxine PTA  -elevated TSH, free T4 decreased.   --started low-dose Synthroid. Will need recheck ~ 1 month     Hyperlipidemia  Hypertension  CAD  -Continue daily ASA  -Continue lisinopril  -Continue statin     BPH  -No longer taking Flomax  --no issues urinating     GERD  -Continue sucralfate/PPI    Leukocytosis  --mild, recheck in am  --afebrile and no s/s of infection  --encourage IS    DVT prophylaxis:  Medical DVT prophylaxis orders are present.       AM-PAC 6 Clicks Score (PT): 11 (12/07/21 4990)    Disposition: NARCISA  working on SNF [facility]      CODE STATUS:   Code Status and Medical Interventions:   Ordered at: 11/27/21 4317     Code Status (Patient has no pulse and is not breathing):    CPR (Attempt to Resuscitate)     Medical Interventions (Patient has pulse or is breathing):    Full Support       DARA Caldera  12/07/21

## 2021-12-07 NOTE — CASE MANAGEMENT/SOCIAL WORK
Continued Stay Note   [place]     Patient Name: Ramin Zaragoza  MRN: [id number]  Today's Date: 12/7/2021    Admit Date: 11/27/2021     Discharge Plan     Row Name 12/07/21 1339       Plan    Plan SNF    Patient/Family in Agreement with Plan yes    Plan Comments Spoke with patient and his daughter at bedside. Initial plan was [facility] [place] [facility], but due to physical debility and pain patient has had limited ability to do PT/OT. Plan is now SNF likely transitioning to LTC. [facility] [place], referral called to [facility] with [facility]. Daughter continuing to persue guardianship. She stated she has had [employer] Medicare revoked, patient now has Medicare A&B ID: [id number].    Final Discharge Disposition Code 03 - skilled nursing facility (SNF)               Discharge Codes    No documentation.                     Carlota Chavez RN

## 2021-12-08 LAB
ANION GAP SERPL CALCULATED.3IONS-SCNC: 11 MMOL/L (ref 5–15)
BASOPHILS # BLD AUTO: 0.04 10*3/MM3 (ref 0–0.2)
BASOPHILS NFR BLD AUTO: 0.4 % (ref 0–1.5)
BUN SERPL-MCNC: 14 MG/DL (ref 8–23)
BUN/CREAT SERPL: 18.4 (ref 7–25)
CALCIUM SPEC-SCNC: 8.5 MG/DL (ref 8.6–10.5)
CHLORIDE SERPL-SCNC: 98 MMOL/L (ref 98–107)
CO2 SERPL-SCNC: 23 MMOL/L (ref 22–29)
CREAT SERPL-MCNC: 0.76 MG/DL (ref 0.76–1.27)
DEPRECATED RDW RBC AUTO: 40.2 FL (ref 37–54)
EOSINOPHIL # BLD AUTO: 0.35 10*3/MM3 (ref 0–0.4)
EOSINOPHIL NFR BLD AUTO: 3.6 % (ref 0.3–6.2)
ERYTHROCYTE [DISTWIDTH] IN BLOOD BY AUTOMATED COUNT: 11.8 % (ref 12.3–15.4)
GFR SERPL CREATININE-BSD FRML MDRD: 97 ML/MIN/1.73
GLUCOSE SERPL-MCNC: 100 MG/DL (ref 65–99)
HCT VFR BLD AUTO: 40.6 % (ref 37.5–51)
HGB BLD-MCNC: 14.2 G/DL (ref 13–17.7)
IMM GRANULOCYTES # BLD AUTO: 0.06 10*3/MM3 (ref 0–0.05)
IMM GRANULOCYTES NFR BLD AUTO: 0.6 % (ref 0–0.5)
LYMPHOCYTES # BLD AUTO: 1.86 10*3/MM3 (ref 0.7–3.1)
LYMPHOCYTES NFR BLD AUTO: 19.3 % (ref 19.6–45.3)
MAGNESIUM SERPL-MCNC: 1.9 MG/DL (ref 1.6–2.4)
MCH RBC QN AUTO: 32.1 PG (ref 26.6–33)
MCHC RBC AUTO-ENTMCNC: 35 G/DL (ref 31.5–35.7)
MCV RBC AUTO: 91.9 FL (ref 79–97)
MONOCYTES # BLD AUTO: 1.54 10*3/MM3 (ref 0.1–0.9)
MONOCYTES NFR BLD AUTO: 16 % (ref 5–12)
NEUTROPHILS NFR BLD AUTO: 5.78 10*3/MM3 (ref 1.7–7)
NEUTROPHILS NFR BLD AUTO: 60.1 % (ref 42.7–76)
NRBC BLD AUTO-RTO: 0 /100 WBC (ref 0–0.2)
PLATELET # BLD AUTO: 205 10*3/MM3 (ref 140–450)
PMV BLD AUTO: 8.6 FL (ref 6–12)
POTASSIUM SERPL-SCNC: 4.1 MMOL/L (ref 3.5–5.2)
RBC # BLD AUTO: 4.42 10*6/MM3 (ref 4.14–5.8)
SODIUM SERPL-SCNC: 132 MMOL/L (ref 136–145)
WBC NRBC COR # BLD: 9.63 10*3/MM3 (ref 3.4–10.8)

## 2021-12-08 PROCEDURE — 85025 COMPLETE CBC W/AUTO DIFF WBC: CPT | Performed by: NURSE PRACTITIONER

## 2021-12-08 PROCEDURE — 25010000002 ENOXAPARIN PER 10 MG: Performed by: NURSE PRACTITIONER

## 2021-12-08 PROCEDURE — 97535 SELF CARE MNGMENT TRAINING: CPT

## 2021-12-08 PROCEDURE — 99232 SBSQ HOSP IP/OBS MODERATE 35: CPT | Performed by: NURSE PRACTITIONER

## 2021-12-08 PROCEDURE — 83735 ASSAY OF MAGNESIUM: CPT | Performed by: NURSE PRACTITIONER

## 2021-12-08 PROCEDURE — 97110 THERAPEUTIC EXERCISES: CPT

## 2021-12-08 PROCEDURE — 80048 BASIC METABOLIC PNL TOTAL CA: CPT | Performed by: NURSE PRACTITIONER

## 2021-12-08 RX ADMIN — ASPIRIN 81 MG: 81 TABLET, COATED ORAL at 08:42

## 2021-12-08 RX ADMIN — QUETIAPINE FUMARATE 50 MG: 25 TABLET ORAL at 22:00

## 2021-12-08 RX ADMIN — DICLOFENAC 2 G: 10 GEL TOPICAL at 22:00

## 2021-12-08 RX ADMIN — SUCRALFATE 1 G: 1 TABLET ORAL at 06:38

## 2021-12-08 RX ADMIN — LEVOTHYROXINE SODIUM 50 MCG: 50 TABLET ORAL at 06:38

## 2021-12-08 RX ADMIN — DIVALPROEX SODIUM 250 MG: 250 TABLET, DELAYED RELEASE ORAL at 22:00

## 2021-12-08 RX ADMIN — DIVALPROEX SODIUM 250 MG: 250 TABLET, DELAYED RELEASE ORAL at 08:43

## 2021-12-08 RX ADMIN — ATORVASTATIN CALCIUM 10 MG: 10 TABLET, FILM COATED ORAL at 08:42

## 2021-12-08 RX ADMIN — DONEPEZIL HYDROCHLORIDE 10 MG: 10 TABLET, FILM COATED ORAL at 22:00

## 2021-12-08 RX ADMIN — FLUTICASONE PROPIONATE 2 SPRAY: 50 SPRAY, METERED NASAL at 08:43

## 2021-12-08 RX ADMIN — PANTOPRAZOLE SODIUM 40 MG: 40 TABLET, DELAYED RELEASE ORAL at 08:42

## 2021-12-08 RX ADMIN — ENOXAPARIN SODIUM 40 MG: 40 INJECTION SUBCUTANEOUS at 08:42

## 2021-12-08 RX ADMIN — DICLOFENAC 2 G: 10 GEL TOPICAL at 17:46

## 2021-12-08 RX ADMIN — SODIUM CHLORIDE, PRESERVATIVE FREE 10 ML: 5 INJECTION INTRAVENOUS at 22:00

## 2021-12-08 RX ADMIN — DICLOFENAC 2 G: 10 GEL TOPICAL at 08:42

## 2021-12-08 RX ADMIN — DICLOFENAC 2 G: 10 GEL TOPICAL at 12:00

## 2021-12-08 RX ADMIN — SUCRALFATE 1 G: 1 TABLET ORAL at 17:46

## 2021-12-08 RX ADMIN — SODIUM CHLORIDE, PRESERVATIVE FREE 10 ML: 5 INJECTION INTRAVENOUS at 08:43

## 2021-12-08 RX ADMIN — LISINOPRIL 2.5 MG: 2.5 TABLET ORAL at 08:42

## 2021-12-08 RX ADMIN — Medication 5 MG: at 22:03

## 2021-12-08 NOTE — PROGRESS NOTES
[facility] Medicine Services  PROGRESS NOTE    Patient Name: Ramin Zaragoza  : 1935  MRN: [id number]    Date of Admission: 2021  Primary Care Physician: Irene Coley MD    Subjective   Subjective     CC:  Weakness, falls    HPI:   No issues overnight  Daughter updated and questions answered    ROS:  Gen- No fevers, chills  CV- No chest pain, palpitations  Resp- No cough, dyspnea  GI- No N/V/D, abd pain      Objective   Objective     Vital Signs:   Temp:  [97.3 °F (36.3 °C)-98.2 °F (36.8 °C)] 97.6 °F (36.4 °C)  Heart Rate:  [70-79] 77  Resp:  [14-16] 16  BP: (128-151)/(67-73) 151/70     Physical Exam:  Constitutional: No acute distress, awake, alert, daughter at bedside  HENT: NCAT, mucous membranes moist  Respiratory: Clear to auscultation bilaterally, respiratory effort normal   Cardiovascular: RRR, no m/r/g  Gastrointestinal: Positive bowel sounds, soft, nontender, nondistended  Musculoskeletal: No bilateral ankle edema  Psychiatric: Flat affect, cooperative  Neurologic: Oriented x 2, MC, speech clear  Skin: No rashes noted    No change in exam from 21    Results Reviewed:  LAB RESULTS:      Lab 21   WBC 9.63 11.31*   HEMOGLOBIN 14.2 15.0   HEMATOCRIT 40.6 44.2   PLATELETS 205 176   NEUTROS ABS 5.78  --    IMMATURE GRANS (ABS) 0.06*  --    LYMPHS ABS 1.86  --    MONOS ABS 1.54*  --    EOS ABS 0.35  --    MCV 91.9 94.4         Lab 21  0411 21  0330 21  0328 21  1923   SODIUM 132* 132*  --   --    POTASSIUM 4.1 4.1  --  4.9   CHLORIDE 98 98  --   --    CO2 23.0 23.0  --   --    ANION GAP 11.0 11.0  --   --    BUN 14 14  --   --    CREATININE 0.76 0.78  --   --    GLUCOSE 100* 123*  --   --    CALCIUM 8.5* 8.9  --   --    MAGNESIUM 1.9  --  2.1  --        Brief Urine Lab Results  (Last result in the past 365 days)      Color   Clarity   Blood   Leuk Est   Nitrite   Protein   CREAT   Urine HCG        21  Yellow   Clear   Negative   Negative   Negative   Negative                 Microbiology Results Abnormal     Procedure Component Value - Date/Time    COVID PRE-OP / PRE-PROCEDURE SCREENING ORDER (NO ISOLATION) - Swab, Nasopharynx [[id number]]  (Normal) Collected: 11/27/21 1724    Lab Status: Final result Specimen: Swab from Nasopharynx Updated: 11/27/21 1801    Narrative:      The following orders were created for panel order COVID PRE-OP / PRE-PROCEDURE SCREENING ORDER (NO ISOLATION) - Swab, Nasopharynx.  Procedure                               Abnormality         Status                     ---------                               -----------         ------                     COVID-19 and FLU A/B PCR...[[id number]]  Normal              Final result                 Please view results for these tests on the individual orders.    COVID-19 and FLU A/B PCR - Swab, Nasopharynx [[id number]]  (Normal) Collected: 11/27/21 1724    Lab Status: Final result Specimen: Swab from Nasopharynx Updated: 11/27/21 1801     COVID19 Not Detected     Influenza A PCR Not Detected     Influenza B PCR Not Detected    Narrative:      Fact sheet for providers: https://www.fda.gov/media/640922/download    Fact sheet for patients: https://www.fda.gov/media/608360/download    Test performed by PCR.          CT Lower Extremity Left Without Contrast    Result Date: 12/8/2021  EXAMINATION: CT LOWER EXTREMITY LEFT WO CONTRAST-  INDICATION: Hip pain, chronic, rheumatoid arthritis suspected; R53.1-Weakness; W19.XXXA-Unspecified fall, initial encounter; R41.0-Disorientation, unspecified; R41.82-Altered mental status, unspecified  TECHNIQUE: Noncontrast CT of the left hip  COMPARISON: CT abdomen and pelvis 3/26/2021  FINDINGS:  The bones are diffusely demineralized. No acute fracture or malalignment. No discrete bony erosion or evidence of axial migration of the femoral head. Moderate osteoarthritic change of the left hip joint. There is evidence of left  hip chondrocalcinosis. No acute osseous findings in the partially imaged pelvis. There is evidence of mild to moderate osteoarthritic change of the left sacroiliac joint. The pubic symphysis is congruent with mild degenerative change. No evidence of sacral fracture.  The left hip soft tissues appear within normal limits. The partially imaged lower abdominal and pelvic viscera demonstrate prostatomegaly measuring up to 7.1 cm in maximal transverse dimension, mild rectal stool burden, and atherosclerosis.      Impression:  Moderate osteoarthrosis of the left hip with evidence of chondrocalcinosis. No acute fracture or malalignment. No discrete bony erosions or axial migration of the hip to suggest sequelae of rheumatoid arthritis as queried.   This report was finalized on 12/8/2021 10:35 AM by Octavio Adamson MD.            I have reviewed the medications:  Scheduled Meds:aspirin, 81 mg, Oral, Daily  atorvastatin, 10 mg, Oral, Daily  Diclofenac Sodium, 2 g, Topical, 4x Daily  divalproex, 250 mg, Oral, Q12H  donepezil, 10 mg, Oral, Nightly  enoxaparin, 40 mg, Subcutaneous, Q24H  fluticasone, 2 spray, Nasal, Daily  levothyroxine, 50 mcg, Oral, Q AM  lisinopril, 2.5 mg, Oral, Daily  pantoprazole, 40 mg, Oral, Daily  QUEtiapine, 50 mg, Oral, Nightly  sodium chloride, 10 mL, Intravenous, Q12H  sucralfate, 1 g, Oral, BID AC      Continuous Infusions:   PRN Meds:.•  acetaminophen **OR** acetaminophen **OR** acetaminophen  •  senna-docusate sodium **AND** polyethylene glycol **AND** bisacodyl **AND** bisacodyl  •  magnesium sulfate **OR** magnesium sulfate **OR** magnesium sulfate  •  melatonin  •  potassium chloride **OR** potassium chloride **OR** potassium chloride  •  sodium chloride  •  sodium chloride    Assessment/Plan   Assessment & Plan     Active Hospital Problems    Diagnosis  POA   • Weakness [R53.1]  Yes   • Fall [W19.XXXA]  Yes   • Hypothyroidism [E03.9]  Yes   • Mixed hyperlipidemia [E78.2]  Yes   • Coronary  artery disease involving native coronary artery of native heart without angina pectoris [I25.10]  Yes   • Essential hypertension [I10]  Yes      Resolved Hospital Problems   No resolved problems to display.        Brief Hospital Course to date:  Ramin Zaragoza is a 86 y.o. male  with PMH significant for CAD, HLD, HTN, hypothyroid, chronic lower extremity edema, who presents to the ED with complaint of weakness and fall.    Weakness  Multiple Falls  -Neurology has followed, now s/o  --B12 is normal  --tsh elevated, started on synthroid as below  --ankle (right) and foot pain (left) and now left hip- voltaren gel helping.  CT extremity unremarkable  --planning rehab, continue PT/OT inpatient     Cognitive impairment patient with episodes of agitation and aggressive behavior.  -dementia, aricept started per Dr. Singh. Increased to 10mg 12/2  -Patient recently started Abilify, stopped this admission  -Daughter has started process for emergency guardianship. Papers signed by Dr. Singh  --continue seroquel     Hypothyroid  -Reports no longer taking levothyroxine PTA  -elevated TSH, free T4 decreased.   --started low-dose Synthroid. Will need recheck ~ 1 month     Hyperlipidemia  Hypertension  CAD  -Continue daily ASA  -Continue lisinopril  -Continue statin     BPH  -No longer taking Flomax  --no issues urinating     GERD  -Continue sucralfate/PPI    Leukocytosis, resolved  --afebrile and no s/s of infection  --encourage IS    DVT prophylaxis:  Medical DVT prophylaxis orders are present.       AM-PAC 6 Clicks Score (PT): 11 (12/08/21 0845)    Disposition: CM working on SNF as not eligible for [facility].  Daughter has applied for guardianship, initial hearing 12/16/21, final hearing 1/16/22.  Needs psychiatry NARCISA hyman finding out if telepsych while here will suffice.    CODE STATUS:   Code Status and Medical Interventions:   Ordered at: 11/27/21 1147     Code Status (Patient has no pulse and is not breathing):     CPR (Attempt to Resuscitate)     Medical Interventions (Patient has pulse or is breathing):    Full Support       Sharlene Ya, DARA  12/08/21

## 2021-12-08 NOTE — DISCHARGE PLACEMENT REQUEST
"From Carlota Chavez RN Case Manager Ph: [phone]    Raymond Zaragoza (86 y.o. Male)             Date of Birth Social Security Number Address Home Phone MRN    1935  [id number]   [place] [address] [phone] [id number]    Taoism Marital Status             Orthodoxy        Admission Date Admission Type Admitting Provider Attending Provider Department, Room/Bed    11/27/21 Emergency Xochilt Mccray MD Reddy, Mayuri V, MD [facility] 4H, S482/1    Discharge Date Discharge Disposition Discharge Destination                         Attending Provider: Xochilt Mccray MD    Allergies: Lactose Intolerance (Gi)    Isolation: None   Infection: None   Code Status: CPR   Advance Care Planning Activity    Ht: 175.3 cm (69\")   Wt: 86.2 kg (190 lb)    Admission Cmt: None   Principal Problem: None                Active Insurance as of 11/27/2021     Primary Coverage     Payor Plan Insurance Group Employer/Plan Group    MEDICARE MEDICARE A & B      Payor Plan Address Payor Plan Phone Number Payor Plan Fax Number Effective Dates    [address] [phone]  10/1/2000 - None Entered    [facility] [id number]       Subscriber Name Subscriber Birth Date Member ID       RAYMOND ZARAGOZA 1935 [id number]           Secondary Coverage     Payor Plan Insurance Group Employer/Plan Group    [facility] SUPP [id number]     Payor Plan Address Payor Plan Phone Number Payor Plan Fax Number Effective Dates    [address]   12/1/2016 - None Entered    [facility] [id number]       Subscriber Name Subscriber Birth Date Member ID       RAYMOND ZARAGOZA 1935 [id number]                 Emergency Contacts      (Rel.) Home Phone Work Phone Mobile Phone    ISABELLA MEYER (Daughter) [phone] -- --               History & Physical      Shaun Murray MD at 11/27/21 2315              [facility] Medicine Services  HISTORY AND PHYSICAL    Patient Name: " Ramin Zaragoza  : 1935  MRN: [id number]  Primary Care Physician: Irene Coley MD  Date of admission: 2021    Subjective   Subjective     Chief Complaint:  Fall, weakness    HPI:  Ramin Zaragoza is a 86 y.o. male with PMH significant for CAD, HLD, HTN, hypothyroid, chronic lower extremity edema, who presents to the ED with complaint of weakness and fall.  Daughter who is at bedside helps provide HPI.  She notes that he has been having general decline with an acute decreased over the past week.  She states that he has had multiple falls.  He is unable to safely stay by himself.  She notes that they have recently tried to get patient into assisted living and at the last minute he refused to go.  She states that she is started the process for emergency guardianship.  Upon arrival to the ED, he does note that he fell today.  He states that he uses 2 canes to walk.  He states that he just feels unsteady.  He denies dizziness or lightheadedness prior to his fall.  He also denies any falls prior to today.  CT head notes advanced generalized cerebral atrophy in ventriculomegaly.  MRI is pending.  Labs are benign.  He will be admitted to hospital medicine for further evaluation.      COVID Details:        Symptoms: [x] NONE [] Fever []  Cough [] Shortness of breath [] Change in taste or smell  The patient has a COVID HM Topic on their chart, and they are fully vaccinated.    Review of Systems   Constitutional: Negative for activity change, appetite change, chills, diaphoresis, fatigue, fever and unexpected weight change.   HENT: Negative.    Eyes: Negative.  Negative for visual disturbance.   Respiratory: Negative.  Negative for cough, chest tightness, shortness of breath and wheezing.    Cardiovascular: Positive for leg swelling. Negative for chest pain and palpitations.   Gastrointestinal: Negative.  Negative for abdominal distention, abdominal pain, blood in stool, constipation, diarrhea, nausea and  vomiting.   Endocrine: Negative.    Genitourinary: Positive for frequency. Negative for difficulty urinating, dysuria and urgency.   Musculoskeletal: Positive for gait problem. Negative for arthralgias, back pain and myalgias.   Skin: Negative for color change, pallor, rash and wound.   Allergic/Immunologic: Negative.    Neurological: Positive for weakness. Negative for dizziness, syncope, speech difficulty, light-headedness, numbness and headaches.   Hematological: Negative.    Psychiatric/Behavioral: Positive for confusion. The patient is not nervous/anxious.       All other systems reviewed and are negative.     Personal History     Past Medical History:   Diagnosis Date   • CAD (coronary artery disease)    • Hyperlipidemia    • Hypertension    • Hypothyroidism     on chronic replacement therapy   • Lactose intolerance    • Lower extremity edema    • PVC's (premature ventricular contractions)     History of   • Thyroid disorder    • Venous insufficiency        Past Surgical History:   Procedure Laterality Date   • CARDIAC CATHETERIZATION     • CORONARY STENT PLACEMENT     • EYE SURGERY      Bilateral cataract extraction   • HERNIA REPAIR      Inguinal hernia repair   • OTHER SURGICAL HISTORY      Melanoma removed from back   • OTHER SURGICAL HISTORY      History of left elbow fracture with sunsequent fixation       Family History:  family history includes No Known Problems in his father and mother. Otherwise pertinent FHx was reviewed and unremarkable.     Social History:  reports that he has never smoked. He has never used smokeless tobacco. He reports that he does not drink alcohol and does not use drugs.  Social History     Social History Narrative   • Not on file       Medications:  ARIPiprazole, aspirin, fluticasone, levothyroxine, lisinopril, nitroglycerin, omeprazole, simvastatin, sucralfate, and tamsulosin    Allergies   Allergen Reactions   • Lactose Intolerance (Gi)        Objective   Objective      Vital Signs:   Temp:  [98.8 °F (37.1 °C)] 98.8 °F (37.1 °C)  Heart Rate:  [53-58] 53  Resp:  [16-18] 16  BP: (133-144)/(77-81) 139/81    Physical Exam   Constitutional: Awake, alert  Eyes: PERRLA, sclerae anicteric, no conjunctival injection  HENT: NCAT, mucous membranes moist  Neck: Supple, no thyromegaly, no lymphadenopathy, trachea midline  Respiratory: Clear to auscultation bilaterally, nonlabored respirations   Cardiovascular: Bradycardic, no murmurs, rubs, or gallops, palpable pedal pulses bilaterally  Gastrointestinal: Positive bowel sounds, soft, nontender, nondistended  Musculoskeletal:  bilateral ankle edema R>L, no clubbing or cyanosis to extremities  Psychiatric: Appropriate affect, cooperative  Neurologic: Oriented to person and place, poor historian, strength symmetric in all extremities, Cranial Nerves grossly intact to confrontation, speech clear  Skin: No rashes      Result Review:  I have personally reviewed the results from the time of this admission to 11/27/21 11:15 PM EST and agree with these findings:  [x]  Laboratory  []  Microbiology  [x]  Radiology  [x]  EKG/Telemetry   []  Cardiology/Vascular   []  Pathology  [x]  Old records  []  Other:  Most notable findings include:       LAB RESULTS:      Lab 11/27/21  1601 11/23/21  1240   WBC 7.05 5.92   HEMOGLOBIN 14.5 13.0   HEMATOCRIT 43.6 38.0   PLATELETS 152 170   NEUTROS ABS 4.15 4.02   IMMATURE GRANS (ABS) 0.02 0.02   LYMPHS ABS 1.66 1.13   MONOS ABS 0.97* 0.65   EOS ABS 0.23 0.09   MCV 96.0 93.6         Lab 11/27/21  1601 11/23/21  1240   SODIUM 141 136   POTASSIUM 4.2 4.4   CHLORIDE 105 101   CO2 28.0 27.0   ANION GAP 8.0 8.0   BUN 18 26*   CREATININE 0.69* 0.81   GLUCOSE 93 102*   CALCIUM 9.7 9.1   MAGNESIUM 2.1  --          Lab 11/27/21  1601 11/23/21  1240   TOTAL PROTEIN 7.2 6.0   ALBUMIN 4.20 3.80   GLOBULIN 3.0 2.2   ALT (SGPT) 26 28   AST (SGOT) 33 33   BILIRUBIN 0.5 0.3   ALK PHOS 66 67         Lab 11/27/21  1601 11/23/21  1240    PROBNP  --  115.5   TROPONIN T <0.010 <0.010                 UA    Urinalysis 3/18/21 3/26/21 11/27/21   Specific Soso, UA 1.014 1.013 1.022   Ketones, UA Negative Negative Negative   Blood, UA Negative Negative Negative   Leukocytes, UA Negative Negative Negative   Nitrite, UA Negative Negative Negative           Microbiology Results (last 10 days)     Procedure Component Value - Date/Time    COVID PRE-OP / PRE-PROCEDURE SCREENING ORDER (NO ISOLATION) - Swab, Nasopharynx [[id number]]  (Normal) Collected: 11/27/21 1724    Lab Status: Final result Specimen: Swab from Nasopharynx Updated: 11/27/21 1801    Narrative:      The following orders were created for panel order COVID PRE-OP / PRE-PROCEDURE SCREENING ORDER (NO ISOLATION) - Swab, Nasopharynx.  Procedure                               Abnormality         Status                     ---------                               -----------         ------                     COVID-19 and FLU A/B PCR...[[id number]]  Normal              Final result                 Please view results for these tests on the individual orders.    COVID-19 and FLU A/B PCR - Swab, Nasopharynx [[id number]]  (Normal) Collected: 11/27/21 1724    Lab Status: Final result Specimen: Swab from Nasopharynx Updated: 11/27/21 1801     COVID19 Not Detected     Influenza A PCR Not Detected     Influenza B PCR Not Detected    Narrative:      Fact sheet for providers: https://www.fda.gov/media/908014/download    Fact sheet for patients: https://www.fda.gov/media/634012/download    Test performed by PCR.    COVID PRE-OP / PRE-PROCEDURE SCREENING ORDER (NO ISOLATION) - Swab, Nasopharynx [[id number]]  (Normal) Collected: 11/23/21 1350    Lab Status: Final result Specimen: Swab from Nasopharynx Updated: 11/23/21 1453    Narrative:      The following orders were created for panel order COVID PRE-OP / PRE-PROCEDURE SCREENING ORDER (NO ISOLATION) - Swab, Nasopharynx.  Procedure                                Abnormality         Status                     ---------                               -----------         ------                     Respiratory Panel PCR w/...[[id number]]  Normal              Final result                 Please view results for these tests on the individual orders.    Respiratory Panel PCR w/COVID-19(SARS-CoV-2) DEEP/JOSEPH/ESTHER/PAD/COR/MAD/MARLINE In-House, NP Swab in UTM/VTM, 3-4 HR TAT - Swab, Nasopharynx [[id number]]  (Normal) Collected: 11/23/21 1350    Lab Status: Final result Specimen: Swab from Nasopharynx Updated: 11/23/21 2839     ADENOVIRUS, PCR Not Detected     Coronavirus 229E Not Detected     Coronavirus HKU1 Not Detected     Coronavirus NL63 Not Detected     Coronavirus OC43 Not Detected     COVID19 Not Detected     Human Metapneumovirus Not Detected     Human Rhinovirus/Enterovirus Not Detected     Influenza A PCR Not Detected     Influenza B PCR Not Detected     Parainfluenza Virus 1 Not Detected     Parainfluenza Virus 2 Not Detected     Parainfluenza Virus 3 Not Detected     Parainfluenza Virus 4 Not Detected     RSV, PCR Not Detected     Bordetella pertussis pcr Not Detected     Bordetella parapertussis PCR Not Detected     Chlamydophila pneumoniae PCR Not Detected     Mycoplasma pneumo by PCR Not Detected    Narrative:      In the setting of a positive respiratory panel with a viral infection PLUS a negative procalcitonin without other underlying concern for bacterial infection, consider observing off antibiotics or discontinuation of antibiotics and continue supportive care. If the respiratory panel is positive for atypical bacterial infection (Bordetella pertussis, Chlamydophila pneumoniae, or Mycoplasma pneumoniae), consider antibiotic de-escalation to target atypical bacterial infection.          CT Head Without Contrast    Result Date: 11/27/2021  EXAMINATION: CT HEAD WO CONTRAST-  INDICATION: ataxia  TECHNIQUE: 5 mm unenhanced images through the brain  The radiation dose reduction  device was turned on for each scan per the ALARA (As Low as Reasonably Achievable) protocol.  COMPARISON: NONE  FINDINGS: The calvarium appears intact. Included paranasal sinuses and mastoids appear clear. Soft tissue images show advanced generalized cerebral atrophy, and ventriculomegaly. Ventricles are relatively large for the degree of atrophy present, and in addition to central brain atrophy as in etiology, the possibility of normal pressure hydrocephalus should be considered.  There is no evidence of hemorrhage, contusion, or edema, no evidence of mass or mass effect, or abnormal extra-axial collection. No evidence of acute or old infarct is seen.        Impression: Advanced generalized cerebral atrophy, and ventriculomegaly. Ventriculomegaly may reflect central brain atrophy, or possibly normal pressure hydrocephalus. No other evidence of acute intracranial disease is seen.       XR Chest 1 View    Result Date: 11/27/2021  EXAMINATION: XR CHEST 1 VW-  INDICATION: Weak/Dizzy/AMS triage protocol  TECHNIQUE: Frontal view of the chest  COMPARISON: Chest x-ray 11/23/2021, CT abdomen and pelvis 3/26/2021  FINDINGS:  Stable cardiomediastinal silhouette within normal limits. Mild decrease in lung volumes from prior. Similar vague bibasilar parenchymal opacities from prior exam; these may reflect chronic interstitial appearing changes and peripheral reticulations seen at the lung bases on CT abdomen and pelvis from 3/26/2021. No pneumothorax. No pleural effusion.      Impression:  Decreased lung volumes from most recent chest x-ray. Otherwise stable chest with redemonstration of vague interstitial bibasilar parenchymal opacities possibly corresponding to chronic interstitial changes and peripheral reticulations noted on lung bases from CT abdomen and pelvis dated 3/26/2021.  This report was finalized on 11/27/2021 6:17 PM by Octavio Adamson MD.            Assessment/Plan   Assessment & Plan       Coronary artery  disease involving native coronary artery of native heart without angina pectoris    Essential hypertension    Mixed hyperlipidemia    Hypothyroidism    Weakness    Fall   86 y.o. male with PMH significant for CAD, HLD, HTN, hypothyroid, chronic lower extremity edema, who presents to the ED with complaint of weakness and fall.    Weakness  Fall  -PT/OT consult in the a.m.  -B12 in the a.m.  -Fall precautions    Cognitive impairment  -PCP has noted concern for dementia  -Patient recently started Abilify  -Daughter has started process for emergency guardianship  -Case management consult in the a.m. for assistance with discharge planning  -Consider neurology consult in the a.m.    Hypothyroid  -Reports no longer taking levothyroxine  -TSH    Hyperlipidemia  Hypertension  CAD  -Continue daily ASA  -Continue lisinopril  -Continue simvastatin    BPH  -No longer taking Flomax    GERD  -Continue sucralfate      DVT prophylaxis: Lovenox    CODE STATUS:    Code Status (Patient has no pulse and is not breathing): CPR (Attempt to Resuscitate)  Medical Interventions (Patient has pulse or is breathing): Full Support      This note has been completed as part of a split-shared workflow.   Signature:   Electronically signed by DARA Cody, 11/27/21, 11:31 PM EST.      Brief Attending Admission Attestation     I have seen and examined the patient, performing an independent face-to-face diagnostic evaluation with plan of care reviewed and developed with the advanced practice clinician (APC).    Old records reviewed and summarized in PM hx.    Brief Summary Statement:  86 Y M, from home(lives alone), here with second ED visit over last 1 week with complaint of generalized decline, patient requiring now walker-from his cane, with fall today am.  No lightheadedness.    No complaint of syncope noted.  No focal weakness noted.  Do not complain of any overt pain.  Recently started on Abilify over past week.  On ED work-up patient  , troponin less than 0.01  Glucose-93  BUN/creatinine 18/0.6, albumin 4.2, magnesium 2.1  WBC-7, hemoglobin 14, platelet 152, UA-negative Covid-negative, chest x-ray-no acute abnormality  UA-negative  MRI-no acute hemorrhage, ventriculomegaly,  Normal pressure hydrocephalus bilateral mastoid effusion.    Remainder of detailed HPI is as noted above and has been reviewed and/or edited by me for completeness.    PM HX:  Dementia/with some behavioral disturbances, mood disorder-Abilify 2 mg daily  CAD-multiple stents, last cardiac cath in  with patent stents, aspirin 81 mg  Hypertension-lisinopril 2.5 mg daily  GERD/HH-Prilosec 20 mg daily  Hyperlipidemia-Zocor 20 mg daily  BPH-Flomax 0.4 mg,   hypothyroid-Synthroid 25 mcg daily    Chronic lower extremity edema.    Vitals:    21 2315   BP: 125/72   Pulse: 53   Resp: 16   Temp:  Afebrile   SpO2: 95%       Attending Physical Exam:  RS- CTA-BL, ,  No wheezing , no crackles, good effort.  CVS- s1s2 regular, no murmur.  ABD- soft, non tender, not distended,no ABD tenderness, no organomegaly.  EXT- no edema.  NEURO- AAO-to place, person, no focal defecit.      LABS:  As above   EKG-sinus bradycardia 56 bpm,  no acute ST-T wave changes.poor R wave progression on Lateral leads.    Brief Assessment/Plan  ??2nd to Abilify,  Normal pressure hydrocephalus -? On MRI- neurology consult.  Pt/ot consult    See above for further detailed assessment and plan developed with APC which I have reviewed and/or edited for completeness.      Admission Status: I believe that this patient meets observation status.            Electronically signed by Shaun Murray MD at 21 0142          Physician Progress Notes (most recent note)      Sharlene Ya APRN at 21 1452              [facility] Medicine Services  PROGRESS NOTE    Patient Name: Ramin Zaragoza  : 1935  MRN: [id number]    Date of Admission: 2021  Primary Care  Physician: Irene Coley MD    Subjective   Subjective     CC:  Weakness, falls    HPI:   No issues overnight  Daughter updated and questions answered    ROS:  Gen- No fevers, chills  CV- No chest pain, palpitations  Resp- No cough, dyspnea  GI- No N/V/D, abd pain      Objective   Objective     Vital Signs:   Temp:  [97.3 °F (36.3 °C)-98.2 °F (36.8 °C)] 97.6 °F (36.4 °C)  Heart Rate:  [70-79] 77  Resp:  [14-16] 16  BP: (128-151)/(67-73) 151/70     Physical Exam:  Constitutional: No acute distress, awake, alert, daughter at bedside  HENT: NCAT, mucous membranes moist  Respiratory: Clear to auscultation bilaterally, respiratory effort normal   Cardiovascular: RRR, no m/r/g  Gastrointestinal: Positive bowel sounds, soft, nontender, nondistended  Musculoskeletal: No bilateral ankle edema  Psychiatric: Flat affect, cooperative  Neurologic: Oriented x 2, MC, speech clear  Skin: No rashes noted    No change in exam from 12/7/21    Results Reviewed:  LAB RESULTS:      Lab 12/08/21  0411 12/05/21  0330   WBC 9.63 11.31*   HEMOGLOBIN 14.2 15.0   HEMATOCRIT 40.6 44.2   PLATELETS 205 176   NEUTROS ABS 5.78  --    IMMATURE GRANS (ABS) 0.06*  --    LYMPHS ABS 1.86  --    MONOS ABS 1.54*  --    EOS ABS 0.35  --    MCV 91.9 94.4         Lab 12/08/21  0411 12/05/21  0330 12/02/21  0328 12/01/21  1923   SODIUM 132* 132*  --   --    POTASSIUM 4.1 4.1  --  4.9   CHLORIDE 98 98  --   --    CO2 23.0 23.0  --   --    ANION GAP 11.0 11.0  --   --    BUN 14 14  --   --    CREATININE 0.76 0.78  --   --    GLUCOSE 100* 123*  --   --    CALCIUM 8.5* 8.9  --   --    MAGNESIUM 1.9  --  2.1  --        Brief Urine Lab Results  (Last result in the past 365 days)      Color   Clarity   Blood   Leuk Est   Nitrite   Protein   CREAT   Urine HCG        11/27/21 1931 Yellow   Clear   Negative   Negative   Negative   Negative                 Microbiology Results Abnormal     Procedure Component Value - Date/Time    COVID PRE-OP / PRE-PROCEDURE  SCREENING ORDER (NO ISOLATION) - Swab, Nasopharynx [[id number]]  (Normal) Collected: 11/27/21 1724    Lab Status: Final result Specimen: Swab from Nasopharynx Updated: 11/27/21 1801    Narrative:      The following orders were created for panel order COVID PRE-OP / PRE-PROCEDURE SCREENING ORDER (NO ISOLATION) - Swab, Nasopharynx.  Procedure                               Abnormality         Status                     ---------                               -----------         ------                     COVID-19 and FLU A/B PCR...[[id number]]  Normal              Final result                 Please view results for these tests on the individual orders.    COVID-19 and FLU A/B PCR - Swab, Nasopharynx [[id number]]  (Normal) Collected: 11/27/21 1724    Lab Status: Final result Specimen: Swab from Nasopharynx Updated: 11/27/21 1801     COVID19 Not Detected     Influenza A PCR Not Detected     Influenza B PCR Not Detected    Narrative:      Fact sheet for providers: https://www.fda.gov/media/486143/download    Fact sheet for patients: https://www.fda.gov/media/242471/download    Test performed by PCR.          CT Lower Extremity Left Without Contrast    Result Date: 12/8/2021  EXAMINATION: CT LOWER EXTREMITY LEFT WO CONTRAST-  INDICATION: Hip pain, chronic, rheumatoid arthritis suspected; R53.1-Weakness; W19.XXXA-Unspecified fall, initial encounter; R41.0-Disorientation, unspecified; R41.82-Altered mental status, unspecified  TECHNIQUE: Noncontrast CT of the left hip  COMPARISON: CT abdomen and pelvis 3/26/2021  FINDINGS:  The bones are diffusely demineralized. No acute fracture or malalignment. No discrete bony erosion or evidence of axial migration of the femoral head. Moderate osteoarthritic change of the left hip joint. There is evidence of left hip chondrocalcinosis. No acute osseous findings in the partially imaged pelvis. There is evidence of mild to moderate osteoarthritic change of the left sacroiliac joint. The  pubic symphysis is congruent with mild degenerative change. No evidence of sacral fracture.  The left hip soft tissues appear within normal limits. The partially imaged lower abdominal and pelvic viscera demonstrate prostatomegaly measuring up to 7.1 cm in maximal transverse dimension, mild rectal stool burden, and atherosclerosis.      Impression:  Moderate osteoarthrosis of the left hip with evidence of chondrocalcinosis. No acute fracture or malalignment. No discrete bony erosions or axial migration of the hip to suggest sequelae of rheumatoid arthritis as queried.   This report was finalized on 12/8/2021 10:35 AM by Octavio Adamson MD.            I have reviewed the medications:  Scheduled Meds:aspirin, 81 mg, Oral, Daily  atorvastatin, 10 mg, Oral, Daily  Diclofenac Sodium, 2 g, Topical, 4x Daily  divalproex, 250 mg, Oral, Q12H  donepezil, 10 mg, Oral, Nightly  enoxaparin, 40 mg, Subcutaneous, Q24H  fluticasone, 2 spray, Nasal, Daily  levothyroxine, 50 mcg, Oral, Q AM  lisinopril, 2.5 mg, Oral, Daily  pantoprazole, 40 mg, Oral, Daily  QUEtiapine, 50 mg, Oral, Nightly  sodium chloride, 10 mL, Intravenous, Q12H  sucralfate, 1 g, Oral, BID AC      Continuous Infusions:   PRN Meds:.•  acetaminophen **OR** acetaminophen **OR** acetaminophen  •  senna-docusate sodium **AND** polyethylene glycol **AND** bisacodyl **AND** bisacodyl  •  magnesium sulfate **OR** magnesium sulfate **OR** magnesium sulfate  •  melatonin  •  potassium chloride **OR** potassium chloride **OR** potassium chloride  •  sodium chloride  •  sodium chloride    Assessment/Plan   Assessment & Plan     Active Hospital Problems    Diagnosis  POA   • Weakness [R53.1]  Yes   • Fall [W19.XXXA]  Yes   • Hypothyroidism [E03.9]  Yes   • Mixed hyperlipidemia [E78.2]  Yes   • Coronary artery disease involving native coronary artery of native heart without angina pectoris [I25.10]  Yes   • Essential hypertension [I10]  Yes      Resolved Hospital Problems   No  resolved problems to display.        Brief Hospital Course to date:  Ramin Zaragoza is a 86 y.o. male  with PMH significant for CAD, HLD, HTN, hypothyroid, chronic lower extremity edema, who presents to the ED with complaint of weakness and fall.    Weakness  Multiple Falls  -Neurology has followed, now s/o  --B12 is normal  --tsh elevated, started on synthroid as below  --ankle (right) and foot pain (left) and now left hip- voltaren gel helping.  CT extremity unremarkable  --planning rehab, continue PT/OT inpatient     Cognitive impairment patient with episodes of agitation and aggressive behavior.  -dementia, aricept started per Dr. Singh. Increased to 10mg 12/2  -Patient recently started Abilify, stopped this admission  -Daughter has started process for emergency guardianship. Papers signed by Dr. Singh  --continue seroquel     Hypothyroid  -Reports no longer taking levothyroxine PTA  -elevated TSH, free T4 decreased.   --started low-dose Synthroid. Will need recheck ~ 1 month     Hyperlipidemia  Hypertension  CAD  -Continue daily ASA  -Continue lisinopril  -Continue statin     BPH  -No longer taking Flomax  --no issues urinating     GERD  -Continue sucralfate/PPI    Leukocytosis, resolved  --afebrile and no s/s of infection  --encourage IS    DVT prophylaxis:  Medical DVT prophylaxis orders are present.       AM-PAC 6 Clicks Score (PT): 11 (12/08/21 0845)    Disposition: CM working on SNF as not eligible for [facility].  Daughter has applied for guardianship, initial hearing 12/16/21, final hearing 1/16/22.  Needs psychiatry NARCISA hyman finding out if telepsych while here will suffice.    CODE STATUS:   Code Status and Medical Interventions:   Ordered at: 11/27/21 9739     Code Status (Patient has no pulse and is not breathing):    CPR (Attempt to Resuscitate)     Medical Interventions (Patient has pulse or is breathing):    Full Support       DARA Caldera  12/08/21                Electronically  signed by Sharlene Ya APRN at 12/08/21 1600          Consult Notes (most recent note)      Mehreen Muhammad MD at 11/28/21 1358      Consult Orders    1. Inpatient Neurology Consult General [[id number]] ordered by Shaun Murray MD at 11/28/21 0112                   Referring Provider: Trevor  Reason for Consultation: weakness, falls    Patient Care Team:  Irene Coley MD as PCP - General (Family Medicine)    Chief complaint weakness,falls     Subjective .     History of present illness:   Mr Ramin Zaragoza is an 86-year-old man who has a history of dementia, coronary artery disease, untreated hypothyroidism, hypertension, hyperlipidemia, urinary frequency who was admitted for weakness and falls.    Daughter helps with the history as patient is a very poor historian.  He does not remember why he was brought to the hospital.  According to the daughter she and her brother found him at home on the ground.  He has been falling lately and has had to call the 911 twice in the last 2 weeks for shortness of breath.    Daughter and son went to check on patient yesterday and found him on the floor of his kitchen. They were able to get him into a chair but he was very lethargic. She reports patient is improving since admission and not getting abilify.    Daughter tells me that patient has had a history of cognitive decline and has been diagnosed with dementia.  Back in 2018 she was awarded conservatorship after he was scammed out of thousands of dollars.  She said he did not understand he was being scammed and will just give money to people.  She now takes care of his bills.         He had been on aricept but stopped taking it for some reason. She tells me he was recently started on Abilify to help with mood swings a couple of weeks ago.  She tells me that he is always had a quick temper.    Patient lives alone and is about 45 minutes away from near his family.    I have reviewed his last available note from  [facility] dated 10/14/2021. Dr Coley saw patient and note states that he had been concerned about his uncontrolled blood pressure. She was able to determine he had been out his lisinopril for months. She also noted that he was found urinating in a trash can in the exam room when left alone.         Review of Systems  He denies any pain, nausea, vomiting, fevers, chills, but does endorse mild back pain. He denies feeling depressed. Remaining ROS is negative except as noted in HPI     History  Past Medical History:   Diagnosis Date   • CAD (coronary artery disease)    • Hyperlipidemia    • Hypertension    • Hypothyroidism     on chronic replacement therapy   • Lactose intolerance    • Lower extremity edema    • PVC's (premature ventricular contractions)     History of   • Thyroid disorder    • Venous insufficiency    ,   Past Surgical History:   Procedure Laterality Date   • CARDIAC CATHETERIZATION     • CORONARY STENT PLACEMENT     • EYE SURGERY      Bilateral cataract extraction   • HERNIA REPAIR      Inguinal hernia repair   • OTHER SURGICAL HISTORY      Melanoma removed from back   • OTHER SURGICAL HISTORY      History of left elbow fracture with sunsequent fixation   ,   Family History   Problem Relation Age of Onset   • No Known Problems Mother    • No Known Problems Father    ,   Social History     Socioeconomic History   • Marital status:    Tobacco Use   • Smoking status: Never Smoker   • Smokeless tobacco: Never Used   Substance and Sexual Activity   • Alcohol use: No   • Drug use: No   • Sexual activity: Defer     E-cigarette/Vaping     E-cigarette/Vaping Substances     E-cigarette/Vaping Devices       ,   Medications Prior to Admission   Medication Sig Dispense Refill Last Dose   • aspirin 81 MG EC tablet Take 81 mg by mouth daily.      • fluticasone (FLONASE) 50 MCG/ACT nasal spray 2 sprays into each nostril daily. Administer 2 sprays in each nostril for each dose.      • lisinopril  "(PRINIVIL,ZESTRIL) 2.5 MG tablet TAKE 1 TABLET BY MOUTH ONCE DAILY 90 tablet 1    • nitroglycerin (NITROSTAT) 0.4 MG SL tablet Place 1 tablet under the tongue every 5 (five) minutes as needed for chest pain. Take no more than 3 doses in 15 minutes. 25 tablet 11    • omeprazole (priLOSEC) 20 MG capsule Take 20 mg by mouth Daily.      • simvastatin (Zocor) 20 MG tablet Take 20 mg by mouth Every Night.      • sucralfate (CARAFATE) 1 g tablet Take 1 g by mouth 2 (Two) Times a Day.      • ARIPiprazole (ABILIFY) 2 MG tablet Take 2 mg by mouth Daily.   Unknown at Unknown time   • levothyroxine (SYNTHROID, LEVOTHROID) 25 MCG tablet Take 25 mcg by mouth daily.      • tamsulosin (FLOMAX) 0.4 MG capsule 24 hr capsule Take 1 capsule by mouth Daily.      , Scheduled Meds:  ARIPiprazole, 2 mg, Oral, Daily  aspirin, 81 mg, Oral, Daily  atorvastatin, 10 mg, Oral, Daily  enoxaparin, 40 mg, Subcutaneous, Q24H  fluticasone, 2 spray, Nasal, Daily  lisinopril, 2.5 mg, Oral, Daily  pantoprazole, 40 mg, Oral, Daily  sodium chloride, 10 mL, Intravenous, Q12H  sucralfate, 1 g, Oral, BID AC    , Continuous Infusions:   , PRN Meds:  •  acetaminophen **OR** acetaminophen **OR** acetaminophen  •  senna-docusate sodium **AND** polyethylene glycol **AND** bisacodyl **AND** bisacodyl  •  melatonin  •  sodium chloride  •  sodium chloride and Allergies:  Lactose intolerance (gi)    Objective     Vital Signs   Blood pressure 127/68, pulse 54, temperature 97.3 °F (36.3 °C), temperature source Oral, resp. rate 18, height 175.3 cm (69\"), weight 86.2 kg (190 lb), SpO2 99 %.    Physical Exam:  Elderly man, sitting in recliner  Head/eyes: atraumatic, perrl  ENT poor dentition, moist membrane  Neck supple, trachea midline  Respiratory on room air, even respirations  Cardiac rrr, mild edema in LE  Extremities flat feet, normal temperature  Skin dry, intact  Speech no dysarthria or aphasia  CN perrl, eomi, vf intact, no facial weakness, hard of hearing, " sensation intact   Mental status oriented to person, place time but has no memory of events leading up to admission   Cognitive status attends to me, responds quickly, no psychomotor slowing, has poor short term memory but has exquisite long term memory, follows commands  Motor nml tone and bulk, globally weak but mild 4+ throughout  Sensation intact to light touch and vibration  Reflexes down going toes, patellar 2+ bilaterally   Cerebellar fnf nml, no nystagmus  Gait shuffling gait but with encouragement can walk with long strides     Results Review:  TSH 7.83  Ft4 0.83  Cr 0.71   MRI brain personally reviewed/ atrophy causing ventriculomegaly, no acute changes, chronic ischemic changes  UA negative for infection     Assessment/Plan       Coronary artery disease involving native coronary artery of native heart without angina pectoris    Essential hypertension    Mixed hyperlipidemia    Hypothyroidism    Weakness    Fall      87 y/o man with history of dementia, mood swings, htn, hld, cad, who is admitted for weakness/falls.    Per report, patient had recently started abilify which can cause excessive sedation and this medication is on hold. It is unclear why he stopped aricept; he has an appt tomorrow with his neurologist Dr Michael. Dr Michael has not seen patient in years according to dgtr but would defer medication changes to him as he will follow in clinic.    MRI brain is reassuring, no stroke. While he does have enlarged ventricles, this is due to atrophy. He does not have clinical symptoms of NPH (no psychomotor slowing, magnetic gait, incontinence).     There is no evidence of u/l infection or metabolic derangement on labs.    Overall, patient seems to be improving in level of alertness. Suspect he was oversedated and falling due to new medication.     I do not believe he has capacity to make his own medical decisions. He has no memory of reason for admission, has poor short term memory, and needs supervision  for his medication. I do not believe he is safe to return home alone.    He is at high risk for delirium, will start seroquel tonight.     I discussed the patient's findings and my recommendations with patient, patient's dgtr and primary team     Mehreen Muhammad MD  21  13:58 EST    Update 245pm  Spoke w/ dgtr again. Advised he does not have capacity to make medical decisions. She understands; will change his appt w/ Dr Michael for later in the next week or so. Will have case management help her find a SNF/LTACH.         Electronically signed by Mehreen Muhammad MD at 21 4849          Physical Therapy Notes (most recent note)      Meg Khalil PTA at 21 0803  Version 1 of 1         Patient Name: Ramin Zaragoza  : 1935    MRN: [id number]                              Today's Date: 2021       Admit Date: 2021    Visit Dx:     ICD-10-CM ICD-9-CM   1. Weakness  R53.1 780.79   2. Fall, initial encounter  W19.XXXA E888.9   3. Confusion  R41.0 298.9   4. Altered mental status, unspecified altered mental status type  R41.82 780.97     Patient Active Problem List   Diagnosis   • Coronary artery disease involving native coronary artery of native heart without angina pectoris   • Premature ventricular contraction   • Essential hypertension   • Mixed hyperlipidemia   • Venous insufficiency   • Hypothyroidism   • Lactose intolerance   • Weakness   • Fall     Past Medical History:   Diagnosis Date   • CAD (coronary artery disease)    • Hyperlipidemia    • Hypertension    • Hypothyroidism     on chronic replacement therapy   • Lactose intolerance    • Lower extremity edema    • PVC's (premature ventricular contractions)     History of   • Thyroid disorder    • Venous insufficiency      Past Surgical History:   Procedure Laterality Date   • CARDIAC CATHETERIZATION     • CORONARY STENT PLACEMENT     • EYE SURGERY      Bilateral cataract extraction   • HERNIA REPAIR       Inguinal hernia repair   • OTHER SURGICAL HISTORY      Melanoma removed from back   • OTHER SURGICAL HISTORY      History of left elbow fracture with sunsequent fixation      General Information     Row Name 12/06/21 0903          Physical Therapy Time and Intention    Document Type therapy note (daily note)  -AS     Mode of Treatment physical therapy  -AS     Row Name 12/06/21 0903          General Information    Patient Profile Reviewed yes  -AS     Existing Precautions/Restrictions fall  B ankle pain  -AS     Barriers to Rehab medically complex; hearing deficit; cognitive status  -AS     Row Name 12/06/21 0903          Cognition    Orientation Status (Cognition) oriented to; person; disoriented to; place; time; situation  -AS     Row Name 12/06/21 0903          Safety Issues, Functional Mobility    Safety Issues Affecting Function (Mobility) insight into deficits/self-awareness; safety precaution awareness; safety precautions follow-through/compliance; awareness of need for assistance; sequencing abilities  -AS     Impairments Affecting Function (Mobility) balance; cognition; endurance/activity tolerance; strength; postural/trunk control; pain  -AS     Cognitive Impairments, Mobility Safety/Performance awareness, need for assistance; insight into deficits/self-awareness; safety precaution follow-through; sequencing abilities; judgment; problem-solving/reasoning  -AS     Comment, Safety Issues/Impairments (Mobility) patient required increased time and effort to paticipate in therapy, increased complaints of bilateral ankle and LE pain  -AS           User Key  (r) = Recorded By, (t) = Taken By, (c) = Cosigned By    Initials Name Provider Type    AS Meg Khalil PTA Physical Therapy Assistant               Mobility     Row Name 12/06/21 0906          Bed Mobility    Scooting/Bridging [place] (Bed Mobility) verbal cues; maximum assist (25% patient effort); 2 person assist  to reposition to [facility]  -AS      Supine-Sit [place] (Bed Mobility) verbal cues; moderate assist (50% patient effort); 1 person assist  -AS     Sit-Supine [place] (Bed Mobility) verbal cues; maximum assist (25% patient effort); 1 person assist  -AS     Assistive Device (Bed Mobility) draw sheet; bed rails; head of bed elevated  -AS     Comment (Bed Mobility) Patient required mod/max assist for bed mobility this date, increased time and effort required this date. deferred OOB to chair due to increased assist needed for sit<>stand transfer and increased c/o pain/stiffness.  -AS     Row Name 12/06/21 0906          Transfers    Comment (Transfers) increased cues for sequencing and hand placement. Assist to block B LE from sliding. Verbal and tactile cues to correct posterior lean. Patient unable to take steps to recliner this date due to weakness, pain and impaired balance.  -AS     Row Name 12/06/21 0906          Bed-Chair Transfer    Bed-Chair [place] (Transfers) unable to assess  -AS     Row Name 12/06/21 0906          Sit-Stand Transfer    Sit-Stand [place] (Transfers) verbal cues; moderate assist (50% patient effort); maximum assist (25% patient effort); 2 person assist  -AS     Assistive Device (Sit-Stand Transfers) walker, front-wheeled  -AS     Row Name 12/06/21 0906          Gait/Stairs (Locomotion)    [place] Level (Gait) unable to assess  -AS           User Key  (r) = Recorded By, (t) = Taken By, (c) = Cosigned By    Initials Name Provider Type    AS Meg Khalil PTA Physical Therapy Assistant               Obj/Interventions     Row Name 12/06/21 0911          Motor Skills    Therapeutic Exercise knee; ankle  -AS     Row Name 12/06/21 0911          Knee (Therapeutic Exercise)    Knee (Therapeutic Exercise) strengthening exercise  -AS     Knee Strengthening (Therapeutic Exercise) bilateral; marching while seated; LAQ (long arc quad); sitting; 10 repetitions  mutliple rest breaks due to pain, patient moves  slowly with all movements.  -AS     Row Name 12/06/21 0911          Ankle (Therapeutic Exercise)    Ankle (Therapeutic Exercise) AROM (active range of motion)  -AS     Ankle AROM (Therapeutic Exercise) bilateral; dorsiflexion; plantarflexion; sitting; 10 repetitions  -AS     Row Name 12/06/21 0911          Balance    Static Standing Balance moderate impairment; severe impairment; supported; standing  -AS     Dynamic Standing Balance not tested  -AS     Comment, Balance increased posterior lean in sitting and standing positions  -AS           User Key  (r) = Recorded By, (t) = Taken By, (c) = Cosigned By    Initials Name Provider Type    AS Meg Khalil PTA Physical Therapy Assistant               Goals/Plan    No documentation.                Clinical Impression     Row Name 12/06/21 0913          Pain    Additional Documentation Pain Scale: FACES Pre/Post-Treatment (Group)  -AS     [facility] Name 12/06/21 0913          Pain Scale: FACES Pre/Post-Treatment    Pain: FACES Scale, Pretreatment 8-->hurts whole lot  -AS     Posttreatment Pain Rating 8-->hurts whole lot  -AS     Pain Location - Side Bilateral  -AS     Pain Location ankle  -AS     Pre/Posttreatment Pain Comment patient did not tolerate activity this date due to increased c/o pain  -AS     Row Name 12/06/21 0913          Plan of Care Review    Plan of Care Reviewed With patient  -AS     Progress no change  -AS     Outcome Summary patient required increased assist for bed mobility and transfers this date, performed sit<>stand transfers with mod/max assist x2, increased cues for sequencing and hand placement. Assist to block B LE from sliding. Verbal and tactile cues to correct posterior lean in both sitting and standing positions. Patient unable to take steps to recliner this date due to weakness, pain and impaired balance.  -AS     Row Name 12/06/21 0913          Positioning and Restraints    Pre-Treatment Position in bed  -AS     Post Treatment Position  bed  -AS     In Bed supine; call light within reach; encouraged to call for assist; exit alarm on; waffle boots/both  -AS           User Key  (r) = Recorded By, (t) = Taken By, (c) = Cosigned By    Initials Name Provider Type    AS Meg Khalil PTA Physical Therapy Assistant               Outcome Measures     Row Name 12/06/21 0915          How much help from another person do you currently need...    Turning from your back to your side while in flat bed without using bedrails? 2  -AS     Moving from lying on back to sitting on the side of a flat bed without bedrails? 2  -AS     Moving to and from a bed to a chair (including a wheelchair)? 2  -AS     Standing up from a chair using your arms (e.g., wheelchair, bedside chair)? 2  -AS     Climbing 3-5 steps with a railing? 1  -AS     To walk in hospital room? 2  -AS     AM-PAC 6 Clicks Score (PT) 11  -AS     Row Name 12/06/21 0915          Functional Assessment    Outcome Measure Options AM-PAC 6 Clicks Basic Mobility (PT)  -AS           User Key  (r) = Recorded By, (t) = Taken By, (c) = Cosigned By    Initials Name Provider Type    AS Meg Khalil PTA Physical Therapy Assistant                             Physical Therapy Education                 Title: PT OT SLP Therapies (In Progress)     Topic: Physical Therapy (In Progress)     Point: Mobility training (In Progress)     Learning Progress Summary           Patient Acceptance, E, NR by AS at 12/6/2021 0916    Acceptance, E,TB, VU by KG at 12/2/2021 1056    Eager, E,D, NR by DM at 11/28/2021 1116                   Point: Home exercise program (In Progress)     Learning Progress Summary           Patient Acceptance, E, NR by AS at 12/6/2021 0916    Acceptance, E,TB, VU by KG at 12/2/2021 1056    Eager, E,D, NR by DM at 11/28/2021 1116                   Point: Body mechanics (In Progress)     Learning Progress Summary           Patient Acceptance, E, NR by AS at 12/6/2021 0916    Acceptance, E,TB, VU  by KG at 12/2/2021 1056    Eager, E,D, NR by DM at 11/28/2021 1116                   Point: Precautions (In Progress)     Learning Progress Summary           Patient Acceptance, E, NR by AS at 12/6/2021 0916    Acceptance, E,TB, VU by KG at 12/2/2021 1056    Eager, E,D, NR by DM at 11/28/2021 1116                               User Key     Initials Effective Dates Name Provider Type Discipline    DM 06/16/21 -  Yakelin Armando, PT Physical Therapist PT    AS 06/16/21 -  Meg Khalil PTA Physical Therapy Assistant PT    KG 06/16/21 -  Emily Quiroz Physical Therapist PT              PT Recommendation and Plan     Plan of Care Reviewed With: patient  Progress: no change  Outcome Summary: patient required increased assist for bed mobility and transfers this date, performed sit<>stand transfers with mod/max assist x2, increased cues for sequencing and hand placement. Assist to block B LE from sliding. Verbal and tactile cues to correct posterior lean in both sitting and standing positions. Patient unable to take steps to recliner this date due to weakness, pain and impaired balance.     Time Calculation:    PT Charges     Row Name 12/06/21 0916             Time Calculation    Start Time 0803  -AS      PT Received On 12/06/21  -AS      PT Goal Re-Cert Due Date 12/08/21  -AS              Timed Charges    59751 - PT Therapeutic Exercise Minutes 15  -AS      24385 - PT Therapeutic Activity Minutes 15  -AS              Total Minutes    Timed Charges Total Minutes 30  -AS       Total Minutes 30  -AS            User Key  (r) = Recorded By, (t) = Taken By, (c) = Cosigned By    Initials Name Provider Type    AS Meg Khalil PTA Physical Therapy Assistant              Therapy Charges for Today     Code Description Service Date Service Provider Modifiers Qty    13740308902 HC PT THER PROC EA 15 MIN 12/6/2021 Meg Khalil PTA GP 1    02378290503 HC PT THERAPEUTIC ACT EA 15 MIN 12/6/2021 Meg Khalil  TRYO Boateng GP 1          PT G-Codes  Outcome Measure Options: AM-PAC 6 Clicks Basic Mobility (PT)  AM-PAC 6 Clicks Score (PT): 11  AM-PAC 6 Clicks Score (OT): 17    Meg Khalil PTA  2021      Electronically signed by Meg Khalil PTA at 21 0917          Occupational Therapy Notes (most recent note)      Jennifer Portillo, OT at 21 1030          Patient Name: Ramin Zaragoza  : 1935    MRN: [id number]                              Today's Date: 2021       Admit Date: 2021    Visit Dx:     ICD-10-CM ICD-9-CM   1. Weakness  R53.1 780.79   2. Fall, initial encounter  W19.XXXA E888.9   3. Confusion  R41.0 298.9   4. Altered mental status, unspecified altered mental status type  R41.82 780.97     Patient Active Problem List   Diagnosis   • Coronary artery disease involving native coronary artery of native heart without angina pectoris   • Premature ventricular contraction   • Essential hypertension   • Mixed hyperlipidemia   • Venous insufficiency   • Hypothyroidism   • Lactose intolerance   • Weakness   • Fall     Past Medical History:   Diagnosis Date   • CAD (coronary artery disease)    • Hyperlipidemia    • Hypertension    • Hypothyroidism     on chronic replacement therapy   • Lactose intolerance    • Lower extremity edema    • PVC's (premature ventricular contractions)     History of   • Thyroid disorder    • Venous insufficiency      Past Surgical History:   Procedure Laterality Date   • CARDIAC CATHETERIZATION     • CORONARY STENT PLACEMENT     • EYE SURGERY      Bilateral cataract extraction   • HERNIA REPAIR      Inguinal hernia repair   • OTHER SURGICAL HISTORY      Melanoma removed from back   • OTHER SURGICAL HISTORY      History of left elbow fracture with sunsequent fixation      General Information     Row Name 21 1030          OT Time and Intention    Document Type progress note/recertification  -SW     Mode of Treatment occupational therapy  -SW     Row  Name 12/08/21 1030          General Information    Patient Profile Reviewed yes  -     Existing Precautions/Restrictions fall  BLE pain  -     Barriers to Rehab medically complex  -[facility] Name 12/08/21 1030          Cognition    Orientation Status (Cognition) oriented to; person; disoriented to; place; time; situation  -[facility] Name 12/08/21 1030          Safety Issues, Functional Mobility    Impairments Affecting Function (Mobility) balance; cognition; endurance/activity tolerance; strength; postural/trunk control; pain  -           User Key  (r) = Recorded By, (t) = Taken By, (c) = Cosigned By    Initials Name Provider Type    Jennifer Kaiser OT Occupational Therapist                 Mobility/ADL's     Row Name 12/08/21 1030          Bed Mobility    Bed Mobility rolling left; rolling right; scooting/bridging  -SW     Rolling Left [place] (Bed Mobility) moderate assist (50% patient effort)  After PROM/AROM to BLE and vcs  -SW     Rolling Right [place] (Bed Mobility) moderate assist (50% patient effort)  After PROM/AROM to BLE and vcs  -SW     Scooting/Bridging [place] (Bed Mobility) minimum assist (75% patient effort)  modified bed and pt had ROM to BLE prior.  -     Row Name 12/08/21 1030          Transfers    Comment (Transfers) deferred oob  -[facility] Name 12/08/21 1030          Activities of Daily Living    BADL Assessment/Intervention toileting; grooming  -[facility] Name 12/08/21 1030          Grooming Assessment/Training    [place] Level (Grooming) grooming skills; wash face, hands; minimum assist (75% patient effort)  -     Position (Grooming) sitting up in bed  -     Row Name 12/08/21 1030          Toileting Assessment/Training    [place] Level (Toileting) adjust/manage clothing; toileting skills; minimum assist (75% patient effort)  -     Assistive Devices (Toileting) urinal  -     Position (Toileting) sitting up in bed  -           User Key  (r) =  Recorded By, (t) = Taken By, (c) = Cosigned By    Initials Name Provider Type    Jennifer Kaiser OT Occupational Therapist               Obj/Interventions     Row Name 12/08/21 1128          Shoulder (Therapeutic Exercise)    Shoulder (Therapeutic Exercise) AROM (active range of motion)  -     Shoulder AROM (Therapeutic Exercise) bilateral; flexion; extension; sitting; 10 repetitions  -     Row Name 12/08/21 1128          Elbow/Forearm (Therapeutic Exercise)    Elbow/Forearm (Therapeutic Exercise) AROM (active range of motion)  -     Elbow/Forearm AROM (Therapeutic Exercise) bilateral; flexion; extension; sitting; 10 repetitions  -     Row Name 12/08/21 1128          Therapeutic Exercise    Therapeutic Exercise shoulder; elbow/forearm; other (see comments)  ble AROM and PROM to promote flexibility and decrease discomfort for improved mobility.  -           User Key  (r) = Recorded By, (t) = Taken By, (c) = Cosigned By    Initials Name Provider Type    Jennifer Kaiser OT Occupational Therapist               Goals/Plan     Row Name 12/08/21 1030          Transfer Goal 1 (OT)    Activity/Assistive Device (Transfer Goal 1, OT) commode; walker, rolling  -     [place] Level/Cues Needed (Transfer Goal 1, OT) moderate assist (50-74% patient effort)  -     Time Frame (Transfer Goal 1, OT) long term goal (LTG); by discharge  -     Progress/Outcome (Transfer Goal 1, OT) goal revised this date; progress slower than expected; unable to make needed progress  -     Row Name 12/08/21 1030          Dressing Goal 1 (OT)    Activity/Device (Dressing Goal 1, OT) lower body dressing  -     [place]/Cues Needed (Dressing Goal 1, OT) moderate assist (50-74% patient effort)  -     Time Frame (Dressing Goal 1, OT) long term goal (LTG); by discharge  -     Progress/Outcome (Dressing Goal 1, OT) goal revised this date; progress slower than expected; unable to make needed progress  -     Row Name 12/08/21  1030          Grooming Goal 1 (OT)    Activity/Device (Grooming Goal 1, OT) hair care; oral care; wash face, hands  -SW     [place] (Grooming Goal 1, OT) contact guard assist; verbal cues required  -     Time Frame (Grooming Goal 1, OT) long term goal (LTG); by discharge  -     Progress/Outcome (Grooming Goal 1, OT) goal ongoing  -           User Key  (r) = Recorded By, (t) = Taken By, (c) = Cosigned By    Initials Name Provider Type     Jennifer Portillo OT Occupational Therapist               Clinical Impression     Row Name 12/08/21 1030          Pain Assessment    Additional Documentation Pain Scale: FACES Pre/Post-Treatment (Group)  -[facility] Name 12/08/21 1030          Pain Scale: FACES Pre/Post-Treatment    Pain: FACES Scale, Pretreatment 0-->no hurt  -SW     Posttreatment Pain Rating 2-->hurts little bit  -SW     Pain Location - Side Left  -SW     Pain Location - Orientation lower  -     Pain Location extremity  -SW     Pre/Posttreatment Pain Comment Pt had LLE pain during AROM and PROM tasks.  -[facility] Name 12/08/21 1030          Plan of Care Review    Plan of Care Reviewed With patient  -     Progress improving  -     Outcome Summary OT completed progress note with tx.  Pt supine and sitting up in bed with focus on improving indep in this environment.  Pt u/a to lift legs up initially and c/o pain when OT began touching LEs.  Pt has increased pain to LLE.  OT provided PROM initially and advanced to AROM to BLE in preparation for bed mobillity.  Pt completed rolling side to side and scooting up in bed after graded TE with min assist and vcs.  Pt utilized urinal with min assist, and groomed with setup.  Pt demonstrates improvement in bed but did not get oob.  Recommend continued OT and SNF at d/c.  -     Row Name 12/08/21 1030          Therapy Plan Review/Discharge Plan (OT)    Anticipated Discharge Disposition (OT) skilled nursing facility  -[facility] Name 12/08/21 1030          Vital  Signs    Pre Systolic BP Rehab 128  -SW     Pre Treatment Diastolic BP 67  -SW     Pretreatment Heart Rate (beats/min) 70  -SW     Pre SpO2 (%) 94  -SW     O2 Delivery Pre Treatment room air  -SW     O2 Delivery Intra Treatment room air  -SW     O2 Delivery Post Treatment room air  -SW     Pre Patient Position Supine  -SW     Intra Patient Position Side Lying  -SW     Post Patient Position Supine  -SW     Row Name 12/08/21 1030          Positioning and Restraints    Pre-Treatment Position in bed  -SW     Post Treatment Position bed  -SW     In Bed notified nsg; supine; fowlers; call light within reach; encouraged to call for assist; exit alarm on; side rails up x3  -SW           User Key  (r) = Recorded By, (t) = Taken By, (c) = Cosigned By    Initials Name Provider Type    Jennifer Kaiser OT Occupational Therapist               Outcome Measures     Row Name 12/08/21 3951          How much help from another is currently needed...    Putting on and taking off regular lower body clothing? 1  -SW     Bathing (including washing, rinsing, and drying) 1  -SW     Toileting (which includes using toilet bed pan or urinal) 2  -SW     Putting on and taking off regular upper body clothing 2  -SW     Taking care of personal grooming (such as brushing teeth) 3  -SW     Eating meals 3  -SW     AM-PAC 6 Clicks Score (OT) 12  -SW     Row Name 12/08/21 0869          How much help from another person do you currently need...    Turning from your back to your side while in flat bed without using bedrails? 2  -TR     Moving from lying on back to sitting on the side of a flat bed without bedrails? 2  -TR     Moving to and from a bed to a chair (including a wheelchair)? 2  -TR     Standing up from a chair using your arms (e.g., wheelchair, bedside chair)? 2  -TR     Climbing 3-5 steps with a railing? 1  -TR     To walk in hospital room? 2  -TR     AM-PAC 6 Clicks Score (PT) 11  -TR     Row Name 12/08/21 9867          Functional Assessment     Outcome Measure Options AM-PAC 6 Clicks Daily Activity (OT)  -           User Key  (r) = Recorded By, (t) = Taken By, (c) = Cosigned By    Initials Name Provider Type    Radha Rosas, RN Registered Nurse    Jennifer Kaiser OT Occupational Therapist                Occupational Therapy Education                 Title: PT OT SLP Therapies (In Progress)     Topic: Occupational Therapy (In Progress)     Point: ADL training (In Progress)     Description:   Instruct learner(s) on proper safety adaptation and remediation techniques during self care or transfers.   Instruct in proper use of assistive devices.              Learning Progress Summary           Patient Acceptance, E, NR by  at 12/8/2021 1139    Acceptance, E,TB,D, VU,DU,NR by  at 12/6/2021 1520    Acceptance, E,TB,D, VU,DU by  at 12/2/2021 1055    Acceptance, E,TB,D, VU,DU,NR by KF at 11/28/2021 1422   Family Acceptance, E,TB,D, VU,DU,NR by  at 11/28/2021 1422                   Point: Home exercise program (In Progress)     Description:   Instruct learner(s) on appropriate technique for monitoring, assisting and/or progressing therapeutic exercises/activities.              Learning Progress Summary           Patient Acceptance, E, NR by POLLO at 12/8/2021 1139    Acceptance, E,TB,D, VU,DU,NR by  at 12/6/2021 1520                   Point: Precautions (Done)     Description:   Instruct learner(s) on prescribed precautions during self-care and functional transfers.              Learning Progress Summary           Patient Acceptance, E,TB,D, VU,DU,NR by KF at 12/6/2021 1520    Acceptance, E,TB,D, VU,DU by KF at 12/2/2021 1055    Acceptance, E,TB,D, VU,DU,NR by  at 11/28/2021 1422   Family Acceptance, E,TB,D, VU,DU,NR by  at 11/28/2021 1422                   Point: Body mechanics (In Progress)     Description:   Instruct learner(s) on proper positioning and spine alignment during self-care, functional mobility activities and/or exercises.               Learning Progress Summary           Patient Acceptance, E, NR by  at 12/8/2021 1139    Acceptance, E,TB,D, VU,DU,NR by  at 12/6/2021 1520    Acceptance, E,TB,D, VU,DU by  at 12/2/2021 1055    Acceptance, E,TB,D, VU,DU,NR by  at 11/28/2021 1422   Family Acceptance, E,TB,D, VU,DU,NR by  at 11/28/2021 1422                               User Key     Initials Effective Dates Name Provider Type Discipline     06/16/21 -  Yuliet Juarez OT Occupational Therapist OT     06/16/21 -  Jennifer Portillo OT Occupational Therapist OT              OT Recommendation and Plan     Plan of Care Review  Plan of Care Reviewed With: patient  Progress: improving  Outcome Summary: OT completed progress note with tx.  Pt supine and sitting up in bed with focus on improving indep in this environment.  Pt u/a to lift legs up initially and c/o pain when OT began touching LEs.  Pt has increased pain to LLE.  OT provided PROM initially and advanced to AROM to BLE in preparation for bed mobillity.  Pt completed rolling side to side and scooting up in bed after graded TE with min assist and vcs.  Pt utilized urinal with min assist, and groomed with setup.  Pt demonstrates improvement in bed but did not get oob.  Recommend continued OT and SNF at d/c.     Time Calculation:    Time Calculation- OT     Row Name 12/08/21 1030             Time Calculation- OT    OT Start Time 1030  -SW      OT Received On 12/08/21  -SW      OT Goal Re-Cert Due Date 12/18/21  -SW              Timed Charges    71643 - OT Therapeutic Exercise Minutes 30  -SW      66054 - OT Self Care/Mgmt Minutes 30  -SW              Total Minutes    Timed Charges Total Minutes 60  -SW       Total Minutes 60  -SW            User Key  (r) = Recorded By, (t) = Taken By, (c) = Cosigned By    Initials Name Provider Type     Jennifer Portillo OT Occupational Therapist              Therapy Charges for Today     Code Description Service Date Service Provider Modifiers Qty     73127626005 HC OT THER PROC EA 15 MIN 12/8/2021 Jennifer Portillo OT GO 2    84725828362 HC OT SELF CARE/MGMT/TRAIN EA 15 MIN 12/8/2021 Jennifer Portillo OT GO 2               Jennifer Portillo OT  12/8/2021    Electronically signed by Jennifer Portillo OT at 12/08/21 1140

## 2021-12-08 NOTE — CASE MANAGEMENT/SOCIAL WORK
Discharge Planning Assessment  [facility]     Patient Name: Ramin Zaragoza  MRN: [id number]  Today's Date: 12/8/2021    Admit Date: 11/27/2021     Discharge Needs Assessment    No documentation.                Discharge Plan     Row Name 12/08/21 1632       Plan    Plan SNF to LTC    Patient/Family in Agreement with Plan yes    Plan Comments Spoke with patient's daughter at bedside. Daughter continuing to persue guardianship.  Continuing to place referrals.    Final Discharge Disposition Code 03 - skilled nursing facility (SNF)              Continued Care and Services - Admitted Since 11/27/2021     Destination     Service Provider Request Status Selected Services Address Phone Fax Patient Preferred    [facility]  Pending - [facility] [address] [facility] [address] [id number] [phone] --    [facility]: [place]  Pending - Request Sent N/A [address] [address] [phone] --    [facility]  Pending - Request Sent N/A [address]  [place] [id number] [phone] --    [facility]  Pending - No Request Sent N/A [address] [id number] [phone] --    [facility]  Pending - No Request Sent N/A [address], [facility] [address] [phone] --    [facility]  Pending - No Request Sent N/A [address] [address] [phone] --    [facility] [place]  Declined  No Bed Available N/A [address] [facility] [address] [phone] --    [facility]  Declined  Patient Insurance Not Accepted N/A [address], [facility] [address] [phone] --    [facility]  Declined  Patient Insurance Not Accepted N/A [address] [address] [phone] --    [facility]  Patient  Insurance Not Accepted N/A [address] [facility] [address], [facility] [address] [phone] --                 Demographic Summary    No documentation.                Functional Status    No documentation.                Psychosocial    No documentation.                Abuse/Neglect    No documentation.                Legal    No documentation.                Substance Abuse    No documentation.                Patient Forms    No documentation.                   Carlota Chavez RN

## 2021-12-08 NOTE — PLAN OF CARE
Goal Outcome Evaluation:  Plan of Care Reviewed With: patient        Progress: improving  Outcome Summary: OT completed progress note with tx.  Pt supine and sitting up in bed with focus on improving indep in this environment.  Pt u/a to lift legs up initially and c/o pain when OT began touching LEs.  Pt has increased pain to LLE.  OT provided PROM initially and advanced to AROM to BLE in preparation for bed mobillity.  Pt completed rolling side to side and scooting up in bed after graded TE with min assist and vcs.  Pt utilized urinal with min assist, and groomed with setup.  Pt demonstrates improvement in bed but did not get oob.  Recommend continued OT and SNF at d/c.

## 2021-12-08 NOTE — PROGRESS NOTES
Clinical Nutrition       Patient Name: Ramin Zaragoza  YOB: 1935  MRN: [id number]  Date of Encounter: 21 14:22 EST  Admission date: 2021      Reason for Visit   LOS      EMR  Reviewed   Yes         Reported/Observed/Food/Nutrition Related - Comments   Pt resting at attempts visits, discussed care with daughter at bedside. She reports he is eating okay, about half his meals but has trouble with meats. She states he has difficulty chewing due to dental issues and requests soft/chopped diet. She also states he likes fruit and strawberry Boost.     Current Nutrition Prescription     Diet Soft Texture; Chopped  Orders Placed This Encounter      DIET MESSAGE Fruit with breakfast and lunch please      Dietary Nutrition Supplements Boost Plus; strawberry      Average Intake from [place]% X 6 meals      Actions     Follow treatment progress, Care plan reviewed, Interview for preferences, Menu adjusted, Supplement provided    Monitor Per Protocol      Emili Pulido RD,   Time Spent: 15

## 2021-12-08 NOTE — THERAPY PROGRESS REPORT/RE-CERT
Patient Name: Ramin Zaragoza  : 1935    MRN: [id number]                              Today's Date: 2021       Admit Date: 2021    Visit Dx:     ICD-10-CM ICD-9-CM   1. Weakness  R53.1 780.79   2. Fall, initial encounter  W19.XXXA E888.9   3. Confusion  R41.0 298.9   4. Altered mental status, unspecified altered mental status type  R41.82 780.97     Patient Active Problem List   Diagnosis   • Coronary artery disease involving native coronary artery of native heart without angina pectoris   • Premature ventricular contraction   • Essential hypertension   • Mixed hyperlipidemia   • Venous insufficiency   • Hypothyroidism   • Lactose intolerance   • Weakness   • Fall     Past Medical History:   Diagnosis Date   • CAD (coronary artery disease)    • Hyperlipidemia    • Hypertension    • Hypothyroidism     on chronic replacement therapy   • Lactose intolerance    • Lower extremity edema    • PVC's (premature ventricular contractions)     History of   • Thyroid disorder    • Venous insufficiency      Past Surgical History:   Procedure Laterality Date   • CARDIAC CATHETERIZATION     • CORONARY STENT PLACEMENT     • EYE SURGERY      Bilateral cataract extraction   • HERNIA REPAIR      Inguinal hernia repair   • OTHER SURGICAL HISTORY      Melanoma removed from back   • OTHER SURGICAL HISTORY      History of left elbow fracture with sunsequent fixation      General Information     Row Name 21 1030          OT Time and Intention    Document Type progress note/recertification  -SW     Mode of Treatment occupational therapy  -     Row Name 21 1030          General Information    Patient Profile Reviewed yes  -SW     Existing Precautions/Restrictions fall  BLE pain  -SW     Barriers to Rehab medically complex  -     Row Name 21 1030          Cognition    Orientation Status (Cognition) oriented to; person; disoriented to; place; time; situation  -     Row Name 21 1030          Safety  Issues, Functional Mobility    Impairments Affecting Function (Mobility) balance; cognition; endurance/activity tolerance; strength; postural/trunk control; pain  -           User Key  (r) = Recorded By, (t) = Taken By, (c) = Cosigned By    Initials Name Provider Type    Jennifer Kaiser OT Occupational Therapist                 Mobility/ADL's     Row Name 12/08/21 1030          Bed Mobility    Bed Mobility rolling left; rolling right; scooting/bridging  -     Rolling Left [place] (Bed Mobility) moderate assist (50% patient effort)  After PROM/AROM to BLE and vcs  -SW     Rolling Right [place] (Bed Mobility) moderate assist (50% patient effort)  After PROM/AROM to BLE and vcs  -SW     Scooting/Bridging [place] (Bed Mobility) minimum assist (75% patient effort)  modified bed and pt had ROM to BLE prior.  -     Row Name 12/08/21 1030          Transfers    Comment (Transfers) deferred oob  -     Row Name 12/08/21 1030          Activities of Daily Living    BADL Assessment/Intervention toileting; grooming  -     Row Name 12/08/21 1030          Grooming Assessment/Training    [place] Level (Grooming) grooming skills; wash face, hands; minimum assist (75% patient effort)  -     Position (Grooming) sitting up in bed  -     Row Name 12/08/21 1030          Toileting Assessment/Training    [place] Level (Toileting) adjust/manage clothing; toileting skills; minimum assist (75% patient effort)  -     Assistive Devices (Toileting) urinal  -     Position (Toileting) sitting up in bed  -           User Key  (r) = Recorded By, (t) = Taken By, (c) = Cosigned By    Initials Name Provider Type    Jennifer Kaiser OT Occupational Therapist               Obj/Interventions     Row Name 12/08/21 1128          Shoulder (Therapeutic Exercise)    Shoulder (Therapeutic Exercise) AROM (active range of motion)  -     Shoulder AROM (Therapeutic Exercise) bilateral; flexion; extension; sitting; 10  repetitions  -     Row Name 12/08/21 1128          Elbow/Forearm (Therapeutic Exercise)    Elbow/Forearm (Therapeutic Exercise) AROM (active range of motion)  -     Elbow/Forearm AROM (Therapeutic Exercise) bilateral; flexion; extension; sitting; 10 repetitions  -     Row Name 12/08/21 1128          Therapeutic Exercise    Therapeutic Exercise shoulder; elbow/forearm; other (see comments)  ble AROM and PROM to promote flexibility and decrease discomfort for improved mobility.  -           User Key  (r) = Recorded By, (t) = Taken By, (c) = Cosigned By    Initials Name Provider Type    Jennifer Kaiser, OT Occupational Therapist               Goals/Plan     Row Name 12/08/21 1030          Transfer Goal 1 (OT)    Activity/Assistive Device (Transfer Goal 1, OT) commode; walker, rolling  -     Jenkins Level/Cues Needed (Transfer Goal 1, OT) moderate assist (50-74% patient effort)  -     Time Frame (Transfer Goal 1, OT) long term goal (LTG); by discharge  -     Progress/Outcome (Transfer Goal 1, OT) goal revised this date; progress slower than expected; unable to make needed progress  -     Row Name 12/08/21 1030          Dressing Goal 1 (OT)    Activity/Device (Dressing Goal 1, OT) lower body dressing  -     Jenkins/Cues Needed (Dressing Goal 1, OT) moderate assist (50-74% patient effort)  -     Time Frame (Dressing Goal 1, OT) long term goal (LTG); by discharge  -     Progress/Outcome (Dressing Goal 1, OT) goal revised this date; progress slower than expected; unable to make needed progress  -     Row Name 12/08/21 1030          Grooming Goal 1 (OT)    Activity/Device (Grooming Goal 1, OT) hair care; oral care; wash face, hands  -     Jenkins (Grooming Goal 1, OT) contact guard assist; verbal cues required  -     Time Frame (Grooming Goal 1, OT) long term goal (LTG); by discharge  -     Progress/Outcome (Grooming Goal 1, OT) goal ongoing  -           User Key  (r) = Recorded  By, (t) = Taken By, (c) = Cosigned By    Initials Name Provider Type    Jennifer Kaiser, MACRINA Occupational Therapist               Clinical Impression     [facility] Name 12/08/21 1030          Pain Assessment    Additional Documentation Pain Scale: FACES Pre/Post-Treatment (Group)  -[facility] Name 12/08/21 1030          Pain Scale: FACES Pre/Post-Treatment    Pain: FACES Scale, Pretreatment 0-->no hurt  -SW     Posttreatment Pain Rating 2-->hurts little bit  -SW     Pain Location - Side Left  -SW     Pain Location - Orientation lower  -     Pain Location extremity  -SW     Pre/Posttreatment Pain Comment Pt had LLE pain during AROM and PROM tasks.  -[facility] Name 12/08/21 1030          Plan of Care Review    Plan of Care Reviewed With patient  -     Progress improving  -     Outcome Summary OT completed progress note with tx.  Pt supine and sitting up in bed with focus on improving indep in this environment.  Pt u/a to lift legs up initially and c/o pain when OT began touching LEs.  Pt has increased pain to LLE.  OT provided PROM initially and advanced to AROM to BLE in preparation for bed mobillity.  Pt completed rolling side to side and scooting up in bed after graded TE with min assist and vcs.  Pt utilized urinal with min assist, and groomed with setup.  Pt demonstrates improvement in bed but did not get oob.  Recommend continued OT and SNF at d/c.  -[facility] Name 12/08/21 1030          Therapy Plan Review/Discharge Plan (OT)    Anticipated Discharge Disposition (OT) skilled nursing facility  -[facility] Name 12/08/21 1030          Vital Signs    Pre Systolic BP Rehab 128  -SW     Pre Treatment Diastolic BP 67  -SW     Pretreatment Heart Rate (beats/min) 70  -SW     Pre SpO2 (%) 94  -SW     O2 Delivery Pre Treatment room air  -SW     O2 Delivery Intra Treatment room air  -SW     O2 Delivery Post Treatment room air  -SW     Pre Patient Position Supine  -SW     Intra Patient Position Side Lying  -SW     Post  Patient Position Supine  -SW     Row Name 12/08/21 1030          Positioning and Restraints    Pre-Treatment Position in bed  -SW     Post Treatment Position bed  -SW     In Bed notified nsg; supine; fowlers; call light within reach; encouraged to call for assist; exit alarm on; side rails up x3  -SW           User Key  (r) = Recorded By, (t) = Taken By, (c) = Cosigned By    Initials Name Provider Type    Jennifer Kaiser OT Occupational Therapist               Outcome Measures     Row Name 12/08/21 1138          How much help from another is currently needed...    Putting on and taking off regular lower body clothing? 1  -SW     Bathing (including washing, rinsing, and drying) 1  -SW     Toileting (which includes using toilet bed pan or urinal) 2  -SW     Putting on and taking off regular upper body clothing 2  -SW     Taking care of personal grooming (such as brushing teeth) 3  -SW     Eating meals 3  -SW     AM-PAC 6 Clicks Score (OT) 12  -     Row Name 12/08/21 0845          How much help from another person do you currently need...    Turning from your back to your side while in flat bed without using bedrails? 2  -TR     Moving from lying on back to sitting on the side of a flat bed without bedrails? 2  -TR     Moving to and from a bed to a chair (including a wheelchair)? 2  -TR     Standing up from a chair using your arms (e.g., wheelchair, bedside chair)? 2  -TR     Climbing 3-5 steps with a railing? 1  -TR     To walk in hospital room? 2  -TR     AM-PAC 6 Clicks Score (PT) 11  -TR     Row Name 12/08/21 1138          Functional Assessment    Outcome Measure Options AM-PAC 6 Clicks Daily Activity (OT)  -SW           User Key  (r) = Recorded By, (t) = Taken By, (c) = Cosigned By    Initials Name Provider Type    Radha Rosas, RN Registered Nurse    Jennifer Kaiser OT Occupational Therapist                Occupational Therapy Education                 Title: PT OT SLP Therapies (In Progress)     Topic:  Occupational Therapy (In Progress)     Point: ADL training (In Progress)     Description:   Instruct learner(s) on proper safety adaptation and remediation techniques during self care or transfers.   Instruct in proper use of assistive devices.              Learning Progress Summary           Patient Acceptance, E, NR by SW at 12/8/2021 1139    Acceptance, E,TB,D, VU,DU,NR by KF at 12/6/2021 1520    Acceptance, E,TB,D, VU,DU by KF at 12/2/2021 1055    Acceptance, E,TB,D, VU,DU,NR by KF at 11/28/2021 1422   Family Acceptance, E,TB,D, VU,DU,NR by KF at 11/28/2021 1422                   Point: Home exercise program (In Progress)     Description:   Instruct learner(s) on appropriate technique for monitoring, assisting and/or progressing therapeutic exercises/activities.              Learning Progress Summary           Patient Acceptance, E, NR by  at 12/8/2021 1139    Acceptance, E,TB,D, VU,DU,NR by KF at 12/6/2021 1520                   Point: Precautions (Done)     Description:   Instruct learner(s) on prescribed precautions during self-care and functional transfers.              Learning Progress Summary           Patient Acceptance, E,TB,D, VU,DU,NR by KF at 12/6/2021 1520    Acceptance, E,TB,D, VU,DU by KF at 12/2/2021 1055    Acceptance, E,TB,D, VU,DU,NR by KF at 11/28/2021 1422   Family Acceptance, E,TB,D, VU,DU,NR by KF at 11/28/2021 1422                   Point: Body mechanics (In Progress)     Description:   Instruct learner(s) on proper positioning and spine alignment during self-care, functional mobility activities and/or exercises.              Learning Progress Summary           Patient Acceptance, E, NR by  at 12/8/2021 1139    Acceptance, E,TB,D, VU,DU,NR by KF at 12/6/2021 1520    Acceptance, E,TB,D, VU,DU by KF at 12/2/2021 1055    Acceptance, E,TB,D, VU,DU,NR by KF at 11/28/2021 1422   Family Acceptance, E,TB,D, VU,DU,NR by KF at 11/28/2021 1422                               User Key     Initials  Effective Dates Name Provider Type Discipline     06/16/21 -  Yuliet Juarez OT Occupational Therapist OT     06/16/21 -  Jennifer Portillo OT Occupational Therapist OT              OT Recommendation and Plan     Plan of Care Review  Plan of Care Reviewed With: patient  Progress: improving  Outcome Summary: OT completed progress note with tx.  Pt supine and sitting up in bed with focus on improving indep in this environment.  Pt u/a to lift legs up initially and c/o pain when OT began touching LEs.  Pt has increased pain to LLE.  OT provided PROM initially and advanced to AROM to BLE in preparation for bed mobillity.  Pt completed rolling side to side and scooting up in bed after graded TE with min assist and vcs.  Pt utilized urinal with min assist, and groomed with setup.  Pt demonstrates improvement in bed but did not get oob.  Recommend continued OT and SNF at d/c.     Time Calculation:    Time Calculation- OT     Row Name 12/08/21 1030             Time Calculation- OT    OT Start Time 1030  -SW      OT Received On 12/08/21  -SW      OT Goal Re-Cert Due Date 12/18/21  -SW              Timed Charges    34968 - OT Therapeutic Exercise Minutes 30  -SW      05259 - OT Self Care/Mgmt Minutes 30  -SW              Total Minutes    Timed Charges Total Minutes 60  -SW       Total Minutes 60  -SW            User Key  (r) = Recorded By, (t) = Taken By, (c) = Cosigned By    Initials Name Provider Type     Jennifer Portillo OT Occupational Therapist              Therapy Charges for Today     Code Description Service Date Service Provider Modifiers Qty    91173360599 HC OT THER PROC EA 15 MIN 12/8/2021 Jennifer Portillo OT GO 2    [id number] HC OT SELF CARE/MGMT/TRAIN EA 15 MIN 12/8/2021 Jennifer Portillo OT GO 2               Jennifer Portillo OT  12/8/2021

## 2021-12-09 ENCOUNTER — APPOINTMENT (OUTPATIENT)
Dept: GENERAL RADIOLOGY | Facility: HOSPITAL | Age: 86
End: 2021-12-09

## 2021-12-09 PROCEDURE — 97530 THERAPEUTIC ACTIVITIES: CPT

## 2021-12-09 PROCEDURE — 97110 THERAPEUTIC EXERCISES: CPT

## 2021-12-09 PROCEDURE — 73502 X-RAY EXAM HIP UNI 2-3 VIEWS: CPT

## 2021-12-09 PROCEDURE — 99233 SBSQ HOSP IP/OBS HIGH 50: CPT | Performed by: HOSPITALIST

## 2021-12-09 PROCEDURE — 25010000002 ENOXAPARIN PER 10 MG: Performed by: NURSE PRACTITIONER

## 2021-12-09 RX ORDER — TRAMADOL HYDROCHLORIDE 50 MG/1
25 TABLET ORAL EVERY 6 HOURS PRN
Status: DISCONTINUED | OUTPATIENT
Start: 2021-12-09 | End: 2021-12-13 | Stop reason: HOSPADM

## 2021-12-09 RX ADMIN — ASPIRIN 81 MG: 81 TABLET, COATED ORAL at 09:21

## 2021-12-09 RX ADMIN — DICLOFENAC 2 G: 10 GEL TOPICAL at 17:01

## 2021-12-09 RX ADMIN — DICLOFENAC 2 G: 10 GEL TOPICAL at 08:53

## 2021-12-09 RX ADMIN — PANTOPRAZOLE SODIUM 40 MG: 40 TABLET, DELAYED RELEASE ORAL at 09:21

## 2021-12-09 RX ADMIN — SUCRALFATE 1 G: 1 TABLET ORAL at 17:01

## 2021-12-09 RX ADMIN — ATORVASTATIN CALCIUM 10 MG: 10 TABLET, FILM COATED ORAL at 09:22

## 2021-12-09 RX ADMIN — LISINOPRIL 2.5 MG: 2.5 TABLET ORAL at 09:21

## 2021-12-09 RX ADMIN — SODIUM CHLORIDE, PRESERVATIVE FREE 10 ML: 5 INJECTION INTRAVENOUS at 09:22

## 2021-12-09 RX ADMIN — QUETIAPINE FUMARATE 50 MG: 25 TABLET ORAL at 21:46

## 2021-12-09 RX ADMIN — SODIUM CHLORIDE, PRESERVATIVE FREE 10 ML: 5 INJECTION INTRAVENOUS at 21:46

## 2021-12-09 RX ADMIN — SUCRALFATE 1 G: 1 TABLET ORAL at 06:21

## 2021-12-09 RX ADMIN — DIVALPROEX SODIUM 250 MG: 250 TABLET, DELAYED RELEASE ORAL at 09:23

## 2021-12-09 RX ADMIN — DIVALPROEX SODIUM 250 MG: 250 TABLET, DELAYED RELEASE ORAL at 21:46

## 2021-12-09 RX ADMIN — ENOXAPARIN SODIUM 40 MG: 40 INJECTION SUBCUTANEOUS at 09:21

## 2021-12-09 RX ADMIN — DICLOFENAC 2 G: 10 GEL TOPICAL at 12:35

## 2021-12-09 RX ADMIN — DICLOFENAC 2 G: 10 GEL TOPICAL at 21:46

## 2021-12-09 RX ADMIN — LEVOTHYROXINE SODIUM 50 MCG: 50 TABLET ORAL at 06:21

## 2021-12-09 RX ADMIN — FLUTICASONE PROPIONATE 2 SPRAY: 50 SPRAY, METERED NASAL at 09:22

## 2021-12-09 RX ADMIN — ACETAMINOPHEN 650 MG: 325 TABLET, FILM COATED ORAL at 12:35

## 2021-12-09 RX ADMIN — DONEPEZIL HYDROCHLORIDE 10 MG: 10 TABLET, FILM COATED ORAL at 21:46

## 2021-12-09 NOTE — PLAN OF CARE
Goal Outcome Evaluation:  Plan of Care Reviewed With: patient           Outcome Summary: Pt VSS on room air. Minimal complaints of pain. Right hip xray ordered after patient worked with PT r/t patient rubbing hip and verbal c/o pain. PRN Tylenol X1. No acute injury on xray. CM following for placement. continue to monitor.

## 2021-12-09 NOTE — THERAPY TREATMENT NOTE
Patient Name: Ramin Zaragoza  : 1935    MRN: [id number]                              Today's Date: 2021       Admit Date: 2021    Visit Dx:     ICD-10-CM ICD-9-CM   1. Weakness  R53.1 780.79   2. Fall, initial encounter  W19.XXXA E888.9   3. Confusion  R41.0 298.9   4. Altered mental status, unspecified altered mental status type  R41.82 780.97     Patient Active Problem List   Diagnosis   • Coronary artery disease involving native coronary artery of native heart without angina pectoris   • Premature ventricular contraction   • Essential hypertension   • Mixed hyperlipidemia   • Venous insufficiency   • Hypothyroidism   • Lactose intolerance   • Weakness   • Fall     Past Medical History:   Diagnosis Date   • CAD (coronary artery disease)    • Hyperlipidemia    • Hypertension    • Hypothyroidism     on chronic replacement therapy   • Lactose intolerance    • Lower extremity edema    • PVC's (premature ventricular contractions)     History of   • Thyroid disorder    • Venous insufficiency      Past Surgical History:   Procedure Laterality Date   • CARDIAC CATHETERIZATION     • CORONARY STENT PLACEMENT     • EYE SURGERY      Bilateral cataract extraction   • HERNIA REPAIR      Inguinal hernia repair   • OTHER SURGICAL HISTORY      Melanoma removed from back   • OTHER SURGICAL HISTORY      History of left elbow fracture with sunsequent fixation      General Information     Row Name 21          Physical Therapy Time and Intention    Document Type therapy note (daily note)  -AS     Mode of Treatment physical therapy  -AS     Row Name 21          General Information    Patient Profile Reviewed yes  -AS     Existing Precautions/Restrictions fall; other (see comments)  continues to have c/o right hip and groin pain with all movement.  -AS     Barriers to Rehab medically complex; hearing deficit  -AS     Row Name 21          Cognition    Orientation Status (Cognition)  oriented to; person; disoriented to; place; situation; time  -AS     Row Name 12/09/21 0931          Safety Issues, Functional Mobility    Safety Issues Affecting Function (Mobility) awareness of need for assistance; friction/shear risk; insight into deficits/self-awareness; safety precaution awareness; safety precautions follow-through/compliance; sequencing abilities  -AS     Impairments Affecting Function (Mobility) balance; cognition; endurance/activity tolerance; strength; postural/trunk control; pain  -AS     Cognitive Impairments, Mobility Safety/Performance attention; awareness, need for assistance; insight into deficits/self-awareness; safety precaution follow-through; safety precaution awareness; sequencing abilities  -AS     Comment, Safety Issues/Impairments (Mobility) patient continues ot have increased pain in right hip and groin with bed mobility, transfers and standing activity.  -AS           User Key  (r) = Recorded By, (t) = Taken By, (c) = Cosigned By    Initials Name Provider Type    AS Meg Khalil PTA Physical Therapy Assistant               Mobility     Row Name 12/09/21 0935          Bed Mobility    Rolling Left [place] (Bed Mobility) verbal cues; moderate assist (50% patient effort); 2 person assist  -AS     Rolling Right [place] (Bed Mobility) verbal cues; moderate assist (50% patient effort); 2 person assist  -AS     Assistive Device (Bed Mobility) bed rails; draw sheet  -AS     Comment (Bed Mobility) patient rolled side to side to place lift sling for safe transfer to recliner, increased cues for technique, slow transitions due to pain.  -AS     Row Name 12/09/21 0935          Transfers    Comment (Transfers) bed>recliner via lift for safety. Completed 2 sit<>stands with BUE support on bedrails. Stood each stand ~1 minute each, 2nd stand patient cleaned secondary to BM and new linen placed in recliner.  -AS     Row Name 12/09/21 0935          Bed-Chair Transfer     Bed-Chair [place] (Transfers) verbal cues; dependent (less than 25% patient effort); 2 person assist  -AS     Assistive Device (Bed-Chair Transfers) other (see comments)  B UE support on bedrails  -AS     Row Name 12/09/21 0935          Sit-Stand Transfer    Sit-Stand [place] (Transfers) verbal cues; maximum assist (25% patient effort); 2 person assist  -AS     Assistive Device (Sit-Stand Transfers) other (see comments)  B UE support on bedrails  -AS     Row Name 12/09/21 0935          Gait/Stairs (Locomotion)    [place] Level (Gait) unable to assess  -AS           User Key  (r) = Recorded By, (t) = Taken By, (c) = Cosigned By    Initials Name Provider Type    AS Meg Khalil PTA Physical Therapy Assistant               Obj/Interventions     Row Name 12/09/21 0941          Motor Skills    Therapeutic Exercise knee; ankle; shoulder  -AS     [facility] Name 12/09/21 0941          Shoulder (Therapeutic Exercise)    Shoulder (Therapeutic Exercise) AROM (active range of motion)  -AS     Shoulder AROM (Therapeutic Exercise) bilateral; flexion; extension; aBduction; aDduction; sitting; 10 repetitions  bicep curls  -AS     Row Name 12/09/21 0941          Knee (Therapeutic Exercise)    Knee (Therapeutic Exercise) strengthening exercise  -AS     Knee Strengthening (Therapeutic Exercise) bilateral; heel slides; marching while seated; LAQ (long arc quad); sitting; 10 repetitions  limited R hip flexion due to pain, rest breaks needed  -AS     Row Name 12/09/21 0941          Ankle (Therapeutic Exercise)    Ankle (Therapeutic Exercise) AROM (active range of motion)  -AS     Ankle AROM (Therapeutic Exercise) bilateral; dorsiflexion; plantarflexion; supine; 10 repetitions  -AS     Row Name 12/09/21 0941          Balance    Static Standing Balance moderate impairment; supported; standing  -AS     Dynamic Standing Balance severe impairment; unsupported; standing  attempted MIP but patient unable to lift feet off floor  due to pain  -AS     Balance Interventions weight shifting activity; standing; supported  -AS           User Key  (r) = Recorded By, (t) = Taken By, (c) = Cosigned By    Initials Name Provider Type    AS Meg Khalil PTA Physical Therapy Assistant               Goals/Plan    No documentation.                Clinical Impression     Row Name 12/09/21 0943          Pain    Additional Documentation Pain Scale: Numbers Pre/Post-Treatment (Group)  -AS     Row Name 12/09/21 0943          Pain Scale: Numbers Pre/Post-Treatment    Pretreatment Pain Rating 7/10  -AS     Posttreatment Pain Rating 7/10  -AS     Pain Location - Side Right  -AS     Pain Location groin; hip  -AS     Pre/Posttreatment Pain Comment nursing notified for possible x-ray right hip and ortho consult  -AS     Pain Intervention(s) Repositioned; Ambulation/increased activity  -AS     Row Name 12/09/21 0943          Plan of Care Review    Plan of Care Reviewed With patient  -AS     Progress improving  -AS     Outcome Summary patient completed bed mobility iwth mod assist x2, bed>recliner transfer via lift for safety. Completed 2 sit<>stands with BUE support on bedrails. Stood each stand ~1 minute each, 2nd stand patient cleaned secondary to BM and new linen placed in recliner. Patient with lateral lean to the left due to right hip and groin pain, worked on weight shifting and midline positioning in standing. Patient limited by weakness and right hip and groin pain.  -AS     Row Name 12/09/21 0943          Positioning and Restraints    Pre-Treatment Position in bed  -AS     Post Treatment Position chair  -AS     In Chair reclined; call light within reach; encouraged to call for assist; exit alarm on; waffle cushion; on mechanical lift sling; notified nsg  -AS           User Key  (r) = Recorded By, (t) = Taken By, (c) = Cosigned By    Initials Name Provider Type    AS Meg Khalil PTA Physical Therapy Assistant               Outcome Measures      Row Name 12/09/21 0946          How much help from another person do you currently need...    Turning from your back to your side while in flat bed without using bedrails? 2  -AS     Moving from lying on back to sitting on the side of a flat bed without bedrails? 2  -AS     Moving to and from a bed to a chair (including a wheelchair)? 1  -AS     Standing up from a chair using your arms (e.g., wheelchair, bedside chair)? 2  -AS     Climbing 3-5 steps with a railing? 1  -AS     To walk in hospital room? 1  -AS     AM-PAC 6 Clicks Score (PT) 9  -AS     Row Name 12/09/21 0946          Functional Assessment    Outcome Measure Options AM-PAC 6 Clicks Basic Mobility (PT)  -AS           User Key  (r) = Recorded By, (t) = Taken By, (c) = Cosigned By    Initials Name Provider Type    AS Meg Khalil PTA Physical Therapy Assistant                             Physical Therapy Education                 Title: PT OT SLP Therapies (In Progress)     Topic: Physical Therapy (In Progress)     Point: Mobility training (In Progress)     Learning Progress Summary           Patient Acceptance, E, NR by AS at 12/9/2021 0946    Acceptance, E, NR by AS at 12/6/2021 0916    Acceptance, E,TB, VU by KG at 12/2/2021 1056    Eager, E,D, NR by DM at 11/28/2021 1116                   Point: Home exercise program (In Progress)     Learning Progress Summary           Patient Acceptance, E, NR by AS at 12/9/2021 0946    Acceptance, E, NR by AS at 12/6/2021 0916    Acceptance, E,TB, VU by KG at 12/2/2021 1056    Eager, E,D, NR by DM at 11/28/2021 1116                   Point: Body mechanics (In Progress)     Learning Progress Summary           Patient Acceptance, E, NR by AS at 12/9/2021 0946    Acceptance, E, NR by AS at 12/6/2021 0916    Acceptance, E,TB, VU by KG at 12/2/2021 1056    Eager, E,D, NR by DM at 11/28/2021 1116                   Point: Precautions (In Progress)     Learning Progress Summary           Patient Acceptance, E, NR  by AS at 12/9/2021 0946    Acceptance, E, NR by AS at 12/6/2021 0916    Acceptance, E,TB, VU by KG at 12/2/2021 1056    Eager, E,D, NR by DM at 11/28/2021 1116                               User Key     Initials Effective Dates Name Provider Type Discipline    DM 06/16/21 -  Yakelin Armando, PT Physical Therapist PT    AS 06/16/21 -  Meg Khalil PTA Physical Therapy Assistant PT    KG 06/16/21 -  Emily Quiroz Physical Therapist PT              PT Recommendation and Plan     Plan of Care Reviewed With: patient  Progress: improving  Outcome Summary: patient completed bed mobility iwth mod assist x2, bed>recliner transfer via lift for safety. Completed 2 sit<>stands with BUE support on bedrails. Stood each stand ~1 minute each, 2nd stand patient cleaned secondary to BM and new linen placed in recliner. Patient with lateral lean to the left due to right hip and groin pain, worked on weight shifting and midline positioning in standing. Patient limited by weakness and right hip and groin pain.     Time Calculation:    PT Charges     Row Name 12/09/21 0946             Time Calculation    Start Time 0835  -AS      PT Received On 12/09/21  -AS      PT Goal Re-Cert Due Date 12/10/21  -AS              Timed Charges    56791 - PT Therapeutic Exercise Minutes 15  -AS      97647 - PT Therapeutic Activity Minutes 10  -AS              Total Minutes    Timed Charges Total Minutes 25  -AS       Total Minutes 25  -AS            User Key  (r) = Recorded By, (t) = Taken By, (c) = Cosigned By    Initials Name Provider Type    AS Meg Khalil PTA Physical Therapy Assistant              Therapy Charges for Today     Code Description Service Date Service Provider Modifiers Qty    49053017696 HC PT THER PROC EA 15 MIN 12/9/2021 Meg Khalil PTA GP 1    15546870200 HC PT THERAPEUTIC ACT EA 15 MIN 12/9/2021 Meg Khalil PTA GP 1    01156327667 HC PT THER SUPP EA 15 MIN 12/9/2021 Meg Khalil PTA  GP 2          PT G-Codes  Outcome Measure Options: AM-PAC 6 Clicks Basic Mobility (PT)  AM-PAC 6 Clicks Score (PT): 9  AM-PAC 6 Clicks Score (OT): 12    Meg Khalil, TROY  12/9/2021

## 2021-12-09 NOTE — PROGRESS NOTES
[facility] Medicine Services  PROGRESS NOTE    Patient Name: Ramin Zaragoza  : 1935  MRN: [id number]    Date of Admission: 2021  Primary Care Physician: Irene Coley MD    Subjective   Subjective     CC:  F/U weakness, falls    HPI:  Seen this morning, sitting up in chair eating breakfast. He has no major complaints other than some right groin and hip pain. He says he has a hernia and has a surgeon who is going to fix it.     ROS:  Gen-no fevers, no chills  CV-no chest pain, no palpitations  Resp-no cough, no dyspnea  GI-no N/V/D, no abd pain    All other systems reviewed and negative except any additional pertinent positives and negatives as discussed in HPI.      Objective   Objective     Vital Signs:   Temp:  [97.2 °F (36.2 °C)-98.1 °F (36.7 °C)] 98.1 °F (36.7 °C)  Heart Rate:  [71-93] 71  Resp:  [15-18] 15  BP: (130-151)/(66-78) 134/66     Physical Exam:  Gen-no acute distress  HENT-NCAT, mucous membranes moist  CV-RRR, S1 S2 normal, no m/r/g  Resp-CTAB, no wheezes or rales  Abd-soft, NT, ND, +BS  Ext-no edema  Neuro-A&Ox2, no focal deficits  Skin-no rashes  Psych-appropriate mood      Results Reviewed:  LAB RESULTS:      Lab 21  0330   WBC 9.63 11.31*   HEMOGLOBIN 14.2 15.0   HEMATOCRIT 40.6 44.2   PLATELETS 205 176   NEUTROS ABS 5.78  --    IMMATURE GRANS (ABS) 0.06*  --    LYMPHS ABS 1.86  --    MONOS ABS 1.54*  --    EOS ABS 0.35  --    MCV 91.9 94.4         Lab 211 21  0330   SODIUM 132* 132*   POTASSIUM 4.1 4.1   CHLORIDE 98 98   CO2 23.0 23.0   ANION GAP 11.0 11.0   BUN 14 14   CREATININE 0.76 0.78   GLUCOSE 100* 123*   CALCIUM 8.5* 8.9   MAGNESIUM 1.9  --                          Brief Urine Lab Results  (Last result in the past 365 days)      Color   Clarity   Blood   Leuk Est   Nitrite   Protein   CREAT   Urine HCG        21 193 Yellow   Clear   Negative   Negative   Negative   Negative                  Microbiology Results Abnormal     Procedure Component Value - Date/Time    COVID PRE-OP / PRE-PROCEDURE SCREENING ORDER (NO ISOLATION) - Swab, Nasopharynx [[id number]]  (Normal) Collected: 11/27/21 1724    Lab Status: Final result Specimen: Swab from Nasopharynx Updated: 11/27/21 1801    Narrative:      The following orders were created for panel order COVID PRE-OP / PRE-PROCEDURE SCREENING ORDER (NO ISOLATION) - Swab, Nasopharynx.  Procedure                               Abnormality         Status                     ---------                               -----------         ------                     COVID-19 and FLU A/B PCR...[[id number]]  Normal              Final result                 Please view results for these tests on the individual orders.    COVID-19 and FLU A/B PCR - Swab, Nasopharynx [[id number]]  (Normal) Collected: 11/27/21 1724    Lab Status: Final result Specimen: Swab from Nasopharynx Updated: 11/27/21 1801     COVID19 Not Detected     Influenza A PCR Not Detected     Influenza B PCR Not Detected    Narrative:      Fact sheet for providers: https://www.fda.gov/media/967736/download    Fact sheet for patients: https://www.fda.gov/media/330772/download    Test performed by PCR.          CT Lower Extremity Left Without Contrast    Result Date: 12/8/2021  EXAMINATION: CT LOWER EXTREMITY LEFT WO CONTRAST-  INDICATION: Hip pain, chronic, rheumatoid arthritis suspected; R53.1-Weakness; W19.XXXA-Unspecified fall, initial encounter; R41.0-Disorientation, unspecified; R41.82-Altered mental status, unspecified  TECHNIQUE: Noncontrast CT of the left hip  COMPARISON: CT abdomen and pelvis 3/26/2021  FINDINGS:  The bones are diffusely demineralized. No acute fracture or malalignment. No discrete bony erosion or evidence of axial migration of the femoral head. Moderate osteoarthritic change of the left hip joint. There is evidence of left hip chondrocalcinosis. No acute osseous findings in the partially  imaged pelvis. There is evidence of mild to moderate osteoarthritic change of the left sacroiliac joint. The pubic symphysis is congruent with mild degenerative change. No evidence of sacral fracture.  The left hip soft tissues appear within normal limits. The partially imaged lower abdominal and pelvic viscera demonstrate prostatomegaly measuring up to 7.1 cm in maximal transverse dimension, mild rectal stool burden, and atherosclerosis.      Impression:  Moderate osteoarthrosis of the left hip with evidence of chondrocalcinosis. No acute fracture or malalignment. No discrete bony erosions or axial migration of the hip to suggest sequelae of rheumatoid arthritis as queried.   This report was finalized on 12/8/2021 10:35 AM by Octavio Adamson MD.            I have reviewed the medications:  Scheduled Meds:aspirin, 81 mg, Oral, Daily  atorvastatin, 10 mg, Oral, Daily  Diclofenac Sodium, 2 g, Topical, 4x Daily  divalproex, 250 mg, Oral, Q12H  donepezil, 10 mg, Oral, Nightly  enoxaparin, 40 mg, Subcutaneous, Q24H  fluticasone, 2 spray, Nasal, Daily  levothyroxine, 50 mcg, Oral, Q AM  lisinopril, 2.5 mg, Oral, Daily  pantoprazole, 40 mg, Oral, Daily  QUEtiapine, 50 mg, Oral, Nightly  sodium chloride, 10 mL, Intravenous, Q12H  sucralfate, 1 g, Oral, BID AC      Continuous Infusions:   PRN Meds:.•  acetaminophen **OR** acetaminophen **OR** acetaminophen  •  senna-docusate sodium **AND** polyethylene glycol **AND** bisacodyl **AND** bisacodyl  •  magnesium sulfate **OR** magnesium sulfate **OR** magnesium sulfate  •  melatonin  •  potassium chloride **OR** potassium chloride **OR** potassium chloride  •  sodium chloride  •  sodium chloride    Assessment/Plan   Assessment & Plan     Active Hospital Problems    Diagnosis  POA   • Weakness [R53.1]  Yes   • Fall [W19.XXXA]  Yes   • Hypothyroidism [E03.9]  Yes   • Mixed hyperlipidemia [E78.2]  Yes   • Coronary artery disease involving native coronary artery of native heart  without angina pectoris [I25.10]  Yes   • Essential hypertension [I10]  Yes      Resolved Hospital Problems   No resolved problems to display.        Brief Hospital Course to date:  Ramin Zaragoza is a 86 y.o. male with hx of CAD, HLD, HTN, hypothyroidism, and chronic lower extremity edema, who presents to the ED with complaints of weakness and falls.    This patient's problems and plans were partially entered by my partner and updated as appropriate by me 12/09/21.    *All problems are new to me today.     Generalized weakness  Multiple falls  Osteoarthritis  -Neurology has followed, now signed off.  --MRI brain negative.  --No evidence of infection.  --B12 is normal.  --TSH elevated, started on Synthroid as below.  --Having multiple complaints of joint pain, seems to be a different joint each time we ask. X-ray right ankle negative, CT LLE with moderate osteoarthrosis of left hip, no acute fractures. Today he complains of right hip pain, will obtain plain x-ray. Continue PRN meds (Tylenol, Voltaren gel). Will add low dose Tramadol as well to see if this helps.  --Planning rehab, continue PT/OT inpatient.     Cognitive impairment patient with episodes of agitation and aggressive behavior  Dementia  -Aricept started per Dr. Singh. Increased to 10 mg on 12/2/21.  --Continue Seroquel.  -Patient recently started Abilify, stopped this admission,  -Daughter has started process for emergency guardianship. Papers signed by Dr. Singh.     Hypothyroid  -Reports no longer taking levothyroxine PTA.  -Elevated TSH, free T4 decreased.   --Started low-dose Synthroid. Will need recheck ~ 1 month.     Hypertension  Hyperlipidemia  CAD  -Continue daily ASA.  -Continue Lisinopril.  -Continue statin.     BPH  -No longer taking Flomax.  --No issues urinating here.     GERD  -Continue sucralfate/PPI     Leukocytosis, resolved  --Afebrile and no signs/symptoms of infection.  --Encourage IS.    DVT prophylaxis:  Medical DVT prophylaxis  orders are present.       AM-PAC 6 Clicks Score (PT): 9 (12/09/21 3463)    Disposition: I expect the patient to be discharged TBD. Daughter applying for guardianship and placement pending.    CODE STATUS:   Code Status and Medical Interventions:   Ordered at: 11/27/21 6653     Code Status (Patient has no pulse and is not breathing):    CPR (Attempt to Resuscitate)     Medical Interventions (Patient has pulse or is breathing):    Full Support       Xochilt Mccray MD  12/09/21

## 2021-12-09 NOTE — CASE MANAGEMENT/SOCIAL WORK
Continued Stay Note  [facility]     Patient Name: Ramin Zaragoza  MRN: [id number]  Today's Date: 12/9/2021    Admit Date: 11/27/2021     Discharge Plan     Row Name 12/09/21 1339       Plan    Plan SW    Plan Comments SW attempted to help with guardianship process by [facility] court; division of Mental Health [phone]. Rep explained that patient is required to have an interdisciplinary Evaluation from completed. POLLO attempted to contact [employer] by phone [phone] (left a message no answer) and email [email or web address] to assist; awaiting return call/email. SW’er attempted to contact patient’s daughter no answer, left a message. SW Available               Discharge Codes    No documentation.                     AUGUSTO Parikh (Kay)

## 2021-12-09 NOTE — PLAN OF CARE
Goal Outcome Evaluation:  Plan of Care Reviewed With: patient        Progress: improving  Outcome Summary: patient completed bed mobility iwth mod assist x2, bed>recliner transfer via lift for safety. Completed 2 sit<>stands with BUE support on bedrails. Stood each stand ~1 minute each, 2nd stand patient cleaned secondary to BM and new linen placed in recliner. Patient with lateral lean to the left due to right hip and groin pain, worked on weight shifting and midline positioning in standing. Patient limited by weakness and right hip and groin pain.

## 2021-12-10 PROCEDURE — 25010000002 ENOXAPARIN PER 10 MG: Performed by: NURSE PRACTITIONER

## 2021-12-10 PROCEDURE — 99232 SBSQ HOSP IP/OBS MODERATE 35: CPT | Performed by: HOSPITALIST

## 2021-12-10 RX ADMIN — SUCRALFATE 1 G: 1 TABLET ORAL at 18:13

## 2021-12-10 RX ADMIN — ENOXAPARIN SODIUM 40 MG: 40 INJECTION SUBCUTANEOUS at 09:21

## 2021-12-10 RX ADMIN — QUETIAPINE FUMARATE 50 MG: 25 TABLET ORAL at 21:44

## 2021-12-10 RX ADMIN — DICLOFENAC 2 G: 10 GEL TOPICAL at 09:21

## 2021-12-10 RX ADMIN — SUCRALFATE 1 G: 1 TABLET ORAL at 09:21

## 2021-12-10 RX ADMIN — LISINOPRIL 2.5 MG: 2.5 TABLET ORAL at 09:22

## 2021-12-10 RX ADMIN — ASPIRIN 81 MG: 81 TABLET, COATED ORAL at 09:22

## 2021-12-10 RX ADMIN — DIVALPROEX SODIUM 250 MG: 250 TABLET, DELAYED RELEASE ORAL at 21:43

## 2021-12-10 RX ADMIN — DICLOFENAC 2 G: 10 GEL TOPICAL at 21:44

## 2021-12-10 RX ADMIN — DONEPEZIL HYDROCHLORIDE 10 MG: 10 TABLET, FILM COATED ORAL at 21:44

## 2021-12-10 RX ADMIN — ATORVASTATIN CALCIUM 10 MG: 10 TABLET, FILM COATED ORAL at 09:22

## 2021-12-10 RX ADMIN — PANTOPRAZOLE SODIUM 40 MG: 40 TABLET, DELAYED RELEASE ORAL at 09:21

## 2021-12-10 RX ADMIN — FLUTICASONE PROPIONATE 2 SPRAY: 50 SPRAY, METERED NASAL at 09:21

## 2021-12-10 RX ADMIN — DICLOFENAC 2 G: 10 GEL TOPICAL at 18:13

## 2021-12-10 RX ADMIN — SODIUM CHLORIDE, PRESERVATIVE FREE 10 ML: 5 INJECTION INTRAVENOUS at 21:44

## 2021-12-10 RX ADMIN — SODIUM CHLORIDE, PRESERVATIVE FREE 10 ML: 5 INJECTION INTRAVENOUS at 09:22

## 2021-12-10 RX ADMIN — LEVOTHYROXINE SODIUM 50 MCG: 50 TABLET ORAL at 05:51

## 2021-12-10 RX ADMIN — DIVALPROEX SODIUM 250 MG: 250 TABLET, DELAYED RELEASE ORAL at 09:21

## 2021-12-10 NOTE — CASE MANAGEMENT/SOCIAL WORK
Continued Stay Note  [facility]     Patient Name: Ramin Zaragoza  MRN: [id number]  Today's Date: 12/10/2021    Admit Date: 11/27/2021     Discharge Plan     Row Name 12/10/21 1658       Plan    Plan [facility] SNF    Patient/Family in Agreement with Plan yes    Plan Comments Spoke with patient's daughter Isatu over the phone. Per Chanel with [facility], they are able to accept patient Monday. Daughter is agreeable for discharge to [facility]. Michelle arranged for Monday at 1500. He will need a negative covid swab on Sunday. Please call report to [phone], fax discharge info to [phone]. Updated Epic to [facility] in [place].    Final Discharge Disposition Code 03 - skilled nursing facility (SNF)               Discharge Codes    No documentation.                     Carlota Chavez RN

## 2021-12-10 NOTE — PROGRESS NOTES
[facility] Medicine Services  PROGRESS NOTE    Patient Name: Ramin Zaragoza  : 1935  MRN: [id number]    Date of Admission: 2021  Primary Care Physician: Irene Coley MD    Subjective   Subjective     CC:  F/U weakness, falls    HPI:  Seen this morning, sitting up in chair asleep.     ROS:  Unable to obtain as patient asleep    Objective   Objective     Vital Signs:   Temp:  [97 °F (36.1 °C)-98.1 °F (36.7 °C)] 98.1 °F (36.7 °C)  Heart Rate:  [68-88] 68  Resp:  [18-20] 18  BP: (114-121)/(58-82) 114/58     Physical Exam:  Gen-no acute distress  HENT-NCAT, mucous membranes moist  CV-RRR, S1 S2 normal, no m/r/g  Resp-CTAB, no wheezes or rales  Abd-soft, NT, ND, +BS  Ext-no edema  Neuro-asleep currently, no focal deficits  Skin-no rashes      Results Reviewed:  LAB RESULTS:      Lab 21  0411 21  0330   WBC 9.63 11.31*   HEMOGLOBIN 14.2 15.0   HEMATOCRIT 40.6 44.2   PLATELETS 205 176   NEUTROS ABS 5.78  --    IMMATURE GRANS (ABS) 0.06*  --    LYMPHS ABS 1.86  --    MONOS ABS 1.54*  --    EOS ABS 0.35  --    MCV 91.9 94.4         Lab 21  0411 21  0330   SODIUM 132* 132*   POTASSIUM 4.1 4.1   CHLORIDE 98 98   CO2 23.0 23.0   ANION GAP 11.0 11.0   BUN 14 14   CREATININE 0.76 0.78   GLUCOSE 100* 123*   CALCIUM 8.5* 8.9   MAGNESIUM 1.9  --                          Brief Urine Lab Results  (Last result in the past 365 days)      Color   Clarity   Blood   Leuk Est   Nitrite   Protein   CREAT   Urine HCG        21 Yellow   Clear   Negative   Negative   Negative   Negative                 Microbiology Results Abnormal     Procedure Component Value - Date/Time    COVID PRE-OP / PRE-PROCEDURE SCREENING ORDER (NO ISOLATION) - Swab, Nasopharynx [[id number]]  (Normal) Collected: 21    Lab Status: Final result Specimen: Swab from Nasopharynx Updated: 21    Narrative:      The following orders were created for panel order COVID PRE-OP  / PRE-PROCEDURE SCREENING ORDER (NO ISOLATION) - Swab, Nasopharynx.  Procedure                               Abnormality         Status                     ---------                               -----------         ------                     COVID-19 and FLU A/B PCR...[[id number]]  Normal              Final result                 Please view results for these tests on the individual orders.    COVID-19 and FLU A/B PCR - Swab, Nasopharynx [[id number]]  (Normal) Collected: 11/27/21 1724    Lab Status: Final result Specimen: Swab from Nasopharynx Updated: 11/27/21 1801     COVID19 Not Detected     Influenza A PCR Not Detected     Influenza B PCR Not Detected    Narrative:      Fact sheet for providers: https://www.fda.gov/media/765176/download    Fact sheet for patients: https://www.fda.gov/media/884552/download    Test performed by PCR.          XR Hip With or Without Pelvis 2 - 3 View Right    Result Date: 12/9/2021  EXAMINATION: XR HIP W OR WO PELVIS 2-3 VIEW RIGHT-  INDICATION: hip pain; R53.1-Weakness; W19.XXXA-Unspecified fall, initial encounter; R41.0-Disorientation, unspecified; R41.82-Altered mental status, unspecified  TECHNIQUE: 2 views of the right hip  COMPARISON: CT abdomen and pelvis 3/26/2021  FINDINGS:  Right hip chondrocalcinosis. Mild osteoarthritic change. No acute fracture or malalignment. The soft tissues are unremarkable. Small spiculated sclerotic foci in the right and left iliac bones are compatible with bone islands. The pubic symphysis and sacroiliac joints are congruent with mild degenerative change.      Impression:  No acute osseous findings. Mild osteoarthrosis of the right hip with chondrocalcinosis.  This report was finalized on 12/9/2021 5:36 PM by Octavio Adamson MD.            I have reviewed the medications:  Scheduled Meds:aspirin, 81 mg, Oral, Daily  atorvastatin, 10 mg, Oral, Daily  Diclofenac Sodium, 2 g, Topical, 4x Daily  divalproex, 250 mg, Oral, Q12H  donepezil, 10 mg, Oral,  Nightly  enoxaparin, 40 mg, Subcutaneous, Q24H  fluticasone, 2 spray, Nasal, Daily  levothyroxine, 50 mcg, Oral, Q AM  lisinopril, 2.5 mg, Oral, Daily  pantoprazole, 40 mg, Oral, Daily  QUEtiapine, 50 mg, Oral, Nightly  sodium chloride, 10 mL, Intravenous, Q12H  sucralfate, 1 g, Oral, BID AC      Continuous Infusions:   PRN Meds:.•  acetaminophen **OR** acetaminophen **OR** acetaminophen  •  senna-docusate sodium **AND** polyethylene glycol **AND** bisacodyl **AND** bisacodyl  •  magnesium sulfate **OR** magnesium sulfate **OR** magnesium sulfate  •  melatonin  •  potassium chloride **OR** potassium chloride **OR** potassium chloride  •  sodium chloride  •  sodium chloride  •  traMADol    Assessment/Plan   Assessment & Plan     Active Hospital Problems    Diagnosis  POA   • Weakness [R53.1]  Yes   • Fall [W19.XXXA]  Yes   • Hypothyroidism [E03.9]  Yes   • Mixed hyperlipidemia [E78.2]  Yes   • Coronary artery disease involving native coronary artery of native heart without angina pectoris [I25.10]  Yes   • Essential hypertension [I10]  Yes      Resolved Hospital Problems   No resolved problems to display.        Brief Hospital Course to date:  Ramin Zaragoza is a 86 y.o. male with hx of CAD, HLD, HTN, hypothyroidism, and chronic lower extremity edema, who presents to the ED with complaints of weakness and falls.    This patient's problems and plans were partially entered by my partner and updated as appropriate by me 12/10/21.     Generalized weakness  Multiple falls  Osteoarthritis  -Neurology has followed, now signed off.  --MRI brain negative.  --No evidence of infection.  --B12 is normal.  --TSH elevated, started on Synthroid as below.  --Having multiple complaints of joint pain, seems to be a different joint each time we ask. X-ray right ankle negative, CT LLE with moderate osteoarthrosis of left hip, no acute fractures. Right hip x-ray no acute fractures. Continue PRN meds (Tylenol, Voltaren gel). Added low dose  Tramadol as well to see if this helps.  --Planning rehab, continue PT/OT inpatient.     Cognitive impairment patient with episodes of agitation and aggressive behavior  Dementia  -Aricept started per Dr. Singh. Increased to 10 mg on 12/2/21.  --Continue Seroquel.  -Patient recently started Abilify, stopped this admission,  -Daughter has started process for emergency guardianship.      Hypothyroid  -Reports no longer taking levothyroxine PTA.  -Elevated TSH, free T4 decreased.   --Started low-dose Synthroid. Will need recheck ~ 1 month.     Hypertension  Hyperlipidemia  CAD  -Continue daily ASA.  -Continue Lisinopril.  -Continue statin.     BPH  -No longer taking Flomax.  --No issues urinating here.     GERD  -Continue sucralfate/PPI     Leukocytosis, resolved  --Afebrile and no signs/symptoms of infection.  --Encourage IS.    DVT prophylaxis:  Medical DVT prophylaxis orders are present.       AM-PAC 6 Clicks Score (PT): 9 (12/10/21 9537)    Disposition: I expect the patient to be discharged TBD. Daughter applying for guardianship and placement pending.    CODE STATUS:   Code Status and Medical Interventions:   Ordered at: 11/27/21 8282     Code Status (Patient has no pulse and is not breathing):    CPR (Attempt to Resuscitate)     Medical Interventions (Patient has pulse or is breathing):    Full Support       Xochilt Mccray MD  12/10/21               Detail Level: None

## 2021-12-10 NOTE — DISCHARGE PLACEMENT REQUEST
"From Carlota Chavez RN Case Manager PH: [phone]    Raymond Zaragoza (86 y.o. Male)             Date of Birth Social Security Number Address Home Phone MRN    [id number]  [id number] [place] [address] [phone] [id number]    Mormon Marital Status             Sikh        Admission Date Admission Type Admitting Provider Attending Provider Department, Room/Bed    11/27/21 Emergency Xochilt Mccray MD Reddy, Mayuri V, MD [facility] 4H, S482/1    Discharge Date Discharge Disposition Discharge Destination                         Attending Provider: Xochilt Mccray MD    Allergies: Lactose Intolerance (Gi)    Isolation: None   Infection: None   Code Status: CPR   Advance Care Planning Activity    Ht: 175.3 cm (69\")   Wt: 86.2 kg (190 lb)    Admission Cmt: None   Principal Problem: None                Active Insurance as of 11/27/2021     Primary Coverage     Payor Plan Insurance Group Employer/Plan Group    MEDICARE MEDICARE A & B      Payor Plan Address Payor Plan Phone Number Payor Plan Fax Number Effective Dates    [address] [phone]  10/1/2000 - None Entered    [facility] [id number]       Subscriber Name Subscriber Birth Date Member ID       RAYMOND ZARAGOZA 1935 [id number]           Secondary Coverage     Payor Plan Insurance Group Employer/Plan Group    [facility] SUPP [id number]     Payor Plan Address Payor Plan Phone Number Payor Plan Fax Number Effective Dates    [address]   12/1/2016 - None Entered    [facility] [id number]       Subscriber Name Subscriber Birth Date Member ID       RAYMOND ZARAGOZA 1935 [id number]                 Emergency Contacts      (Rel.) Home Phone Work Phone Mobile Phone    ISABELLA MEYER (Daughter) [phone] -- --               Physician Progress Notes (most recent note)      Xochilt Mccray MD at 12/09/21 1404              [facility] Medicine Services  PROGRESS " NOTE    Patient Name: Ramin Zaragoza  : 1935  MRN: [id number]    Date of Admission: 2021  Primary Care Physician: Irene Coley MD    Subjective   Subjective     CC:  F/U weakness, falls    HPI:  Seen this morning, sitting up in chair eating breakfast. He has no major complaints other than some right groin and hip pain. He says he has a hernia and has a surgeon who is going to fix it.     ROS:  Gen-no fevers, no chills  CV-no chest pain, no palpitations  Resp-no cough, no dyspnea  GI-no N/V/D, no abd pain    All other systems reviewed and negative except any additional pertinent positives and negatives as discussed in HPI.      Objective   Objective     Vital Signs:   Temp:  [97.2 °F (36.2 °C)-98.1 °F (36.7 °C)] 98.1 °F (36.7 °C)  Heart Rate:  [71-93] 71  Resp:  [15-18] 15  BP: (130-151)/(66-78) 134/66     Physical Exam:  Gen-no acute distress  HENT-NCAT, mucous membranes moist  CV-RRR, S1 S2 normal, no m/r/g  Resp-CTAB, no wheezes or rales  Abd-soft, NT, ND, +BS  Ext-no edema  Neuro-A&Ox2, no focal deficits  Skin-no rashes  Psych-appropriate mood      Results Reviewed:  LAB RESULTS:      Lab 21  0330   WBC 9.63 11.31*   HEMOGLOBIN 14.2 15.0   HEMATOCRIT 40.6 44.2   PLATELETS 205 176   NEUTROS ABS 5.78  --    IMMATURE GRANS (ABS) 0.06*  --    LYMPHS ABS 1.86  --    MONOS ABS 1.54*  --    EOS ABS 0.35  --    MCV 91.9 94.4         Lab 211 21  0330   SODIUM 132* 132*   POTASSIUM 4.1 4.1   CHLORIDE 98 98   CO2 23.0 23.0   ANION GAP 11.0 11.0   BUN 14 14   CREATININE 0.76 0.78   GLUCOSE 100* 123*   CALCIUM 8.5* 8.9   MAGNESIUM 1.9  --                          Brief Urine Lab Results  (Last result in the past 365 days)      Color   Clarity   Blood   Leuk Est   Nitrite   Protein   CREAT   Urine HCG        21 Yellow   Clear   Negative   Negative   Negative   Negative                 Microbiology Results Abnormal     Procedure Component Value - Date/Time     COVID PRE-OP / PRE-PROCEDURE SCREENING ORDER (NO ISOLATION) - Swab, Nasopharynx [[id number]]  (Normal) Collected: 11/27/21 1724    Lab Status: Final result Specimen: Swab from Nasopharynx Updated: 11/27/21 1801    Narrative:      The following orders were created for panel order COVID PRE-OP / PRE-PROCEDURE SCREENING ORDER (NO ISOLATION) - Swab, Nasopharynx.  Procedure                               Abnormality         Status                     ---------                               -----------         ------                     COVID-19 and FLU A/B PCR...[[id number]]  Normal              Final result                 Please view results for these tests on the individual orders.    COVID-19 and FLU A/B PCR - Swab, Nasopharynx [[id number]]  (Normal) Collected: 11/27/21 1724    Lab Status: Final result Specimen: Swab from Nasopharynx Updated: 11/27/21 1801     COVID19 Not Detected     Influenza A PCR Not Detected     Influenza B PCR Not Detected    Narrative:      Fact sheet for providers: https://www.fda.gov/media/007901/download    Fact sheet for patients: https://www.fda.gov/media/258536/download    Test performed by PCR.          CT Lower Extremity Left Without Contrast    Result Date: 12/8/2021  EXAMINATION: CT LOWER EXTREMITY LEFT WO CONTRAST-  INDICATION: Hip pain, chronic, rheumatoid arthritis suspected; R53.1-Weakness; W19.XXXA-Unspecified fall, initial encounter; R41.0-Disorientation, unspecified; R41.82-Altered mental status, unspecified  TECHNIQUE: Noncontrast CT of the left hip  COMPARISON: CT abdomen and pelvis 3/26/2021  FINDINGS:  The bones are diffusely demineralized. No acute fracture or malalignment. No discrete bony erosion or evidence of axial migration of the femoral head. Moderate osteoarthritic change of the left hip joint. There is evidence of left hip chondrocalcinosis. No acute osseous findings in the partially imaged pelvis. There is evidence of mild to moderate osteoarthritic change of the  left sacroiliac joint. The pubic symphysis is congruent with mild degenerative change. No evidence of sacral fracture.  The left hip soft tissues appear within normal limits. The partially imaged lower abdominal and pelvic viscera demonstrate prostatomegaly measuring up to 7.1 cm in maximal transverse dimension, mild rectal stool burden, and atherosclerosis.      Impression:  Moderate osteoarthrosis of the left hip with evidence of chondrocalcinosis. No acute fracture or malalignment. No discrete bony erosions or axial migration of the hip to suggest sequelae of rheumatoid arthritis as queried.   This report was finalized on 12/8/2021 10:35 AM by Octavio Adamson MD.            I have reviewed the medications:  Scheduled Meds:aspirin, 81 mg, Oral, Daily  atorvastatin, 10 mg, Oral, Daily  Diclofenac Sodium, 2 g, Topical, 4x Daily  divalproex, 250 mg, Oral, Q12H  donepezil, 10 mg, Oral, Nightly  enoxaparin, 40 mg, Subcutaneous, Q24H  fluticasone, 2 spray, Nasal, Daily  levothyroxine, 50 mcg, Oral, Q AM  lisinopril, 2.5 mg, Oral, Daily  pantoprazole, 40 mg, Oral, Daily  QUEtiapine, 50 mg, Oral, Nightly  sodium chloride, 10 mL, Intravenous, Q12H  sucralfate, 1 g, Oral, BID AC      Continuous Infusions:   PRN Meds:.•  acetaminophen **OR** acetaminophen **OR** acetaminophen  •  senna-docusate sodium **AND** polyethylene glycol **AND** bisacodyl **AND** bisacodyl  •  magnesium sulfate **OR** magnesium sulfate **OR** magnesium sulfate  •  melatonin  •  potassium chloride **OR** potassium chloride **OR** potassium chloride  •  sodium chloride  •  sodium chloride    Assessment/Plan   Assessment & Plan     Active Hospital Problems    Diagnosis  POA   • Weakness [R53.1]  Yes   • Fall [W19.XXXA]  Yes   • Hypothyroidism [E03.9]  Yes   • Mixed hyperlipidemia [E78.2]  Yes   • Coronary artery disease involving native coronary artery of native heart without angina pectoris [I25.10]  Yes   • Essential hypertension [I10]  Yes       Resolved Hospital Problems   No resolved problems to display.        Brief Hospital Course to date:  Ramin Zaragoza is a 86 y.o. male with hx of CAD, HLD, HTN, hypothyroidism, and chronic lower extremity edema, who presents to the ED with complaints of weakness and falls.    This patient's problems and plans were partially entered by my partner and updated as appropriate by me 12/09/21.    *All problems are new to me today.     Generalized weakness  Multiple falls  Osteoarthritis  -Neurology has followed, now signed off.  --MRI brain negative.  --No evidence of infection.  --B12 is normal.  --TSH elevated, started on Synthroid as below.  --Having multiple complaints of joint pain, seems to be a different joint each time we ask. X-ray right ankle negative, CT LLE with moderate osteoarthrosis of left hip, no acute fractures. Today he complains of right hip pain, will obtain plain x-ray. Continue PRN meds (Tylenol, Voltaren gel). Will add low dose Tramadol as well to see if this helps.  --Planning rehab, continue PT/OT inpatient.     Cognitive impairment patient with episodes of agitation and aggressive behavior  Dementia  -Aricept started per Dr. Singh. Increased to 10 mg on 12/2/21.  --Continue Seroquel.  -Patient recently started Abilify, stopped this admission,  -Daughter has started process for emergency guardianship. Papers signed by Dr. Singh.     Hypothyroid  -Reports no longer taking levothyroxine PTA.  -Elevated TSH, free T4 decreased.   --Started low-dose Synthroid. Will need recheck ~ 1 month.     Hypertension  Hyperlipidemia  CAD  -Continue daily ASA.  -Continue Lisinopril.  -Continue statin.     BPH  -No longer taking Flomax.  --No issues urinating here.     GERD  -Continue sucralfate/PPI     Leukocytosis, resolved  --Afebrile and no signs/symptoms of infection.  --Encourage IS.    DVT prophylaxis:  Medical DVT prophylaxis orders are present.       AM-PAC 6 Clicks Score (PT): 9 (12/09/21  1116)    Disposition: I expect the patient to be discharged TBD. Daughter applying for guardianship and placement pending.    CODE STATUS:   Code Status and Medical Interventions:   Ordered at: 11/27/21 9126     Code Status (Patient has no pulse and is not breathing):    CPR (Attempt to Resuscitate)     Medical Interventions (Patient has pulse or is breathing):    Full Support       Xochilt Mccray MD  12/09/21                Electronically signed by Xochilt Mccray MD at 12/09/21 1411          Consult Notes (most recent note)      Mehreen Muhammad MD at 11/28/21 1358      Consult Orders    1. Inpatient Neurology Consult General [[id number]] ordered by Shaun Murray MD at 11/28/21 0112                   Referring Provider: Trevor  Reason for Consultation: weakness, falls    Patient Care Team:  Irene Coley MD as PCP - General (Family Medicine)    Chief complaint weakness,falls     Subjective .     History of present illness:   Mr Ramin Zaragoza is an 86-year-old man who has a history of dementia, coronary artery disease, untreated hypothyroidism, hypertension, hyperlipidemia, urinary frequency who was admitted for weakness and falls.    Daughter helps with the history as patient is a very poor historian.  He does not remember why he was brought to the hospital.  According to the daughter she and her brother found him at home on the ground.  He has been falling lately and has had to call the 911 twice in the last 2 weeks for shortness of breath.    Daughter and son went to check on patient yesterday and found him on the floor of his kitchen. They were able to get him into a chair but he was very lethargic. She reports patient is improving since admission and not getting abilify.    Daughter tells me that patient has had a history of cognitive decline and has been diagnosed with dementia.  Back in 2018 she was awarded conservatorship after he was scammed out of thousands of dollars.  She said he did  not understand he was being scammed and will just give money to people.  She now takes care of his bills.         He had been on aricept but stopped taking it for some reason. She tells me he was recently started on Abilify to help with mood swings a couple of weeks ago.  She tells me that he is always had a quick temper.    Patient lives alone and is about 45 minutes away from near his family.    I have reviewed his last available note from [facility] dated 10/14/2021. Dr Coley saw patient and note states that he had been concerned about his uncontrolled blood pressure. She was able to determine he had been out his lisinopril for months. She also noted that he was found urinating in a trash can in the exam room when left alone.         Review of Systems  He denies any pain, nausea, vomiting, fevers, chills, but does endorse mild back pain. He denies feeling depressed. Remaining ROS is negative except as noted in HPI     History  Past Medical History:   Diagnosis Date   • CAD (coronary artery disease)    • Hyperlipidemia    • Hypertension    • Hypothyroidism     on chronic replacement therapy   • Lactose intolerance    • Lower extremity edema    • PVC's (premature ventricular contractions)     History of   • Thyroid disorder    • Venous insufficiency    ,   Past Surgical History:   Procedure Laterality Date   • CARDIAC CATHETERIZATION     • CORONARY STENT PLACEMENT     • EYE SURGERY      Bilateral cataract extraction   • HERNIA REPAIR      Inguinal hernia repair   • OTHER SURGICAL HISTORY      Melanoma removed from back   • OTHER SURGICAL HISTORY      History of left elbow fracture with sunsequent fixation   ,   Family History   Problem Relation Age of Onset   • No Known Problems Mother    • No Known Problems Father    ,   Social History     Socioeconomic History   • Marital status:    Tobacco Use   • Smoking status: Never Smoker   • Smokeless tobacco: Never Used   Substance and Sexual Activity   •  Alcohol use: No   • Drug use: No   • Sexual activity: Defer     E-cigarette/Vaping     E-cigarette/Vaping Substances     E-cigarette/Vaping Devices       ,   Medications Prior to Admission   Medication Sig Dispense Refill Last Dose   • aspirin 81 MG EC tablet Take 81 mg by mouth daily.      • fluticasone (FLONASE) 50 MCG/ACT nasal spray 2 sprays into each nostril daily. Administer 2 sprays in each nostril for each dose.      • lisinopril (PRINIVIL,ZESTRIL) 2.5 MG tablet TAKE 1 TABLET BY MOUTH ONCE DAILY 90 tablet 1    • nitroglycerin (NITROSTAT) 0.4 MG SL tablet Place 1 tablet under the tongue every 5 (five) minutes as needed for chest pain. Take no more than 3 doses in 15 minutes. 25 tablet 11    • omeprazole (priLOSEC) 20 MG capsule Take 20 mg by mouth Daily.      • simvastatin (Zocor) 20 MG tablet Take 20 mg by mouth Every Night.      • sucralfate (CARAFATE) 1 g tablet Take 1 g by mouth 2 (Two) Times a Day.      • ARIPiprazole (ABILIFY) 2 MG tablet Take 2 mg by mouth Daily.   Unknown at Unknown time   • levothyroxine (SYNTHROID, LEVOTHROID) 25 MCG tablet Take 25 mcg by mouth daily.      • tamsulosin (FLOMAX) 0.4 MG capsule 24 hr capsule Take 1 capsule by mouth Daily.      , Scheduled Meds:  ARIPiprazole, 2 mg, Oral, Daily  aspirin, 81 mg, Oral, Daily  atorvastatin, 10 mg, Oral, Daily  enoxaparin, 40 mg, Subcutaneous, Q24H  fluticasone, 2 spray, Nasal, Daily  lisinopril, 2.5 mg, Oral, Daily  pantoprazole, 40 mg, Oral, Daily  sodium chloride, 10 mL, Intravenous, Q12H  sucralfate, 1 g, Oral, BID AC    , Continuous Infusions:   , PRN Meds:  •  acetaminophen **OR** acetaminophen **OR** acetaminophen  •  senna-docusate sodium **AND** polyethylene glycol **AND** bisacodyl **AND** bisacodyl  •  melatonin  •  sodium chloride  •  sodium chloride and Allergies:  Lactose intolerance (gi)    Objective     Vital Signs   Blood pressure 127/68, pulse 54, temperature 97.3 °F (36.3 °C), temperature source Oral, resp. rate 18,  "height 175.3 cm (69\"), weight 86.2 kg (190 lb), SpO2 99 %.    Physical Exam:  Elderly man, sitting in recliner  Head/eyes: atraumatic, perrl  ENT poor dentition, moist membrane  Neck supple, trachea midline  Respiratory on room air, even respirations  Cardiac rrr, mild edema in LE  Extremities flat feet, normal temperature  Skin dry, intact  Speech no dysarthria or aphasia  CN perrl, eomi, vf intact, no facial weakness, hard of hearing, sensation intact   Mental status oriented to person, place time but has no memory of events leading up to admission   Cognitive status attends to me, responds quickly, no psychomotor slowing, has poor short term memory but has exquisite long term memory, follows commands  Motor nml tone and bulk, globally weak but mild 4+ throughout  Sensation intact to light touch and vibration  Reflexes down going toes, patellar 2+ bilaterally   Cerebellar fnf nml, no nystagmus  Gait shuffling gait but with encouragement can walk with long strides     Results Review:  TSH 7.83  Ft4 0.83  Cr 0.71   MRI brain personally reviewed/ atrophy causing ventriculomegaly, no acute changes, chronic ischemic changes  UA negative for infection     Assessment/Plan       Coronary artery disease involving native coronary artery of native heart without angina pectoris    Essential hypertension    Mixed hyperlipidemia    Hypothyroidism    Weakness    Fall      87 y/o man with history of dementia, mood swings, htn, hld, cad, who is admitted for weakness/falls.    Per report, patient had recently started abilify which can cause excessive sedation and this medication is on hold. It is unclear why he stopped aricept; he has an appt tomorrow with his neurologist Dr Michael. Dr Michael has not seen patient in years according to dgtr but would defer medication changes to him as he will follow in clinic.    MRI brain is reassuring, no stroke. While he does have enlarged ventricles, this is due to atrophy. He does not have clinical " symptoms of NPH (no psychomotor slowing, magnetic gait, incontinence).     There is no evidence of u/l infection or metabolic derangement on labs.    Overall, patient seems to be improving in level of alertness. Suspect he was oversedated and falling due to new medication.     I do not believe he has capacity to make his own medical decisions. He has no memory of reason for admission, has poor short term memory, and needs supervision for his medication. I do not believe he is safe to return home alone.    He is at high risk for delirium, will start seroquel tonight.     I discussed the patient's findings and my recommendations with patient, patient's dgtr and primary team     Mehreen Muhammad MD  21  13:58 EST    Update 245pm  Spoke w/ dgtr again. Advised he does not have capacity to make medical decisions. She understands; will change his appt w/ Dr Michael for later in the next week or so. Will have case management help her find a SNF/LTACH.         Electronically signed by Mehreen Muhammad MD at 21 1449          Physical Therapy Notes (most recent note)      Meg Khalil PTA at 21 0835  Version 1 of 1         Patient Name: Ramin Zaragoza  : 1935    MRN: [id number]                              Today's Date: 2021       Admit Date: 2021    Visit Dx:     ICD-10-CM ICD-9-CM   1. Weakness  R53.1 780.79   2. Fall, initial encounter  W19.XXXA E888.9   3. Confusion  R41.0 298.9   4. Altered mental status, unspecified altered mental status type  R41.82 780.97     Patient Active Problem List   Diagnosis   • Coronary artery disease involving native coronary artery of native heart without angina pectoris   • Premature ventricular contraction   • Essential hypertension   • Mixed hyperlipidemia   • Venous insufficiency   • Hypothyroidism   • Lactose intolerance   • Weakness   • Fall     Past Medical History:   Diagnosis Date   • CAD (coronary artery disease)    •  Hyperlipidemia    • Hypertension    • Hypothyroidism     on chronic replacement therapy   • Lactose intolerance    • Lower extremity edema    • PVC's (premature ventricular contractions)     History of   • Thyroid disorder    • Venous insufficiency      Past Surgical History:   Procedure Laterality Date   • CARDIAC CATHETERIZATION     • CORONARY STENT PLACEMENT     • EYE SURGERY      Bilateral cataract extraction   • HERNIA REPAIR      Inguinal hernia repair   • OTHER SURGICAL HISTORY      Melanoma removed from back   • OTHER SURGICAL HISTORY      History of left elbow fracture with sunsequent fixation      General Information     Row Name 12/09/21 0931          Physical Therapy Time and Intention    Document Type therapy note (daily note)  -AS     Mode of Treatment physical therapy  -AS     Row Name 12/09/21 0931          General Information    Patient Profile Reviewed yes  -AS     Existing Precautions/Restrictions fall; other (see comments)  continues to have c/o right hip and groin pain with all movement.  -AS     Barriers to Rehab medically complex; hearing deficit  -AS     Row Name 12/09/21 0931          Cognition    Orientation Status (Cognition) oriented to; person; disoriented to; place; situation; time  -AS     Row Name 12/09/21 0931          Safety Issues, Functional Mobility    Safety Issues Affecting Function (Mobility) awareness of need for assistance; friction/shear risk; insight into deficits/self-awareness; safety precaution awareness; safety precautions follow-through/compliance; sequencing abilities  -AS     Impairments Affecting Function (Mobility) balance; cognition; endurance/activity tolerance; strength; postural/trunk control; pain  -AS     Cognitive Impairments, Mobility Safety/Performance attention; awareness, need for assistance; insight into deficits/self-awareness; safety precaution follow-through; safety precaution awareness; sequencing abilities  -AS     Comment, Safety  Issues/Impairments (Mobility) patient continues ot have increased pain in right hip and groin with bed mobility, transfers and standing activity.  -AS           User Key  (r) = Recorded By, (t) = Taken By, (c) = Cosigned By    Initials Name Provider Type    AS Meg Khalil PTA Physical Therapy Assistant               Mobility     Row Name 12/09/21 0935          Bed Mobility    Rolling Left [place] (Bed Mobility) verbal cues; moderate assist (50% patient effort); 2 person assist  -AS     Rolling Right [place] (Bed Mobility) verbal cues; moderate assist (50% patient effort); 2 person assist  -AS     Assistive Device (Bed Mobility) bed rails; draw sheet  -AS     Comment (Bed Mobility) patient rolled side to side to place lift sling for safe transfer to recliner, increased cues for technique, slow transitions due to pain.  -AS     Row Name 12/09/21 0935          Transfers    Comment (Transfers) bed>recliner via lift for safety. Completed 2 sit<>stands with BUE support on bedrails. Stood each stand ~1 minute each, 2nd stand patient cleaned secondary to BM and new linen placed in recliner.  -AS     Row Name 12/09/21 0935          Bed-Chair Transfer    Bed-Chair [place] (Transfers) verbal cues; dependent (less than 25% patient effort); 2 person assist  -AS     Assistive Device (Bed-Chair Transfers) other (see comments)  B UE support on bedrails  -AS     Row Name 12/09/21 0935          Sit-Stand Transfer    Sit-Stand [place] (Transfers) verbal cues; maximum assist (25% patient effort); 2 person assist  -AS     Assistive Device (Sit-Stand Transfers) other (see comments)  B UE support on bedrails  -AS     Row Name 12/09/21 0935          Gait/Stairs (Locomotion)    [place] Level (Gait) unable to assess  -AS           User Key  (r) = Recorded By, (t) = Taken By, (c) = Cosigned By    Initials Name Provider Type    AS Meg Khalil PTA Physical Therapy Assistant                Obj/Interventions     Row Name 12/09/21 0941          Motor Skills    Therapeutic Exercise knee; ankle; shoulder  -AS     [facility] Name 12/09/21 0941          Shoulder (Therapeutic Exercise)    Shoulder (Therapeutic Exercise) AROM (active range of motion)  -AS     Shoulder AROM (Therapeutic Exercise) bilateral; flexion; extension; aBduction; aDduction; sitting; 10 repetitions  bicep curls  -AS     [facility] Name 12/09/21 0941          Knee (Therapeutic Exercise)    Knee (Therapeutic Exercise) strengthening exercise  -AS     Knee Strengthening (Therapeutic Exercise) bilateral; heel slides; marching while seated; LAQ (long arc quad); sitting; 10 repetitions  limited R hip flexion due to pain, rest breaks needed  -AS     [facility] Name 12/09/21 0941          Ankle (Therapeutic Exercise)    Ankle (Therapeutic Exercise) AROM (active range of motion)  -AS     Ankle AROM (Therapeutic Exercise) bilateral; dorsiflexion; plantarflexion; supine; 10 repetitions  -AS     [facility] Name 12/09/21 0941          Balance    Static Standing Balance moderate impairment; supported; standing  -AS     Dynamic Standing Balance severe impairment; unsupported; standing  attempted MIP but patient unable to lift feet off floor due to pain  -AS     Balance Interventions weight shifting activity; standing; supported  -AS           User Key  (r) = Recorded By, (t) = Taken By, (c) = Cosigned By    Initials Name Provider Type    AS Meg Khalil PTA Physical Therapy Assistant               Goals/Plan    No documentation.                Clinical Impression     [facility] Name 12/09/21 0943          Pain    Additional Documentation Pain Scale: Numbers Pre/Post-Treatment (Group)  -AS     Row Name 12/09/21 0943          Pain Scale: Numbers Pre/Post-Treatment    Pretreatment Pain Rating 7/10  -AS     Posttreatment Pain Rating 7/10  -AS     Pain Location - Side Right  -AS     Pain Location groin; hip  -AS     Pre/Posttreatment Pain Comment nursing notified for possible x-ray  right hip and ortho consult  -AS     Pain Intervention(s) Repositioned; Ambulation/increased activity  -AS     Row Name 12/09/21 0943          Plan of Care Review    Plan of Care Reviewed With patient  -AS     Progress improving  -AS     Outcome Summary patient completed bed mobility iwth mod assist x2, bed>recliner transfer via lift for safety. Completed 2 sit<>stands with BUE support on bedrails. Stood each stand ~1 minute each, 2nd stand patient cleaned secondary to BM and new linen placed in recliner. Patient with lateral lean to the left due to right hip and groin pain, worked on weight shifting and midline positioning in standing. Patient limited by weakness and right hip and groin pain.  -AS     Row Name 12/09/21 0943          Positioning and Restraints    Pre-Treatment Position in bed  -AS     Post Treatment Position chair  -AS     In Chair reclined; call light within reach; encouraged to call for assist; exit alarm on; waffle cushion; on mechanical lift sling; notified nsg  -AS           User Key  (r) = Recorded By, (t) = Taken By, (c) = Cosigned By    Initials Name Provider Type    AS Meg Khalil PTA Physical Therapy Assistant               Outcome Measures     Row Name 12/09/21 0946          How much help from another person do you currently need...    Turning from your back to your side while in flat bed without using bedrails? 2  -AS     Moving from lying on back to sitting on the side of a flat bed without bedrails? 2  -AS     Moving to and from a bed to a chair (including a wheelchair)? 1  -AS     Standing up from a chair using your arms (e.g., wheelchair, bedside chair)? 2  -AS     Climbing 3-5 steps with a railing? 1  -AS     To walk in hospital room? 1  -AS     AM-PAC 6 Clicks Score (PT) 9  -AS     Row Name 12/09/21 0946          Functional Assessment    Outcome Measure Options AM-PAC 6 Clicks Basic Mobility (PT)  -AS           User Key  (r) = Recorded By, (t) = Taken By, (c) = Cosigned By     Initials Name Provider Type    AS Meg Khalil, TROY Physical Therapy Assistant                             Physical Therapy Education                 Title: PT OT SLP Therapies (In Progress)     Topic: Physical Therapy (In Progress)     Point: Mobility training (In Progress)     Learning Progress Summary           Patient Acceptance, E, NR by AS at 12/9/2021 0946    Acceptance, E, NR by AS at 12/6/2021 0916    Acceptance, E,TB, VU by KG at 12/2/2021 1056    Eager, E,D, NR by DM at 11/28/2021 1116                   Point: Home exercise program (In Progress)     Learning Progress Summary           Patient Acceptance, E, NR by AS at 12/9/2021 0946    Acceptance, E, NR by AS at 12/6/2021 0916    Acceptance, E,TB, VU by KG at 12/2/2021 1056    Eager, E,D, NR by DM at 11/28/2021 1116                   Point: Body mechanics (In Progress)     Learning Progress Summary           Patient Acceptance, E, NR by AS at 12/9/2021 0946    Acceptance, E, NR by AS at 12/6/2021 0916    Acceptance, E,TB, VU by KG at 12/2/2021 1056    Eager, E,D, NR by DM at 11/28/2021 1116                   Point: Precautions (In Progress)     Learning Progress Summary           Patient Acceptance, E, NR by AS at 12/9/2021 0946    Acceptance, E, NR by AS at 12/6/2021 0916    Acceptance, E,TB, VU by KG at 12/2/2021 1056    Eager, E,D, NR by DM at 11/28/2021 1116                               User Key     Initials Effective Dates Name Provider Type Discipline    DM 06/16/21 -  Yakelin Armando, PT Physical Therapist PT    AS 06/16/21 -  Meg Khalil, TROY Physical Therapy Assistant PT    KG 06/16/21 -  Emily Quiroz Physical Therapist PT              PT Recommendation and Plan     Plan of Care Reviewed With: patient  Progress: improving  Outcome Summary: patient completed bed mobility iwth mod assist x2, bed>recliner transfer via lift for safety. Completed 2 sit<>stands with BUE support on bedrails. Stood each stand ~1 minute each, 2nd  stand patient cleaned secondary to BM and new linen placed in recliner. Patient with lateral lean to the left due to right hip and groin pain, worked on weight shifting and midline positioning in standing. Patient limited by weakness and right hip and groin pain.     Time Calculation:    PT Charges     Row Name 21 0946             Time Calculation    Start Time 0835  -AS      PT Received On 21  -AS      PT Goal Re-Cert Due Date 12/10/21  -AS              Timed Charges    59443 - PT Therapeutic Exercise Minutes 15  -AS      06388 - PT Therapeutic Activity Minutes 10  -AS              Total Minutes    Timed Charges Total Minutes 25  -AS       Total Minutes 25  -AS            User Key  (r) = Recorded By, (t) = Taken By, (c) = Cosigned By    Initials Name Provider Type    AS Meg Khalil PTA Physical Therapy Assistant              Therapy Charges for Today     Code Description Service Date Service Provider Modifiers Qty    23423080950 HC PT THER PROC EA 15 MIN 2021 Meg Khalil PTA GP 1    65855205539 HC PT THERAPEUTIC ACT EA 15 MIN 2021 Meg Khalil PTA GP 1    39288255232 HC PT THER SUPP EA 15 MIN 2021 Meg Khalil PTA GP 2          PT G-Codes  Outcome Measure Options: AM-PAC 6 Clicks Basic Mobility (PT)  AM-PAC 6 Clicks Score (PT): 9  AM-PAC 6 Clicks Score (OT): 12    Meg Khalil PTA  2021      Electronically signed by Meg Khalil PTA at 21 0947          Occupational Therapy Notes (most recent note)      Jennifer Portillo, OT at 21 1030          Patient Name: Ramin Zaragoza  : 1935    MRN: [id number]                              Today's Date: 2021       Admit Date: 2021    Visit Dx:     ICD-10-CM ICD-9-CM   1. Weakness  R53.1 780.79   2. Fall, initial encounter  W19.XXXA E888.9   3. Confusion  R41.0 298.9   4. Altered mental status, unspecified altered mental status type  R41.82 780.97     Patient Active Problem  List   Diagnosis   • Coronary artery disease involving native coronary artery of native heart without angina pectoris   • Premature ventricular contraction   • Essential hypertension   • Mixed hyperlipidemia   • Venous insufficiency   • Hypothyroidism   • Lactose intolerance   • Weakness   • Fall     Past Medical History:   Diagnosis Date   • CAD (coronary artery disease)    • Hyperlipidemia    • Hypertension    • Hypothyroidism     on chronic replacement therapy   • Lactose intolerance    • Lower extremity edema    • PVC's (premature ventricular contractions)     History of   • Thyroid disorder    • Venous insufficiency      Past Surgical History:   Procedure Laterality Date   • CARDIAC CATHETERIZATION     • CORONARY STENT PLACEMENT     • EYE SURGERY      Bilateral cataract extraction   • HERNIA REPAIR      Inguinal hernia repair   • OTHER SURGICAL HISTORY      Melanoma removed from back   • OTHER SURGICAL HISTORY      History of left elbow fracture with sunsequent fixation      General Information     Row Name 12/08/21 1030          OT Time and Intention    Document Type progress note/recertification  -SW     Mode of Treatment occupational therapy  -     Row Name 12/08/21 1030          General Information    Patient Profile Reviewed yes  -SW     Existing Precautions/Restrictions fall  BLE pain  -SW     Barriers to Rehab medically complex  -     Row Name 12/08/21 1030          Cognition    Orientation Status (Cognition) oriented to; person; disoriented to; place; time; situation  -     Row Name 12/08/21 1030          Safety Issues, Functional Mobility    Impairments Affecting Function (Mobility) balance; cognition; endurance/activity tolerance; strength; postural/trunk control; pain  -SW           User Key  (r) = Recorded By, (t) = Taken By, (c) = Cosigned By    Initials Name Provider Type    Jennifer Kaiser OT Occupational Therapist                 Mobility/ADL's     Row Name 12/08/21 1030          Bed  Mobility    Bed Mobility rolling left; rolling right; scooting/bridging  -     Rolling Left [place] (Bed Mobility) moderate assist (50% patient effort)  After PROM/AROM to BLE and vcs  -SW     Rolling Right [place] (Bed Mobility) moderate assist (50% patient effort)  After PROM/AROM to BLE and vcs  -SW     Scooting/Bridging [place] (Bed Mobility) minimum assist (75% patient effort)  modified bed and pt had ROM to BLE prior.  -     Row Name 12/08/21 1030          Transfers    Comment (Transfers) deferred oob  -[facility] Name 12/08/21 1030          Activities of Daily Living    BADL Assessment/Intervention toileting; grooming  -[facility] Name 12/08/21 1030          Grooming Assessment/Training    [place] Level (Grooming) grooming skills; wash face, hands; minimum assist (75% patient effort)  -     Position (Grooming) sitting up in bed  -[facility] Name 12/08/21 1030          Toileting Assessment/Training    [place] Level (Toileting) adjust/manage clothing; toileting skills; minimum assist (75% patient effort)  -     Assistive Devices (Toileting) urinal  -     Position (Toileting) sitting up in bed  -           User Key  (r) = Recorded By, (t) = Taken By, (c) = Cosigned By    Initials Name Provider Type    Jennifer Kaiser OT Occupational Therapist               Obj/Interventions     Row Name 12/08/21 1128          Shoulder (Therapeutic Exercise)    Shoulder (Therapeutic Exercise) AROM (active range of motion)  -     Shoulder AROM (Therapeutic Exercise) bilateral; flexion; extension; sitting; 10 repetitions  -     Row Name 12/08/21 1128          Elbow/Forearm (Therapeutic Exercise)    Elbow/Forearm (Therapeutic Exercise) AROM (active range of motion)  -     Elbow/Forearm AROM (Therapeutic Exercise) bilateral; flexion; extension; sitting; 10 repetitions  -     Row Name 12/08/21 1128          Therapeutic Exercise    Therapeutic Exercise shoulder; elbow/forearm; other (see  comments)  ble AROM and PROM to promote flexibility and decrease discomfort for improved mobility.  -           User Key  (r) = Recorded By, (t) = Taken By, (c) = Cosigned By    Initials Name Provider Type    Jennifer Kaiser OT Occupational Therapist               Goals/Plan     Row Name 12/08/21 1030          Transfer Goal 1 (OT)    Activity/Assistive Device (Transfer Goal 1, OT) commode; walker, rolling  -     [place] Level/Cues Needed (Transfer Goal 1, OT) moderate assist (50-74% patient effort)  -     Time Frame (Transfer Goal 1, OT) long term goal (LTG); by discharge  -     Progress/Outcome (Transfer Goal 1, OT) goal revised this date; progress slower than expected; unable to make needed progress  -     Row Name 12/08/21 1030          Dressing Goal 1 (OT)    Activity/Device (Dressing Goal 1, OT) lower body dressing  -     [place]/Cues Needed (Dressing Goal 1, OT) moderate assist (50-74% patient effort)  -     Time Frame (Dressing Goal 1, OT) long term goal (LTG); by discharge  -     Progress/Outcome (Dressing Goal 1, OT) goal revised this date; progress slower than expected; unable to make needed progress  -     Row Name 12/08/21 1030          Grooming Goal 1 (OT)    Activity/Device (Grooming Goal 1, OT) hair care; oral care; wash face, hands  -SW     [place] (Grooming Goal 1, OT) contact guard assist; verbal cues required  -     Time Frame (Grooming Goal 1, OT) long term goal (LTG); by discharge  -     Progress/Outcome (Grooming Goal 1, OT) goal ongoing  -           User Key  (r) = Recorded By, (t) = Taken By, (c) = Cosigned By    Initials Name Provider Type    Jennifer Kaiser OT Occupational Therapist               Clinical Impression     Row Name 12/08/21 1030          Pain Assessment    Additional Documentation Pain Scale: FACES Pre/Post-Treatment (Group)  -[facility] Name 12/08/21 1030          Pain Scale: FACES Pre/Post-Treatment    Pain: FACES Scale, Pretreatment  0-->no hurt  -SW     Posttreatment Pain Rating 2-->hurts little bit  -SW     Pain Location - Side Left  -SW     Pain Location - Orientation lower  -SW     Pain Location extremity  -SW     Pre/Posttreatment Pain Comment Pt had LLE pain during AROM and PROM tasks.  -[facility] Name 12/08/21 1030          Plan of Care Review    Plan of Care Reviewed With patient  -SW     Progress improving  -     Outcome Summary OT completed progress note with tx.  Pt supine and sitting up in bed with focus on improving indep in this environment.  Pt u/a to lift legs up initially and c/o pain when OT began touching LEs.  Pt has increased pain to LLE.  OT provided PROM initially and advanced to AROM to BLE in preparation for bed mobillity.  Pt completed rolling side to side and scooting up in bed after graded TE with min assist and vcs.  Pt utilized urinal with min assist, and groomed with setup.  Pt demonstrates improvement in bed but did not get oob.  Recommend continued OT and SNF at d/c.  -[facility] Name 12/08/21 1030          Therapy Plan Review/Discharge Plan (OT)    Anticipated Discharge Disposition (OT) skilled nursing facility  -[facility] Name 12/08/21 1030          Vital Signs    Pre Systolic BP Rehab 128  -SW     Pre Treatment Diastolic BP 67  -SW     Pretreatment Heart Rate (beats/min) 70  -SW     Pre SpO2 (%) 94  -SW     O2 Delivery Pre Treatment room air  -SW     O2 Delivery Intra Treatment room air  -SW     O2 Delivery Post Treatment room air  -SW     Pre Patient Position Supine  -SW     Intra Patient Position Side Lying  -SW     Post Patient Position Supine  -[facility] Name 12/08/21 1030          Positioning and Restraints    Pre-Treatment Position in bed  -SW     Post Treatment Position bed  -SW     In Bed notified nsg; supine; fowlers; call light within reach; encouraged to call for assist; exit alarm on; side rails up x3  -SW           User Key  (r) = Recorded By, (t) = Taken By, (c) = Cosigned By    Initials  Name Provider Type    Jennifer Kaiser OT Occupational Therapist               Outcome Measures     Row Name 12/08/21 1138          How much help from another is currently needed...    Putting on and taking off regular lower body clothing? 1  -SW     Bathing (including washing, rinsing, and drying) 1  -SW     Toileting (which includes using toilet bed pan or urinal) 2  -SW     Putting on and taking off regular upper body clothing 2  -SW     Taking care of personal grooming (such as brushing teeth) 3  -SW     Eating meals 3  -SW     AM-PAC 6 Clicks Score (OT) 12  -     Row Name 12/08/21 0845          How much help from another person do you currently need...    Turning from your back to your side while in flat bed without using bedrails? 2  -TR     Moving from lying on back to sitting on the side of a flat bed without bedrails? 2  -TR     Moving to and from a bed to a chair (including a wheelchair)? 2  -TR     Standing up from a chair using your arms (e.g., wheelchair, bedside chair)? 2  -TR     Climbing 3-5 steps with a railing? 1  -TR     To walk in hospital room? 2  -TR     AM-PAC 6 Clicks Score (PT) 11  -TR     Row Name 12/08/21 1138          Functional Assessment    Outcome Measure Options AM-PAC 6 Clicks Daily Activity (OT)  -           User Key  (r) = Recorded By, (t) = Taken By, (c) = Cosigned By    Initials Name Provider Type    Radha Rosas RN Registered Nurse    Jennifer Kaiser OT Occupational Therapist                Occupational Therapy Education                 Title: PT OT SLP Therapies (In Progress)     Topic: Occupational Therapy (In Progress)     Point: ADL training (In Progress)     Description:   Instruct learner(s) on proper safety adaptation and remediation techniques during self care or transfers.   Instruct in proper use of assistive devices.              Learning Progress Summary           Patient Acceptance, E, NR by POLLO at 12/8/2021 1139    Acceptance, E,TB,D, VU,DU,NR by KF at  12/6/2021 1520    Acceptance, E,TB,D, VU,DU by  at 12/2/2021 1055    Acceptance, E,TB,D, VU,DU,NR by  at 11/28/2021 1422   Family Acceptance, E,TB,D, VU,DU,NR by  at 11/28/2021 1422                   Point: Home exercise program (In Progress)     Description:   Instruct learner(s) on appropriate technique for monitoring, assisting and/or progressing therapeutic exercises/activities.              Learning Progress Summary           Patient Acceptance, E, NR by  at 12/8/2021 1139    Acceptance, E,TB,D, VU,DU,NR by  at 12/6/2021 1520                   Point: Precautions (Done)     Description:   Instruct learner(s) on prescribed precautions during self-care and functional transfers.              Learning Progress Summary           Patient Acceptance, E,TB,D, VU,DU,NR by  at 12/6/2021 1520    Acceptance, E,TB,D, VU,DU by  at 12/2/2021 1055    Acceptance, E,TB,D, VU,DU,NR by  at 11/28/2021 1422   Family Acceptance, E,TB,D, VU,DU,NR by  at 11/28/2021 1422                   Point: Body mechanics (In Progress)     Description:   Instruct learner(s) on proper positioning and spine alignment during self-care, functional mobility activities and/or exercises.              Learning Progress Summary           Patient Acceptance, E, NR by  at 12/8/2021 1139    Acceptance, E,TB,D, VU,DU,NR by  at 12/6/2021 1520    Acceptance, E,TB,D, VU,DU by  at 12/2/2021 1055    Acceptance, E,TB,D, VU,DU,NR by  at 11/28/2021 1422   Family Acceptance, E,TB,D, VU,DU,NR by  at 11/28/2021 1422                               User Key     Initials Effective Dates Name Provider Type Discipline     06/16/21 -  Yuliet Juarez OT Occupational Therapist OT     06/16/21 -  Jennifer Portillo OT Occupational Therapist OT              OT Recommendation and Plan     Plan of Care Review  Plan of Care Reviewed With: patient  Progress: improving  Outcome Summary: OT completed progress note with tx.  Pt supine and sitting up in bed  with focus on improving indep in this environment.  Pt u/a to lift legs up initially and c/o pain when OT began touching LEs.  Pt has increased pain to LLE.  OT provided PROM initially and advanced to AROM to BLE in preparation for bed mobillity.  Pt completed rolling side to side and scooting up in bed after graded TE with min assist and vcs.  Pt utilized urinal with min assist, and groomed with setup.  Pt demonstrates improvement in bed but did not get oob.  Recommend continued OT and SNF at d/c.     Time Calculation:    Time Calculation- OT     Row Name 12/08/21 1030             Time Calculation- OT    OT Start Time 1030  -SW      OT Received On 12/08/21  -SW      OT Goal Re-Cert Due Date 12/18/21  -SW              Timed Charges    84555 - OT Therapeutic Exercise Minutes 30  -SW      75090 - OT Self Care/Mgmt Minutes 30  -SW              Total Minutes    Timed Charges Total Minutes 60  -SW       Total Minutes 60  -SW            User Key  (r) = Recorded By, (t) = Taken By, (c) = Cosigned By    Initials Name Provider Type    SW Jennifer Portillo OT Occupational Therapist              Therapy Charges for Today     Code Description Service Date Service Provider Modifiers Qty    05159155142  OT THER PROC EA 15 MIN 12/8/2021 Jennifer Portillo OT GO 2    [id number]  OT SELF CARE/MGMT/TRAIN EA 15 MIN 12/8/2021 Jennifer Portillo OT GO 2               Jennifer Portillo OT  12/8/2021    Electronically signed by Jennifer Portillo OT at 12/08/21 1140

## 2021-12-10 NOTE — CASE MANAGEMENT/SOCIAL WORK
Continued Stay Note  [facility]     Patient Name: Ramin Zaragoza  MRN: [id number]  Today's Date: 12/10/2021    Admit Date: 11/27/2021     Discharge Plan     Row Name 12/10/21 0542       Plan    Plan Comments Physician completed the guardianship assessment paperwork. MSW faxed this completed paperwork to the mental health division.               Discharge Codes    No documentation.                     AUGUSTO Olson

## 2021-12-11 PROCEDURE — 99232 SBSQ HOSP IP/OBS MODERATE 35: CPT | Performed by: NURSE PRACTITIONER

## 2021-12-11 PROCEDURE — 25010000002 ENOXAPARIN PER 10 MG: Performed by: NURSE PRACTITIONER

## 2021-12-11 RX ORDER — LIDOCAINE 50 MG/G
1 PATCH TOPICAL
Status: DISCONTINUED | OUTPATIENT
Start: 2021-12-11 | End: 2021-12-13 | Stop reason: HOSPADM

## 2021-12-11 RX ADMIN — LISINOPRIL 2.5 MG: 2.5 TABLET ORAL at 08:21

## 2021-12-11 RX ADMIN — DICLOFENAC 2 G: 10 GEL TOPICAL at 08:20

## 2021-12-11 RX ADMIN — SODIUM CHLORIDE, PRESERVATIVE FREE 10 ML: 5 INJECTION INTRAVENOUS at 20:31

## 2021-12-11 RX ADMIN — ATORVASTATIN CALCIUM 10 MG: 10 TABLET, FILM COATED ORAL at 08:20

## 2021-12-11 RX ADMIN — DONEPEZIL HYDROCHLORIDE 10 MG: 10 TABLET, FILM COATED ORAL at 20:30

## 2021-12-11 RX ADMIN — QUETIAPINE FUMARATE 50 MG: 25 TABLET ORAL at 20:30

## 2021-12-11 RX ADMIN — DIVALPROEX SODIUM 250 MG: 250 TABLET, DELAYED RELEASE ORAL at 20:31

## 2021-12-11 RX ADMIN — DICLOFENAC 2 G: 10 GEL TOPICAL at 12:08

## 2021-12-11 RX ADMIN — DICLOFENAC 2 G: 10 GEL TOPICAL at 20:31

## 2021-12-11 RX ADMIN — LIDOCAINE 1 PATCH: 50 PATCH CUTANEOUS at 12:08

## 2021-12-11 RX ADMIN — FLUTICASONE PROPIONATE 2 SPRAY: 50 SPRAY, METERED NASAL at 08:20

## 2021-12-11 RX ADMIN — SUCRALFATE 1 G: 1 TABLET ORAL at 08:20

## 2021-12-11 RX ADMIN — SUCRALFATE 1 G: 1 TABLET ORAL at 18:00

## 2021-12-11 RX ADMIN — SODIUM CHLORIDE, PRESERVATIVE FREE 10 ML: 5 INJECTION INTRAVENOUS at 08:21

## 2021-12-11 RX ADMIN — DICLOFENAC 2 G: 10 GEL TOPICAL at 18:00

## 2021-12-11 RX ADMIN — LEVOTHYROXINE SODIUM 50 MCG: 50 TABLET ORAL at 05:23

## 2021-12-11 RX ADMIN — DIVALPROEX SODIUM 250 MG: 250 TABLET, DELAYED RELEASE ORAL at 08:21

## 2021-12-11 RX ADMIN — PANTOPRAZOLE SODIUM 40 MG: 40 TABLET, DELAYED RELEASE ORAL at 08:20

## 2021-12-11 RX ADMIN — ASPIRIN 81 MG: 81 TABLET, COATED ORAL at 08:20

## 2021-12-11 RX ADMIN — ENOXAPARIN SODIUM 40 MG: 40 INJECTION SUBCUTANEOUS at 08:21

## 2021-12-11 NOTE — PROGRESS NOTES
[facility] Medicine Services  PROGRESS NOTE    Patient Name: Ramin Zaragoza  : 1935  MRN: [id number]    Date of Admission: 2021  Primary Care Physician: Irene Coley MD    Subjective   Subjective     CC:  F/u weakness, falls,     HPI:  Patient is sitting up in bed in NAD eating breakfast. He compalins of right hip pain. He states he feels good but knows he will need rehab. No acute events overnight per nursing.     ROS:  Gen- No fevers, chills  CV- No chest pain, palpitations  Resp- No cough, dyspnea  GI- No N/V/D, abd pain        Objective   Objective     Vital Signs:   Temp:  [96.9 °F (36.1 °C)-98.2 °F (36.8 °C)] 96.9 °F (36.1 °C)  Heart Rate:  [65-74] 65  Resp:  [18] 18  BP: (117-140)/(59-93) 128/93     Physical Exam:  Constitutional: No acute distress, awake, alert  HENT: NCAT, mucous membranes moist  Respiratory: Clear to auscultation bilaterally, respiratory effort normal room air 98%  Cardiovascular: RRR, no murmurs, rubs, or gallops  Gastrointestinal: Positive bowel sounds, soft, nontender, nondistended  Musculoskeletal: No bilateral ankle edema  Psychiatric: Appropriate affect, cooperative  Neurologic: Oriented x 2, strength symmetric in all extremities, Cranial Nerves grossly intact to confrontation, speech clear  Skin: No rashes         Results Reviewed:  LAB RESULTS:      Lab 21  0330   WBC 9.63 11.31*   HEMOGLOBIN 14.2 15.0   HEMATOCRIT 40.6 44.2   PLATELETS 205 176   NEUTROS ABS 5.78  --    IMMATURE GRANS (ABS) 0.06*  --    LYMPHS ABS 1.86  --    MONOS ABS 1.54*  --    EOS ABS 0.35  --    MCV 91.9 94.4         Lab 211 21  0330   SODIUM 132* 132*   POTASSIUM 4.1 4.1   CHLORIDE 98 98   CO2 23.0 23.0   ANION GAP 11.0 11.0   BUN 14 14   CREATININE 0.76 0.78   GLUCOSE 100* 123*   CALCIUM 8.5* 8.9   MAGNESIUM 1.9  --                          Brief Urine Lab Results  (Last result in the past 365 days)      Color   Clarity    Blood   Leuk Est   Nitrite   Protein   CREAT   Urine HCG        11/27/21 1931 Yellow   Clear   Negative   Negative   Negative   Negative                 Microbiology Results Abnormal     Procedure Component Value - Date/Time    COVID PRE-OP / PRE-PROCEDURE SCREENING ORDER (NO ISOLATION) - Swab, Nasopharynx [[id number]]  (Normal) Collected: 11/27/21 1724    Lab Status: Final result Specimen: Swab from Nasopharynx Updated: 11/27/21 1801    Narrative:      The following orders were created for panel order COVID PRE-OP / PRE-PROCEDURE SCREENING ORDER (NO ISOLATION) - Swab, Nasopharynx.  Procedure                               Abnormality         Status                     ---------                               -----------         ------                     COVID-19 and FLU A/B PCR...[[id number]]  Normal              Final result                 Please view results for these tests on the individual orders.    COVID-19 and FLU A/B PCR - Swab, Nasopharynx [[id number]]  (Normal) Collected: 11/27/21 1724    Lab Status: Final result Specimen: Swab from Nasopharynx Updated: 11/27/21 1801     COVID19 Not Detected     Influenza A PCR Not Detected     Influenza B PCR Not Detected    Narrative:      Fact sheet for providers: https://www.fda.gov/media/337970/download    Fact sheet for patients: https://www.fda.gov/media/042382/download    Test performed by PCR.          XR Hip With or Without Pelvis 2 - 3 View Right    Result Date: 12/9/2021  EXAMINATION: XR HIP W OR WO PELVIS 2-3 VIEW RIGHT-  INDICATION: hip pain; R53.1-Weakness; W19.XXXA-Unspecified fall, initial encounter; R41.0-Disorientation, unspecified; R41.82-Altered mental status, unspecified  TECHNIQUE: 2 views of the right hip  COMPARISON: CT abdomen and pelvis 3/26/2021  FINDINGS:  Right hip chondrocalcinosis. Mild osteoarthritic change. No acute fracture or malalignment. The soft tissues are unremarkable. Small spiculated sclerotic foci in the right and left iliac bones  are compatible with bone islands. The pubic symphysis and sacroiliac joints are congruent with mild degenerative change.      Impression:  No acute osseous findings. Mild osteoarthrosis of the right hip with chondrocalcinosis.  This report was finalized on 12/9/2021 5:36 PM by Octavio Adamson MD.            I have reviewed the medications:  Scheduled Meds:aspirin, 81 mg, Oral, Daily  atorvastatin, 10 mg, Oral, Daily  Diclofenac Sodium, 2 g, Topical, 4x Daily  divalproex, 250 mg, Oral, Q12H  donepezil, 10 mg, Oral, Nightly  enoxaparin, 40 mg, Subcutaneous, Q24H  fluticasone, 2 spray, Nasal, Daily  levothyroxine, 50 mcg, Oral, Q AM  lidocaine, 1 patch, Transdermal, Q24H  lisinopril, 2.5 mg, Oral, Daily  pantoprazole, 40 mg, Oral, Daily  QUEtiapine, 50 mg, Oral, Nightly  sodium chloride, 10 mL, Intravenous, Q12H  sucralfate, 1 g, Oral, BID AC      Continuous Infusions:   PRN Meds:.•  acetaminophen **OR** acetaminophen **OR** acetaminophen  •  senna-docusate sodium **AND** polyethylene glycol **AND** bisacodyl **AND** bisacodyl  •  magnesium sulfate **OR** magnesium sulfate **OR** magnesium sulfate  •  melatonin  •  potassium chloride **OR** potassium chloride **OR** potassium chloride  •  sodium chloride  •  sodium chloride  •  traMADol    Assessment/Plan   Assessment & Plan     Active Hospital Problems    Diagnosis  POA   • Weakness [R53.1]  Yes   • Fall [W19.XXXA]  Yes   • Hypothyroidism [E03.9]  Yes   • Mixed hyperlipidemia [E78.2]  Yes   • Coronary artery disease involving native coronary artery of native heart without angina pectoris [I25.10]  Yes   • Essential hypertension [I10]  Yes      Resolved Hospital Problems   No resolved problems to display.        Brief Hospital Course to date:  Ramin Zaragoza is a 86 y.o. male with hx of CAD, HLD, HTN, hypothyroidism, and chronic lower extremity edema, who presents to the ED with complaints of weakness and falls.     This patient's problems and plans were partially  entered by my partner and updated as appropriate by me 12/11/21.     Generalized weakness  Multiple falls  Osteoarthritis  -Neurology has followed, now signed off.  --MRI brain negative.  --No evidence of infection.  --B12 is normal.  --TSH elevated, started on Synthroid as below.  --Having multiple complaints of joint pain, seems to be a different joint each time we ask. X-ray right ankle negative, CT LLE with moderate osteoarthrosis of left hip, no acute fractures. Right hip x-ray no acute fractures. Continue PRN meds (Tylenol, Voltaren gel). Added low dose Tramadol as well to see if this helps.  --Planning rehab, continue PT/OT inpatient.     Cognitive impairment patient with episodes of agitation and aggressive behavior  Dementia  -Aricept started per Dr. Singh. Increased to 10 mg on 12/2/21.  --Continue Seroquel.  -Patient recently started Abilify, stopped this admission,  -Daughter has started process for emergency guardianship.      Hypothyroid  -Reports no longer taking levothyroxine PTA.  -Elevated TSH, free T4 decreased.   --Started low-dose Synthroid. Will need recheck ~ 1 month.     Hypertension  Hyperlipidemia  CAD  -Continue daily ASA.  -Continue Lisinopril.  -Continue statin.     BPH  -No longer taking Flomax.  --No issues urinating here.     GERD  -Continue sucralfate/PPI     Leukocytosis, resolved  --Afebrile and no signs/symptoms of infection.  --Encourage IS.       DVT prophylaxis:  Medical DVT prophylaxis orders are present.       AM-PAC 6 Clicks Score (PT): 14 (12/11/21 0800)    Disposition: I expect the patient to be discharged back to facility on Monday     CODE STATUS:   Code Status and Medical Interventions:   Ordered at: 11/27/21 4654     Code Status (Patient has no pulse and is not breathing):    CPR (Attempt to Resuscitate)     Medical Interventions (Patient has pulse or is breathing):    Full Support       Rosario Mcconnell, APRN  12/11/21

## 2021-12-12 LAB — SARS-COV-2 RNA RESP QL NAA+PROBE: NOT DETECTED

## 2021-12-12 PROCEDURE — U0005 INFEC AGEN DETEC AMPLI PROBE: HCPCS | Performed by: HOSPITALIST

## 2021-12-12 PROCEDURE — 99232 SBSQ HOSP IP/OBS MODERATE 35: CPT | Performed by: NURSE PRACTITIONER

## 2021-12-12 PROCEDURE — 25010000002 ENOXAPARIN PER 10 MG: Performed by: NURSE PRACTITIONER

## 2021-12-12 PROCEDURE — U0003 INFECTIOUS AGENT DETECTION BY NUCLEIC ACID (DNA OR RNA); SEVERE ACUTE RESPIRATORY SYNDROME CORONAVIRUS 2 (SARS-COV-2) (CORONAVIRUS DISEASE [COVID-19]), AMPLIFIED PROBE TECHNIQUE, MAKING USE OF HIGH THROUGHPUT TECHNOLOGIES AS DESCRIBED BY CMS-2020-01-R: HCPCS | Performed by: HOSPITALIST

## 2021-12-12 RX ADMIN — DONEPEZIL HYDROCHLORIDE 10 MG: 10 TABLET, FILM COATED ORAL at 20:07

## 2021-12-12 RX ADMIN — ATORVASTATIN CALCIUM 10 MG: 10 TABLET, FILM COATED ORAL at 09:34

## 2021-12-12 RX ADMIN — DIVALPROEX SODIUM 250 MG: 250 TABLET, DELAYED RELEASE ORAL at 09:34

## 2021-12-12 RX ADMIN — SODIUM CHLORIDE, PRESERVATIVE FREE 10 ML: 5 INJECTION INTRAVENOUS at 22:50

## 2021-12-12 RX ADMIN — ENOXAPARIN SODIUM 40 MG: 40 INJECTION SUBCUTANEOUS at 09:33

## 2021-12-12 RX ADMIN — DICLOFENAC 2 G: 10 GEL TOPICAL at 09:35

## 2021-12-12 RX ADMIN — DICLOFENAC 2 G: 10 GEL TOPICAL at 20:08

## 2021-12-12 RX ADMIN — Medication 5 MG: at 20:08

## 2021-12-12 RX ADMIN — DIVALPROEX SODIUM 250 MG: 250 TABLET, DELAYED RELEASE ORAL at 20:07

## 2021-12-12 RX ADMIN — LISINOPRIL 2.5 MG: 2.5 TABLET ORAL at 09:34

## 2021-12-12 RX ADMIN — PANTOPRAZOLE SODIUM 40 MG: 40 TABLET, DELAYED RELEASE ORAL at 09:34

## 2021-12-12 RX ADMIN — ASPIRIN 81 MG: 81 TABLET, COATED ORAL at 09:34

## 2021-12-12 RX ADMIN — QUETIAPINE FUMARATE 50 MG: 25 TABLET ORAL at 20:08

## 2021-12-12 RX ADMIN — SUCRALFATE 1 G: 1 TABLET ORAL at 18:04

## 2021-12-12 RX ADMIN — SODIUM CHLORIDE, PRESERVATIVE FREE 10 ML: 5 INJECTION INTRAVENOUS at 09:35

## 2021-12-12 RX ADMIN — DICLOFENAC 2 G: 10 GEL TOPICAL at 12:25

## 2021-12-12 RX ADMIN — DICLOFENAC 2 G: 10 GEL TOPICAL at 18:04

## 2021-12-12 RX ADMIN — FLUTICASONE PROPIONATE 2 SPRAY: 50 SPRAY, METERED NASAL at 09:34

## 2021-12-12 RX ADMIN — LEVOTHYROXINE SODIUM 50 MCG: 50 TABLET ORAL at 09:33

## 2021-12-12 RX ADMIN — SUCRALFATE 1 G: 1 TABLET ORAL at 09:34

## 2021-12-12 RX ADMIN — LIDOCAINE 1 PATCH: 50 PATCH CUTANEOUS at 09:34

## 2021-12-12 NOTE — PLAN OF CARE
Goal Outcome Evaluation:  Plan of Care Reviewed With: patient        Progress: no change  Outcome Summary: Pt transferred from . VSS. Pt incontinent of bowel and bladder.  Offers no complaints this shift. Rested comfortably.

## 2021-12-12 NOTE — PLAN OF CARE
Goal Outcome Evaluation:              Outcome Summary: pleasantly confused today, daughter at bedside, meets criteria to transfer to SNF tomorrow, will CTM

## 2021-12-12 NOTE — PROGRESS NOTES
[facility] Medicine Services  PROGRESS NOTE    Patient Name: Ramin Zaragoza  : 1935  MRN: [id number]    Date of Admission: 2021  Primary Care Physician: Irene Coley MD    Subjective   Subjective     CC:  F/u weakness, falls,     HPI:  Patient is resting in bed in NAD. He awakens easily and states his right hip feels better. No acute events overnight per nursing.     ROS:  Gen- No fevers, chills  CV- No chest pain, palpitations  Resp- No cough, dyspnea  GI- No N/V/D, abd pain        Objective   Objective     Vital Signs:   Temp:  [98 °F (36.7 °C)-98.1 °F (36.7 °C)] 98 °F (36.7 °C)  Heart Rate:  [64-88] 64  Resp:  [18-20] 18  BP: (133-149)/(75-76) 149/76     Physical Exam:  Constitutional: No acute distress, awake, alert  HENT: NCAT, mucous membranes moist  Respiratory: Clear to auscultation bilaterally, respiratory effort normal room air 98%  Cardiovascular: RRR, no murmurs, rubs, or gallops  Gastrointestinal: Positive bowel sounds, soft, nontender, nondistended  Musculoskeletal: No bilateral ankle edema  Psychiatric: Appropriate affect, cooperative  Neurologic: Oriented x 2, strength symmetric in all extremities, Cranial Nerves grossly intact to confrontation, speech clear  Skin: No rashes         Results Reviewed:  LAB RESULTS:      Lab 21   WBC 9.63   HEMOGLOBIN 14.2   HEMATOCRIT 40.6   PLATELETS 205   NEUTROS ABS 5.78   IMMATURE GRANS (ABS) 0.06*   LYMPHS ABS 1.86   MONOS ABS 1.54*   EOS ABS 0.35   MCV 91.9         Lab 21   SODIUM 132*   POTASSIUM 4.1   CHLORIDE 98   CO2 23.0   ANION GAP 11.0   BUN 14   CREATININE 0.76   GLUCOSE 100*   CALCIUM 8.5*   MAGNESIUM 1.9                         Brief Urine Lab Results  (Last result in the past 365 days)      Color   Clarity   Blood   Leuk Est   Nitrite   Protein   CREAT   Urine HCG        21 Yellow   Clear   Negative   Negative   Negative   Negative                 Microbiology Results  Abnormal     Procedure Component Value - Date/Time    COVID PRE-OP / PRE-PROCEDURE SCREENING ORDER (NO ISOLATION) - Swab, Nasopharynx [[id number]]  (Normal) Collected: 11/27/21 1724    Lab Status: Final result Specimen: Swab from Nasopharynx Updated: 11/27/21 1801    Narrative:      The following orders were created for panel order COVID PRE-OP / PRE-PROCEDURE SCREENING ORDER (NO ISOLATION) - Swab, Nasopharynx.  Procedure                               Abnormality         Status                     ---------                               -----------         ------                     COVID-19 and FLU A/B PCR...[[id number]]  Normal              Final result                 Please view results for these tests on the individual orders.    COVID-19 and FLU A/B PCR - Swab, Nasopharynx [[id number]]  (Normal) Collected: 11/27/21 1724    Lab Status: Final result Specimen: Swab from Nasopharynx Updated: 11/27/21 1801     COVID19 Not Detected     Influenza A PCR Not Detected     Influenza B PCR Not Detected    Narrative:      Fact sheet for providers: https://www.fda.gov/media/386845/download    Fact sheet for patients: https://www.fda.gov/media/879985/download    Test performed by PCR.          No radiology results from the last 24 hrs        I have reviewed the medications:  Scheduled Meds:aspirin, 81 mg, Oral, Daily  atorvastatin, 10 mg, Oral, Daily  Diclofenac Sodium, 2 g, Topical, 4x Daily  divalproex, 250 mg, Oral, Q12H  donepezil, 10 mg, Oral, Nightly  enoxaparin, 40 mg, Subcutaneous, Q24H  fluticasone, 2 spray, Nasal, Daily  levothyroxine, 50 mcg, Oral, Q AM  lidocaine, 1 patch, Transdermal, Q24H  lisinopril, 2.5 mg, Oral, Daily  pantoprazole, 40 mg, Oral, Daily  QUEtiapine, 50 mg, Oral, Nightly  sodium chloride, 10 mL, Intravenous, Q12H  sucralfate, 1 g, Oral, BID AC      Continuous Infusions:   PRN Meds:.•  acetaminophen **OR** acetaminophen **OR** acetaminophen  •  senna-docusate sodium **AND** polyethylene glycol  **AND** bisacodyl **AND** bisacodyl  •  magnesium sulfate **OR** magnesium sulfate **OR** magnesium sulfate  •  melatonin  •  potassium chloride **OR** potassium chloride **OR** potassium chloride  •  sodium chloride  •  sodium chloride  •  traMADol    Assessment/Plan   Assessment & Plan     Active Hospital Problems    Diagnosis  POA   • Weakness [R53.1]  Yes   • Fall [W19.XXXA]  Yes   • Hypothyroidism [E03.9]  Yes   • Mixed hyperlipidemia [E78.2]  Yes   • Coronary artery disease involving native coronary artery of native heart without angina pectoris [I25.10]  Yes   • Essential hypertension [I10]  Yes      Resolved Hospital Problems   No resolved problems to display.        Brief Hospital Course to date:  Ramin Zaragoza is a 86 y.o. male with hx of CAD, HLD, HTN, hypothyroidism, and chronic lower extremity edema, who presents to the ED with complaints of weakness and falls.     This patient's problems and plans were partially entered by my partner and updated as appropriate by me 12/11/21.     Generalized weakness  Multiple falls  Osteoarthritis  -Neurology has followed, now signed off.  --MRI brain negative.  --No evidence of infection.  --B12 is normal.  --TSH elevated, started on Synthroid as below.  --Having multiple complaints of joint pain, seems to be a different joint each time we ask. X-ray right ankle negative, CT LLE with moderate osteoarthrosis of left hip, no acute fractures. Right hip x-ray no acute fractures. Continue PRN meds (Tylenol, Voltaren gel and Lidoderm patches). Added low dose Tramadol as well to see if this helps.  --Planning rehab, continue PT/OT inpatient.     Cognitive impairment patient with episodes of agitation and aggressive behavior  Dementia  -Aricept started per Dr. Singh. Increased to 10 mg on 12/2/21.  --Continue Seroquel.  -Patient recently started Abilify, stopped this admission,  -Daughter has started process for emergency guardianship.      Hypothyroid  -Reports no longer  taking levothyroxine PTA.  -Elevated TSH, free T4 decreased.   --Started low-dose Synthroid. Will need recheck ~ 1 month.     Hypertension  Hyperlipidemia  CAD  -Continue daily ASA.  -Continue Lisinopril.  -Continue statin.     BPH  -No longer taking Flomax.  --No issues urinating here.     GERD  -Continue sucralfate/PPI     Leukocytosis, resolved  --Afebrile and no signs/symptoms of infection.  --Encourage IS.       DVT prophylaxis:  Medical DVT prophylaxis orders are present.       AM-PAC 6 Clicks Score (PT): 14 (12/11/21 0800)    Disposition: I expect the patient to be discharged back to facility on Monday     CODE STATUS:   Code Status and Medical Interventions:   Ordered at: 11/27/21 2648     Code Status (Patient has no pulse and is not breathing):    CPR (Attempt to Resuscitate)     Medical Interventions (Patient has pulse or is breathing):    Full Support       Rosario Mcconnell, APRN  12/12/21

## 2021-12-13 VITALS
WEIGHT: 190 LBS | TEMPERATURE: 98.2 F | HEART RATE: 69 BPM | HEIGHT: 69 IN | SYSTOLIC BLOOD PRESSURE: 123 MMHG | DIASTOLIC BLOOD PRESSURE: 68 MMHG | RESPIRATION RATE: 16 BRPM | BODY MASS INDEX: 28.14 KG/M2 | OXYGEN SATURATION: 94 %

## 2021-12-13 PROCEDURE — 25010000002 ENOXAPARIN PER 10 MG: Performed by: NURSE PRACTITIONER

## 2021-12-13 PROCEDURE — 99239 HOSP IP/OBS DSCHRG MGMT >30: CPT | Performed by: NURSE PRACTITIONER

## 2021-12-13 RX ORDER — LEVOTHYROXINE SODIUM 0.05 MG/1
50 TABLET ORAL
Start: 2021-12-14 | End: 2022-03-02

## 2021-12-13 RX ORDER — DONEPEZIL HYDROCHLORIDE 10 MG/1
10 TABLET, FILM COATED ORAL NIGHTLY
Start: 2021-12-13

## 2021-12-13 RX ORDER — QUETIAPINE FUMARATE 50 MG/1
50 TABLET, FILM COATED ORAL NIGHTLY
Start: 2021-12-13 | End: 2022-11-30

## 2021-12-13 RX ORDER — ACETAMINOPHEN 325 MG/1
650 TABLET ORAL EVERY 4 HOURS PRN
Start: 2021-12-13

## 2021-12-13 RX ORDER — TRAMADOL HYDROCHLORIDE 50 MG/1
25 TABLET ORAL EVERY 6 HOURS PRN
Qty: 12 TABLET | Refills: 0 | Status: SHIPPED | OUTPATIENT
Start: 2021-12-13 | End: 2021-12-16

## 2021-12-13 RX ORDER — DIVALPROEX SODIUM 250 MG/1
250 TABLET, DELAYED RELEASE ORAL EVERY 12 HOURS SCHEDULED
Start: 2021-12-13 | End: 2023-01-21 | Stop reason: HOSPADM

## 2021-12-13 RX ORDER — LIDOCAINE 50 MG/G
1 PATCH TOPICAL
Start: 2021-12-14 | End: 2022-03-02

## 2021-12-13 RX ORDER — CHOLECALCIFEROL (VITAMIN D3) 125 MCG
5 CAPSULE ORAL NIGHTLY PRN
Start: 2021-12-13 | End: 2022-03-02

## 2021-12-13 RX ORDER — AMOXICILLIN 250 MG
2 CAPSULE ORAL 2 TIMES DAILY PRN
Start: 2021-12-13 | End: 2022-03-02

## 2021-12-13 RX ADMIN — FLUTICASONE PROPIONATE 2 SPRAY: 50 SPRAY, METERED NASAL at 08:52

## 2021-12-13 RX ADMIN — DICLOFENAC 2 G: 10 GEL TOPICAL at 12:43

## 2021-12-13 RX ADMIN — PANTOPRAZOLE SODIUM 40 MG: 40 TABLET, DELAYED RELEASE ORAL at 08:51

## 2021-12-13 RX ADMIN — ATORVASTATIN CALCIUM 10 MG: 10 TABLET, FILM COATED ORAL at 08:51

## 2021-12-13 RX ADMIN — ENOXAPARIN SODIUM 40 MG: 40 INJECTION SUBCUTANEOUS at 08:51

## 2021-12-13 RX ADMIN — SODIUM CHLORIDE, PRESERVATIVE FREE 10 ML: 5 INJECTION INTRAVENOUS at 08:58

## 2021-12-13 RX ADMIN — DICLOFENAC 2 G: 10 GEL TOPICAL at 08:52

## 2021-12-13 RX ADMIN — LEVOTHYROXINE SODIUM 50 MCG: 50 TABLET ORAL at 07:33

## 2021-12-13 RX ADMIN — ACETAMINOPHEN 650 MG: 325 TABLET, FILM COATED ORAL at 03:52

## 2021-12-13 RX ADMIN — DIVALPROEX SODIUM 250 MG: 250 TABLET, DELAYED RELEASE ORAL at 08:51

## 2021-12-13 RX ADMIN — ASPIRIN 81 MG: 81 TABLET, COATED ORAL at 08:51

## 2021-12-13 RX ADMIN — SUCRALFATE 1 G: 1 TABLET ORAL at 08:51

## 2021-12-13 RX ADMIN — LIDOCAINE 1 PATCH: 50 PATCH CUTANEOUS at 08:51

## 2021-12-13 RX ADMIN — LISINOPRIL 2.5 MG: 2.5 TABLET ORAL at 08:51

## 2021-12-13 NOTE — CASE MANAGEMENT/SOCIAL WORK
Continued Stay Note  [facility]     Patient Name: Ramin Zaragoza  MRN: [id number]  Today's Date: 12/13/2021    Admit Date: 11/27/2021     Discharge Plan     Row Name 12/13/21 0836       Plan    Plan SW    Plan Comments SW attempted to contact patients daughter, left a message with the information for [employer] phone [phone] and email [email or web address] in order for patients daughter to arrange evaluation. Awaiting return call.               Discharge Codes    No documentation.                     AUGUSTO Parikh (Kay)

## 2021-12-13 NOTE — PLAN OF CARE
Goal Outcome Evaluation:   Pt oriented to self. VSS. Complains of no pain. Weight shift provided. Wound care provided. Patient will be discvharged and [employer] is transporting pt at 1500. Monitoring.        Progress: no change

## 2021-12-13 NOTE — CASE MANAGEMENT/SOCIAL WORK
Case Management Discharge Note      Final Note: The patient is discharging to [facility] at 3pm today by Michelle. The nurse is to call report to [phone]. Social work faxed the discharge summary to fax: [phone]. Social work called the patients daughter Isatu Menendez, [phone] and left her a message to let her know that the patient was going to [facility] at 3 pm today.    Provided Post Acute Provider List?: Yes  Post Acute Provider List: Inpatient Rehab  Provided Post Acute Provider Quality & Resource List?: Yes  Post Acute Provider Quality and Resource List: Inpatient Rehab  Delivered To: Support Person  Support Person: Isatu Menendez  Method of Delivery: Telephone    Selected Continued Care - Admitted Since 11/27/2021     Destination Coordination complete.    Service Provider Selected Services Address Phone Fax Patient Preferred    [facility]: [facility]  Skilled Nursing [address] [address] [phone] --          Durable Medical Equipment    No services have been selected for the patient.              Dialysis/Infusion    No services have been selected for the patient.              Home Medical Care    No services have been selected for the patient.              Therapy    No services have been selected for the patient.              Community Resources    No services have been selected for the patient.              Community & DME    No services have been selected for the patient.                       Final Discharge Disposition Code: 03 - skilled nursing facility (SNF)Continued Stay Note  [facility]     Patient Name: Ramin Zaragoza  MRN: [id number]  Today's Date: 12/13/2021    Admit Date: 11/27/2021     Discharge Plan     Row Name 12/13/21 1220       Plan    Final Discharge Disposition Code 03 - skilled nursing facility (SNF)    Final Note The patient is discharging to [facility] at 3pm today by Michelle. The nurse is to call report to  [phone]. Social work faxed the discharge summary to fax: [phone]. Social work called the patients daughter Isatu Menendez, [phone] and left her a message to let her know that the patient was going to [facility] at 3 pm today.               Discharge Codes    No documentation.               Expected Discharge Date and Time     Expected Discharge Date Expected Discharge Time    Dec 13, 2021             AUGUSTO Sapp

## 2021-12-13 NOTE — PLAN OF CARE
Goal Outcome Evaluation:  Plan of Care Reviewed With: patient        Progress: no change  Outcome Summary: VSS. Pt given tylenol X1 for c/o back pain. Rested comfortably this shift. Is expecting discharge today.

## 2021-12-13 NOTE — DISCHARGE SUMMARY
[facility] Medicine Services  DISCHARGE SUMMARY    Patient Name: Ramin Zaragoza  : 1935  MRN: [id number]    Date of Admission: 2021  4:12 PM  Date of Discharge:  2021  Primary Care Physician: Irene Coley MD    Consults     Date and Time Order Name Status Description    2021  1:12 AM Inpatient Neurology Consult General Completed           Hospital Course     Presenting Problem:   Weakness [R53.1]    Active Hospital Problems    Diagnosis  POA   • Weakness [R53.1]  Yes   • Fall [W19.XXXA]  Yes   • Hypothyroidism [E03.9]  Yes   • Mixed hyperlipidemia [E78.2]  Yes   • Coronary artery disease involving native coronary artery of native heart without angina pectoris [I25.10]  Yes   • Essential hypertension [I10]  Yes      Resolved Hospital Problems   No resolved problems to display.          Hospital Course:  Ramin Zaragoza is a 86 y.o. male  with hx of CAD, HLD, HTN, hypothyroidism, and chronic lower extremity edema, who presented to [facility] ED with complaints of weakness and falls as well as general decline over a week.  He was admitted to Hospital medicine services for further evaluation and treatment. .       Generalized weakness  Multiple falls  Osteoarthritis  -Neurology has followed, now signed off. Continue aricept at discharge.   --MRI brain negative.  --No evidence of infection.  --B12 is normal.  --TSH elevated, started on Synthroid as below.  --Having multiple complaints of joint pain, seems to be a different joint each time we ask. X-ray right ankle negative, CT LLE with moderate osteoarthrosis of left hip, no acute fractures. Right hip x-ray no acute fractures. Continue PRN meds (Tylenol, Voltaren gel and Lidoderm patches). Added low dose Tramadol as well to see if this helps. This morning, he had no complaints of pain.   --Planning rehab at discharge.     Cognitive impairment patient with episodes of agitation and aggressive behavior  Dementia  -Aricept  "started per Dr. Singh. Increased to 10 mg on 12/2/21.  --Continue Seroquel.  -Patient recently started Abilify, stopped this admission.  -Daughter has started process for emergency guardianship.   --He is now stable and cooperative.     Hypothyroid  -Reports no longer taking levothyroxine PTA.  -Elevated TSH, free T4 decreased.   --Started low-dose Synthroid. Will need recheck ~ 1 month.     Hypertension  Hyperlipidemia  CAD  -Continue daily ASA.  -Continue Lisinopril.  -Continue statin.     BPH  -No longer taking Flomax.  --No issues urinating here.     GERD  -Continue sucralfate/PPI     Leukocytosis, resolved  --Afebrile and no signs/symptoms of infection.  --Encourage IS.      Discharge Follow Up Recommendations for outpatient labs/diagnostics:   Follow up with PCP, first available hospital follow up appt.   Follow up with neurology as needed.     Day of Discharge     HPI:   Resting comfortably in bed this morning. No complaints of pain.  Agreeable to discharge to facility today.  States \" I am going to miss you\".  When I asked if he needed anything, he stated \"Just some tender loving care\". Asked my to hold his hand while I was in the room with him.      Review of Systems  Difficult to obtain due to confusion but has no complaints of pain or shortness of air today.     Vital Signs:   Temp:  [98 °F (36.7 °C)-98.2 °F (36.8 °C)] 98.2 °F (36.8 °C)  Heart Rate:  [] 69  Resp:  [16] 16  BP: (123-147)/(68-74) 123/68     Physical Exam:  Constitutional: No acute distress, awake, alert, upright in bed, no family at BS  HENT: NCAT, mucous membranes moist  Respiratory: Clear to auscultation bilaterally, respiratory effort normal on room air  Cardiovascular: RRR, no murmurs, rubs, or gallops  Gastrointestinal: Positive bowel sounds, soft, nontender, nondistended  Musculoskeletal: No bilateral ankle edema, thin extremities.  Psychiatric: Appropriate affect, cooperative, pleasant  Neurologic: Alert but confused, " strength symmetric in all extremities, Cranial Nerves grossly intact to confrontation, speech clear  Skin: No rashes noted to exposed skin       Pertinent  and/or Most Recent Results     LAB RESULTS:      Lab 12/08/21 0411   WBC 9.63   HEMOGLOBIN 14.2   HEMATOCRIT 40.6   PLATELETS 205   NEUTROS ABS 5.78   IMMATURE GRANS (ABS) 0.06*   LYMPHS ABS 1.86   MONOS ABS 1.54*   EOS ABS 0.35   MCV 91.9         Lab 12/08/21  0411   SODIUM 132*   POTASSIUM 4.1   CHLORIDE 98   CO2 23.0   ANION GAP 11.0   BUN 14   CREATININE 0.76   GLUCOSE 100*   CALCIUM 8.5*   MAGNESIUM 1.9                         Brief Urine Lab Results  (Last result in the past 365 days)      Color   Clarity   Blood   Leuk Est   Nitrite   Protein   CREAT   Urine HCG        11/27/21 1931 Yellow   Clear   Negative   Negative   Negative   Negative               Microbiology Results (last 10 days)     Procedure Component Value - Date/Time    COVID-19,CEPHEID/THIAGO,JOSEPH IN-HOUSE(OR EMERGENT/ADD-ON),NP SWAB IN TRANSPORT MEDIA 3-4 HR TAT - Swab, Nasopharynx [[id number]]  (Normal) Collected: 12/12/21 1317    Lab Status: Final result Specimen: Swab from Nasopharynx Updated: 12/12/21 1442     COVID19 Not Detected    Narrative:      Fact sheet for providers: https://www.fda.gov/media/380103/download     Fact sheet for patients: https://www.fda.gov/media/966431/download  Fact sheet for providers: https://www.fda.gov/media/649750/download     Fact sheet for patients: https://www.fda.gov/media/775450/download          CT Lower Extremity Left Without Contrast    Result Date: 12/8/2021  EXAMINATION: CT LOWER EXTREMITY LEFT WO CONTRAST-  INDICATION: Hip pain, chronic, rheumatoid arthritis suspected; R53.1-Weakness; W19.XXXA-Unspecified fall, initial encounter; R41.0-Disorientation, unspecified; R41.82-Altered mental status, unspecified  TECHNIQUE: Noncontrast CT of the left hip  COMPARISON: CT abdomen and pelvis 3/26/2021  FINDINGS:  The bones are diffusely demineralized. No  acute fracture or malalignment. No discrete bony erosion or evidence of axial migration of the femoral head. Moderate osteoarthritic change of the left hip joint. There is evidence of left hip chondrocalcinosis. No acute osseous findings in the partially imaged pelvis. There is evidence of mild to moderate osteoarthritic change of the left sacroiliac joint. The pubic symphysis is congruent with mild degenerative change. No evidence of sacral fracture.  The left hip soft tissues appear within normal limits. The partially imaged lower abdominal and pelvic viscera demonstrate prostatomegaly measuring up to 7.1 cm in maximal transverse dimension, mild rectal stool burden, and atherosclerosis.       Moderate osteoarthrosis of the left hip with evidence of chondrocalcinosis. No acute fracture or malalignment. No discrete bony erosions or axial migration of the hip to suggest sequelae of rheumatoid arthritis as queried.   This report was finalized on 12/8/2021 10:35 AM by Octavio Adamson MD.      XR Hip With or Without Pelvis 2 - 3 View Right    Result Date: 12/9/2021  EXAMINATION: XR HIP W OR WO PELVIS 2-3 VIEW RIGHT-  INDICATION: hip pain; R53.1-Weakness; W19.XXXA-Unspecified fall, initial encounter; R41.0-Disorientation, unspecified; R41.82-Altered mental status, unspecified  TECHNIQUE: 2 views of the right hip  COMPARISON: CT abdomen and pelvis 3/26/2021  FINDINGS:  Right hip chondrocalcinosis. Mild osteoarthritic change. No acute fracture or malalignment. The soft tissues are unremarkable. Small spiculated sclerotic foci in the right and left iliac bones are compatible with bone islands. The pubic symphysis and sacroiliac joints are congruent with mild degenerative change.       No acute osseous findings. Mild osteoarthrosis of the right hip with chondrocalcinosis.  This report was finalized on 12/9/2021 5:36 PM by Octavio Adamson MD.                    Plan for Follow-up of Pending Labs/Results:     Discharge Details         Discharge Medications      New Medications      Instructions Start Date   acetaminophen 325 MG tablet  Commonly known as: TYLENOL   650 mg, Oral, Every 4 Hours PRN      Diclofenac Sodium 1 % gel gel  Commonly known as: VOLTAREN   2 g, Topical, 4 Times Daily      divalproex 250 MG DR tablet  Commonly known as: DEPAKOTE   250 mg, Oral, Every 12 Hours Scheduled      donepezil 10 MG tablet  Commonly known as: ARICEPT   10 mg, Oral, Nightly      lidocaine 5 %  Commonly known as: LIDODERM   1 patch, Transdermal, Every 24 Hours Scheduled, Remove & Discard patch within 12 hours or as directed by MD   Start Date: December 14, 2021     melatonin 5 MG tablet tablet   5 mg, Oral, Nightly PRN      QUEtiapine 50 MG tablet  Commonly known as: SEROquel   50 mg, Oral, Nightly      sennosides-docusate 8.6-50 MG per tablet  Commonly known as: PERICOLACE   2 tablets, Oral, 2 Times Daily PRN      traMADol 50 MG tablet  Commonly known as: ULTRAM   25 mg, Oral, Every 6 Hours PRN         Changes to Medications      Instructions Start Date   levothyroxine 50 MCG tablet  Commonly known as: SYNTHROID, LEVOTHROID  What changed:   medication strength  how much to take  when to take this   50 mcg, Oral, Every Early Morning   Start Date: December 14, 2021        Continue These Medications      Instructions Start Date   aspirin 81 MG EC tablet   81 mg, Oral, Daily      fluticasone 50 MCG/ACT nasal spray  Commonly known as: FLONASE   2 sprays, Nasal, Daily, Administer 2 sprays in each nostril for each dose.       lisinopril 2.5 MG tablet  Commonly known as: PRINIVIL,ZESTRIL   TAKE 1 TABLET BY MOUTH ONCE DAILY      nitroglycerin 0.4 MG SL tablet  Commonly known as: NITROSTAT   0.4 mg, Sublingual, Every 5 Minutes PRN, Take no more than 3 doses in 15 minutes.       omeprazole 20 MG capsule  Commonly known as: priLOSEC   20 mg, Oral, Daily      sucralfate 1 g tablet  Commonly known as: CARAFATE   1 g, Oral, 2 Times Daily      Zocor 20 MG  tablet  Generic drug: simvastatin   20 mg, Oral, Nightly         Stop These Medications    ARIPiprazole 2 MG tablet  Commonly known as: ABILIFY     tamsulosin 0.4 MG capsule 24 hr capsule  Commonly known as: FLOMAX            Allergies   Allergen Reactions   • Lactose Intolerance (Gi)          Discharge Disposition:  Skilled Nursing Facility (DC - External)    Diet:  Hospital:  Diet Order   Procedures   • Diet Soft Texture; Chopped       Activity:  Activity Instructions     Activity as Tolerated                   CODE STATUS:    Code Status and Medical Interventions:   Ordered at: 11/27/21 5342     Code Status (Patient has no pulse and is not breathing):    CPR (Attempt to Resuscitate)     Medical Interventions (Patient has pulse or is breathing):    Full Support       No future appointments.    Additional Instructions for the Follow-ups that You Need to Schedule     Discharge Follow-up with PCP   As directed       Currently Documented PCP:    Irene Coley MD    PCP Phone Number:    [phone]     Follow Up Details: first available hospital follow up appt.         Discharge Follow-up with Specified Provider: neurology   As directed      To: neurology    Follow Up Details: as needed                     DARA Barker  12/13/21      Time Spent on Discharge:  I spent  40  minutes on this discharge activity which included: face-to-face encounter with the patient, reviewing the data in the system, coordination of the care with the nursing staff as well as consultants, documentation, and entering orders.

## 2022-01-11 ENCOUNTER — APPOINTMENT (OUTPATIENT)
Dept: GENERAL RADIOLOGY | Facility: HOSPITAL | Age: 87
End: 2022-01-11

## 2022-01-11 ENCOUNTER — APPOINTMENT (OUTPATIENT)
Dept: CT IMAGING | Facility: HOSPITAL | Age: 87
End: 2022-01-11

## 2022-01-11 ENCOUNTER — HOSPITAL ENCOUNTER (EMERGENCY)
Facility: HOSPITAL | Age: 87
Discharge: HOME | End: 2022-01-11
Attending: EMERGENCY MEDICINE | Admitting: EMERGENCY MEDICINE

## 2022-01-11 VITALS
TEMPERATURE: 97 F | DIASTOLIC BLOOD PRESSURE: 56 MMHG | BODY MASS INDEX: 27.4 KG/M2 | HEIGHT: 69 IN | HEART RATE: 62 BPM | WEIGHT: 185 LBS | SYSTOLIC BLOOD PRESSURE: 127 MMHG | RESPIRATION RATE: 16 BRPM | OXYGEN SATURATION: 97 %

## 2022-01-11 DIAGNOSIS — T50.905A MEDICATION REACTION, INITIAL ENCOUNTER: ICD-10-CM

## 2022-01-11 DIAGNOSIS — E88.09 HYPOALBUMINEMIA: ICD-10-CM

## 2022-01-11 DIAGNOSIS — R07.9 CHEST PAIN, UNSPECIFIED TYPE: Primary | ICD-10-CM

## 2022-01-11 DIAGNOSIS — R93.89 ABNORMAL CT OF THE CHEST: ICD-10-CM

## 2022-01-11 DIAGNOSIS — R79.89 POSITIVE D DIMER: ICD-10-CM

## 2022-01-11 LAB
ALBUMIN SERPL-MCNC: 3.3 G/DL (ref 3.5–5.2)
ALBUMIN/GLOB SERPL: 1.7 G/DL
ALP SERPL-CCNC: 67 U/L (ref 39–117)
ALT SERPL W P-5'-P-CCNC: 13 U/L (ref 1–41)
ANION GAP SERPL CALCULATED.3IONS-SCNC: 9 MMOL/L (ref 5–15)
AST SERPL-CCNC: 20 U/L (ref 1–40)
BASOPHILS # BLD AUTO: 0.04 10*3/MM3 (ref 0–0.2)
BASOPHILS NFR BLD AUTO: 0.7 % (ref 0–1.5)
BILIRUB SERPL-MCNC: 0.3 MG/DL (ref 0–1.2)
BUN SERPL-MCNC: 19 MG/DL (ref 8–23)
BUN/CREAT SERPL: 23.8 (ref 7–25)
CALCIUM SPEC-SCNC: 9 MG/DL (ref 8.6–10.5)
CHLORIDE SERPL-SCNC: 105 MMOL/L (ref 98–107)
CO2 SERPL-SCNC: 26 MMOL/L (ref 22–29)
CREAT SERPL-MCNC: 0.8 MG/DL (ref 0.76–1.27)
D DIMER PPP FEU-MCNC: 1.13 MCGFEU/ML (ref 0–0.56)
DEPRECATED RDW RBC AUTO: 44.2 FL (ref 37–54)
EOSINOPHIL # BLD AUTO: 0.4 10*3/MM3 (ref 0–0.4)
EOSINOPHIL NFR BLD AUTO: 6.5 % (ref 0.3–6.2)
ERYTHROCYTE [DISTWIDTH] IN BLOOD BY AUTOMATED COUNT: 12.9 % (ref 12.3–15.4)
FLUAV SUBTYP SPEC NAA+PROBE: NOT DETECTED
FLUBV RNA ISLT QL NAA+PROBE: NOT DETECTED
GFR SERPL CREATININE-BSD FRML MDRD: 92 ML/MIN/1.73
GLOBULIN UR ELPH-MCNC: 2 GM/DL
GLUCOSE SERPL-MCNC: 105 MG/DL (ref 65–99)
HCT VFR BLD AUTO: 35.4 % (ref 37.5–51)
HGB BLD-MCNC: 12.1 G/DL (ref 13–17.7)
HOLD SPECIMEN: NORMAL
IMM GRANULOCYTES # BLD AUTO: 0.04 10*3/MM3 (ref 0–0.05)
IMM GRANULOCYTES NFR BLD AUTO: 0.7 % (ref 0–0.5)
LIPASE SERPL-CCNC: 11 U/L (ref 13–60)
LYMPHOCYTES # BLD AUTO: 1.47 10*3/MM3 (ref 0.7–3.1)
LYMPHOCYTES NFR BLD AUTO: 24.1 % (ref 19.6–45.3)
MCH RBC QN AUTO: 32 PG (ref 26.6–33)
MCHC RBC AUTO-ENTMCNC: 34.2 G/DL (ref 31.5–35.7)
MCV RBC AUTO: 93.7 FL (ref 79–97)
MONOCYTES # BLD AUTO: 0.92 10*3/MM3 (ref 0.1–0.9)
MONOCYTES NFR BLD AUTO: 15.1 % (ref 5–12)
NEUTROPHILS NFR BLD AUTO: 3.24 10*3/MM3 (ref 1.7–7)
NEUTROPHILS NFR BLD AUTO: 52.9 % (ref 42.7–76)
NRBC BLD AUTO-RTO: 0 /100 WBC (ref 0–0.2)
NT-PROBNP SERPL-MCNC: 105.9 PG/ML (ref 0–1800)
PLATELET # BLD AUTO: 149 10*3/MM3 (ref 140–450)
PMV BLD AUTO: 8.9 FL (ref 6–12)
POTASSIUM SERPL-SCNC: 4.5 MMOL/L (ref 3.5–5.2)
PROT SERPL-MCNC: 5.3 G/DL (ref 6–8.5)
QT INTERVAL: 390 MS
QT INTERVAL: 412 MS
QTC INTERVAL: 421 MS
QTC INTERVAL: 447 MS
RBC # BLD AUTO: 3.78 10*6/MM3 (ref 4.14–5.8)
SARS-COV-2 RNA PNL SPEC NAA+PROBE: NOT DETECTED
SODIUM SERPL-SCNC: 140 MMOL/L (ref 136–145)
TROPONIN T SERPL-MCNC: <0.01 NG/ML (ref 0–0.03)
TROPONIN T SERPL-MCNC: <0.01 NG/ML (ref 0–0.03)
VALPROATE SERPL-MCNC: 48.4 MCG/ML (ref 50–125)
WBC NRBC COR # BLD: 6.11 10*3/MM3 (ref 3.4–10.8)
WHOLE BLOOD HOLD SPECIMEN: NORMAL
WHOLE BLOOD HOLD SPECIMEN: NORMAL

## 2022-01-11 PROCEDURE — 80053 COMPREHEN METABOLIC PANEL: CPT | Performed by: EMERGENCY MEDICINE

## 2022-01-11 PROCEDURE — 71275 CT ANGIOGRAPHY CHEST: CPT

## 2022-01-11 PROCEDURE — 84484 ASSAY OF TROPONIN QUANT: CPT | Performed by: EMERGENCY MEDICINE

## 2022-01-11 PROCEDURE — 71045 X-RAY EXAM CHEST 1 VIEW: CPT

## 2022-01-11 PROCEDURE — 87636 SARSCOV2 & INF A&B AMP PRB: CPT | Performed by: EMERGENCY MEDICINE

## 2022-01-11 PROCEDURE — 80164 ASSAY DIPROPYLACETIC ACD TOT: CPT | Performed by: EMERGENCY MEDICINE

## 2022-01-11 PROCEDURE — 96360 HYDRATION IV INFUSION INIT: CPT

## 2022-01-11 PROCEDURE — 99284 EMERGENCY DEPT VISIT MOD MDM: CPT

## 2022-01-11 PROCEDURE — 85379 FIBRIN DEGRADATION QUANT: CPT | Performed by: EMERGENCY MEDICINE

## 2022-01-11 PROCEDURE — 93005 ELECTROCARDIOGRAM TRACING: CPT | Performed by: EMERGENCY MEDICINE

## 2022-01-11 PROCEDURE — 83880 ASSAY OF NATRIURETIC PEPTIDE: CPT | Performed by: EMERGENCY MEDICINE

## 2022-01-11 PROCEDURE — 96361 HYDRATE IV INFUSION ADD-ON: CPT

## 2022-01-11 PROCEDURE — 85025 COMPLETE CBC W/AUTO DIFF WBC: CPT | Performed by: EMERGENCY MEDICINE

## 2022-01-11 PROCEDURE — 36415 COLL VENOUS BLD VENIPUNCTURE: CPT

## 2022-01-11 PROCEDURE — 83690 ASSAY OF LIPASE: CPT | Performed by: EMERGENCY MEDICINE

## 2022-01-11 PROCEDURE — 0 IOPAMIDOL PER 1 ML: Performed by: EMERGENCY MEDICINE

## 2022-01-11 RX ORDER — ONDANSETRON 2 MG/ML
INJECTION INTRAMUSCULAR; INTRAVENOUS
Status: DISCONTINUED
Start: 2022-01-11 | End: 2022-01-11 | Stop reason: HOSPADM

## 2022-01-11 RX ORDER — SODIUM CHLORIDE 0.9 % (FLUSH) 0.9 %
10 SYRINGE (ML) INJECTION AS NEEDED
Status: DISCONTINUED | OUTPATIENT
Start: 2022-01-11 | End: 2022-01-11 | Stop reason: HOSPADM

## 2022-01-11 RX ORDER — SODIUM CHLORIDE 9 MG/ML
125 INJECTION, SOLUTION INTRAVENOUS CONTINUOUS
Status: DISCONTINUED | OUTPATIENT
Start: 2022-01-11 | End: 2022-01-11 | Stop reason: HOSPADM

## 2022-01-11 RX ADMIN — IOPAMIDOL 80 ML: 755 INJECTION, SOLUTION INTRAVENOUS at 14:23

## 2022-01-11 RX ADMIN — SODIUM CHLORIDE 500 ML: 9 INJECTION, SOLUTION INTRAVENOUS at 13:49

## 2022-01-11 NOTE — ED PROVIDER NOTES
Subjective   This is a very pleasant but very hard of hearing 86-year-old male who is brought to the emergency room today  [facility] for evaluation of chest pain.  It is baseline apparently lives next to his daughter he is able to walk without difficulty but does have some history of memory issues.  He cannot recall any history of Novocain reactions in the past.    Apparently he was having dental work done had lidocaine injected and developed some substernal chest pain that was brief and fleeting.  On arrival here he was pain-free.  He cannot recall ever having chest pain like this before.  He has no history of coronary artery disease that he knows of even though it is listed in his past medical history.  On removal review of his past medical history I see a note from Dr. Murray that he had multiple heart caths and stents in the past and his last heart cath 2014 was stable.  I reviewed his hospital admission here in November.  He went to rehab after that but apparently is out now living somewhat independently but cared for by his daughter.    He tells me he is not felt bad up until he had this dental work done.  He has had no fevers or chills or cough.  He said bowel movements and urine have been at his baseline.  He has had no exertional chest pain or dyspnea.  He has not been passing blood per any orifice cannot think of any change in medications.        All other systems reviewed and are negative except as noted above with the caveat the patient probably does have some memory issues.  Was not accurate in his description of previous coronary artery disease          Review of Systems   Unable to perform ROS: Dementia       Past Medical History:   Diagnosis Date   • CAD (coronary artery disease)    • Hyperlipidemia    • Hypertension    • Hypothyroidism     on chronic replacement therapy   • Lactose intolerance    • Lower extremity edema    • PVC's (premature ventricular contractions)     History of   • Thyroid  disorder    • Venous insufficiency        Allergies   Allergen Reactions   • Flomax [Tamsulosin] Other (See Comments)     Unknown     • Lactose Intolerance (Gi)        Past Surgical History:   Procedure Laterality Date   • CARDIAC CATHETERIZATION     • CORONARY STENT PLACEMENT     • EYE SURGERY      Bilateral cataract extraction   • HERNIA REPAIR      Inguinal hernia repair   • OTHER SURGICAL HISTORY      Melanoma removed from back   • OTHER SURGICAL HISTORY      History of left elbow fracture with sunsequent fixation       Family History   Problem Relation Age of Onset   • No Known Problems Mother    • No Known Problems Father        Social History     Socioeconomic History   • Marital status:    Tobacco Use   • Smoking status: Never Smoker   • Smokeless tobacco: Never Used   Substance and Sexual Activity   • Alcohol use: No   • Drug use: No   • Sexual activity: Defer           Objective   Physical Exam  Vitals and nursing note reviewed.   Constitutional:       Comments: This is a pleasant 86-year-old no acute distress alert knows he is in the hospital ED.  Conversation is difficult I had to use the reverse stethoscope technique in order to communicate with him.   HENT:      Head: Normocephalic and atraumatic.      Mouth/Throat:      Mouth: Mucous membranes are moist.      Pharynx: Oropharynx is clear.      Comments: His face little deformed because of the Novocain little droop around his left lip but I think this is because of Novocain injection.  Eyes:      Extraocular Movements: Extraocular movements intact.      Conjunctiva/sclera: Conjunctivae normal.      Pupils: Pupils are equal, round, and reactive to light.   Cardiovascular:      Rate and Rhythm: Normal rate and regular rhythm.      Pulses: Normal pulses.      Heart sounds: Normal heart sounds.   Pulmonary:      Effort: Pulmonary effort is normal.      Breath sounds: Normal breath sounds.   Abdominal:      Comments: BMI 27 soft and nontender no  organomegaly, masses or guarding.   Musculoskeletal:      Cervical back: Normal range of motion and neck supple.      Comments: 's upper extremity is normal with good pulses no point tenderness neurovascular intact.  Lower extremities he is got 3+ pitting which she tells me is chronic for him knees.  He has preserved pulses 3/4 in his feet no tissue loss no venous cords or cellulitis that I can see   Skin:     General: Skin is warm and dry.      Capillary Refill: Capillary refill takes less than 2 seconds.   Neurological:      Mental Status: He is alert.      Comments: Face to bed asymmetric due to Novocain tongue midline vision preserved hearing is decreased but we can communicate.  Moves all extremities well and has no focal weakness.         Procedures           ED Course                 Recent Results (from the past 24 hour(s))   ECG 12 Lead    Collection Time: 01/11/22 10:13 AM   Result Value Ref Range    QT Interval 412 ms    QTC Interval 421 ms   Troponin    Collection Time: 01/11/22 10:49 AM    Specimen: Blood   Result Value Ref Range    Troponin T <0.010 0.000 - 0.030 ng/mL   Comprehensive Metabolic Panel    Collection Time: 01/11/22 10:49 AM    Specimen: Blood   Result Value Ref Range    Glucose 105 (H) 65 - 99 mg/dL    BUN 19 8 - 23 mg/dL    Creatinine 0.80 0.76 - 1.27 mg/dL    Sodium 140 136 - 145 mmol/L    Potassium 4.5 3.5 - 5.2 mmol/L    Chloride 105 98 - 107 mmol/L    CO2 26.0 22.0 - 29.0 mmol/L    Calcium 9.0 8.6 - 10.5 mg/dL    Total Protein 5.3 (L) 6.0 - 8.5 g/dL    Albumin 3.30 (L) 3.50 - 5.20 g/dL    ALT (SGPT) 13 1 - 41 U/L    AST (SGOT) 20 1 - 40 U/L    Alkaline Phosphatase 67 39 - 117 U/L    Total Bilirubin 0.3 0.0 - 1.2 mg/dL    eGFR Non African Amer 92 >60 mL/min/1.73    Globulin 2.0 gm/dL    A/G Ratio 1.7 g/dL    BUN/Creatinine Ratio 23.8 7.0 - 25.0    Anion Gap 9.0 5.0 - 15.0 mmol/L   Lipase    Collection Time: 01/11/22 10:49 AM    Specimen: Blood   Result Value Ref Range    Lipase 11 (L)  13 - 60 U/L   BNP    Collection Time: 01/11/22 10:49 AM    Specimen: Blood   Result Value Ref Range    proBNP 105.9 0.0-1,800.0 pg/mL   Green Top (Gel)    Collection Time: 01/11/22 10:49 AM   Result Value Ref Range    Extra Tube Hold for add-ons.    Lavender Top    Collection Time: 01/11/22 10:49 AM   Result Value Ref Range    Extra Tube     Gold Top - SST    Collection Time: 01/11/22 10:49 AM   Result Value Ref Range    Extra Tube Hold for add-ons.    Gray Top    Collection Time: 01/11/22 10:49 AM   Result Value Ref Range    Extra Tube Hold for add-ons.    Light Blue Top    Collection Time: 01/11/22 10:49 AM   Result Value Ref Range    Extra Tube hold for add-on    CBC Auto Differential    Collection Time: 01/11/22 10:49 AM    Specimen: Blood   Result Value Ref Range    WBC 6.11 3.40 - 10.80 10*3/mm3    RBC 3.78 (L) 4.14 - 5.80 10*6/mm3    Hemoglobin 12.1 (L) 13.0 - 17.7 g/dL    Hematocrit 35.4 (L) 37.5 - 51.0 %    MCV 93.7 79.0 - 97.0 fL    MCH 32.0 26.6 - 33.0 pg    MCHC 34.2 31.5 - 35.7 g/dL    RDW 12.9 12.3 - 15.4 %    RDW-SD 44.2 37.0 - 54.0 fl    MPV 8.9 6.0 - 12.0 fL    Platelets 149 140 - 450 10*3/mm3    Neutrophil % 52.9 42.7 - 76.0 %    Lymphocyte % 24.1 19.6 - 45.3 %    Monocyte % 15.1 (H) 5.0 - 12.0 %    Eosinophil % 6.5 (H) 0.3 - 6.2 %    Basophil % 0.7 0.0 - 1.5 %    Immature Grans % 0.7 (H) 0.0 - 0.5 %    Neutrophils, Absolute 3.24 1.70 - 7.00 10*3/mm3    Lymphocytes, Absolute 1.47 0.70 - 3.10 10*3/mm3    Monocytes, Absolute 0.92 (H) 0.10 - 0.90 10*3/mm3    Eosinophils, Absolute 0.40 0.00 - 0.40 10*3/mm3    Basophils, Absolute 0.04 0.00 - 0.20 10*3/mm3    Immature Grans, Absolute 0.04 0.00 - 0.05 10*3/mm3    nRBC 0.0 0.0 - 0.2 /100 WBC   Valproic Acid Level, Total    Collection Time: 01/11/22 10:49 AM    Specimen: Blood   Result Value Ref Range    Valproic Acid 48.4 (L) 50.0 - 125.0 mcg/mL   D-dimer, Quantitative    Collection Time: 01/11/22 10:49 AM    Specimen: Blood   Result Value Ref Range     D-Dimer, Quantitative 1.13 (H) 0.00 - 0.56 MCGFEU/mL   COVID-19 and FLU A/B PCR - Swab, Nasopharynx    Collection Time: 01/11/22 10:51 AM    Specimen: Nasopharynx; Swab   Result Value Ref Range    COVID19 Not Detected Not Detected - Ref. Range    Influenza A PCR Not Detected Not Detected    Influenza B PCR Not Detected Not Detected   Troponin    Collection Time: 01/11/22  3:01 PM    Specimen: Blood   Result Value Ref Range    Troponin T <0.010 0.000 - 0.030 ng/mL   ECG 12 Lead    Collection Time: 01/11/22  3:15 PM   Result Value Ref Range    QT Interval 390 ms    QTC Interval 447 ms     Note: In addition to lab results from this visit, the labs listed above may include labs taken at another facility or during a different encounter within the last 24 hours. Please correlate lab times with ED admission and discharge times for further clarification of the services performed during this visit.    CT Angiogram Chest   Preliminary Result   1. No evidence of pulmonary embolus.       2. Moderate hiatal hernia.       3. Peripheral interstitial disease pattern of the lower lungs, with some   features suggestive of Covid-19 pneumonia. Differential would include   other atypical pneumonia, developing postinflammatory fibrosis.       D:  01/11/2022   E:  01/11/2022                  XR Chest 1 View   Final Result   Stable chronic appearing lung changes including patchy   interstitial changes of the lower lungs. No new chest disease is seen.            This report was finalized on 1/11/2022 11:57 AM by Dr. Alonzo Mcguire MD.            Vitals:    01/11/22 1555 01/11/22 1600 01/11/22 1630 01/11/22 1713   BP: 132/69 140/59 140/67 127/56   BP Location:    Left arm   Patient Position:    Lying   Pulse: 92   62   Resp:    16   Temp:       TempSrc:       SpO2: 97% 93% 97% 97%   Weight:       Height:         Medications   sodium chloride 0.9 % flush 10 mL (has no administration in time range)   sodium chloride 0.9 % infusion (125 mL/hr  Intravenous Currently Infusing 1/11/22 1449)   ondansetron (ZOFRAN) 2 mg/mL injection  - ADS Override Pull (  Not Given 1/11/22 1559)   sodium chloride 0.9 % bolus 500 mL (0 mL Intravenous Stopped 1/11/22 1449)   iopamidol (ISOVUE-370) 76 % injection 80 mL (80 mL Intravenous Given 1/11/22 1423)     ECG/EMG Results (last 24 hours)     Procedure Component Value Units Date/Time    ECG 12 Lead [[id number]] Collected: 01/11/22 1013     Updated: 01/11/22 1104     QT Interval 412 ms      QTC Interval 421 ms     Narrative:      Test Reason : chest pain  Blood Pressure :   */*   mmHG  Vent. Rate :  63 BPM     Atrial Rate :  63 BPM     P-R Int : 172 ms          QRS Dur :  94 ms      QT Int : 412 ms       P-R-T Axes :  29 -18  57 degrees     QTc Int : 421 ms    Normal sinus rhythm  Cannot rule out Anterior infarct (cited on or before 27-NOV-2021)  Abnormal ECG  When compared with ECG of 27-NOV-2021 15:58,  No significant change was found  Confirmed by JANIE THOMPSON MD ([id number]) on 1/11/2022 11:04:15 AM    Referred By: EMILIA           Confirmed By: JANIE THOMPSON MD        ECG 12 Lead   Final Result   Test Reason : chest pain   Blood Pressure :   */*   mmHG   Vent. Rate :  79 BPM     Atrial Rate :  79 BPM      P-R Int : 176 ms          QRS Dur :  78 ms       QT Int : 390 ms       P-R-T Axes :   * -22  38 degrees      QTc Int : 447 ms      Normal sinus rhythm   prwp   Abnormal ECG   When compared with ECG of 11-JAN-2022 10:13,   No significant change was found   Confirmed by JANIE THOMPSON MD ([id number]) on 1/11/2022 3:20:26 PM      Referred By: CHLOE BAIRES           Confirmed By: JANIE THOMPSON MD      ECG 12 Lead   Final Result   Test Reason : chest pain   Blood Pressure :   */*   mmHG   Vent. Rate :  63 BPM     Atrial Rate :  63 BPM      P-R Int : 172 ms          QRS Dur :  94 ms       QT Int : 412 ms       P-R-T Axes :  29 -18  57 degrees      QTc Int : 421 ms      Normal sinus rhythm   Cannot rule out Anterior infarct (cited on or before  27-NOV-2021)   Abnormal ECG   When compared with ECG of 27-NOV-2021 15:58,   No significant change was found   Confirmed by JANIE THOMPSON MD ([id number]) on 1/11/2022 11:04:15 AM      Referred By: EDMD           Confirmed By: JANIE THOMPSON MD                                         HEART Score (for prediction of 6-week risk of major adverse cardiac event) reviewed and/or performed as part of the patient evaluation and treatment planning process.  The result associated with this review/performance is: 4       MDM  Number of Diagnoses or Management Options  Abnormal CT of the chest  Chest pain, unspecified type  Hypoalbuminemia  Medication reaction, initial encounter  Positive D dimer  Diagnosis management comments:       I reviewed all available studies at the bedside with the patient.  He has had unremarkable ER course and is pain-free.  He does have a history of coronary artery disease but this pain is quite atypical and I think likely a reaction to his local anesthetic.  His 2 sets of cardiac markers and EKG are bland.  His D-dimer was elevated and the patient had a CTA of the chest which was reassuring there is no evidence of pulmonary embolus he did have some broad fibrotic lung changes that I do not see documented before and he may need outpatient follow-up on this and I will refer him both of the heart valve center and a pulmonology.    Probably best to avoid lidocaine in the future with him and I discussed this.  I do not think this represents a true allergy but he may need monitoring if he has it again.    I have also asked him to follow-up with his PCP regarding his other issues.    He will return to the ED if worse in any way.    All are agreeable with the plan           Amount and/or Complexity of Data Reviewed  Clinical lab tests: reviewed  Tests in the radiology section of CPT®: reviewed  Tests in the medicine section of CPT®: reviewed  Decide to obtain previous medical records or to obtain history from  someone other than the patient: yes        Final diagnoses:   Chest pain, unspecified type   Positive D dimer   Medication reaction, initial encounter   Abnormal CT of the chest   Hypoalbuminemia       ED Disposition  ED Disposition     ED Disposition Condition Comment    Discharge Stable           Irene Coley MD  [address]  [place] [id number] [phone]    Schedule an appointment as soon as possible for a visit       Pedro Luis Erickson MD  [address]  [facility] [address]  [place] [address]  [phone]    Schedule an appointment as soon as possible for a visit       [facility] CARDIOLOGY  [address]  [address]  [facility] [id number] [phone]  Schedule an appointment as soon as possible for a visit        DENTAL CLINIC  [address] [place] [id number] [phone]  Schedule an appointment as soon as possible for a visit            Medication List      No changes were made to your prescriptions during this visit.          Rubin Bentley MD  01/11/22 1810       Rubin Bentley MD  01/11/22 1813

## 2022-01-13 ENCOUNTER — PATIENT ROUNDING (BHMG ONLY) (OUTPATIENT)
Dept: CARDIOLOGY | Facility: HOSPITAL | Age: 87
End: 2022-01-13

## 2022-01-13 ENCOUNTER — OFFICE VISIT (OUTPATIENT)
Dept: CARDIOLOGY | Facility: HOSPITAL | Age: 87
End: 2022-01-13

## 2022-01-13 VITALS
DIASTOLIC BLOOD PRESSURE: 60 MMHG | OXYGEN SATURATION: 98 % | HEART RATE: 60 BPM | HEIGHT: 69 IN | SYSTOLIC BLOOD PRESSURE: 126 MMHG | RESPIRATION RATE: 15 BRPM | TEMPERATURE: 97.2 F | WEIGHT: 189.38 LBS | BODY MASS INDEX: 28.05 KG/M2

## 2022-01-13 DIAGNOSIS — R60.0 LOWER EXTREMITY EDEMA: ICD-10-CM

## 2022-01-13 DIAGNOSIS — I10 PRIMARY HYPERTENSION: ICD-10-CM

## 2022-01-13 DIAGNOSIS — I25.119 CORONARY ARTERY DISEASE INVOLVING NATIVE CORONARY ARTERY OF NATIVE HEART WITH ANGINA PECTORIS: Primary | ICD-10-CM

## 2022-01-13 DIAGNOSIS — E78.5 HYPERLIPIDEMIA, UNSPECIFIED HYPERLIPIDEMIA TYPE: ICD-10-CM

## 2022-01-13 DIAGNOSIS — R06.02 SHORTNESS OF BREATH: ICD-10-CM

## 2022-01-13 PROCEDURE — 99214 OFFICE O/P EST MOD 30 MIN: CPT | Performed by: NURSE PRACTITIONER

## 2022-01-13 NOTE — PROGRESS NOTES
"[facility] V[facility]    Chief Complaint  Chest Pain (hx of CAD)    Subjective    History of Present Illness {CC  Problem List  Visit  Diagnosis   Encounters  Notes  Medications  Labs  Result Review Imaging  Media :23}     Ramin Zaragoza presents to [facility] CARDIOLOGY for   History of Present Illness     86-year-old male with coronary artery disease (cardiac stent), PVCs, hypertension, hyperlipidemia, venous insufficiency, hypothyroidism, dementia (now a resident of [facility]).  Last seen by [facility] cardiology approximately 2 years ago (Dr. Marley).    Patient presented to [facility] ED on 1-11-22 for evaluation of chest pain.  Patient was having dental work when he developed substernal chest pain.  Occurred after receiving his lidocaine injection.  Pt presents today with his daughter.  He has been hospitalized for weakness, dyspnea, and CP.  Pt is a poor historian. Increased edema over the last 3 months.     2 sets of cardiac markers were negative.  No acute changes noted on his EKG.  He did have an elevated D-dimer but his CTA was negative for PE.  He was noted to have broad fibrotic lung changes.  He was given referral for pulmonary for evaluation.      Objective     Vital Signs:   Vitals:    01/13/22 1257 01/13/22 1258 01/13/22 1259   BP: 114/55 110/55 126/60   BP Location: Right arm Left arm Left arm   Patient Position: Sitting Standing Sitting   Cuff Size: Adult Adult Adult   Pulse: 59 63 60   Resp:   15   Temp:   97.2 °F (36.2 °C)   TempSrc:   Temporal   SpO2: 97% 97% 98%   Weight:   85.9 kg (189 lb 6 oz)   Height:   175.3 cm (69\")     Body mass index is 27.97 kg/m².  Physical Exam  Constitutional:       General: He is not in acute distress.     Appearance: Normal appearance.   Cardiovascular:      Rate and Rhythm: Normal rate and regular rhythm.      Pulses:           Radial pulses are 2+ " on the right side.        Dorsalis pedis pulses are 2+ on the right side.        Posterior tibial pulses are 2+ on the right side.      Heart sounds: Normal heart sounds.   Pulmonary:      Effort: Pulmonary effort is normal.      Breath sounds: Normal breath sounds.   Abdominal:      Palpations: Abdomen is soft.      Tenderness: There is no abdominal tenderness.   Musculoskeletal:      Right lower leg: Edema (2+ bilateral edema) present.      Left lower leg: Edema present.   Skin:     General: Skin is warm and dry.   Neurological:      Mental Status: He is alert.   Psychiatric:         Mood and Affect: Mood normal.         Behavior: Behavior is cooperative.              Result Review  Data Reviewed:{ Labs  Result Review  Imaging  Med Tab  Media :23}     EKG 1-11-22: Normal sinus rhythm 79 bpm    CT angiogram chest 1-11-22: Negative PE, moderate hiatal hernia, peripheral interstitial disease pattern of lower lungs some features suggestive of COVID-19 pneumonia.  Differential would include atypical pneumonia, developing postinflammatory fibrosis.  (Referral completed for pulmonary)        Admission on 01/11/2022, Discharged on 01/11/2022   Component Date Value Ref Range Status   • QT Interval 01/11/2022 412  ms Final   • QTC Interval 01/11/2022 421  ms Final   • QT Interval 01/11/2022 390  ms Final   • QTC Interval 01/11/2022 447  ms Final   • Troponin T 01/11/2022 <0.010  0.000 - 0.030 ng/mL Final   • Troponin T 01/11/2022 <0.010  0.000 - 0.030 ng/mL Final   • Glucose 01/11/2022 105* 65 - 99 mg/dL Final   • BUN 01/11/2022 19  8 - 23 mg/dL Final   • Creatinine 01/11/2022 0.80  0.76 - 1.27 mg/dL Final   • Sodium 01/11/2022 140  136 - 145 mmol/L Final   • Potassium 01/11/2022 4.5  3.5 - 5.2 mmol/L Final    Slight hemolysis detected by analyzer. Results may be affected.   • Chloride 01/11/2022 105  98 - 107 mmol/L Final   • CO2 01/11/2022 26.0  22.0 - 29.0 mmol/L Final   • Calcium 01/11/2022 9.0  8.6 - 10.5 mg/dL  Final   • Total Protein 01/11/2022 5.3* 6.0 - 8.5 g/dL Final   • Albumin 01/11/2022 3.30* 3.50 - 5.20 g/dL Final   • ALT (SGPT) 01/11/2022 13  1 - 41 U/L Final   • AST (SGOT) 01/11/2022 20  1 - 40 U/L Final   • Alkaline Phosphatase 01/11/2022 67  39 - 117 U/L Final   • Total Bilirubin 01/11/2022 0.3  0.0 - 1.2 mg/dL Final   • eGFR Non African Amer 01/11/2022 92  >60 mL/min/1.73 Final   • Globulin 01/11/2022 2.0  gm/dL Final    Calculated Result   • A/G Ratio 01/11/2022 1.7  g/dL Final   • BUN/Creatinine Ratio 01/11/2022 23.8  7.0 - 25.0 Final   • Anion Gap 01/11/2022 9.0  5.0 - 15.0 mmol/L Final   • Lipase 01/11/2022 11* 13 - 60 U/L Final   • proBNP 01/11/2022 105.9  0.0-1,800.0 pg/mL Final   • Extra Tube 01/11/2022 Hold for add-ons.   Final    Auto resulted.   • Extra Tube 01/11/2022 Hold for add-ons.   Final    Auto resulted.   • Extra Tube 01/11/2022 Hold for add-ons.   Final    Auto resulted.   • Extra Tube 01/11/2022 hold for add-on   Final    Auto resulted   • WBC 01/11/2022 6.11  3.40 - 10.80 10*3/mm3 Final   • RBC 01/11/2022 3.78* 4.14 - 5.80 10*6/mm3 Final   • Hemoglobin 01/11/2022 12.1* 13.0 - 17.7 g/dL Final   • Hematocrit 01/11/2022 35.4* 37.5 - 51.0 % Final   • MCV 01/11/2022 93.7  79.0 - 97.0 fL Final   • MCH 01/11/2022 32.0  26.6 - 33.0 pg Final   • MCHC 01/11/2022 34.2  31.5 - 35.7 g/dL Final   • RDW 01/11/2022 12.9  12.3 - 15.4 % Final   • RDW-SD 01/11/2022 44.2  37.0 - 54.0 fl Final   • MPV 01/11/2022 8.9  6.0 - 12.0 fL Final   • Platelets 01/11/2022 149  140 - 450 10*3/mm3 Final   • Neutrophil % 01/11/2022 52.9  42.7 - 76.0 % Final   • Lymphocyte % 01/11/2022 24.1  19.6 - 45.3 % Final   • Monocyte % 01/11/2022 15.1* 5.0 - 12.0 % Final   • Eosinophil % 01/11/2022 6.5* 0.3 - 6.2 % Final   • Basophil % 01/11/2022 0.7  0.0 - 1.5 % Final   • Immature Grans % 01/11/2022 0.7* 0.0 - 0.5 % Final   • Neutrophils, Absolute 01/11/2022 3.24  1.70 - 7.00 10*3/mm3 Final   • Lymphocytes, Absolute 01/11/2022 1.47   0.70 - 3.10 10*3/mm3 Final   • Monocytes, Absolute 01/11/2022 0.92* 0.10 - 0.90 10*3/mm3 Final   • Eosinophils, Absolute 01/11/2022 0.40  0.00 - 0.40 10*3/mm3 Final   • Basophils, Absolute 01/11/2022 0.04  0.00 - 0.20 10*3/mm3 Final   • Immature Grans, Absolute 01/11/2022 0.04  0.00 - 0.05 10*3/mm3 Final   • nRBC 01/11/2022 0.0  0.0 - 0.2 /100 WBC Final   • Valproic Acid 01/11/2022 48.4* 50.0 - 125.0 mcg/mL Final   • D-Dimer, Quantitative 01/11/2022 1.13* 0.00 - 0.56 MCGFEU/mL Final   • COVID19 01/11/2022 Not Detected  Not Detected - Ref. Range Final   • Influenza A PCR 01/11/2022 Not Detected  Not Detected Final   • Influenza B PCR 01/11/2022 Not Detected  Not Detected Final       Assessment and Plan {CC Problem List  Visit Diagnosis  ROS  Review (Popup)  Health Maintenance  Quality  BestPractice  Medications  SmartSets  SnapShot Encounters  Media :23}   1. Coronary artery disease involving native coronary artery of native heart with angina pectoris (HCC)  Pt with previous cardiac stents    Statin, asa    - Stress Test With Myocardial Perfusion (1 Day); Future    2. Primary hypertension  Continue lisinopril    3. Hyperlipidemia, unspecified hyperlipidemia type  statin    4. Lower extremity edema  Encouraged compression stockings    - Adult Transthoracic Echo Complete W/ Cont if Necessary Per Protocol; Future    5. Shortness of breath    - Stress Test With Myocardial Perfusion (1 Day); Future  - Adult Transthoracic Echo Complete W/ Cont if Necessary Per Protocol; Future    Pt to f/u with [facility] to be scheduled in 4-6 weeks.           Follow Up {Instructions Charge Capture  Follow-up Communications :23}   Return if symptoms worsen or fail to improve.    Patient was given instructions and counseling regarding his condition or for health maintenance advice. Please see specific information pulled into the AVS if appropriate.  Patient was instructed to call the [facility] with any questions,  concerns, or worsening symptoms.

## 2022-01-13 NOTE — PROGRESS NOTES
January 13, 2022    Hello, may I speak with Ramin Zaragoza?    My name is Belinda     I am  with [facility] CARDIOLOGY  [address]  [facility] [address] [phone].    Before we get started may I verify your date of birth? 1935    I am calling to officially welcome you to our practice and ask about your recent visit. Is this a good time to talk? Yes    Tell me about your visit with us. What things went well?  Visit was great. We are leaving with a plan       We're always looking for ways to make our patients' experiences even better. Do you have recommendations on ways we may improve? No  Overall were you satisfied with your first visit to our practice? Yes       I appreciate you taking the time to speak with me today. Is there anything else I can do for you? No      Thank you, and have a great day.

## 2022-02-18 DIAGNOSIS — Z01.812 BLOOD TESTS PRIOR TO TREATMENT OR PROCEDURE: Primary | ICD-10-CM

## 2022-02-21 ENCOUNTER — CLINICAL SUPPORT NO REQUIREMENTS (OUTPATIENT)
Dept: PULMONOLOGY | Facility: CLINIC | Age: 87
End: 2022-02-21

## 2022-02-21 DIAGNOSIS — Z01.812 BLOOD TESTS PRIOR TO TREATMENT OR PROCEDURE: ICD-10-CM

## 2022-02-21 PROCEDURE — 99211 OFF/OP EST MAY X REQ PHY/QHP: CPT | Performed by: INTERNAL MEDICINE

## 2022-02-21 PROCEDURE — U0004 COV-19 TEST NON-CDC HGH THRU: HCPCS | Performed by: INTERNAL MEDICINE

## 2022-02-21 PROCEDURE — U0005 INFEC AGEN DETEC AMPLI PROBE: HCPCS | Performed by: INTERNAL MEDICINE

## 2022-02-22 LAB — SARS-COV-2 RNA NOSE QL NAA+PROBE: NOT DETECTED

## 2022-02-23 ENCOUNTER — HOSPITAL ENCOUNTER (OUTPATIENT)
Dept: CARDIOLOGY | Facility: HOSPITAL | Age: 87
Discharge: HOME | End: 2022-02-23

## 2022-02-23 ENCOUNTER — OFFICE VISIT (OUTPATIENT)
Dept: PULMONOLOGY | Facility: CLINIC | Age: 87
End: 2022-02-23

## 2022-02-23 VITALS
HEART RATE: 50 BPM | WEIGHT: 189 LBS | BODY MASS INDEX: 27.99 KG/M2 | DIASTOLIC BLOOD PRESSURE: 72 MMHG | OXYGEN SATURATION: 97 % | HEIGHT: 69 IN | SYSTOLIC BLOOD PRESSURE: 114 MMHG

## 2022-02-23 VITALS
DIASTOLIC BLOOD PRESSURE: 70 MMHG | BODY MASS INDEX: 28.14 KG/M2 | HEART RATE: 55 BPM | SYSTOLIC BLOOD PRESSURE: 116 MMHG | TEMPERATURE: 97.8 F | OXYGEN SATURATION: 99 % | WEIGHT: 190 LBS | HEIGHT: 69 IN

## 2022-02-23 VITALS — HEIGHT: 69 IN | BODY MASS INDEX: 27.99 KG/M2 | WEIGHT: 189 LBS

## 2022-02-23 DIAGNOSIS — I25.119 CORONARY ARTERY DISEASE INVOLVING NATIVE CORONARY ARTERY OF NATIVE HEART WITH ANGINA PECTORIS: ICD-10-CM

## 2022-02-23 DIAGNOSIS — J84.10 PULMONARY FIBROSIS: ICD-10-CM

## 2022-02-23 DIAGNOSIS — R06.02 SHORTNESS OF BREATH: ICD-10-CM

## 2022-02-23 DIAGNOSIS — R60.0 LOWER EXTREMITY EDEMA: ICD-10-CM

## 2022-02-23 DIAGNOSIS — R06.02 SHORTNESS OF BREATH: Primary | ICD-10-CM

## 2022-02-23 DIAGNOSIS — K21.9 GERD WITHOUT ESOPHAGITIS: ICD-10-CM

## 2022-02-23 LAB
BH CV ECHO MEAS - AO ROOT AREA (BSA CORRECTED): 1.8
BH CV ECHO MEAS - AO ROOT AREA: 9.8 CM^2
BH CV ECHO MEAS - AO ROOT DIAM: 3.5 CM
BH CV ECHO MEAS - ASC AORTA: 3.1 CM
BH CV ECHO MEAS - BSA(HAYCOCK): 2.1 M^2
BH CV ECHO MEAS - BSA: 2 M^2
BH CV ECHO MEAS - BZI_BMI: 27.9 KILOGRAMS/M^2
BH CV ECHO MEAS - BZI_METRIC_HEIGHT: 175.3 CM
BH CV ECHO MEAS - BZI_METRIC_WEIGHT: 85.7 KG
BH CV ECHO MEAS - EDV(CUBED): 120.1 ML
BH CV ECHO MEAS - EDV(MOD-SP2): 128 ML
BH CV ECHO MEAS - EDV(MOD-SP4): 164 ML
BH CV ECHO MEAS - EDV(TEICH): 114.6 ML
BH CV ECHO MEAS - EF(CUBED): 69.8 %
BH CV ECHO MEAS - EF(MOD-BP): 60 %
BH CV ECHO MEAS - EF(MOD-SP2): 68.8 %
BH CV ECHO MEAS - EF(MOD-SP4): 56.1 %
BH CV ECHO MEAS - EF(TEICH): 61.2 %
BH CV ECHO MEAS - ESV(CUBED): 36.3 ML
BH CV ECHO MEAS - ESV(MOD-SP2): 40 ML
BH CV ECHO MEAS - ESV(MOD-SP4): 72 ML
BH CV ECHO MEAS - ESV(TEICH): 44.5 ML
BH CV ECHO MEAS - FS: 32.9 %
BH CV ECHO MEAS - IVS/LVPW: 0.97
BH CV ECHO MEAS - IVSD: 0.95 CM
BH CV ECHO MEAS - LA DIMENSION: 4.1 CM
BH CV ECHO MEAS - LA/AO: 1.2
BH CV ECHO MEAS - LAD MAJOR: 5.9 CM
BH CV ECHO MEAS - LAT PEAK E' VEL: 8.9 CM/SEC
BH CV ECHO MEAS - LATERAL E/E' RATIO: 8.4
BH CV ECHO MEAS - LV DIASTOLIC VOL/BSA (35-75): 81.3 ML/M^2
BH CV ECHO MEAS - LV MASS(C)D: 170.9 GRAMS
BH CV ECHO MEAS - LV MASS(C)DI: 84.7 GRAMS/M^2
BH CV ECHO MEAS - LV MAX PG: 1.7 MMHG
BH CV ECHO MEAS - LV MEAN PG: 0.63 MMHG
BH CV ECHO MEAS - LV SYSTOLIC VOL/BSA (12-30): 35.7 ML/M^2
BH CV ECHO MEAS - LV V1 MAX: 65.2 CM/SEC
BH CV ECHO MEAS - LV V1 MEAN: 36 CM/SEC
BH CV ECHO MEAS - LV V1 VTI: 22.8 CM
BH CV ECHO MEAS - LVIDD: 4.9 CM
BH CV ECHO MEAS - LVIDS: 3.3 CM
BH CV ECHO MEAS - LVLD AP2: 8.2 CM
BH CV ECHO MEAS - LVLD AP4: 8.7 CM
BH CV ECHO MEAS - LVLS AP2: 6.1 CM
BH CV ECHO MEAS - LVLS AP4: 7.5 CM
BH CV ECHO MEAS - LVOT AREA (M): 3.5 CM^2
BH CV ECHO MEAS - LVOT AREA: 3.5 CM^2
BH CV ECHO MEAS - LVOT DIAM: 2.1 CM
BH CV ECHO MEAS - LVPWD: 0.98 CM
BH CV ECHO MEAS - MED PEAK E' VEL: 8.8 CM/SEC
BH CV ECHO MEAS - MEDIAL E/E' RATIO: 8.5
BH CV ECHO MEAS - MV A MAX VEL: 53.3 CM/SEC
BH CV ECHO MEAS - MV DEC SLOPE: 385.8 CM/SEC^2
BH CV ECHO MEAS - MV DEC TIME: 0.17 SEC
BH CV ECHO MEAS - MV E MAX VEL: 76 CM/SEC
BH CV ECHO MEAS - MV E/A: 1.4
BH CV ECHO MEAS - MV MAX PG: 4.2 MMHG
BH CV ECHO MEAS - MV MEAN PG: 1.1 MMHG
BH CV ECHO MEAS - MV P1/2T MAX VEL: 101.3 CM/SEC
BH CV ECHO MEAS - MV P1/2T: 76.9 MSEC
BH CV ECHO MEAS - MV V2 MAX: 102.6 CM/SEC
BH CV ECHO MEAS - MV V2 MEAN: 42.9 CM/SEC
BH CV ECHO MEAS - MV V2 VTI: 38.3 CM
BH CV ECHO MEAS - MVA P1/2T LCG: 2.2 CM^2
BH CV ECHO MEAS - MVA(P1/2T): 2.9 CM^2
BH CV ECHO MEAS - MVA(VTI): 2.1 CM^2
BH CV ECHO MEAS - PA ACC SLOPE: 237.3 CM/SEC^2
BH CV ECHO MEAS - PA ACC TIME: 0.22 SEC
BH CV ECHO MEAS - PA PR(ACCEL): -18.4 MMHG
BH CV ECHO MEAS - PI END-D VEL: 93.7 CM/SEC
BH CV ECHO MEAS - RAP SYSTOLE: 34 MMHG
BH CV ECHO MEAS - RVSP: 8 MMHG
BH CV ECHO MEAS - SI(CUBED): 41.6 ML/M^2
BH CV ECHO MEAS - SI(LVOT): 39.9 ML/M^2
BH CV ECHO MEAS - SI(MOD-SP2): 43.6 ML/M^2
BH CV ECHO MEAS - SI(MOD-SP4): 45.6 ML/M^2
BH CV ECHO MEAS - SI(TEICH): 34.8 ML/M^2
BH CV ECHO MEAS - SV(CUBED): 83.8 ML
BH CV ECHO MEAS - SV(LVOT): 80.4 ML
BH CV ECHO MEAS - SV(MOD-SP2): 88 ML
BH CV ECHO MEAS - SV(MOD-SP4): 92 ML
BH CV ECHO MEAS - SV(TEICH): 70.2 ML
BH CV ECHO MEAS - TAPSE (>1.6): 2.6 CM
BH CV ECHO MEAS - TR MAX PG: 26 MMHG
BH CV ECHO MEAS - TR MAX VEL: 242.4 CM/SEC
BH CV ECHO MEASUREMENTS AVERAGE E/E' RATIO: 8.59
BH CV REST NUCLEAR ISOTOPE DOSE: 9.4 MCI
BH CV STRESS BP STAGE 2: NORMAL
BH CV STRESS BP STAGE 4: NORMAL
BH CV STRESS COMMENTS STAGE 1: NORMAL
BH CV STRESS DOSE REGADENOSON STAGE 1: 0.4
BH CV STRESS DURATION MIN STAGE 1: 1
BH CV STRESS DURATION MIN STAGE 2: 1
BH CV STRESS DURATION MIN STAGE 3: 1
BH CV STRESS DURATION MIN STAGE 4: 1
BH CV STRESS DURATION SEC STAGE 1: 0
BH CV STRESS DURATION SEC STAGE 2: 0
BH CV STRESS DURATION SEC STAGE 3: 0
BH CV STRESS DURATION SEC STAGE 4: 0
BH CV STRESS HR STAGE 1: 49
BH CV STRESS HR STAGE 2: 75
BH CV STRESS HR STAGE 3: 77
BH CV STRESS HR STAGE 4: 76
BH CV STRESS NUCLEAR ISOTOPE DOSE: 31.7 MCI
BH CV STRESS O2 STAGE 1: 98
BH CV STRESS O2 STAGE 2: 98
BH CV STRESS O2 STAGE 3: 99
BH CV STRESS O2 STAGE 4: 99
BH CV STRESS PROTOCOL 1: NORMAL
BH CV STRESS RECOVERY BP: NORMAL MMHG
BH CV STRESS RECOVERY HR: 68 BPM
BH CV STRESS RECOVERY O2: 99 %
BH CV STRESS STAGE 1: 1
BH CV STRESS STAGE 2: 2
BH CV STRESS STAGE 3: 3
BH CV STRESS STAGE 4: 4
BH CV VAS BP LEFT ARM: NORMAL MMHG
BH CV XLRA - RV BASE: 4.9 CM
BH CV XLRA - RV LENGTH: 6.2 CM
BH CV XLRA - RV MID: 4 CM
BH CV XLRA - TDI S': 15.9 CM/SEC
LEFT ATRIUM VOLUME INDEX: 19.8 ML/M2
LV EF 2D ECHO EST: 60 %
LV EF NUC BP: 72 %
MAXIMAL PREDICTED HEART RATE: 134 BPM
MAXIMAL PREDICTED HEART RATE: 134 BPM
PERCENT MAX PREDICTED HR: 58.21 %
STRESS BASELINE BP: NORMAL MMHG
STRESS BASELINE HR: 50 BPM
STRESS O2 SAT REST: 97 %
STRESS PERCENT HR: 68 %
STRESS POST ESTIMATED WORKLOAD: 1 METS
STRESS POST EXERCISE DUR MIN: 4 MIN
STRESS POST EXERCISE DUR SEC: 0 SEC
STRESS POST O2 SAT PEAK: 99 %
STRESS POST PEAK BP: NORMAL MMHG
STRESS POST PEAK HR: 78 BPM
STRESS TARGET HR: 114 BPM
STRESS TARGET HR: 114 BPM

## 2022-02-23 PROCEDURE — 93017 CV STRESS TEST TRACING ONLY: CPT

## 2022-02-23 PROCEDURE — 78452 HT MUSCLE IMAGE SPECT MULT: CPT

## 2022-02-23 PROCEDURE — 78452 HT MUSCLE IMAGE SPECT MULT: CPT | Performed by: INTERNAL MEDICINE

## 2022-02-23 PROCEDURE — 25010000002 REGADENOSON 0.4 MG/5ML SOLUTION: Performed by: NURSE PRACTITIONER

## 2022-02-23 PROCEDURE — 93306 TTE W/DOPPLER COMPLETE: CPT | Performed by: INTERNAL MEDICINE

## 2022-02-23 PROCEDURE — 99204 OFFICE O/P NEW MOD 45 MIN: CPT | Performed by: INTERNAL MEDICINE

## 2022-02-23 PROCEDURE — 93018 CV STRESS TEST I&R ONLY: CPT | Performed by: INTERNAL MEDICINE

## 2022-02-23 PROCEDURE — A9500 TC99M SESTAMIBI: HCPCS | Performed by: NURSE PRACTITIONER

## 2022-02-23 PROCEDURE — 93306 TTE W/DOPPLER COMPLETE: CPT

## 2022-02-23 PROCEDURE — 0 TECHNETIUM SESTAMIBI: Performed by: NURSE PRACTITIONER

## 2022-02-23 PROCEDURE — 94010 BREATHING CAPACITY TEST: CPT | Performed by: INTERNAL MEDICINE

## 2022-02-23 RX ORDER — FLUTICASONE PROPIONATE 50 MCG
SPRAY, SUSPENSION (ML) NASAL
COMMUNITY
End: 2022-02-23

## 2022-02-23 RX ADMIN — TECHNETIUM TC 99M SESTAMIBI 1 DOSE: 1 INJECTION INTRAVENOUS at 10:05

## 2022-02-23 RX ADMIN — REGADENOSON 0.4 MG: 0.08 INJECTION, SOLUTION INTRAVENOUS at 10:01

## 2022-02-23 RX ADMIN — TECHNETIUM TC 99M SESTAMIBI 1 DOSE: 1 INJECTION INTRAVENOUS at 08:00

## 2022-02-23 NOTE — PROGRESS NOTES
"     New Pulmonary Patient Office Visit      Patient Name: Ramin Zaragoza    Referring Physician: Rubin Bentley MD    Chief Complaint:    Chief Complaint   Patient presents with   • Abnormal Chest X-ray       History of Present Illness: Ramin Zaragoza is a 86 y.o. male who is here today to establish care with Pulmonary.     86-year-old male seen here for initial valuation.  Patient with past medical history of hypothyroidism, dementia, GERD, dyslipidemia, hearing loss.  Patient was recently seen in the emergency room for nonspecific chest pain.  Underwent further work-up including CT angiogram which did not reveal any pulmonary embolism but moderate hiatal hernia noted and some basilar fibrotic changes noted.  Patient was also having significant peripheral edema.  Patient was referred to cardiology as well as pulmonary for further evaluation.    Patient came today for follow-up.  He denies any acute pulmonary issues.  Denies any chronic cough, sputum production, hemoptysis.  Denies any fevers, chills or night sweats.  Denies any change in appetite or weight loss.  Does get short of breath with the activity.  He is very difficult to obtain history from as he is extremely hard of hearing and kept on saying \" I think I am pretty healthy\".  Denies any frequent pneumonias or hospitalization.  He lives in [facility].    Patient denies any history of smoking.  His father used to smoke but patient smoked and denies any significant secondhand smoke exposure.  No pets or birds exposure.  Used to be .  Denies any asbestos exposure in the past either.      Subjective      Review of Systems:   Review of Systems   Constitutional: Positive for fatigue.   HENT: Negative.    Respiratory: Positive for shortness of breath.    Cardiovascular: Negative.    Gastrointestinal: Negative.    Endocrine: Negative.    Musculoskeletal: Positive for gait problem.   Skin: Negative.    Hematological: Negative.  "   Psychiatric/Behavioral: Negative.    All other systems reviewed and are negative.      Past Medical History:   Past Medical History:   Diagnosis Date   • CAD (coronary artery disease)    • Hyperlipidemia    • Hypertension    • Hypothyroidism     on chronic replacement therapy   • Lactose intolerance    • Lower extremity edema    • PVC's (premature ventricular contractions)     History of   • Thyroid disorder    • Venous insufficiency        Past Surgical History:   Past Surgical History:   Procedure Laterality Date   • CARDIAC CATHETERIZATION     • CORONARY STENT PLACEMENT     • EYE SURGERY      Bilateral cataract extraction   • HERNIA REPAIR      Inguinal hernia repair   • OTHER SURGICAL HISTORY      Melanoma removed from back   • OTHER SURGICAL HISTORY      History of left elbow fracture with sunsequent fixation       Family History:   Family History   Problem Relation Age of Onset   • No Known Problems Mother    • No Known Problems Father        Social History:   Social History     Socioeconomic History   • Marital status:    Tobacco Use   • Smoking status: Never Smoker   • Smokeless tobacco: Never Used   Vaping Use   • Vaping Use: Never used   Substance and Sexual Activity   • Alcohol use: No   • Drug use: No   • Sexual activity: Defer       Medications:     Current Outpatient Medications:   •  acetaminophen (TYLENOL) 325 MG tablet, Take 2 tablets by mouth Every 4 (Four) Hours As Needed for Mild Pain ., Disp: , Rfl:   •  aspirin 81 MG EC tablet, Take 81 mg by mouth daily., Disp: , Rfl:   •  Diclofenac Sodium (VOLTAREN) 1 % gel gel, Apply 2 g topically to the appropriate area as directed 4 (Four) Times a Day., Disp: , Rfl:   •  divalproex (DEPAKOTE) 250 MG DR tablet, Take 1 tablet by mouth Every 12 (Twelve) Hours., Disp: , Rfl:   •  donepezil (ARICEPT) 10 MG tablet, Take 1 tablet by mouth Every Night., Disp: , Rfl:   •  fluticasone (FLONASE) 50 MCG/ACT nasal spray, 2 sprays into each nostril daily.  "Administer 2 sprays in each nostril for each dose., Disp: , Rfl:   •  levothyroxine (SYNTHROID, LEVOTHROID) 50 MCG tablet, Take 1 tablet by mouth Every Morning., Disp: , Rfl:   •  lidocaine (LIDODERM) 5 %, Place 1 patch on the skin as directed by provider Daily. Remove & Discard patch within 12 hours or as directed by MD, Disp: , Rfl:   •  lisinopril (PRINIVIL,ZESTRIL) 2.5 MG tablet, TAKE 1 TABLET BY MOUTH ONCE DAILY, Disp: 90 tablet, Rfl: 1  •  melatonin 5 MG tablet tablet, Take 1 tablet by mouth At Night As Needed (sleep)., Disp: , Rfl:   •  nitroglycerin (NITROSTAT) 0.4 MG SL tablet, Place 1 tablet under the tongue every 5 (five) minutes as needed for chest pain. Take no more than 3 doses in 15 minutes., Disp: 25 tablet, Rfl: 11  •  omeprazole (priLOSEC) 20 MG capsule, Take 20 mg by mouth Daily., Disp: , Rfl:   •  QUEtiapine (SEROquel) 50 MG tablet, Take 1 tablet by mouth Every Night., Disp: , Rfl:   •  sennosides-docusate (PERICOLACE) 8.6-50 MG per tablet, Take 2 tablets by mouth 2 (Two) Times a Day As Needed for Constipation., Disp: , Rfl:   •  simvastatin (Zocor) 20 MG tablet, Take 20 mg by mouth Every Night., Disp: , Rfl:   •  sucralfate (CARAFATE) 1 g tablet, Take 1 g by mouth 2 (Two) Times a Day., Disp: , Rfl:   No current facility-administered medications for this visit.    Allergies:   Allergies   Allergen Reactions   • Flomax [Tamsulosin] Other (See Comments)     Unknown     • Lactose Intolerance (Gi)        Objective     Physical Exam:  Vital Signs:   Vitals:    02/23/22 1312   BP: 116/70   Pulse: 55   Temp: 97.8 °F (36.6 °C)   SpO2: 99%   Weight: 86.2 kg (190 lb)   Height: 175.3 cm (69\")       Physical Exam  Vitals and nursing note reviewed.   Constitutional:       General: He is not in acute distress.     Appearance: He is well-developed. He is not diaphoretic.   HENT:      Head: Normocephalic and atraumatic.      Comments: Mallampati 2 airway     Nose: Nose normal.      Mouth/Throat:      Pharynx: No " oropharyngeal exudate.   Eyes:      General: No scleral icterus.        Right eye: No discharge.         Left eye: No discharge.      Pupils: Pupils are equal, round, and reactive to light.   Neck:      Thyroid: No thyromegaly.      Trachea: No tracheal deviation.   Cardiovascular:      Rate and Rhythm: Normal rate and regular rhythm.      Heart sounds: Normal heart sounds. No murmur heard.  No friction rub. No gallop.    Pulmonary:      Effort: Pulmonary effort is normal. No respiratory distress.      Breath sounds: No stridor. Rales (intermittent rales bases L>R.) present. No wheezing.   Abdominal:      General: There is no distension.      Palpations: Abdomen is soft. There is no mass.      Tenderness: There is no abdominal tenderness. There is no guarding.   Musculoskeletal:         General: No tenderness.      Cervical back: Neck supple.      Right lower leg: Edema (2-3+) present.      Left lower leg: Edema (2-3+) present.      Comments: Clubbing: none   Lymphadenopathy:      Cervical: No cervical adenopathy.   Neurological:      Mental Status: He is alert. He is disoriented.      Cranial Nerves: No cranial nerve deficit.   Psychiatric:         Behavior: Behavior normal.         Results Review:   I reviewed the patient's new clinical results.    PFTs reviewed personally and patient had very difficult time understanding the testing.  Spirometry was done which showed normal spirometry with FEV1 2.43 L, 98% predicted.  Patient could not perform DLCO testing or plethysmography despite multiple attempts.    Lab Results   Component Value Date    WBC 6.11 01/11/2022    HGB 12.1 (L) 01/11/2022    HCT 35.4 (L) 01/11/2022    MCV 93.7 01/11/2022     01/11/2022       Lab Results   Component Value Date    GLUCOSE 105 (H) 01/11/2022    CALCIUM 9.0 01/11/2022     01/11/2022    K 4.5 01/11/2022    CO2 26.0 01/11/2022     01/11/2022    BUN 19 01/11/2022    CREATININE 0.80 01/11/2022    EGFRIFNONA 92  01/11/2022    BCR 23.8 01/11/2022    ANIONGAP 9.0 01/11/2022     Lab Results   Component Value Date    TSH 7.830 (H) 11/28/2021     CT chest reviewed personally and showed basilar fibrotic changes but nonspecific as to etiology and does not fit with UIP or NSIP pattern.    Previous chest x-ray is reviewed and I do not see any significant worsening of fibrotic changes.    Assessment / Plan      Assessment:   Problem List Items Addressed This Visit     None      Visit Diagnoses     Shortness of breath    -  Primary    Relevant Orders    XR Chest Pa    Spirometry Without Bronchodilator (Completed)    Pulmonary fibrosis (HCC)        GERD without esophagitis              Plan:   1.  Patient with pulmonary fibrosis noted on CT scan of the chest.  I did review old films including chest x-rays as well as lower cuts on abdominal CT scans in the past.  On the CT scan there seems to be slight worsening of interstitial changes in the lower lobes.  However pattern is not fitting with a UIP or NSIP pattern.  Could be just pulmonary vascular congestion and pulmonary edema leading to these changes as well.  Did not see any significant pleural effusions however.  Discussed with the patient that since he is asymptomatic I think we will need to keep an eye on things.  If this gets worse may need to consider antifibrotic treatment.  Given patient's advanced age and other underlying conditions such as dementia and some behavioral problems may not be the best plan with antifibrotic agents which can have some serious side effects.  We will need to have more meaningful discussion with patient and his famil regarding those choices.  If these findings are just chronic changes either from his old reflux or previous exposures then hopefully we will not see any further worsening which may not necessitate any treatment.  Reviewed this with patient and him not certain how much she will understand but he is comfortable with this plan.  I will get  him back in 5 to 6 months and will do a high-resolution CT scan to further evaluate lung parenchyma.    2.  Continue follow-up with cardiology for significant peripheral edema and what appears like diastolic dysfunction.  Echocardiogram is pending.    3.  Monitor salt intake and consider diuresis versus lymphedema wraps or significant peripheral edema.    4.  Continue antireflux measures as untreated reflux can worsen basilar pulmonary fibrosis.    We will follow patient closely.  Thank you for allowing me to participate in the care of this patient.    Follow Up:   5-6 months with HRCT    Discussed plan of care in detail with patient today. Patient verbally understands and agrees.      Pedro Luis Erickson MD  Pulmonary Critical Care and Sleep Medicine

## 2022-02-24 ENCOUNTER — TELEPHONE (OUTPATIENT)
Dept: CARDIOLOGY | Facility: HOSPITAL | Age: 87
End: 2022-02-24

## 2022-02-24 DIAGNOSIS — J84.9 ILD (INTERSTITIAL LUNG DISEASE): Primary | ICD-10-CM

## 2022-02-24 NOTE — TELEPHONE ENCOUNTER
Attempted to call patient to review cardiac testing results.      Left message for patient to call back    Pt has f/u scheduled with Dr. Marley next week.

## 2022-02-25 ENCOUNTER — TRANSCRIBE ORDERS (OUTPATIENT)
Dept: ADMINISTRATIVE | Facility: HOSPITAL | Age: 87
End: 2022-02-25

## 2022-02-25 ENCOUNTER — HOSPITAL ENCOUNTER (OUTPATIENT)
Dept: GENERAL RADIOLOGY | Facility: HOSPITAL | Age: 87
Discharge: HOME | End: 2022-02-25
Admitting: FAMILY MEDICINE

## 2022-02-25 DIAGNOSIS — M25.512 LEFT ANTERIOR SHOULDER PAIN: Primary | ICD-10-CM

## 2022-02-25 PROCEDURE — 73030 X-RAY EXAM OF SHOULDER: CPT

## 2022-02-25 NOTE — TELEPHONE ENCOUNTER
Able to reach daughter.  Reviewed test results. Discussed abnormal stress test.  Pt with hx of CAD and cardiac stents.  Discussed med management vs repeat LHC.   Pt currently in memory care.  They will f/u next week with Dr. Marley to discuss POC.

## 2022-03-02 ENCOUNTER — OFFICE VISIT (OUTPATIENT)
Dept: CARDIOLOGY | Facility: CLINIC | Age: 87
End: 2022-03-02

## 2022-03-02 VITALS
HEART RATE: 52 BPM | DIASTOLIC BLOOD PRESSURE: 66 MMHG | OXYGEN SATURATION: 96 % | SYSTOLIC BLOOD PRESSURE: 124 MMHG | BODY MASS INDEX: 28.18 KG/M2 | HEIGHT: 70 IN | WEIGHT: 196.8 LBS

## 2022-03-02 DIAGNOSIS — I49.3 PREMATURE VENTRICULAR CONTRACTION: ICD-10-CM

## 2022-03-02 DIAGNOSIS — Z79.899 LONG-TERM USE OF HIGH-RISK MEDICATION: ICD-10-CM

## 2022-03-02 DIAGNOSIS — E78.2 MIXED HYPERLIPIDEMIA: ICD-10-CM

## 2022-03-02 DIAGNOSIS — I10 ESSENTIAL HYPERTENSION: ICD-10-CM

## 2022-03-02 DIAGNOSIS — I25.119 CORONARY ARTERY DISEASE INVOLVING NATIVE CORONARY ARTERY OF NATIVE HEART WITH ANGINA PECTORIS: Primary | ICD-10-CM

## 2022-03-02 PROCEDURE — 99214 OFFICE O/P EST MOD 30 MIN: CPT | Performed by: INTERNAL MEDICINE

## 2022-03-02 RX ORDER — DICLOFENAC SODIUM 75 MG/1
75 TABLET, DELAYED RELEASE ORAL 2 TIMES DAILY
COMMUNITY
End: 2023-01-21 | Stop reason: HOSPADM

## 2022-03-02 RX ORDER — TRAMADOL HYDROCHLORIDE 50 MG/1
50 TABLET ORAL 2 TIMES DAILY PRN
COMMUNITY
End: 2023-01-21 | Stop reason: HOSPADM

## 2022-03-02 RX ORDER — BUMETANIDE 0.5 MG/1
0.5 TABLET ORAL DAILY
Qty: 30 TABLET | Refills: 1 | Status: SHIPPED | OUTPATIENT
Start: 2022-03-02 | End: 2022-04-28 | Stop reason: SDUPTHER

## 2022-03-02 NOTE — PROGRESS NOTES
[facility]    Patient ID: Ramin Zaragoza is a 86 y.o. male.  : 1935   Contact: [phone]    Encounter date: 2022    PCP: Tez Santos MD      Chief complaint:   Chief Complaint   Patient presents with   • Coronary artery disease involving native coronary artery of        Problem List:  1. Coronary artery disease:   a. History of stents to the LAD and PDA , , . Data deficit.  b. Abnormal nuclear perfusion study, 2014, revealing a moderate reversible defect involving the apex and basal mid inferior wall.    c. Cardiac catheterization, 2014, by Dr. Emerson revealing normal LV systolic function of 55% to 60% with patent stents to the LAD and PDA.   d. MPS, 2022: EF 72%. Positive Cardiolite for inducible ischemia in the distal anterior and apical myocardial segments. Baseline ST abnormality unchanged with infusion.  e. Echocardiogram, 2022: EF 60%. Mild MR and TR with normal RVSP. Mild PI.  2. History of PVCs.    3. Hypertension.    4. Hyperlipidemia.    a. FLP 19: Trig 77, , LDL 53, HDL 43.   5. Venous insufficiency:  a. Status post right lower extremity great saphenous vein ablation in .    b. Negative DVT for thrombus, May 2015.  6. Hypothyroidism on chronic replacement therapy.    7. Lactose intolerance.    8. Lower extremity edema.    9. Surgical history:   a. Inguinal hernia repair.  b. Bilateral cataract extraction.  c. Melanoma removal from the back.    d. History of left elbow fracture with subsequent fixation.     Allergies   Allergen Reactions   • Flomax [Tamsulosin] Other (See Comments)     Unknown     • Lactose Intolerance (Gi)        Current Medications:    Current Outpatient Medications:   •  acetaminophen (TYLENOL) 325 MG tablet, Take 2 tablets by mouth Every 4 (Four) Hours As Needed for Mild Pain ., Disp: , Rfl:   •  aspirin 81 MG EC tablet, Take 81 mg by mouth daily., Disp: , Rfl:   •  diclofenac  (VOLTAREN) 75 MG EC tablet, Take 75 mg by mouth 2 (Two) Times a Day., Disp: , Rfl:   •  divalproex (DEPAKOTE) 250 MG DR tablet, Take 1 tablet by mouth Every 12 (Twelve) Hours., Disp: , Rfl:   •  donepezil (ARICEPT) 10 MG tablet, Take 1 tablet by mouth Every Night., Disp: , Rfl:   •  fluticasone (FLONASE) 50 MCG/ACT nasal spray, 2 sprays into each nostril daily. Administer 2 sprays in each nostril for each dose., Disp: , Rfl:   •  lisinopril (PRINIVIL,ZESTRIL) 2.5 MG tablet, TAKE 1 TABLET BY MOUTH ONCE DAILY, Disp: 90 tablet, Rfl: 1  •  nitroglycerin (NITROSTAT) 0.4 MG SL tablet, Place 1 tablet under the tongue every 5 (five) minutes as needed for chest pain. Take no more than 3 doses in 15 minutes., Disp: 25 tablet, Rfl: 11  •  omeprazole (priLOSEC) 20 MG capsule, Take 20 mg by mouth Daily., Disp: , Rfl:   •  QUEtiapine (SEROquel) 50 MG tablet, Take 1 tablet by mouth Every Night., Disp: , Rfl:   •  simvastatin (Zocor) 20 MG tablet, Take 20 mg by mouth Every Night., Disp: , Rfl:   •  traMADol (ULTRAM) 50 MG tablet, Take 50 mg by mouth 2 (Two) Times a Day As Needed for Moderate Pain ., Disp: , Rfl:     HPI    Ramin Zaragoza is a 86 y.o. male who presents today for a follow up of CAD, PVCs, and cardiac risk factors. Since last visit, he had a 16-day [facility] admission for weakness and recurrent falls due to general decline. He currently resides in a memory care unit. He had an episode of chest pain which undergoing a dental procedure in which he was administered lidocaine with epinephrine. He was instructed to follow up with his cardiologist and pulmonologist. He will need full mouth dental extraction and dentures. He walks occasionally through his nursing home but is not very active on a regular basis.      The following portions of the patient's history were reviewed and updated as appropriate: allergies, current medications and problem list.    Pertinent positives as listed in the HPI.  All other systems reviewed are  "negative.         Vitals:    03/02/22 1539   BP: 124/66   BP Location: Left arm   Patient Position: Sitting   Pulse: 52   SpO2: 96%   Weight: 89.3 kg (196 lb 12.8 oz)   Height: 176.5 cm (69.5\")       Physical Exam:  General: Alert and oriented.  Neck: Jugular venous pressure is within normal limits. Carotids have normal upstrokes without bruits.   Cardiovascular: Heart has a nondisplaced focal PMI. Regular rate and rhythm. No murmur, gallop or rub.  Lungs: Clear, no rales or wheezes. Equal expansion is noted.   Extremities: 1-2+ edema in bilateral lower extremities  Skin: Warm and dry.  Neurologic: Nonfocal.     Diagnostic Data (reviewed with patient):  Lab Results   Component Value Date    GLUCOSE 105 (H) 01/11/2022    CALCIUM 9.0 01/11/2022     01/11/2022    K 4.5 01/11/2022    CO2 26.0 01/11/2022     01/11/2022    BUN 19 01/11/2022    CREATININE 0.80 01/11/2022    EGFRIFNONA 92 01/11/2022    BCR 23.8 01/11/2022    ANIONGAP 9.0 01/11/2022      Lab Results   Component Value Date    GLUCOSE 105 (H) 01/11/2022    BUN 19 01/11/2022    CREATININE 0.80 01/11/2022    EGFRIFNONA 92 01/11/2022    BCR 23.8 01/11/2022     01/11/2022    K 4.5 01/11/2022     01/11/2022    CO2 26.0 01/11/2022    CALCIUM 9.0 01/11/2022    ALBUMIN 3.30 (L) 01/11/2022    ALKPHOS 67 01/11/2022    AST 20 01/11/2022    ALT 13 01/11/2022      Lab Results   Component Value Date    WBC 6.11 01/11/2022    RBC 3.78 (L) 01/11/2022    HGB 12.1 (L) 01/11/2022    HCT 35.4 (L) 01/11/2022    MCV 93.7 01/11/2022     01/11/2022      Lab Results   Component Value Date    TSH 7.830 (H) 11/28/2021        Procedures      Assessment:    ICD-10-CM ICD-9-CM   1. Coronary artery disease involving native coronary artery of native heart with angina pectoris (HCC)  I25.119 414.01     413.9   2. Premature ventricular contraction  I49.3 427.69   3. Essential hypertension  I10 401.9   4. Mixed hyperlipidemia  E78.2 272.2         Plan:  1. Medical " management of the patient's abnormal stress test will be continued at this time.  He has not had any chest discomfort that he can remember.  For recurrent chest pain we would consider Imdur and Ranexa prior to cardiac catheterization given his age and mental status.  2. Initiate Bumex 0.5 mg daily we will recheck his BMP in 2 week.  3. Continue lisinopril for hypertension.  4. Continue aspirin for CAD and NTG PRN; we will consider initiating him on Imdur if he has persistent or progressive anginal-type chest pain.  5. Continue simvastatin for hyperlipidemia.  6. Recommend consultation with oral surgery to consider dental extraction.  7. Continue all other current medications.  8. F/up in 4 months, sooner if needed.    I, Mohinder Buck, attest that this documentation has been prepared under the direction and in the presence of Vesta Marley MD 03/02/2022    I Vesta Marley MD personally performed the services described in this documentation as scribed by the above individual in my presence, and it is both accurate and complete.    Vesta Marley MD, FACC\

## 2022-03-11 ENCOUNTER — HOSPITAL ENCOUNTER (OUTPATIENT)
Dept: CT IMAGING | Facility: HOSPITAL | Age: 87
Discharge: HOME | End: 2022-03-11
Admitting: NURSE PRACTITIONER

## 2022-03-11 DIAGNOSIS — J84.9 ILD (INTERSTITIAL LUNG DISEASE): ICD-10-CM

## 2022-03-11 PROCEDURE — 71250 CT THORAX DX C-: CPT

## 2022-03-21 ENCOUNTER — LAB (OUTPATIENT)
Dept: LAB | Facility: HOSPITAL | Age: 87
End: 2022-03-21

## 2022-03-21 DIAGNOSIS — Z79.899 LONG-TERM USE OF HIGH-RISK MEDICATION: ICD-10-CM

## 2022-03-21 LAB
ANION GAP SERPL CALCULATED.3IONS-SCNC: 8.6 MMOL/L (ref 5–15)
BUN SERPL-MCNC: 14 MG/DL (ref 8–23)
BUN/CREAT SERPL: 14 (ref 7–25)
CALCIUM SPEC-SCNC: 9.2 MG/DL (ref 8.6–10.5)
CHLORIDE SERPL-SCNC: 103 MMOL/L (ref 98–107)
CO2 SERPL-SCNC: 28.4 MMOL/L (ref 22–29)
CREAT SERPL-MCNC: 1 MG/DL (ref 0.76–1.27)
EGFRCR SERPLBLD CKD-EPI 2021: 73.3 ML/MIN/1.73
GLUCOSE SERPL-MCNC: 113 MG/DL (ref 65–99)
POTASSIUM SERPL-SCNC: 4 MMOL/L (ref 3.5–5.2)
SODIUM SERPL-SCNC: 140 MMOL/L (ref 136–145)

## 2022-03-21 PROCEDURE — 80048 BASIC METABOLIC PNL TOTAL CA: CPT

## 2022-03-21 PROCEDURE — 36415 COLL VENOUS BLD VENIPUNCTURE: CPT

## 2022-04-01 ENCOUNTER — TELEPHONE (OUTPATIENT)
Dept: CARDIOLOGY | Facility: CLINIC | Age: 87
End: 2022-04-01

## 2022-04-01 NOTE — TELEPHONE ENCOUNTER
Reached out to pt's daughter regarding his recent labs- asked about his swelling and she states that his legs are still swollen- he is at [facility] on the [address]- there are not nurses there and the staff only provide reminders for their medications. He is not wearing his support stockings, as the daughter reports that he is refusing to wear them.     I did speak with Jd at [facility]- one of the RA's confirmed that he has significant edema but is refusing his support stockings.     Was going to recommend per Dr. Marley's suggestion that he take an extra Bumex for 3 days but since they do not fill the mediplanners, they can not take med change orders.     PT's daughter said that she thought that [facility] filled his planner. I talked with Mahsa at [facility] and she states that they do not fill for him. [facility] do not fill planners for [facility].     I have reached out to Isatu, pt's daughter and left a msg to call me back to get clarification. Will await her return call

## 2022-04-05 ENCOUNTER — TELEPHONE (OUTPATIENT)
Dept: CARDIOLOGY | Facility: CLINIC | Age: 87
End: 2022-04-05

## 2022-04-28 RX ORDER — BUMETANIDE 0.5 MG/1
0.5 TABLET ORAL DAILY
Qty: 90 TABLET | Refills: 0 | Status: SHIPPED | OUTPATIENT
Start: 2022-04-28 | End: 2022-07-20

## 2022-07-20 RX ORDER — BUMETANIDE 0.5 MG/1
TABLET ORAL
Qty: 90 TABLET | Refills: 0 | Status: SHIPPED | OUTPATIENT
Start: 2022-07-20 | End: 2022-10-13

## 2022-07-20 NOTE — TELEPHONE ENCOUNTER
Lab Results   Component Value Date    GLUCOSE 113 (H) 03/21/2022    CALCIUM 9.2 03/21/2022     03/21/2022    K 4.0 03/21/2022    CO2 28.4 03/21/2022     03/21/2022    BUN 14 03/21/2022    CREATININE 1.00 03/21/2022    EGFRIFNONA 92 01/11/2022    BCR 14.0 03/21/2022    ANIONGAP 8.6 03/21/2022

## 2022-10-13 RX ORDER — BUMETANIDE 0.5 MG/1
TABLET ORAL
Qty: 90 TABLET | Refills: 0 | Status: SHIPPED | OUTPATIENT
Start: 2022-10-13

## 2022-11-30 ENCOUNTER — HOSPITAL ENCOUNTER (EMERGENCY)
Facility: HOSPITAL | Age: 87
Discharge: HOME | End: 2022-11-30
Attending: EMERGENCY MEDICINE | Admitting: EMERGENCY MEDICINE

## 2022-11-30 ENCOUNTER — APPOINTMENT (OUTPATIENT)
Dept: GENERAL RADIOLOGY | Facility: HOSPITAL | Age: 87
End: 2022-11-30

## 2022-11-30 VITALS
HEART RATE: 74 BPM | SYSTOLIC BLOOD PRESSURE: 128 MMHG | RESPIRATION RATE: 15 BRPM | OXYGEN SATURATION: 97 % | HEIGHT: 69 IN | DIASTOLIC BLOOD PRESSURE: 69 MMHG | BODY MASS INDEX: 29.03 KG/M2 | TEMPERATURE: 99.2 F | WEIGHT: 196 LBS

## 2022-11-30 DIAGNOSIS — U07.1 COVID-19: Primary | ICD-10-CM

## 2022-11-30 DIAGNOSIS — S40.011A CONTUSION OF RIGHT SHOULDER, INITIAL ENCOUNTER: ICD-10-CM

## 2022-11-30 DIAGNOSIS — M25.512 ACUTE PAIN OF LEFT SHOULDER: ICD-10-CM

## 2022-11-30 LAB
ALBUMIN SERPL-MCNC: 4 G/DL (ref 3.5–5.2)
ALBUMIN/GLOB SERPL: 1.5 G/DL
ALP SERPL-CCNC: 46 U/L (ref 39–117)
ALT SERPL W P-5'-P-CCNC: 11 U/L (ref 1–41)
ANION GAP SERPL CALCULATED.3IONS-SCNC: 11 MMOL/L (ref 5–15)
AST SERPL-CCNC: 20 U/L (ref 1–40)
BASOPHILS # BLD AUTO: 0.01 10*3/MM3 (ref 0–0.2)
BASOPHILS NFR BLD AUTO: 0.1 % (ref 0–1.5)
BILIRUB SERPL-MCNC: 0.5 MG/DL (ref 0–1.2)
BILIRUB UR QL STRIP: NEGATIVE
BUN SERPL-MCNC: 9 MG/DL (ref 8–23)
BUN/CREAT SERPL: 10.1 (ref 7–25)
CALCIUM SPEC-SCNC: 9.2 MG/DL (ref 8.6–10.5)
CHLORIDE SERPL-SCNC: 103 MMOL/L (ref 98–107)
CLARITY UR: CLEAR
CO2 SERPL-SCNC: 26 MMOL/L (ref 22–29)
COLOR UR: YELLOW
CREAT SERPL-MCNC: 0.89 MG/DL (ref 0.76–1.27)
DEPRECATED RDW RBC AUTO: 43.5 FL (ref 37–54)
EGFRCR SERPLBLD CKD-EPI 2021: 82.9 ML/MIN/1.73
EOSINOPHIL # BLD AUTO: 0.06 10*3/MM3 (ref 0–0.4)
EOSINOPHIL NFR BLD AUTO: 0.8 % (ref 0.3–6.2)
ERYTHROCYTE [DISTWIDTH] IN BLOOD BY AUTOMATED COUNT: 12.4 % (ref 12.3–15.4)
FLUAV RNA RESP QL NAA+PROBE: NOT DETECTED
FLUBV RNA RESP QL NAA+PROBE: NOT DETECTED
GLOBULIN UR ELPH-MCNC: 2.6 GM/DL
GLUCOSE SERPL-MCNC: 138 MG/DL (ref 65–99)
GLUCOSE UR STRIP-MCNC: NEGATIVE MG/DL
HCT VFR BLD AUTO: 39.7 % (ref 37.5–51)
HGB BLD-MCNC: 13.7 G/DL (ref 13–17.7)
HGB UR QL STRIP.AUTO: NEGATIVE
HOLD SPECIMEN: NORMAL
IMM GRANULOCYTES # BLD AUTO: 0.03 10*3/MM3 (ref 0–0.05)
IMM GRANULOCYTES NFR BLD AUTO: 0.4 % (ref 0–0.5)
KETONES UR QL STRIP: NEGATIVE
LEUKOCYTE ESTERASE UR QL STRIP.AUTO: NEGATIVE
LYMPHOCYTES # BLD AUTO: 1.02 10*3/MM3 (ref 0.7–3.1)
LYMPHOCYTES NFR BLD AUTO: 13.4 % (ref 19.6–45.3)
MAGNESIUM SERPL-MCNC: 1.7 MG/DL (ref 1.6–2.4)
MCH RBC QN AUTO: 33 PG (ref 26.6–33)
MCHC RBC AUTO-ENTMCNC: 34.5 G/DL (ref 31.5–35.7)
MCV RBC AUTO: 95.7 FL (ref 79–97)
MONOCYTES # BLD AUTO: 0.93 10*3/MM3 (ref 0.1–0.9)
MONOCYTES NFR BLD AUTO: 12.2 % (ref 5–12)
NEUTROPHILS NFR BLD AUTO: 5.57 10*3/MM3 (ref 1.7–7)
NEUTROPHILS NFR BLD AUTO: 73.1 % (ref 42.7–76)
NITRITE UR QL STRIP: NEGATIVE
NRBC BLD AUTO-RTO: 0 /100 WBC (ref 0–0.2)
PH UR STRIP.AUTO: 7 [PH] (ref 5–8)
PLATELET # BLD AUTO: 172 10*3/MM3 (ref 140–450)
PMV BLD AUTO: 9 FL (ref 6–12)
POTASSIUM SERPL-SCNC: 3.6 MMOL/L (ref 3.5–5.2)
PROT SERPL-MCNC: 6.6 G/DL (ref 6–8.5)
PROT UR QL STRIP: NEGATIVE
RBC # BLD AUTO: 4.15 10*6/MM3 (ref 4.14–5.8)
SARS-COV-2 RNA RESP QL NAA+PROBE: DETECTED
SODIUM SERPL-SCNC: 140 MMOL/L (ref 136–145)
SP GR UR STRIP: 1.01 (ref 1–1.03)
TROPONIN T SERPL-MCNC: <0.01 NG/ML (ref 0–0.03)
UROBILINOGEN UR QL STRIP: NORMAL
VALPROATE SERPL-MCNC: 68 MCG/ML (ref 50–125)
WBC NRBC COR # BLD: 7.62 10*3/MM3 (ref 3.4–10.8)
WHOLE BLOOD HOLD COAG: NORMAL
WHOLE BLOOD HOLD SPECIMEN: NORMAL

## 2022-11-30 PROCEDURE — 73030 X-RAY EXAM OF SHOULDER: CPT

## 2022-11-30 PROCEDURE — 99284 EMERGENCY DEPT VISIT MOD MDM: CPT

## 2022-11-30 PROCEDURE — 85025 COMPLETE CBC W/AUTO DIFF WBC: CPT | Performed by: EMERGENCY MEDICINE

## 2022-11-30 PROCEDURE — 83735 ASSAY OF MAGNESIUM: CPT | Performed by: EMERGENCY MEDICINE

## 2022-11-30 PROCEDURE — 71045 X-RAY EXAM CHEST 1 VIEW: CPT

## 2022-11-30 PROCEDURE — 81003 URINALYSIS AUTO W/O SCOPE: CPT | Performed by: EMERGENCY MEDICINE

## 2022-11-30 PROCEDURE — 84484 ASSAY OF TROPONIN QUANT: CPT | Performed by: EMERGENCY MEDICINE

## 2022-11-30 PROCEDURE — 80164 ASSAY DIPROPYLACETIC ACD TOT: CPT | Performed by: EMERGENCY MEDICINE

## 2022-11-30 PROCEDURE — 87636 SARSCOV2 & INF A&B AMP PRB: CPT | Performed by: EMERGENCY MEDICINE

## 2022-11-30 PROCEDURE — 80053 COMPREHEN METABOLIC PANEL: CPT | Performed by: EMERGENCY MEDICINE

## 2022-11-30 PROCEDURE — 93005 ELECTROCARDIOGRAM TRACING: CPT | Performed by: EMERGENCY MEDICINE

## 2022-11-30 RX ORDER — SODIUM CHLORIDE 0.9 % (FLUSH) 0.9 %
10 SYRINGE (ML) INJECTION AS NEEDED
Status: DISCONTINUED | OUTPATIENT
Start: 2022-11-30 | End: 2022-11-30 | Stop reason: HOSPADM

## 2022-12-03 LAB
QT INTERVAL: 382 MS
QTC INTERVAL: 440 MS

## 2023-01-13 ENCOUNTER — APPOINTMENT (OUTPATIENT)
Dept: CT IMAGING | Facility: HOSPITAL | Age: 88
End: 2023-01-13
Payer: MEDICARE

## 2023-01-13 ENCOUNTER — APPOINTMENT (OUTPATIENT)
Dept: GENERAL RADIOLOGY | Facility: HOSPITAL | Age: 88
End: 2023-01-13
Payer: MEDICARE

## 2023-01-13 ENCOUNTER — HOSPITAL ENCOUNTER (OUTPATIENT)
Facility: HOSPITAL | Age: 88
Setting detail: OBSERVATION
Discharge: NURSING, REHAB OR LONG-TERM CARE | End: 2023-01-21
Attending: EMERGENCY MEDICINE | Admitting: FAMILY MEDICINE
Payer: MEDICARE

## 2023-01-13 DIAGNOSIS — S09.90XA INJURY OF HEAD, INITIAL ENCOUNTER: ICD-10-CM

## 2023-01-13 DIAGNOSIS — R13.10 DYSPHAGIA, UNSPECIFIED TYPE: ICD-10-CM

## 2023-01-13 DIAGNOSIS — S40.012A CONTUSION OF LEFT SHOULDER, INITIAL ENCOUNTER: ICD-10-CM

## 2023-01-13 DIAGNOSIS — R31.9 HEMATURIA, UNSPECIFIED TYPE: ICD-10-CM

## 2023-01-13 DIAGNOSIS — R41.82 ALTERED MENTAL STATUS, UNSPECIFIED ALTERED MENTAL STATUS TYPE: Primary | ICD-10-CM

## 2023-01-13 DIAGNOSIS — S70.02XA CONTUSION OF LEFT HIP, INITIAL ENCOUNTER: ICD-10-CM

## 2023-01-13 LAB
ALBUMIN SERPL-MCNC: 4 G/DL (ref 3.5–5.2)
ALBUMIN/GLOB SERPL: 1.3 G/DL
ALP SERPL-CCNC: 55 U/L (ref 39–117)
ALT SERPL W P-5'-P-CCNC: 17 U/L (ref 1–41)
ANION GAP SERPL CALCULATED.3IONS-SCNC: 10 MMOL/L (ref 5–15)
AST SERPL-CCNC: 40 U/L (ref 1–40)
BACTERIA UR QL AUTO: ABNORMAL /HPF
BASOPHILS # BLD AUTO: 0.02 10*3/MM3 (ref 0–0.2)
BASOPHILS NFR BLD AUTO: 0.2 % (ref 0–1.5)
BILIRUB SERPL-MCNC: 0.9 MG/DL (ref 0–1.2)
BILIRUB UR QL STRIP: NEGATIVE
BUN SERPL-MCNC: 13 MG/DL (ref 8–23)
BUN/CREAT SERPL: 13.8 (ref 7–25)
CALCIUM SPEC-SCNC: 9.4 MG/DL (ref 8.6–10.5)
CHLORIDE SERPL-SCNC: 98 MMOL/L (ref 98–107)
CLARITY UR: CLEAR
CO2 SERPL-SCNC: 29 MMOL/L (ref 22–29)
COLOR UR: ABNORMAL
CREAT SERPL-MCNC: 0.94 MG/DL (ref 0.76–1.27)
DEPRECATED RDW RBC AUTO: 42.1 FL (ref 37–54)
EGFRCR SERPLBLD CKD-EPI 2021: 78.5 ML/MIN/1.73
EOSINOPHIL # BLD AUTO: 0.03 10*3/MM3 (ref 0–0.4)
EOSINOPHIL NFR BLD AUTO: 0.3 % (ref 0.3–6.2)
ERYTHROCYTE [DISTWIDTH] IN BLOOD BY AUTOMATED COUNT: 12.2 % (ref 12.3–15.4)
GLOBULIN UR ELPH-MCNC: 3.1 GM/DL
GLUCOSE SERPL-MCNC: 154 MG/DL (ref 65–99)
GLUCOSE UR STRIP-MCNC: NEGATIVE MG/DL
HCT VFR BLD AUTO: 41.9 % (ref 37.5–51)
HGB BLD-MCNC: 14.5 G/DL (ref 13–17.7)
HGB UR QL STRIP.AUTO: ABNORMAL
HOLD SPECIMEN: NORMAL
HYALINE CASTS UR QL AUTO: ABNORMAL /LPF
IMM GRANULOCYTES # BLD AUTO: 0.05 10*3/MM3 (ref 0–0.05)
IMM GRANULOCYTES NFR BLD AUTO: 0.4 % (ref 0–0.5)
KETONES UR QL STRIP: ABNORMAL
LEUKOCYTE ESTERASE UR QL STRIP.AUTO: NEGATIVE
LYMPHOCYTES # BLD AUTO: 1.43 10*3/MM3 (ref 0.7–3.1)
LYMPHOCYTES NFR BLD AUTO: 12.8 % (ref 19.6–45.3)
MAGNESIUM SERPL-MCNC: 1.7 MG/DL (ref 1.6–2.4)
MCH RBC QN AUTO: 32.7 PG (ref 26.6–33)
MCHC RBC AUTO-ENTMCNC: 34.6 G/DL (ref 31.5–35.7)
MCV RBC AUTO: 94.4 FL (ref 79–97)
MONOCYTES # BLD AUTO: 1.44 10*3/MM3 (ref 0.1–0.9)
MONOCYTES NFR BLD AUTO: 12.9 % (ref 5–12)
NEUTROPHILS NFR BLD AUTO: 73.4 % (ref 42.7–76)
NEUTROPHILS NFR BLD AUTO: 8.16 10*3/MM3 (ref 1.7–7)
NITRITE UR QL STRIP: NEGATIVE
NRBC BLD AUTO-RTO: 0 /100 WBC (ref 0–0.2)
PH UR STRIP.AUTO: 5.5 [PH] (ref 5–8)
PLATELET # BLD AUTO: 179 10*3/MM3 (ref 140–450)
PMV BLD AUTO: 9 FL (ref 6–12)
POTASSIUM SERPL-SCNC: 4.3 MMOL/L (ref 3.5–5.2)
PROT SERPL-MCNC: 7.1 G/DL (ref 6–8.5)
PROT UR QL STRIP: ABNORMAL
QT INTERVAL: 354 MS
QTC INTERVAL: 433 MS
RBC # BLD AUTO: 4.44 10*6/MM3 (ref 4.14–5.8)
RBC # UR STRIP: ABNORMAL /HPF
REF LAB TEST METHOD: ABNORMAL
SODIUM SERPL-SCNC: 137 MMOL/L (ref 136–145)
SP GR UR STRIP: 1.02 (ref 1–1.03)
SQUAMOUS #/AREA URNS HPF: ABNORMAL /HPF
TROPONIN T SERPL-MCNC: <0.01 NG/ML (ref 0–0.03)
UROBILINOGEN UR QL STRIP: ABNORMAL
VALPROATE SERPL-MCNC: 68.4 MCG/ML (ref 50–125)
WBC # UR STRIP: ABNORMAL /HPF
WBC NRBC COR # BLD: 11.13 10*3/MM3 (ref 3.4–10.8)
WHOLE BLOOD HOLD COAG: NORMAL
WHOLE BLOOD HOLD SPECIMEN: NORMAL

## 2023-01-13 PROCEDURE — 85025 COMPLETE CBC W/AUTO DIFF WBC: CPT

## 2023-01-13 PROCEDURE — 93005 ELECTROCARDIOGRAM TRACING: CPT

## 2023-01-13 PROCEDURE — 81001 URINALYSIS AUTO W/SCOPE: CPT

## 2023-01-13 PROCEDURE — 99223 1ST HOSP IP/OBS HIGH 75: CPT | Performed by: INTERNAL MEDICINE

## 2023-01-13 PROCEDURE — 73030 X-RAY EXAM OF SHOULDER: CPT

## 2023-01-13 PROCEDURE — G0378 HOSPITAL OBSERVATION PER HR: HCPCS

## 2023-01-13 PROCEDURE — 99285 EMERGENCY DEPT VISIT HI MDM: CPT

## 2023-01-13 PROCEDURE — 83735 ASSAY OF MAGNESIUM: CPT

## 2023-01-13 PROCEDURE — 84484 ASSAY OF TROPONIN QUANT: CPT

## 2023-01-13 PROCEDURE — 73502 X-RAY EXAM HIP UNI 2-3 VIEWS: CPT

## 2023-01-13 PROCEDURE — 80164 ASSAY DIPROPYLACETIC ACD TOT: CPT | Performed by: EMERGENCY MEDICINE

## 2023-01-13 PROCEDURE — 70450 CT HEAD/BRAIN W/O DYE: CPT

## 2023-01-13 PROCEDURE — 80053 COMPREHEN METABOLIC PANEL: CPT

## 2023-01-13 PROCEDURE — 74176 CT ABD & PELVIS W/O CONTRAST: CPT

## 2023-01-13 PROCEDURE — 72125 CT NECK SPINE W/O DYE: CPT

## 2023-01-13 PROCEDURE — 71045 X-RAY EXAM CHEST 1 VIEW: CPT

## 2023-01-13 RX ORDER — ENOXAPARIN SODIUM 100 MG/ML
40 INJECTION SUBCUTANEOUS DAILY
Status: DISCONTINUED | OUTPATIENT
Start: 2023-01-14 | End: 2023-01-13

## 2023-01-13 RX ORDER — BISACODYL 10 MG
10 SUPPOSITORY, RECTAL RECTAL DAILY PRN
Status: DISCONTINUED | OUTPATIENT
Start: 2023-01-13 | End: 2023-01-21 | Stop reason: HOSPADM

## 2023-01-13 RX ORDER — PANTOPRAZOLE SODIUM 40 MG/1
40 TABLET, DELAYED RELEASE ORAL
Status: DISCONTINUED | OUTPATIENT
Start: 2023-01-14 | End: 2023-01-21 | Stop reason: HOSPADM

## 2023-01-13 RX ORDER — FLUTICASONE PROPIONATE 50 MCG
2 SPRAY, SUSPENSION (ML) NASAL DAILY
Status: DISCONTINUED | OUTPATIENT
Start: 2023-01-14 | End: 2023-01-14

## 2023-01-13 RX ORDER — SODIUM CHLORIDE 0.9 % (FLUSH) 0.9 %
10 SYRINGE (ML) INJECTION EVERY 12 HOURS SCHEDULED
Status: DISCONTINUED | OUTPATIENT
Start: 2023-01-13 | End: 2023-01-21 | Stop reason: HOSPADM

## 2023-01-13 RX ORDER — ONDANSETRON 2 MG/ML
4 INJECTION INTRAMUSCULAR; INTRAVENOUS EVERY 6 HOURS PRN
Status: DISCONTINUED | OUTPATIENT
Start: 2023-01-13 | End: 2023-01-21 | Stop reason: HOSPADM

## 2023-01-13 RX ORDER — SODIUM CHLORIDE 9 MG/ML
40 INJECTION, SOLUTION INTRAVENOUS AS NEEDED
Status: DISCONTINUED | OUTPATIENT
Start: 2023-01-13 | End: 2023-01-21 | Stop reason: HOSPADM

## 2023-01-13 RX ORDER — ASPIRIN 81 MG/1
81 TABLET ORAL DAILY
Status: DISCONTINUED | OUTPATIENT
Start: 2023-01-14 | End: 2023-01-21 | Stop reason: HOSPADM

## 2023-01-13 RX ORDER — DONEPEZIL HYDROCHLORIDE 10 MG/1
10 TABLET, FILM COATED ORAL NIGHTLY
Status: DISCONTINUED | OUTPATIENT
Start: 2023-01-13 | End: 2023-01-21 | Stop reason: HOSPADM

## 2023-01-13 RX ORDER — ONDANSETRON 4 MG/1
4 TABLET, FILM COATED ORAL EVERY 6 HOURS PRN
Status: DISCONTINUED | OUTPATIENT
Start: 2023-01-13 | End: 2023-01-21 | Stop reason: HOSPADM

## 2023-01-13 RX ORDER — ACETAMINOPHEN 325 MG/1
650 TABLET ORAL EVERY 4 HOURS PRN
Status: DISCONTINUED | OUTPATIENT
Start: 2023-01-13 | End: 2023-01-21 | Stop reason: HOSPADM

## 2023-01-13 RX ORDER — POLYETHYLENE GLYCOL 3350 17 G/17G
17 POWDER, FOR SOLUTION ORAL DAILY PRN
Status: DISCONTINUED | OUTPATIENT
Start: 2023-01-13 | End: 2023-01-21 | Stop reason: HOSPADM

## 2023-01-13 RX ORDER — AMOXICILLIN 250 MG
2 CAPSULE ORAL 2 TIMES DAILY
Status: DISCONTINUED | OUTPATIENT
Start: 2023-01-13 | End: 2023-01-21 | Stop reason: HOSPADM

## 2023-01-13 RX ORDER — DIVALPROEX SODIUM 250 MG/1
250 TABLET, DELAYED RELEASE ORAL EVERY 12 HOURS SCHEDULED
Status: DISCONTINUED | OUTPATIENT
Start: 2023-01-13 | End: 2023-01-19

## 2023-01-13 RX ORDER — BISACODYL 5 MG/1
5 TABLET, DELAYED RELEASE ORAL DAILY PRN
Status: DISCONTINUED | OUTPATIENT
Start: 2023-01-13 | End: 2023-01-21 | Stop reason: HOSPADM

## 2023-01-13 RX ORDER — BUMETANIDE 1 MG/1
0.5 TABLET ORAL DAILY
Status: DISCONTINUED | OUTPATIENT
Start: 2023-01-14 | End: 2023-01-13

## 2023-01-13 RX ORDER — SODIUM CHLORIDE 0.9 % (FLUSH) 0.9 %
10 SYRINGE (ML) INJECTION AS NEEDED
Status: DISCONTINUED | OUTPATIENT
Start: 2023-01-13 | End: 2023-01-21 | Stop reason: HOSPADM

## 2023-01-13 RX ORDER — HYDROCODONE BITARTRATE AND ACETAMINOPHEN 5; 325 MG/1; MG/1
1 TABLET ORAL ONCE
Status: COMPLETED | OUTPATIENT
Start: 2023-01-13 | End: 2023-01-13

## 2023-01-13 RX ADMIN — DONEPEZIL HYDROCHLORIDE 10 MG: 10 TABLET, FILM COATED ORAL at 23:08

## 2023-01-13 RX ADMIN — HYDROCODONE BITARTRATE AND ACETAMINOPHEN 1 TABLET: 5; 325 TABLET ORAL at 19:07

## 2023-01-13 RX ADMIN — DIVALPROEX SODIUM 250 MG: 250 TABLET, DELAYED RELEASE ORAL at 23:08

## 2023-01-13 RX ADMIN — Medication 10 ML: at 23:08

## 2023-01-13 RX ADMIN — SENNOSIDES AND DOCUSATE SODIUM 2 TABLET: 50; 8.6 TABLET ORAL at 23:08

## 2023-01-13 NOTE — LETTER
EMS Transport Request  For use at [facility], [place], [place], and [place] only   Patient Name: Ramin Zaragoza : 1935   Weight:79.6 kg (175 lb 8 oz) Pick-up [facility] BLS/ALS: bls   Insurance: [employer] MEDICARE REPLACEMENT Auth End Date:    Pre-Cert #: D/C Summary complete:    Destination: [facility] place   Contact Precautions:    Equipment (O2, Fluids, etc.):   Arrive By Date/Time:  Stretcher/WC: stretcher   CM Requesting: Rosario Fuentes RN Ext: [phone]   Notes/Medical Necessity: pt cannot stand without max assist X2; unable to follow safety instructions      ______________________________________________________________________    *Only 2 patient bags OR 1 carry-on size bag are permitted.  Wheelchairs and walkers CANNOT transported with the patient. Acknowledge: yes

## 2023-01-14 PROBLEM — F03.90 DEMENTIA (HCC): Status: ACTIVE | Noted: 2023-01-14

## 2023-01-14 LAB
ANION GAP SERPL CALCULATED.3IONS-SCNC: 11 MMOL/L (ref 5–15)
BUN SERPL-MCNC: 11 MG/DL (ref 8–23)
BUN/CREAT SERPL: 15.5 (ref 7–25)
CALCIUM SPEC-SCNC: 8.7 MG/DL (ref 8.6–10.5)
CHLORIDE SERPL-SCNC: 100 MMOL/L (ref 98–107)
CO2 SERPL-SCNC: 26 MMOL/L (ref 22–29)
CREAT SERPL-MCNC: 0.71 MG/DL (ref 0.76–1.27)
DEPRECATED RDW RBC AUTO: 42.8 FL (ref 37–54)
EGFRCR SERPLBLD CKD-EPI 2021: 88.8 ML/MIN/1.73
ERYTHROCYTE [DISTWIDTH] IN BLOOD BY AUTOMATED COUNT: 12.2 % (ref 12.3–15.4)
GLUCOSE BLDC GLUCOMTR-MCNC: 130 MG/DL (ref 70–130)
GLUCOSE SERPL-MCNC: 109 MG/DL (ref 65–99)
HCT VFR BLD AUTO: 37.3 % (ref 37.5–51)
HGB BLD-MCNC: 12.7 G/DL (ref 13–17.7)
MCH RBC QN AUTO: 32.6 PG (ref 26.6–33)
MCHC RBC AUTO-ENTMCNC: 34 G/DL (ref 31.5–35.7)
MCV RBC AUTO: 95.9 FL (ref 79–97)
PLATELET # BLD AUTO: 161 10*3/MM3 (ref 140–450)
PMV BLD AUTO: 9.5 FL (ref 6–12)
POTASSIUM SERPL-SCNC: 3.4 MMOL/L (ref 3.5–5.2)
RBC # BLD AUTO: 3.89 10*6/MM3 (ref 4.14–5.8)
SODIUM SERPL-SCNC: 137 MMOL/L (ref 136–145)
TSH SERPL DL<=0.05 MIU/L-ACNC: 10.67 UIU/ML (ref 0.27–4.2)
VIT B12 BLD-MCNC: 485 PG/ML (ref 211–946)
WBC NRBC COR # BLD: 8.68 10*3/MM3 (ref 3.4–10.8)

## 2023-01-14 PROCEDURE — 84443 ASSAY THYROID STIM HORMONE: CPT | Performed by: INTERNAL MEDICINE

## 2023-01-14 PROCEDURE — 99214 OFFICE O/P EST MOD 30 MIN: CPT | Performed by: PSYCHIATRY & NEUROLOGY

## 2023-01-14 PROCEDURE — 82607 VITAMIN B-12: CPT | Performed by: INTERNAL MEDICINE

## 2023-01-14 PROCEDURE — G0378 HOSPITAL OBSERVATION PER HR: HCPCS

## 2023-01-14 PROCEDURE — 92610 EVALUATE SWALLOWING FUNCTION: CPT

## 2023-01-14 PROCEDURE — 97530 THERAPEUTIC ACTIVITIES: CPT

## 2023-01-14 PROCEDURE — 97166 OT EVAL MOD COMPLEX 45 MIN: CPT

## 2023-01-14 PROCEDURE — 99232 SBSQ HOSP IP/OBS MODERATE 35: CPT | Performed by: INTERNAL MEDICINE

## 2023-01-14 PROCEDURE — 80048 BASIC METABOLIC PNL TOTAL CA: CPT | Performed by: INTERNAL MEDICINE

## 2023-01-14 PROCEDURE — 97161 PT EVAL LOW COMPLEX 20 MIN: CPT

## 2023-01-14 PROCEDURE — 85027 COMPLETE CBC AUTOMATED: CPT | Performed by: INTERNAL MEDICINE

## 2023-01-14 PROCEDURE — 97116 GAIT TRAINING THERAPY: CPT

## 2023-01-14 PROCEDURE — 82962 GLUCOSE BLOOD TEST: CPT

## 2023-01-14 RX ORDER — LEVOTHYROXINE SODIUM 0.05 MG/1
50 TABLET ORAL
Status: DISCONTINUED | OUTPATIENT
Start: 2023-01-15 | End: 2023-01-21 | Stop reason: HOSPADM

## 2023-01-14 RX ORDER — FLUTICASONE PROPIONATE 50 MCG
2 SPRAY, SUSPENSION (ML) NASAL 2 TIMES DAILY
Status: DISCONTINUED | OUTPATIENT
Start: 2023-01-14 | End: 2023-01-21 | Stop reason: HOSPADM

## 2023-01-14 RX ADMIN — ASPIRIN 81 MG: 81 TABLET, COATED ORAL at 09:09

## 2023-01-14 RX ADMIN — Medication 10 ML: at 19:38

## 2023-01-14 RX ADMIN — SENNOSIDES AND DOCUSATE SODIUM 2 TABLET: 50; 8.6 TABLET ORAL at 19:37

## 2023-01-14 RX ADMIN — Medication 10 ML: at 09:10

## 2023-01-14 RX ADMIN — DONEPEZIL HYDROCHLORIDE 10 MG: 10 TABLET, FILM COATED ORAL at 19:38

## 2023-01-14 RX ADMIN — FLUTICASONE PROPIONATE 2 SPRAY: 50 SPRAY, METERED NASAL at 09:09

## 2023-01-14 RX ADMIN — DIVALPROEX SODIUM 250 MG: 250 TABLET, DELAYED RELEASE ORAL at 09:09

## 2023-01-14 RX ADMIN — SENNOSIDES AND DOCUSATE SODIUM 2 TABLET: 50; 8.6 TABLET ORAL at 09:09

## 2023-01-14 RX ADMIN — PANTOPRAZOLE SODIUM 40 MG: 40 TABLET, DELAYED RELEASE ORAL at 05:01

## 2023-01-14 RX ADMIN — FLUTICASONE PROPIONATE 2 SPRAY: 50 SPRAY, METERED NASAL at 19:39

## 2023-01-14 RX ADMIN — DIVALPROEX SODIUM 250 MG: 250 TABLET, DELAYED RELEASE ORAL at 19:38

## 2023-01-14 NOTE — THERAPY EVALUATION
Patient Name: Ramin Zaragoza  : 1935    MRN: [id number]                              Today's Date: 2023       Admit Date: 2023    Visit Dx:     ICD-10-CM ICD-9-CM   1. Altered mental status, unspecified altered mental status type  R41.82 780.97   2. Injury of head, initial encounter  S09.90XA 959.01   3. Contusion of left shoulder, initial encounter  S40.012A 923.00   4. Contusion of left hip, initial encounter  S70.02XA 924.01   5. Hematuria, unspecified type  R31.9 599.70   6. Dysphagia, unspecified type  R13.10 787.20     Patient Active Problem List   Diagnosis   • Coronary artery disease involving native coronary artery of native heart without angina pectoris   • Premature ventricular contraction   • Essential hypertension   • Mixed hyperlipidemia   • Venous insufficiency   • Hypothyroidism   • Lactose intolerance   • Weakness   • Accident due to mechanical fall without injury   • Altered mental status   • Dementia (HCC)     Past Medical History:   Diagnosis Date   • CAD (coronary artery disease)    • Hyperlipidemia    • Hypertension    • Hypothyroidism     on chronic replacement therapy   • Lactose intolerance    • Lower extremity edema    • PVC's (premature ventricular contractions)     History of   • Thyroid disorder    • Venous insufficiency      Past Surgical History:   Procedure Laterality Date   • CARDIAC CATHETERIZATION     • CORONARY STENT PLACEMENT     • EYE SURGERY      Bilateral cataract extraction   • HERNIA REPAIR      Inguinal hernia repair   • OTHER SURGICAL HISTORY      Melanoma removed from back   • OTHER SURGICAL HISTORY      History of left elbow fracture with sunsequent fixation      General Information     Row Name 23 1518          OT Time and Intention    Document Type evaluation  -CS     Mode of Treatment occupational therapy  -CS     Row Name 23 1518          General Information    Patient Profile Reviewed yes  -CS     Prior Level of Function --   Pt limited  historian 2/2 confusion and dementia, specific levels of assist at [facility] require further inquiry. No family present.  -CS     Existing Precautions/Restrictions fall;other (see comments)  baseline dementia, LUQ/neck pain, [place]  -CS     Barriers to Rehab medically complex;previous functional deficit;cognitive status  -CS     Row Name 01/14/23 1518          Living Environment    People in Home facility resident  -CS     Row Name 01/14/23 1518          Home Main Entrance    Number of Stairs, Main Entrance none  -CS     Row Name 01/14/23 1518          Stairs Within Home, Primary    Number of Stairs, Within Home, Primary none  -CS     Row Name 01/14/23 1518          Cognition    Orientation Status (Cognition) oriented to;person  -CS     Row Name 01/14/23 1518          Safety Issues, Functional Mobility    Safety Issues Affecting Function (Mobility) insight into deficits/self-awareness;safety precaution awareness;awareness of need for assistance;safety precautions follow-through/compliance;sequencing abilities;problem-solving;judgment  -CS     Impairments Affecting Function (Mobility) balance;cognition;coordination;motor planning;endurance/activity tolerance;strength;range of motion (ROM)  -CS     Cognitive Impairments, Mobility Safety/Performance safety precaution awareness;safety precaution follow-through;awareness, need for assistance;problem-solving/reasoning;insight into deficits/self-awareness;judgment;impulsivity  -CS           User Key  (r) = Recorded By, (t) = Taken By, (c) = Cosigned By    Initials Name Provider Type    CS Ayush Carney OT Occupational Therapist                 Mobility/ADL's     Row Name 01/14/23 1524          Bed Mobility    Bed Mobility supine-sit;scooting/bridging  -CS     Scooting/Bridging [place] (Bed Mobility) moderate assist (50% patient effort);2 person assist;verbal cues;nonverbal cues (demo/gesture)  -CS     Supine-Sit [place] (Bed Mobility) moderate  assist (50% patient effort);verbal cues;nonverbal cues (demo/gesture)  -CS     Bed Mobility, Safety Issues decreased use of arms for pushing/pulling;decreased use of legs for bridging/pushing;impaired trunk control for bed mobility;cognitive deficits limit understanding  -CS     Assistive Device (Bed Mobility) bed rails;draw sheet;head of bed elevated  -CS     Comment, (Bed Mobility) verbal/tactile cues for sequencing BLEs to EOB, increased assist at trunk to achieve sitting, no dizziness reported, mild posterior lean  -CS     Row Name 01/14/23 1524          Transfers    Transfers sit-stand transfer  -CS     Comment, (Transfers) forward flexed posture upon standing, improved w/ tactile cues, poor safety awareness  -CS     Row Name 01/14/23 1524          Sit-Stand Transfer    Sit-Stand [place] (Transfers) moderate assist (50% patient effort);2 person assist;verbal cues;nonverbal cues (demo/gesture)  -     Assistive Device (Sit-Stand Transfers) walker, front-wheeled  -CS     Row Name 01/14/23 1524          Functional Mobility    Functional Mobility- Comment defer to PT for specifics, limited at baseline, shuffled/small steps observed  -CS     Row Name 01/14/23 1524          Activities of Daily Living    BADL Assessment/Intervention lower body dressing;grooming;feeding  -CS     Row Name 01/14/23 1524          Grooming Assessment/Training    [place] Level (Grooming) wash face, hands;minimum assist (75% patient effort)  -CS     Position (Grooming) supported sitting  -CS     Comment, (Grooming) assist for quality/completion  -CS     Row Name 01/14/23 1524          Self-Feeding Assessment/Training    [place] Level (Feeding) prepare tray/open items;moderate assist (50% patient effort);liquids to mouth;scoop food and bring to mouth;set up  -CS     Position (Self-Feeding) supported sitting  -CS     Comment, (Feeding) mild tremor observed, demo'd functional use of fork to eat w/out spillage  -CS           User  Key  (r) = Recorded By, (t) = Taken By, (c) = Cosigned By    Initials Name Provider Type    CS Ayush Carney, OT Occupational Therapist               Obj/Interventions     Row Name 01/14/23 1529          Sensory Assessment (Somatosensory)    Sensory Assessment (Somatosensory) UE sensation intact  -[facility] Name 01/14/23 1529          Vision Assessment/Intervention    Visual Impairment/Limitations unable/difficult to assess  -     Row Name 01/14/23 1529          Range of Motion Comprehensive    General Range of Motion upper extremity range of motion deficits identified  -CS     Comment, General Range of Motion L shoulder limited by pain, demo'd BUE PROM WFL,  -     Row Name 01/14/23 1529          Strength Comprehensive (MMT)    General Manual Muscle Testing (MMT) Assessment upper extremity strength deficits identified  -CS     Comment, General Manual Muscle Testing (MMT) Assessment BUE grossly 4-/5  -[facility] Name 01/14/23 1529          Motor Skills    Motor Skills coordination;functional endurance;neuro-muscular function  -     Coordination bimanual skills;gross motor deficit;fine motor deficit;minimal impairment  -     Functional Endurance O2 sats stable on RA  -     Neuromuscular Function bilateral;lower extremity;tremor, resting;tremor, intention;minimal impairment  -     Row Name 01/14/23 1529          Balance    Balance Assessment sitting static balance;standing static balance;sitting dynamic balance;standing dynamic balance  -     Static Sitting Balance standby assist  -     Dynamic Sitting Balance contact guard  -CS     Position, Sitting Balance unsupported;sitting edge of bed  -     Static Standing Balance minimal assist;2-person assist  -     Dynamic Standing Balance 2-person assist;moderate assist  -     Position/Device Used, Standing Balance walker, front-wheeled;supported  -     Balance Interventions standing;sit to stand;occupation based/functional task;sitting  -            User Key  (r) = Recorded By, (t) = Taken By, (c) = Cosigned By    Initials Name Provider Type    CS Ayush Carney, OT Occupational Therapist               Goals/Plan     Row Name 01/14/23 1537          Bed Mobility Goal 1 (OT)    Activity/Assistive Device (Bed Mobility Goal 1, OT) supine to sit;sit to supine;scooting  -CS     [place] Level/Cues Needed (Bed Mobility Goal 1, OT) contact guard required  -CS     Time Frame (Bed Mobility Goal 1, OT) long term goal (LTG);10 days  -CS     Progress/Outcomes (Bed Mobility Goal 1, OT) new goal  -CS     Row Name 01/14/23 1537          Bathing Goal 1 (OT)    Activity/Device (Bathing Goal 1, OT) upper body bathing  -CS     [place] Level/Cues Needed (Bathing Goal 1, OT) minimum assist (75% or more patient effort)  -CS     Time Frame (Bathing Goal 1, OT) 10 days  -CS     Progress/Outcomes (Bathing Goal 1, OT) new goal  -CS     Row Name 01/14/23 1537          Dressing Goal 1 (OT)    Activity/Device (Dressing Goal 1, OT) upper body dressing  -CS     [place]/Cues Needed (Dressing Goal 1, OT) minimum assist (75% or more patient effort)  -CS     Time Frame (Dressing Goal 1, OT) long term goal (LTG);10 days  -CS     Strategies/Barriers (Dressing Goal 1, OT) marino tech prn, LUE pain  -CS     Progress/Outcome (Dressing Goal 1, OT) goal ongoing  -CS     Row Name 01/14/23 1537          Strength Goal 1 (OT)    Strength Goal 1 (OT) Increase gross BUE strength 1/2 muscle grade  -CS     Time Frame (Strength Goal 1, OT) long term goal (LTG);10 days  -CS     Progress/Outcome (Strength Goal 1, OT) new goal  -CS     Row Name 01/14/23 1537          Therapy Assessment/Plan (OT)    Planned Therapy Interventions (OT) functional balance retraining;occupation/activity based interventions;ROM/therapeutic exercise;transfer/mobility retraining;patient/caregiver education/training;neuromuscular control/coordination retraining;strengthening exercise;adaptive equipment training  -CS            User Key  (r) = Recorded By, (t) = Taken By, (c) = Cosigned By    Initials Name Provider Type    CS Ayush Carney, OT Occupational Therapist               Clinical Impression     Row Name 01/14/23 1532          Pain Assessment    Pretreatment Pain Rating 0/10 - no pain  -CS     Posttreatment Pain Rating 0/10 - no pain  -CS     Pre/Posttreatment Pain Comment tolerated  -CS     Pain Intervention(s) Repositioned;Ambulation/increased activity  -CS     Row Name 01/14/23 1532          Plan of Care Review    Plan of Care Reviewed With patient  -CS     Progress no change  OT eval  -CS     Outcome Evaluation OT eval completed. Pt presents w/ L shoulder/neck pain alongside baseline confusion, impaired balance and motor planning, and generalized weakness warranting skilled IP OT services. Baseline assist levels at facility require further inquiry. Pt tolerated bed mobility and STS w/ ModA x2, Dep for LB ADLs, and MCLEAN for feeding. Rec d/c to SNF.  -CS     Row Name 01/14/23 1532          Therapy Assessment/Plan (OT)    Rehab Potential (OT) fair, will monitor progress closely  -CS     Criteria for Skilled Therapeutic Interventions Met (OT) yes;meets criteria;skilled treatment is necessary  -CS     Therapy Frequency (OT) daily  -CS     Row Name 01/14/23 1532          Therapy Plan Review/Discharge Plan (OT)    Anticipated Discharge Disposition (OT) skilled nursing facility  -     Row Name 01/14/23 8055          Vital Signs    Pre Systolic BP Rehab --  RN cleared for tx  -CS     Post Systolic BP Rehab 125  -CS     Post Treatment Diastolic BP 79  -CS     Posttreatment Heart Rate (beats/min) 79  -CS     O2 Delivery Pre Treatment room air  -CS     O2 Delivery Intra Treatment room air  -CS     Post SpO2 (%) 94  -CS     O2 Delivery Post Treatment room air  -CS     Pre Patient Position Supine  -CS     Intra Patient Position Standing  -CS     Post Patient Position Sitting  -CS     Row Name 01/14/23 9932          Positioning and  Restraints    Pre-Treatment Position in bed  -CS     Post Treatment Position bed  -CS     In Bed sitting EOB;with PT  -CS           User Key  (r) = Recorded By, (t) = Taken By, (c) = Cosigned By    Initials Name Provider Type    Ayush Stroud OT Occupational Therapist               Outcome Measures     Row Name 01/14/23 1538          How much help from another is currently needed...    Putting on and taking off regular lower body clothing? 2  -CS     Bathing (including washing, rinsing, and drying) 2  -CS     Toileting (which includes using toilet bed pan or urinal) 2  -CS     Putting on and taking off regular upper body clothing 3  -CS     Taking care of personal grooming (such as brushing teeth) 3  -CS     Eating meals 3  -CS     AM-PAC 6 Clicks Score (OT) 15  -CS     Row Name 01/14/23 1538          Functional Assessment    Outcome Measure Options AM-PAC 6 Clicks Daily Activity (OT)  -CS           User Key  (r) = Recorded By, (t) = Taken By, (c) = Cosigned By    Initials Name Provider Type    Ayush Stroud OT Occupational Therapist                Occupational Therapy Education     Title: PT OT SLP Therapies (Not Started)     Topic: Occupational Therapy (Not Started)     Point: ADL training (Not Started)     Description:   Instruct learner(s) on proper safety adaptation and remediation techniques during self care or transfers.   Instruct in proper use of assistive devices.              Learner Progress:  Not documented in this visit.          Point: Home exercise program (Not Started)     Description:   Instruct learner(s) on appropriate technique for monitoring, assisting and/or progressing therapeutic exercises/activities.              Learner Progress:  Not documented in this visit.          Point: Precautions (Not Started)     Description:   Instruct learner(s) on prescribed precautions during self-care and functional transfers.              Learner Progress:  Not documented in this visit.           Point: Body mechanics (Not Started)     Description:   Instruct learner(s) on proper positioning and spine alignment during self-care, functional mobility activities and/or exercises.              Learner Progress:  Not documented in this visit.                          OT Recommendation and Plan  Planned Therapy Interventions (OT): functional balance retraining, occupation/activity based interventions, ROM/therapeutic exercise, transfer/mobility retraining, patient/caregiver education/training, neuromuscular control/coordination retraining, strengthening exercise, adaptive equipment training  Therapy Frequency (OT): daily  Plan of Care Review  Plan of Care Reviewed With: patient  Progress: no change (OT eval)  Outcome Evaluation: OT eval completed. Pt presents w/ L shoulder/neck pain alongside baseline confusion, impaired balance and motor planning, and generalized weakness warranting skilled IP OT services. Baseline assist levels at facility require further inquiry. Pt tolerated bed mobility and STS w/ ModA x2, Dep for LB ADLs, and MCLEAN for feeding. Rec d/c to SNF.     Time Calculation:    Time Calculation- OT     Row Name 01/14/23 1539             Time Calculation- OT    OT Start Time 1430  -CS      OT Received On 01/14/23  -CS      OT Goal Re-Cert Due Date 01/24/23  -CS         Timed Charges    16650 - OT Therapeutic Activity Minutes 4  -CS      01590 - OT Self Care/Mgmt Minutes 4  -CS         Untimed Charges    OT Eval/Re-eval Minutes 33  -CS         Total Minutes    Timed Charges Total Minutes 8  -CS      Untimed Charges Total Minutes 33  -CS       Total Minutes 41  -CS            User Key  (r) = Recorded By, (t) = Taken By, (c) = Cosigned By    Initials Name Provider Type    CS Ayush Carney OT Occupational Therapist              Therapy Charges for Today     Code Description Service Date Service Provider Modifiers Qty    [id number]  OT THERAPEUTIC ACT EA 15 MIN 1/14/2023 Ayush Carney OT GO 1     [id number]  OT EVAL MOD COMPLEXITY 3 1/14/2023 Ayush Carney, OT GO 1               Ayush Carney OT  1/14/2023

## 2023-01-14 NOTE — THERAPY EVALUATION
Patient Name: Ramin Zaragoza  : 1935    MRN: [id number]                              Today's Date: 2023       Admit Date: 2023    Visit Dx:     ICD-10-CM ICD-9-CM   1. Altered mental status, unspecified altered mental status type  R41.82 780.97   2. Injury of head, initial encounter  S09.90XA 959.01   3. Contusion of left shoulder, initial encounter  S40.012A 923.00   4. Contusion of left hip, initial encounter  S70.02XA 924.01   5. Hematuria, unspecified type  R31.9 599.70   6. Dysphagia, unspecified type  R13.10 787.20     Patient Active Problem List   Diagnosis   • Coronary artery disease involving native coronary artery of native heart without angina pectoris   • Premature ventricular contraction   • Essential hypertension   • Mixed hyperlipidemia   • Venous insufficiency   • Hypothyroidism   • Lactose intolerance   • Weakness   • Accident due to mechanical fall without injury   • Altered mental status   • Dementia (HCC)     Past Medical History:   Diagnosis Date   • CAD (coronary artery disease)    • Hyperlipidemia    • Hypertension    • Hypothyroidism     on chronic replacement therapy   • Lactose intolerance    • Lower extremity edema    • PVC's (premature ventricular contractions)     History of   • Thyroid disorder    • Venous insufficiency      Past Surgical History:   Procedure Laterality Date   • CARDIAC CATHETERIZATION     • CORONARY STENT PLACEMENT     • EYE SURGERY      Bilateral cataract extraction   • HERNIA REPAIR      Inguinal hernia repair   • OTHER SURGICAL HISTORY      Melanoma removed from back   • OTHER SURGICAL HISTORY      History of left elbow fracture with sunsequent fixation      General Information     Row Name 23 1445          Physical Therapy Time and Intention    Document Type evaluation  -LJ     Mode of Treatment physical therapy  -     Row Name 23 1445          General Information    Patient Profile Reviewed yes  -LJ     Prior Level of Function --   Pt  unable to express PLOF, reported using cane at times, living with wife and having 4 children. Will need to follow-up for accurate history, support and PLOF.  -     Existing Precautions/Restrictions fall;other (see comments)  [place], dementia, L neck, shoulder and chest  -     Barriers to Rehab hearing deficit  -     Row Name 01/14/23 1445          Living Environment    People in Home facility resident  -[facility] Name 01/14/23 1445          Cognition    Orientation Status (Cognition) unable/difficult to assess;oriented to;person  -[facility] Name 01/14/23 1445          Safety Issues, Functional Mobility    Safety Issues Affecting Function (Mobility) judgment;safety precaution awareness;safety precautions follow-through/compliance;insight into deficits/self-awareness  -     Impairments Affecting Function (Mobility) balance;endurance/activity tolerance;pain;strength;cognition  -     Cognitive Impairments, Mobility Safety/Performance awareness, need for assistance;insight into deficits/self-awareness;problem-solving/reasoning;safety precaution awareness;safety precaution follow-through;sequencing abilities  -           User Key  (r) = Recorded By, (t) = Taken By, (c) = Cosigned By    Initials Name Provider Type     Bernadette Moss, PT Physical Therapist               Mobility     [facility] Name 01/14/23 1445          Bed Mobility    Comment, (Bed Mobility) Pt met EOB  -[facility] Name 01/14/23 1445          Transfers    Comment, (Transfers) Pt required increased cueing for safety and hand placement  -[facility] Name 01/14/23 1445          Sit-Stand Transfer    Sit-Stand [place] (Transfers) moderate assist (50% patient effort);2 person assist  -     Assistive Device (Sit-Stand Transfers) walker, front-wheeled  -[facility] Name 01/14/23 1445          Gait/Stairs (Locomotion)    [place] Level (Gait) moderate assist (50% patient effort)  -     Assistive Device (Gait) walker, front-wheeled  -      Distance in Feet (Gait) 10  -LJ     Deviations/Abnormal Patterns (Gait) bilateral deviations;base of support, narrow;stride length decreased;festinating/shuffling  -     Bilateral Gait Deviations forward flexed posture  -     Comment, (Gait/Stairs) Pt required assist with RW navigation, needed blocking of RW for safety during ambulation, pt ambulated with small shuffled step, difficulty initating, limited weight shift during ambulation. Pt able to stand upright with cueing for posture.  -           User Key  (r) = Recorded By, (t) = Taken By, (c) = Cosigned By    Initials Name Provider Type    Bernadette Mejia PT Physical Therapist               Obj/Interventions     Row Name 01/14/23 1445          Range of Motion Comprehensive    General Range of Motion bilateral lower extremity ROM WFL  -     Row Name 01/14/23 1445          Strength Comprehensive (MMT)    Comment, General Manual Muscle Testing (MMT) Assessment BLE grossly 3/5- difficulty to assess due to cognition  -     Row Name 01/14/23 1440          Balance    Balance Assessment sitting static balance;sitting dynamic balance;standing static balance;standing dynamic balance  -     Static Sitting Balance contact guard  -     Dynamic Sitting Balance minimal assist  -LJ     Position, Sitting Balance unsupported;sitting edge of bed  -     Static Standing Balance moderate assist  -LJ     Dynamic Standing Balance moderate assist  -LJ     Position/Device Used, Standing Balance supported;walker, front-wheeled  -           User Key  (r) = Recorded By, (t) = Taken By, (c) = Cosigned By    Initials Name Provider Type    Bernadette Mejia PT Physical Therapist               Goals/Plan     Row Name 01/14/23 1445          Transfer Goal 1 (PT)    Activity/Assistive Device (Transfer Goal 1, PT) sit-to-stand/stand-to-sit;bed-to-chair/chair-to-bed  -ANNE MARIE     [place] Level/Cues Needed (Transfer Goal 1, PT) minimum assist (75% or more patient effort)   -     Time Frame (Transfer Goal 1, PT) 1 week  -     Progress/Outcome (Transfer Goal 1, PT) goal revised this date  -     Row Name 01/14/23 1445          Gait Training Goal 1 (PT)    Activity/Assistive Device (Gait Training Goal 1, PT) gait (walking locomotion)  -     [place] Level (Gait Training Goal 1, PT) minimum assist (75% or more patient effort)  -     Distance (Gait Training Goal 1, PT) 100  -     Time Frame (Gait Training Goal 1, PT) 10 days  -LJ     Progress/Outcome (Gait Training Goal 1, PT) goal revised this date  -     Row Name 01/14/23 1445          Therapy Assessment/Plan (PT)    Planned Therapy Interventions (PT) balance training;bed mobility training;gait training;home exercise program;patient/family education;transfer training;strengthening;stretching  -           User Key  (r) = Recorded By, (t) = Taken By, (c) = Cosigned By    Initials Name Provider Type     Bernadette Moss, PT Physical Therapist               Clinical Impression     [facility] Name 01/14/23 1445          Pain    Pretreatment Pain Rating 0/10 - no pain  -     Posttreatment Pain Rating 0/10 - no pain  -[facility] Name 01/14/23 1445          Plan of Care Review    Plan of Care Reviewed With patient  -     Progress no change  -     Outcome Evaluation Pt presents agreeable to therapy evaluation. Pt reports pain in L neck, shoulder and chest region- sensitive to light touch. Pt demonstrates decreased activity tolerance, fair balance and limited functional mobility. Pt required increased cueing due to hearing deficits and cognitive deficits. Pt will benefit from skilled acute physical therapy services to increase functional mobility. Recommend SNF upon DC.  -     Row Name 01/14/23 1443          Therapy Assessment/Plan (PT)    Rehab Potential (PT) good, to achieve stated therapy goals  -     Criteria for Skilled Interventions Met (PT) yes;meets criteria;skilled treatment is necessary  -     Therapy Frequency  (PT) daily  -     Row Name 01/14/23 1445          Vital Signs    Pre Systolic BP Rehab --  VSS throughout session  -     Row Name 01/14/23 1445          Positioning and Restraints    Pre-Treatment Position in bed  -     Post Treatment Position chair  -     In Chair notified nsg;reclined;sitting;call light within reach;encouraged to call for assist;exit alarm on;legs elevated;waffle cushion;on mechanical lift sling  -           User Key  (r) = Recorded By, (t) = Taken By, (c) = Cosigned By    Initials Name Provider Type    Bernadette Mejia, PT Physical Therapist               Outcome Measures     Row Name 01/14/23 1445          How much help from another person do you currently need...    Turning from your back to your side while in flat bed without using bedrails? 2  -LJ     Moving from lying on back to sitting on the side of a flat bed without bedrails? 2  -LJ     Moving to and from a bed to a chair (including a wheelchair)? 2  -LJ     Standing up from a chair using your arms (e.g., wheelchair, bedside chair)? 2  -LJ     Climbing 3-5 steps with a railing? 1  -LJ     To walk in hospital room? 2  -     AM-PAC 6 Clicks Score (PT) 11  -     Highest level of mobility 4 --> Transferred to chair/commode  -     Row Name 01/14/23 1538 01/14/23 1445       Functional Assessment    Outcome Measure Options AM-PAC 6 Clicks Daily Activity (OT)  - AM-PAC 6 Clicks Basic Mobility (PT)  -          User Key  (r) = Recorded By, (t) = Taken By, (c) = Cosigned By    Initials Name Provider Type    Ayush Stroud, OT Occupational Therapist    Bernadette Mejia, PT Physical Therapist                             Physical Therapy Education     Title: PT OT SLP Therapies (In Progress)     Topic: Physical Therapy (In Progress)     Point: Mobility training (Done)     Learning Progress Summary           Patient AcceptanceDOUGLAS VU by ANNE MARIE at 1/14/2023 1445                   Point: Home exercise program (Not Started)      Learner Progress:  Not documented in this visit.          Point: Body mechanics (Done)     Learning Progress Summary           Patient Acceptance, E, VU by  at 1/14/2023 1445                   Point: Precautions (Done)     Learning Progress Summary           Patient Acceptance, E, VU by  at 1/14/2023 1445                               User Key     Initials Effective Dates Name Provider Type Discipline     07/14/21 -  Bernadette Moss PT Physical Therapist PT              PT Recommendation and Plan  Planned Therapy Interventions (PT): balance training, bed mobility training, gait training, home exercise program, patient/family education, transfer training, strengthening, stretching  Plan of Care Reviewed With: patient  Progress: no change  Outcome Evaluation: Pt presents agreeable to therapy evaluation. Pt reports pain in L neck, shoulder and chest region- sensitive to light touch. Pt demonstrates decreased activity tolerance, fair balance and limited functional mobility. Pt required increased cueing due to hearing deficits and cognitive deficits. Pt will benefit from skilled acute physical therapy services to increase functional mobility. Recommend SNF upon DC.     Time Calculation:    PT Charges     Row Name 01/14/23 1445             Time Calculation    Start Time 1445  -      PT Received On 01/14/23  -      PT Goal Re-Cert Due Date 01/24/23  -         Timed Charges    66941 - Gait Training Minutes  11  -         Untimed Charges    PT Eval/Re-eval Minutes 38  -         Total Minutes    Timed Charges Total Minutes 11  -      Untimed Charges Total Minutes 38  -       Total Minutes 49  -            User Key  (r) = Recorded By, (t) = Taken By, (c) = Cosigned By    Initials Name Provider Type     Bernadette Moss, PT Physical Therapist              Therapy Charges for Today     Code Description Service Date Service Provider Modifiers Qty    87019938407 HC GAIT TRAINING EA 15 MIN 1/14/2023  Bernadette Moss, PT GP 1    96566625209 HC PT EVAL LOW COMPLEXITY 3 1/14/2023 Bernadette Moss, PT GP 1          PT G-Codes  Outcome Measure Options: AM-PAC 6 Clicks Daily Activity (OT)  AM-PAC 6 Clicks Score (PT): 11  AM-PAC 6 Clicks Score (OT): 15  PT Discharge Summary  Anticipated Discharge Disposition (PT): skilled nursing facility    Bernadette Moss, PT  1/14/2023

## 2023-01-14 NOTE — CONSULTS
DOS: 2023  NAME: Ramin Zaragoza   : 1935  PCP: Tez Santos MD  CC: Evaluate for confusion and normal pressure hydrocephalus.  Referring MD: Tamia Barahona MD    Neurological Problem and Interval History:  87 y.o. right-handed white male with a Hx of progressive dementia currently in a memory care unit was brought in because of confusion and gait disturbance.  Patient states that he usually walks around with a walker.    When I came to see him he was laying comfortably in the bed.  However he was having a lot of choking sensation and feels like something is getting caught in the back of his throat.  The registered nurse help me to get him up and set him up by the side of the bed but he could not perform the task of sitting up for some time to get the gait belt on as he felt that the choking sensation was worsening and he felt like pain in his chest area.  However he was able to tell me his full name, the place where he is currently, the correct month but he was confused about the year but later on it was corrected to the correct year.  He was able to answer some simple questions without any problems.  However it was hard to do a full Mini-Mental status examination as a choking sensation in his throat was bothering him too much and he said he needed to lay down and rest..    Past Medical/Surgical Hx:  Past Medical History:   Diagnosis Date   • CAD (coronary artery disease)    • Hyperlipidemia    • Hypertension    • Hypothyroidism     on chronic replacement therapy   • Lactose intolerance    • Lower extremity edema    • PVC's (premature ventricular contractions)     History of   • Thyroid disorder    • Venous insufficiency      Past Surgical History:   Procedure Laterality Date   • CARDIAC CATHETERIZATION     • CORONARY STENT PLACEMENT     • EYE SURGERY      Bilateral cataract extraction   • HERNIA REPAIR      Inguinal hernia repair   • OTHER SURGICAL HISTORY      Melanoma removed from back   • OTHER SURGICAL  HISTORY      History of left elbow fracture with sunsequent fixation       Review of Systems:    Constitutional: Pleasant gentleman currently feeling choking sensation in his throat.  Cardiovascular: No chest pain or palpitations noted.  Respiratory: No shortness of breath noted.  Gastrointestinal: No nausea and vomiting except for the choking sensation in the back of his throat.  Genitourinary: He has adult dependable's in place  Musculoskeletal: No weakness of his upper or lower extremities noted.  Dermatological: No skin breakdown noted.  Neurological: He is awake and alert and oriented x3.  Psychiatric: No underlying major anxiety or depression.  Ophthalmological: No visual changes.          Medications On Admission  Medications Prior to Admission   Medication Sig Dispense Refill Last Dose   • acetaminophen (TYLENOL) 325 MG tablet Take 2 tablets by mouth Every 4 (Four) Hours As Needed for Mild Pain .   Past Week   • aspirin 81 MG EC tablet Take 81 mg by mouth daily.   1/13/2023   • bumetanide (BUMEX) 0.5 MG tablet TAKE ONE TABLET BY MOUTH DAILY 90 tablet 0 1/13/2023   • divalproex (DEPAKOTE) 250 MG DR tablet Take 1 tablet by mouth Every 12 (Twelve) Hours.   1/13/2023   • donepezil (ARICEPT) 10 MG tablet Take 1 tablet by mouth Every Night.   1/12/2023   • lisinopril (PRINIVIL,ZESTRIL) 2.5 MG tablet TAKE 1 TABLET BY MOUTH ONCE DAILY 90 tablet 1 1/13/2023   • omeprazole (priLOSEC) 20 MG capsule Take 20 mg by mouth Daily.   1/13/2023   • diclofenac (VOLTAREN) 75 MG EC tablet Take 75 mg by mouth 2 (Two) Times a Day.   Unknown   • fluticasone (FLONASE) 50 MCG/ACT nasal spray 2 sprays into each nostril daily. Administer 2 sprays in each nostril for each dose.   Unknown   • nitroglycerin (NITROSTAT) 0.4 MG SL tablet Place 1 tablet under the tongue every 5 (five) minutes as needed for chest pain. Take no more than 3 doses in 15 minutes. 25 tablet 11 Unknown   • traMADol (ULTRAM) 50 MG tablet Take 50 mg by mouth 2 (Two)  Times a Day As Needed for Moderate Pain .   Unknown       Allergies:  Allergies   Allergen Reactions   • Flomax [Tamsulosin] Other (See Comments)     Unknown     • Lactose Intolerance (Gi)        Social Hx:  Social History     Socioeconomic History   • Marital status:    Tobacco Use   • Smoking status: Never   • Smokeless tobacco: Never   Vaping Use   • Vaping Use: Never used   Substance and Sexual Activity   • Alcohol use: No   • Drug use: No   • Sexual activity: Defer       Family Hx:  Family History   Problem Relation Age of Onset   • No Known Problems Mother    • No Known Problems Father        Review of Imaging (Interpretation of images not reports): Reviewed the CT of the head without contrast which shows the following:    FINDINGS:     Brain/Ventricles: There is diffuse atrophy with somewhat asymmetric predominance of the lateral ventricle dilation. Findings are more prominent than the comparison exam. There is no acute intracranial hemorrhage or suspicious extra axial fluid   collection. Diffuse hypoattenuation of the white matter of the supratentorial brain represent small vessel ischemic disease or increased transependymal flow of CSF.     No territorial areas of low-attenuation to suggest acute infarct are noted.     Orbits: The visualized portion of the orbits demonstrate no acute abnormality.     Sinuses: The visualized paranasal sinuses are clear. Chronic mild bilateral mastoid effusions noted     Soft Tissues/Skull: No acute abnormality of the visualized skull or soft tissues.     IMPRESSION:     1. No acute hemorrhage.  2. Increased prominence of the lateral ventricles with respect to overlying diffuse atrophy. This is slightly more prominent than seen on the comparison study. Consider the possibility of normal pressure hydrocephalus      Additional Tests Performed: The formal results of the MRI of the brain without contrast performed on November 28, 2021 are as follows:    FINDINGS:  There is  some artifact on the diffusion series in the inferior posterior fossa. Allowing for this, there is no evidence of restricted diffusion identified to suggest acute to subacute infarct. There is generalized atrophy. Ventricular size is prominent,  possibly due to centralized atrophy. Normal pressure hydrocephalus is not excluded. Mild chronic small vessel ischemic changes are present in the white matter. No acute hemorrhage is seen. There is a small round focus of hemosiderin in the right frontal  lobe subcortical white matter, as well as in the right side of the superior cerebellum. These may reflect small cavernoma's or old hemorrhagic lacunar infarcts. Craniovertebral junction is normal. No masses are identified. Major intracranial flow voids  are maintained. Bilateral mastoid effusions are present.     IMPRESSION:  1. Allowing for some artifact in the posterior fossa, no evidence of acute or subacute infarct. No acute hemorrhage.  2. Atrophy with mild chronic small vessel ischemic disease in the white matter.  3. Ventriculomegaly which may be due to prominent central atrophy or possibly normal pressure hydrocephalus.  4. Bilateral mastoid effusions.      Laboratory Results:   Lab Results   Component Value Date    GLUCOSE 109 (H) 01/14/2023    CALCIUM 8.7 01/14/2023     01/14/2023    K 3.4 (L) 01/14/2023    CO2 26.0 01/14/2023     01/14/2023    BUN 11 01/14/2023    CREATININE 0.71 (L) 01/14/2023    EGFRIFNONA 92 01/11/2022    BCR 15.5 01/14/2023    ANIONGAP 11.0 01/14/2023     Lab Results   Component Value Date    WBC 8.68 01/14/2023    HGB 12.7 (L) 01/14/2023    HCT 37.3 (L) 01/14/2023    MCV 95.9 01/14/2023     01/14/2023       Lab Results   Component Value Date    HGBA1C 5.90 (H) 11/28/2021     No results found for: INR, PROTIME      Physical Examination:  /79 (BP Location: Right arm, Patient Position: Lying)   Pulse 74   Temp 97.5 °F (36.4 °C) (Axillary)   Resp 16   Ht 177.8 cm  "(70\")   Wt 79.6 kg (175 lb 8 oz)   SpO2 94%   BMI 25.18 kg/m²   General Appearance:   Well developed, well nourished, well groomed, alert, and cooperative.  HEENT: Normocephalic.  He is extremely hard of hearing.  Neck and Spine: Normal range of motion.  Normal alignment. No mass or tenderness. No bruits.  Cardiac: Regular rate and rhythm. No murmurs.  Peripheral Vasculature: Radial and pedal pulses are equal and symmetric. No signs of distal embolization.  Extremities:    No edema or deformities. Normal joint ROM. BILLY hoses and SCD's in place  Skin:    No rashes or birth marks.    Neurological examination:  Higher Integrative  Function: Oriented to time, place and person.  Has short-term cognitive issues..  CN II: Pupils are equal, round, and reactive to light. Normal visual acuity and visual fields.    CN III IV VI: Extraocular movements are full without nystagmus.   CN V: Normal facial sensation and strength of muscles of mastication.  CN VII: Facial movements are symmetric. No weakness.  CN VIII:   Auditory acuity is compromised and he is extremely hard of hearing.  CN IX & X:   Symmetric palatal movement.  CN XI: Sternocleidomastoid and trapezius are normal.  No weakness.  CN XII:   The tongue is midline.  No atrophy or fasciculations.  Motor: Normal muscle strength, bulk and tone in upper and lower extremities.  No fasciculations, rigidity, spasticity, or abnormal movements.  Reflexes: 1+ in the upper and lower extremities. Plantar responses are flexor.  Sensation: Normal to light touch, pinprick, vibration, temperature, and proprioception in arms and legs. .  Station and Gait: Tried to sit him up by the side of the bed along with the help of our registered nurse and a gait belt but he started feeling uncomfortable as the choking sensation in his throat started getting worse.  Coordination: Finger to nose test shows no dysmetria.        Diagnoses / Discussion:  87 y.o. who presents with Sx of choking " sensation in his throat as well as confusional episode which seems to have corrected.  However his choking sensation still continues.  Gait was difficult to evaluate.  Reviewing the MRI from before it seems that his ventricles were generous at that time and given that the neuroimaging techniques are different and so a CT scan is hard to compare to the MRI but still it looks like the ventricles have been generous from before.    Plan:  Discussed with Dr. Tamia Barahona, the hospitalist about making sure that he does not choke when he is eating food or thin liquids.  Patient has been evaluated by speech pathologist already.    Patient does not need any urgent MRI of his brain at this point as he possibly has progressive cortical atrophy from the dementia and he will not qualify for any kind of high-volume spinal tap or CSF analysis or ventriculoperitoneal shunt placement as this will have its own complications..     I have discussed the above with the patient and Dr. Barahona.  I do not have any further recommendations and will have him follow-up in the outpatient neurology clinic with Dr. Nitin Stafford in 1 month's time..  Time spent with patient: 70 minutes in face-to-face evaluation and management of the patient.    Coding    Dictated using Dragon dictation.

## 2023-01-14 NOTE — PLAN OF CARE
Goal Outcome Evaluation:  Plan of Care Reviewed With: patient, daughter            SLP evaluation completed. Will continue to address dysphagia. Please see note for further details and recommendations.

## 2023-01-14 NOTE — PLAN OF CARE
Patient arrived on the floor at 2100 hours from ED accompanied by daughter. A&O only to self, patient is confused without behavioral disturbance. Lung sound clear/diminished bilaterally. Abd soft/non tender to touch. No open area/PI noted upon admission. VSS on RA, SR on cardiac mx, denies pain/discomfort at this time.  Will cont to mx. Frequent rounding in place.

## 2023-01-14 NOTE — ED PROVIDER NOTES
[facility]    EMERGENCY DEPARTMENT ENCOUNTER      Pt Name: Ramin Zaragoza  MRN: [id number]  YOB: 1935  Date of evaluation: 1/13/2023  Provider: Brennan Hernandez MD    CHIEF COMPLAINT       Chief Complaint   Patient presents with   • Weakness - Generalized         HISTORY OF PRESENT ILLNESS   Ramin Zaragoza is a 87 y.o. male who presents to the emergency department with progressively worsening confusion and generalized weakness over the course of the past few days.  Patient actually had a fall and hit his head a couple days ago and has been complaining about left-sided shoulder and hip pain since then.  He does not complain to me about any ongoing headache, chest pain, back pain, vomiting, or diarrhea.  Much of the history was obtained from his daughter at the bedside given the patient's history of dementia.  He currently lives at a memory care unit.      Nursing notes were reviewed.    REVIEW OF SYSTEMS     ROS:  A chief complaint appropriate review of systems was completed and is negative except as noted in the HPI.      PAST MEDICAL HISTORY     Past Medical History:   Diagnosis Date   • CAD (coronary artery disease)    • Hyperlipidemia    • Hypertension    • Hypothyroidism     on chronic replacement therapy   • Lactose intolerance    • Lower extremity edema    • PVC's (premature ventricular contractions)     History of   • Thyroid disorder    • Venous insufficiency          SURGICAL HISTORY       Past Surgical History:   Procedure Laterality Date   • CARDIAC CATHETERIZATION     • CORONARY STENT PLACEMENT     • EYE SURGERY      Bilateral cataract extraction   • HERNIA REPAIR      Inguinal hernia repair   • OTHER SURGICAL HISTORY      Melanoma removed from back   • OTHER SURGICAL HISTORY      History of left elbow fracture with sunsequent fixation         CURRENT MEDICATIONS       Current Facility-Administered Medications:   •  acetaminophen (TYLENOL) tablet 650 mg, 650 mg, Oral, Q4H PRN, Fernando Rivera,  MD  •  [START ON 1/14/2023] aspirin EC tablet 81 mg, 81 mg, Oral, Daily, Fernando Rivera MD  •  sennosides-docusate (PERICOLACE) 8.6-50 MG per tablet 2 tablet, 2 tablet, Oral, BID **AND** polyethylene glycol (MIRALAX) packet 17 g, 17 g, Oral, Daily PRN **AND** bisacodyl (DULCOLAX) EC tablet 5 mg, 5 mg, Oral, Daily PRN **AND** bisacodyl (DULCOLAX) suppository 10 mg, 10 mg, Rectal, Daily PRN, Fernando Rivera MD  •  divalproex (DEPAKOTE) DR tablet 250 mg, 250 mg, Oral, Q12H, Fernando Rivera MD  •  donepezil (ARICEPT) tablet 10 mg, 10 mg, Oral, Nightly, Fernando Rivera MD  •  [START ON 1/14/2023] fluticasone (FLONASE) 50 MCG/ACT nasal spray 2 spray, 2 spray, Nasal, Daily, Fernando Rivera MD  •  ondansetron (ZOFRAN) tablet 4 mg, 4 mg, Oral, Q6H PRN **OR** ondansetron (ZOFRAN) injection 4 mg, 4 mg, Intravenous, Q6H PRN, Fernando Rivera MD  •  [START ON 1/14/2023] pantoprazole (PROTONIX) EC tablet 40 mg, 40 mg, Oral, Q AM, Fernando Rivera MD  •  sodium chloride 0.9 % flush 10 mL, 10 mL, Intravenous, PRN, Emergency, Triage Protocol, MD  •  sodium chloride 0.9 % flush 10 mL, 10 mL, Intravenous, Q12H, Fernando Rivera MD  •  sodium chloride 0.9 % flush 10 mL, 10 mL, Intravenous, PRN, Fernando Rivera MD  •  sodium chloride 0.9 % infusion 40 mL, 40 mL, Intravenous, PRN, Fernando Rivera MD    ALLERGIES     Flomax [tamsulosin] and Lactose intolerance (gi)    FAMILY HISTORY       Family History   Problem Relation Age of Onset   • No Known Problems Mother    • No Known Problems Father           SOCIAL HISTORY       Social History     Socioeconomic History   • Marital status:    Tobacco Use   • Smoking status: Never   • Smokeless tobacco: Never   Vaping Use   • Vaping Use: Never used   Substance and Sexual Activity   • Alcohol use: No   • Drug use: No   • Sexual activity: Defer         PHYSICAL EXAM    (up to 7 for level 4, 8 or more for level 5)     Vitals:    01/13/23 1859 01/13/23 1900 01/13/23 1904 01/13/23 2112    BP:       Pulse: 82 82     Resp:   16    Temp:       TempSrc:       SpO2: 97% 97%     Weight:    79.6 kg (175 lb 8 oz)   Height:           General: Awake, alert, no acute distress.  HEENT: Pupils are equally round and reactive to light, EOMI, conjunctivae clear, sclerae white, there is no injection no icterus.  Oral mucosa is moist, no exudate. Uvula is midline. No malocclusion or tenderness over the mandible. There is no hemotympanum, gonzales sign, or raccoon eyes.  Neck: Neck is supple, full range of motion, trachea midline. No midline tenderness.  Cardiac: Heart regular rate, rhythm, no murmurs, rubs, or gallops. Peripheral pulses are 2+ throughout.  Lungs: Lungs are clear to auscultation, there is no wheezing, rhonchi, or rales. There is no use of accessory muscles.  Chest wall: There is no tenderness to palpation over the chest wall or over ribs. There are no chest wall ecchymoses.  Abdomen: Abdomen is soft, nontender, nondistended. There are no firm or pulsatile masses, no rebound rigidity or guarding. No abdominal wall ecchymoses.  Musculoskeletal: Mild tenderness without deformity at the left shoulder and left hip  Neuro: Motor intact, sensory intact, level of consciousness is normal.  Dermatology: Skin is warm and dry  Psych: Mentation is grossly normal, cognition is grossly normal. Affect is appropriate.        DIAGNOSTIC RESULTS     EKG: All EKGs are interpreted by the Emergency Department Physician who either signs or Co-signs this chart in the absence of a cardiologist.    ECG 12 Lead ED Triage Standing Order; Weak / Dizzy / AMS   Final Result   Test Reason : ED Triage Standing Order~   Blood Pressure :   */*   mmHG   Vent. Rate :  90 BPM     Atrial Rate :  90 BPM      P-R Int : 148 ms          QRS Dur :  88 ms       QT Int : 354 ms       P-R-T Axes :   7 -42  64 degrees      QTc Int : 433 ms      Normal sinus rhythm   Left axis deviation   Left ventricular hypertrophy with repolarization abnormality    Possible Lateral infarct (cited on or before 30-NOV-2022)   Abnormal ECG   When compared with ECG of 30-NOV-2022 11:00,   No significant change was found   Confirmed by JOSE R MA ([id number]) on 1/13/2023 9:16:03 PM      Referred By: CHLOE BAIRES           Confirmed By: JOSE R MA            RADIOLOGY:   [x] Radiologist's Report Reviewed:  CT Head Without Contrast   Final Result      1. No acute hemorrhage.   2. Increased prominence of the lateral ventricles with respect to overlying diffuse atrophy. This is slightly more prominent than seen on the comparison study. Consider the possibility of normal pressure hydrocephalus          Electronically Signed: Riccardo Bustos     1/13/2023 4:31 PM EST     Workstation ID: [id number]      CT Cervical Spine Without Contrast   Final Result   Impression:   Negative for fracture      Electronically Signed: Jose Roberto Post     1/13/2023 4:28 PM EST     Workstation ID: [id number]      CT Abdomen Pelvis Without Contrast   Final Result   Impression:      1. No acute findings in the abdomen or pelvis.   2. Interstitial fibrosis of the lung bases with coronary artery calcifications.   3. Hiatal and periumbilical hernias.   4. Bilateral renal cysts.   5. Sigmoid diverticulosis without diverticulitis.   6. Markedly enlarged prostate         Electronically Signed: Rubens STACK Maira     1/13/2023 4:26 PM EST     Workstation ID: [id number]      XR Shoulder 2+ View Left   Final Result   Impression:   No fractures or dislocations of the shoulder or hip.    Osteopenia.   Degenerative changes as described.      Electronically Signed: Arabella Rutherford     1/13/2023 4:09 PM EST     Workstation ID: [id number]      XR Hip With or Without Pelvis 2 - 3 View Left   Final Result   Impression:   No fractures or dislocations of the shoulder or hip.    Osteopenia.   Degenerative changes as described.      Electronically Signed: Arabella Rutherford     1/13/2023 4:09 PM EST     Workstation ID: [id number]      XR Chest 1 View   Final  Result   Impression:   Bibasilar atelectasis or scarring, unchanged from the patient's previous radiograph. The chest is otherwise clear.      Electronically Signed: Rubens Rao     1/13/2023 3:23 PM EST     Workstation ID: [id number]          I ordered and independently reviewed the above noted radiographic studies.        LABS:    I have reviewed and interpreted all of the currently available lab results from this visit (if applicable):  Results for orders placed or performed during the hospital encounter of 01/13/23   Comprehensive Metabolic Panel    Specimen: Blood   Result Value Ref Range    Glucose 154 (H) 65 - 99 mg/dL    BUN 13 8 - 23 mg/dL    Creatinine 0.94 0.76 - 1.27 mg/dL    Sodium 137 136 - 145 mmol/L    Potassium 4.3 3.5 - 5.2 mmol/L    Chloride 98 98 - 107 mmol/L    CO2 29.0 22.0 - 29.0 mmol/L    Calcium 9.4 8.6 - 10.5 mg/dL    Total Protein 7.1 6.0 - 8.5 g/dL    Albumin 4.0 3.5 - 5.2 g/dL    ALT (SGPT) 17 1 - 41 U/L    AST (SGOT) 40 1 - 40 U/L    Alkaline Phosphatase 55 39 - 117 U/L    Total Bilirubin 0.9 0.0 - 1.2 mg/dL    Globulin 3.1 gm/dL    A/G Ratio 1.3 g/dL    BUN/Creatinine Ratio 13.8 7.0 - 25.0    Anion Gap 10.0 5.0 - 15.0 mmol/L    eGFR 78.5 >60.0 mL/min/1.73   Troponin    Specimen: Blood   Result Value Ref Range    Troponin T <0.010 0.000 - 0.030 ng/mL   Magnesium    Specimen: Blood   Result Value Ref Range    Magnesium 1.7 1.6 - 2.4 mg/dL   Urinalysis With Microscopic If Indicated (No Culture) - Urine, Clean Catch    Specimen: Urine, Clean Catch   Result Value Ref Range    Color, UA Dark Yellow (A) Yellow, Straw    Appearance, UA Clear Clear    pH, UA 5.5 5.0 - 8.0    Specific Gravity, UA 1.024 1.001 - 1.030    Glucose, UA Negative Negative    Ketones, UA 15 mg/dL (1+) (A) Negative    Bilirubin, UA Negative Negative    Blood, UA Moderate (2+) (A) Negative    Protein, UA 30 mg/dL (1+) (A) Negative    Leuk Esterase, UA Negative Negative    Nitrite, UA Negative Negative    Urobilinogen, UA  1.0 E.U./dL 0.2 - 1.0 E.U./dL   CBC Auto Differential    Specimen: Blood   Result Value Ref Range    WBC 11.13 (H) 3.40 - 10.80 10*3/mm3    RBC 4.44 4.14 - 5.80 10*6/mm3    Hemoglobin 14.5 13.0 - 17.7 g/dL    Hematocrit 41.9 37.5 - 51.0 %    MCV 94.4 79.0 - 97.0 fL    MCH 32.7 26.6 - 33.0 pg    MCHC 34.6 31.5 - 35.7 g/dL    RDW 12.2 (L) 12.3 - 15.4 %    RDW-SD 42.1 37.0 - 54.0 fl    MPV 9.0 6.0 - 12.0 fL    Platelets 179 140 - 450 10*3/mm3    Neutrophil % 73.4 42.7 - 76.0 %    Lymphocyte % 12.8 (L) 19.6 - 45.3 %    Monocyte % 12.9 (H) 5.0 - 12.0 %    Eosinophil % 0.3 0.3 - 6.2 %    Basophil % 0.2 0.0 - 1.5 %    Immature Grans % 0.4 0.0 - 0.5 %    Neutrophils, Absolute 8.16 (H) 1.70 - 7.00 10*3/mm3    Lymphocytes, Absolute 1.43 0.70 - 3.10 10*3/mm3    Monocytes, Absolute 1.44 (H) 0.10 - 0.90 10*3/mm3    Eosinophils, Absolute 0.03 0.00 - 0.40 10*3/mm3    Basophils, Absolute 0.02 0.00 - 0.20 10*3/mm3    Immature Grans, Absolute 0.05 0.00 - 0.05 10*3/mm3    nRBC 0.0 0.0 - 0.2 /100 WBC   Valproic Acid Level, Total    Specimen: Blood   Result Value Ref Range    Valproic Acid 68.4 50.0 - 125.0 mcg/mL   Urinalysis, Microscopic Only - Urine, Clean Catch    Specimen: Urine, Clean Catch   Result Value Ref Range    RBC, UA Too Numerous to Count (A) None Seen, 0-2 /HPF    WBC, UA 0-2 None Seen, 0-2 /HPF    Bacteria, UA None Seen None Seen, Trace /HPF    Squamous Epithelial Cells, UA 0-2 None Seen, 0-2 /HPF    Hyaline Casts, UA 0-6 0 - 6 /LPF    Methodology Automated Microscopy    ECG 12 Lead ED Triage Standing Order; Weak / Dizzy / AMS   Result Value Ref Range    QT Interval 354 ms    QTC Interval 433 ms   Green Top (Gel)   Result Value Ref Range    Extra Tube Hold for add-ons.    Lavender Top   Result Value Ref Range    Extra Tube hold for add-on    Gold Top - SST   Result Value Ref Range    Extra Tube Hold for add-ons.    Gray Top   Result Value Ref Range    Extra Tube Hold for add-ons.    Light Blue Top   Result Value Ref  Range    Extra Tube Hold for add-ons.         If labs were ordered, I independently reviewed the results and considered them in treating the patient.      EMERGENCY DEPARTMENT COURSE and DIFFERENTIAL DIAGNOSIS/MDM:   Vitals:  AS OF 22:33 EST    BP - 107/61  HR - 82  TEMP - 98 °F (36.7 °C) (Oral)  O2 SATS - 97%        Discussion below represents my analysis of pertinent findings related to patient's condition, differential diagnosis, treatment plan and final disposition.      Differential diagnosis:  The differential diagnosis associated with the patient's presentation includes: Skull fracture, intracranial hemorrhage, C-spine fracture, left shoulder fracture, left foot fracture, urinary tract infection, electrolyte derangement, anemia      Independent interpretations (ECG/rhythm strip/X-ray/US/CT scan): I personally reviewed the x-rays of the patient's left shoulder and left hip without any evidence of fracture.  I reviewed his head CT which demonstrates no evidence of intracranial hemorrhage or skull fracture.      Additional sources:  Discussed/obtained information from independent historians:   [] Spouse:   [] Parent:   [] Friend:   [] EMS:   [x] Other: History obtained from the patient's daughter at bedside.  She informs me that patient has been significantly more weak and confused over the course the past couple of days in comparison to his baseline.  External (non-ED) record review:   [] Inpatient record:   [] Office record:   [] Outpatient record:   [] Prior Outpatient labs:   [] Prior Outpatient radiology:   [] Primary Care record:   [x] Outside ED record: I reviewed patient's prior ED visit in November 2022 for a left shoulder injury which may contribute to his pain today given his lack of obvious injury on his x-ray. [] Other:       Patient's care impacted by:   [] Diabetes   [] Hypertension   [] Coronary Artery Disease   [] Cancer   [x] Other: Cognitive impairment    Care significantly affected by Social  Determinants of Health (housing and economic circumstances, unemployment)    [x] Yes     [] No   If yes, Patient's care significantly limited by  Social Determinants of Health including:    [] Inadequate housing    [] Low income    [] Alcoholism and drug addiction in family    [] Problems related to primary support group    [] Unemployment    [] Problems related to employment    [x] Other Social Determinants of Health: Patient lives at [facility] care facility with limited ability to care for significant medical illness.    Consideration of admission/observation vs discharge: I did consider discharge home, however discussion with the patient's daughter revealed significant concern due to his decline over the course the past few days.  Given this and commendation with the possibility of normal pressure hydrocephalus noted on CT scan, will admit for further evaluation.      I considered prescription management with:    [x] Pain medication: Narcotic pain medication, however patient symptoms well controlled in the ED.   [] Antiviral:   [] Antibiotic:   [] Other:    Additional orders considered but not ordered:  The following testing was considered but ultimately not selected after discussion with patient/family: I considered obtaining chest CT, however based on the physical examination, he has no obvious chest wall tenderness and his x-ray is within normal limits and so felt that significant chest trauma was less likely.    ED Course:    ED Course as of 01/13/23 2233 Fri Jan 13, 2023 1944 I discussed the case with the hospitalist, Dr. Buckley.  We discussed patient's history, presentation, work-up.  She accepts patient for admission. [NS]      ED Course User Index  [NS] Brennan Hernandez MD           PROCEDURES:  Procedures    CRITICAL CARE TIME        FINAL IMPRESSION      1. Altered mental status, unspecified altered mental status type    2. Injury of head, initial encounter    3. Contusion of left shoulder, initial  encounter    4. Contusion of left hip, initial encounter    5. Hematuria, unspecified type          DISPOSITION/PLAN     ED Disposition     ED Disposition   Decision to Admit    Condition   --    Comment   Level of Care: Telemetry [5]   Diagnosis: Altered mental status [780.97.ICD-9-CM]                 Comment: Please note this report has been produced using speech recognition software.      Brennan Hernandez MD  Attending Emergency Physician           Brennan Hernandez MD  01/13/23 [id number]

## 2023-01-14 NOTE — PROGRESS NOTES
"                    Clinical Nutrition     Patient Name: Ramin Zaragoza  YOB: 1935  MRN: [id number]  Date of Encounter: 01/14/23 11:51 EST  Admission date: 1/13/2023    Reason for Visit   Reduced oral intake, MST=3 (eating poorly, unsure wt loss)    EMR Reviewed: Yes     Diet Nutrition Related History:      Pt admitted d/t AMS. Attempted to visit pt. Pt was sleeping. No family at bedside. Pt has a PMH of dementia and noted for confusion. Attempted to call pt's daughter but no answer. SLP following and currently recommends regular textures and nectar thick liquids. Per EMR wt (2/23/22) pt may have had a wt loss of 14# (7.4%) within 11 months (insigificant to malnutrition timeframe). Per allergies list pt has lactose intolerance. Will add Boost Plus BID.    Anthropometrics   Height: Height: 177.8 cm (70\")  Admission Weight:   Flowsheet Rows    Flowsheet Row First Filed Value   Admission Height 177.8 cm (70\") Documented at 01/13/2023 1452   Admission Weight 83.9 kg (185 lb) Documented at 01/13/2023 1452        Last Filed Weight:Weight: 79.6 kg (175 lb 8 oz) (01/13/23 2112)  Weight Method: Bed scale  BMI: BMI (Calculated): 25.2  BMI classification: Overweight: 25.0-29.9kg/m2    IBW: Ideal Body Weight (IBW) (kg): 76.48  EMR wts:   Weight Weight (kg) Weight (lbs) Weight Method VISIT REPORT   1/13/2023 79.606 kg 175 lb 8 oz Bed scale -   1/13/2023 83.915 kg 185 lb Stated -   11/30/2022 88.905 kg 196 lb - -   3/2/2022 89.268 kg 196 lb 12.8 oz - Report   2/23/2022 86.183 kg 190 lb - Report   2/23/2022 85.73 kg 189 lb - Report   2/23/2022 85.73 kg 189 lb - Report   1/13/2022 85.9 kg 189 lb 6 oz - Report   1/11/2022 83.915 kg 185 lb Stated -   11/28/2021 86.183 kg 190 lb - -   11/28/2021 86.183 kg 190 lb Bed scale -   11/27/2021 83.462 kg 184 lb Stated -   11/23/2021 87.544 kg 193 lb Stated -   3/26/2021 83.008 kg 183 lb Stated -   3/18/2021 83.008 kg 183 lb Stated -     Weight change: 14# wt loss per EMR " (2/23/22)  % change:  7.4%  Time frame:  ~ 11 months     Current Nutrition Prescription     Diet: Cardiac Diets; Healthy Heart (2-3 Na+); Texture: Regular Texture (IDDSI 7); Fluid Consistency: Nectar Thick    ONS: Will order Boost Plus BID    Average Intake from Charting: No intakes recorded at this time    Intake History:     Intake Category  Unable to determine at this time.    Actions   Appropriateness for MSA:  Criteria for further malnutrition exam not met at this time. Follow per protocol.     Interventions:   Follow treatment progress, Care plan reviewed, Encourage intake, Supplement provided    Shruti Dash,   Time Spent: 20 min

## 2023-01-14 NOTE — PLAN OF CARE
Goal Outcome Evaluation:  Plan of Care Reviewed With: patient        Progress: no change  Outcome Evaluation: Pt presents agreeable to therapy evaluation. Pt reports pain in L neck, shoulder and chest region- sensitive to light touch. Pt demonstrates decreased activity tolerance, fair balance and limited functional mobility. Pt required increased cueing due to hearing deficits and cognitive deficits. Pt will benefit from skilled acute physical therapy services to increase functional mobility. Recommend SNF upon DC.

## 2023-01-14 NOTE — H&P
[facility] Medicine Services  HISTORY AND PHYSICAL    Patient Name: Ramin Zaragoza  : 1935  MRN: [id number]  Primary Care Physician: Tez Santos MD  Date of admission: 2023      Subjective   Subjective     Chief Complaint:  AMS    HPI:  Ramin Zaragoza is a 87 y.o. male with h/o dementia in a memory care unit with worsening confusion.  History is per daughter who is a pharmacist.  She states that yesterday patient fell and hit his head on closet door and EMS was called and assessed and did not feel needed to come to hospital.  She states that when she went to the facility to check on him he was slumped over in the chair and confusion has been worse.  But it sounds like his confusion is fairly severe at baseline, he will know who his daughter is but maybe not much else.  At baseline he does very limited walking.  Daughter states that he has been fine otherwise and has not shown any other signs of being sick.  Specifically no cough, soa, dysuria, abdominal pain, diarrhea, or fevers.   Note that there is some blood on u/a but daughter denies any history of hematuria, he did require I/o cath while in ER for sample.       Review of Systems   Unable to assess d/t patient's AMS  All other systems reviewed and are negative.     Personal History     Past Medical History:   Diagnosis Date   • CAD (coronary artery disease)    • Hyperlipidemia    • Hypertension    • Hypothyroidism     on chronic replacement therapy   • Lactose intolerance    • Lower extremity edema    • PVC's (premature ventricular contractions)     History of   • Thyroid disorder    • Venous insufficiency              Past Surgical History:   Procedure Laterality Date   • CARDIAC CATHETERIZATION     • CORONARY STENT PLACEMENT     • EYE SURGERY      Bilateral cataract extraction   • HERNIA REPAIR      Inguinal hernia repair   • OTHER SURGICAL HISTORY      Melanoma removed from back   • OTHER SURGICAL HISTORY      History of  left elbow fracture with sunsequent fixation       Family History:  family history includes No Known Problems in his father and mother. Otherwise pertinent FHx was reviewed and unremarkable.     Social History:  reports that he has never smoked. He has never used smokeless tobacco. He reports that he does not drink alcohol and does not use drugs.  Social History     Social History Narrative    Caffeine decaf coffee and half reg       Medications:  Available home medication information reviewed.  Medications Prior to Admission   Medication Sig Dispense Refill Last Dose   • acetaminophen (TYLENOL) 325 MG tablet Take 2 tablets by mouth Every 4 (Four) Hours As Needed for Mild Pain .   Past Week   • aspirin 81 MG EC tablet Take 81 mg by mouth daily.   1/13/2023   • bumetanide (BUMEX) 0.5 MG tablet TAKE ONE TABLET BY MOUTH DAILY 90 tablet 0 1/13/2023   • divalproex (DEPAKOTE) 250 MG DR tablet Take 1 tablet by mouth Every 12 (Twelve) Hours.   1/13/2023   • donepezil (ARICEPT) 10 MG tablet Take 1 tablet by mouth Every Night.   1/12/2023   • lisinopril (PRINIVIL,ZESTRIL) 2.5 MG tablet TAKE 1 TABLET BY MOUTH ONCE DAILY 90 tablet 1 1/13/2023   • omeprazole (priLOSEC) 20 MG capsule Take 20 mg by mouth Daily.   1/13/2023   • diclofenac (VOLTAREN) 75 MG EC tablet Take 75 mg by mouth 2 (Two) Times a Day.   Unknown   • fluticasone (FLONASE) 50 MCG/ACT nasal spray 2 sprays into each nostril daily. Administer 2 sprays in each nostril for each dose.   Unknown   • nitroglycerin (NITROSTAT) 0.4 MG SL tablet Place 1 tablet under the tongue every 5 (five) minutes as needed for chest pain. Take no more than 3 doses in 15 minutes. 25 tablet 11 Unknown   • traMADol (ULTRAM) 50 MG tablet Take 50 mg by mouth 2 (Two) Times a Day As Needed for Moderate Pain .   Unknown       Allergies   Allergen Reactions   • Flomax [Tamsulosin] Other (See Comments)     Unknown     • Lactose Intolerance (Gi)        Objective   Objective     Vital Signs:   Temp:   [98 °F (36.7 °C)] 98 °F (36.7 °C)  Heart Rate:  [77-91] 82  Resp:  [16-18] 16  BP: (107-123)/(61-67) 107/61       Physical Exam   Constitutional: Awake, alert  Eyes: PERRLA, sclerae anicteric, no conjunctival injection  HENT: NCAT, mucous membranes moist  Neck: Supple, no thyromegaly, no lymphadenopathy, trachea midline  Respiratory: Clear to auscultation bilaterally, nonlabored respirations   Cardiovascular: RRR, no murmurs, rubs, or gallops, palpable pedal pulses bilaterally  Gastrointestinal: Positive bowel sounds, soft, nontender, nondistended  Musculoskeletal: 2-3+pitting edema bilateral LE's  Psychiatric: Appropriate affect, cooperative  Neurologic: Oriented x 1, pleasantly confused , moves all extremities, Cranial Nerves grossly intact to confrontation, speech clear  Skin: No rashes      Result Review:  I have personally reviewed the results from the time of this admission to 1/13/2023 21:39 EST and agree with these findings:  [x]  Laboratory list / accordion  [x]  Microbiology  []  Radiology  []  EKG/Telemetry   []  Cardiology/Vascular   []  Pathology  []  Old records  []  Other:  Most notable findings include:       LAB RESULTS:      Lab 01/13/23  1640   WBC 11.13*   HEMOGLOBIN 14.5   HEMATOCRIT 41.9   PLATELETS 179   NEUTROS ABS 8.16*   IMMATURE GRANS (ABS) 0.05   LYMPHS ABS 1.43   MONOS ABS 1.44*   EOS ABS 0.03   MCV 94.4         Lab 01/13/23  1640   SODIUM 137   POTASSIUM 4.3   CHLORIDE 98   CO2 29.0   ANION GAP 10.0   BUN 13   CREATININE 0.94   EGFR 78.5   GLUCOSE 154*   CALCIUM 9.4   MAGNESIUM 1.7         Lab 01/13/23  1640   TOTAL PROTEIN 7.1   ALBUMIN 4.0   GLOBULIN 3.1   ALT (SGPT) 17   AST (SGOT) 40   BILIRUBIN 0.9   ALK PHOS 55         Lab 01/13/23  1640   TROPONIN T <0.010                 UA    Urinalysis 11/30/22 1/13/23 1/13/23     1858 1858   Squamous Epithelial Cells, UA   0-2   Specific Gravity, UA 1.011 1.024    Ketones, UA Negative 15 mg/dL (1+) (A)    Blood, UA Negative Moderate (2+) (A)     Leukocytes, UA Negative Negative    Nitrite, UA Negative Negative    RBC, UA   Too Numerous to Count (A)   WBC, UA   0-2   Bacteria, UA   None Seen   (A) Abnormal value              Microbiology Results (last 10 days)     ** No results found for the last 240 hours. **          CT Abdomen Pelvis Without Contrast    Result Date: 1/13/2023  CT ABDOMEN PELVIS WO CONTRAST Date of Exam: 1/13/2023 3:59 PM EST Indication: fall, abd pain. Comparison: 2/12/2020 Technique: Axial CT images were obtained of the abdomen and pelvis without the administration of contrast. Reconstructed coronal and sagittal images were also obtained. Automated exposure control and iterative construction methods were used. Findings: There is chronic interstitial fibrosis in the lung bases. There is an hiatal hernia. There are no rib fractures identified. There are coronary artery calcifications. The liver and spleen have a normal appearance. The gallbladder is absent. The adrenal glands appear normal. There are bilateral renal cysts present. There are no solid renal masses. There is no evidence of hydronephrosis. There is minimal perinephric soft tissue stranding. There is a periumbilical hernia containing fat. There is sigmoid diverticulosis without diverticulitis. The prostate is markedly enlarged. The urinary bladder appears unremarkable. There are no pelvic masses or fluid collections. There is degenerative disc disease in the lower lumbar spine. There is a sclerotic bone lesion in the right iliac crest measuring 1.4 x 1.0 cm. There are no other bone lesions identified. This is present and unchanged from the patient's previous CT. No fractures are identified.     Impression: Impression: 1. No acute findings in the abdomen or pelvis. 2. Interstitial fibrosis of the lung bases with coronary artery calcifications. 3. Hiatal and periumbilical hernias. 4. Bilateral renal cysts. 5. Sigmoid diverticulosis without diverticulitis. 6. Markedly enlarged  prostate Electronically Signed: Rubens Rao  1/13/2023 4:26 PM EST  Workstation ID: [id number]    XR Shoulder 2+ View Left    Result Date: 1/13/2023  XR HIP W OR WO PELVIS 2-3 VIEW LEFT, XR SHOULDER 2+ VW LEFT Date of Exam: 1/13/2023 3:54 PM EST Indication: fall, l hip pain.  Shoulder pain. Comparison: None available. Findings: Pelvis and hip: Osteopenia. This limits evaluation for nonspecific fractures or focal osseous lesions. There is moderate arthritis of the hips. There is chondrocalcinosis of the hips. Sacroiliac joints are well aligned. There are no aggressive osseous lesions. Shoulder: Mild osteopenia. No acute fractures or dislocations. Limited evaluation glenohumeral  joint space. Large subacromial osteophyte. Mild chondrocalcinosis along the humeral head articular surface. Mild degenerative change of acromioclavicular joint. Visualized thorax is clear.     Impression: Impression: No fractures or dislocations of the shoulder or hip. Osteopenia. Degenerative changes as described. Electronically Signed: Arabella Rutherford  1/13/2023 4:09 PM EST  Workstation ID: [id number]    CT Head Without Contrast    Result Date: 1/13/2023  CT HEAD WO CONTRAST Date of Exam: 1/13/2023 3:59 PM EST Indication: fall, head injury. Comparison: None available. Technique: Axial CT images were obtained of the head without contrast administration.  Reconstructed coronal and sagittal images were also obtained. Automated exposure control and iterative construction methods were used. FINDINGS: Brain/Ventricles: There is diffuse atrophy with somewhat asymmetric predominance of the lateral ventricle dilation. Findings are more prominent than the comparison exam. There is no acute intracranial hemorrhage or suspicious extra axial fluid collection. Diffuse hypoattenuation of the white matter of the supratentorial brain represent small vessel ischemic disease or increased transependymal flow of CSF. No territorial areas of low-attenuation to suggest  acute infarct are noted. Orbits: The visualized portion of the orbits demonstrate no acute abnormality. Sinuses: The visualized paranasal sinuses are clear. Chronic mild bilateral mastoid effusions noted Soft Tissues/Skull: No acute abnormality of the visualized skull or soft tissues.     Impression: 1. No acute hemorrhage. 2. Increased prominence of the lateral ventricles with respect to overlying diffuse atrophy. This is slightly more prominent than seen on the comparison study. Consider the possibility of normal pressure hydrocephalus  Electronically Signed: Riccardo Bustos  1/13/2023 4:31 PM EST  Workstation ID: [id number]    CT Cervical Spine Without Contrast    Result Date: 1/13/2023  CT CERVICAL SPINE WO CONTRAST Date of Exam: 1/13/2023 3:59 PM EST Indication: fall, head injury. Comparison: None available. Technique: Axial CT images were obtained of the cervical spine without contrast administration.  Reconstructed coronal and sagittal images were also obtained. Automated exposure control and iterative construction methods were used. Findings: No evidence of fracture or subluxation. Moderate degenerative disc disease at C6-C7, mild degenerative disc disease in the remainder of the cervical spine. Degenerative change at the articulation of the odontoid and anterior arch of C1. No acute soft tissue abnormality     Impression: Impression: Negative for fracture Electronically Signed: Jose Roberto Sejal  1/13/2023 4:28 PM EST  Workstation ID: [id number]    XR Chest 1 View    Result Date: 1/13/2023  XR CHEST 1 VW Date of Exam: 1/13/2023 3:02 PM EST Indication: Weak/Dizzy/AMS triage protocol. Comparison: 11/30/2022 Findings: Cardiac size is stable. There is atelectasis or scarring in the lung bases. The lungs are otherwise clear and the vascular markings are normal.     Impression: Impression: Bibasilar atelectasis or scarring, unchanged from the patient's previous radiograph. The chest is otherwise clear. Electronically  Signed: Rubens Rao  1/13/2023 3:23 PM EST  Workstation ID: [id number]    XR Hip With or Without Pelvis 2 - 3 View Left    Result Date: 1/13/2023  XR HIP W OR WO PELVIS 2-3 VIEW LEFT, XR SHOULDER 2+ VW LEFT Date of Exam: 1/13/2023 3:54 PM EST Indication: fall, l hip pain.  Shoulder pain. Comparison: None available. Findings: Pelvis and hip: Osteopenia. This limits evaluation for nonspecific fractures or focal osseous lesions. There is moderate arthritis of the hips. There is chondrocalcinosis of the hips. Sacroiliac joints are well aligned. There are no aggressive osseous lesions. Shoulder: Mild osteopenia. No acute fractures or dislocations. Limited evaluation glenohumeral  joint space. Large subacromial osteophyte. Mild chondrocalcinosis along the humeral head articular surface. Mild degenerative change of acromioclavicular joint. Visualized thorax is clear.     Impression: Impression: No fractures or dislocations of the shoulder or hip. Osteopenia. Degenerative changes as described. Electronically Signed: Arabella Rutherford  1/13/2023 4:09 PM EST  Workstation ID: [id number]      Results for orders placed during the hospital encounter of 02/23/22    Adult Transthoracic Echo Complete W/ Cont if Necessary Per Protocol    Interpretation Summary  · Normal left ventricular systolic function, estimated EF 60%.  · Mild mitral regurgitation.  · Mild tricuspid regurgitation with normal RVSP.  · Mild pulmonic insufficiency.      Assessment & Plan   Assessment & Plan     Active Hospital Problems    Diagnosis  POA   • **Altered mental status [R41.82]  Yes       Mr. Ramin Zaragoza is an 86yo with h/o dementia lives in a memory care unit for worsening confusion    AMS on baseline dementia  --?mildly worse  --infectious w/u negative so far including u/a and cxr  --follow cultures  --CT head showed increased prominence of the lateral ventricles with respect the diffuse atrophy, consider possibility of NPH  --unable to really assess other  symptoms of NPH as patient doesn't walk much at baseline and also wears diapers/is incontinent  --will consult neuro in AM  --continue home aricept  --check TSH and B12  --consider MRI/EEG  --has been in memory care unit but likely needs to move to Skilled care/d/w daughter.  Consult PT/OT/CM    Osteoarthritis  H/o multiple falls with recent fall  --hold tramadol right now that takes for pain with increased confusion but not a new med (was started while here 12/2021)    Hematuria  --no problems before.  May be d/t traumatic cath in ER.  Repeat u/a in a few days    HTN  HL  CAD  --hold lisinopril and bumex for now for lowish BP's    BPH  --allergy to flomax, apparently caused severe hypotension    Hypothyroid  --continue levothyroxine.  Check TSH        DVT prophylaxis:  Hematuria on U/a so TEDs/SCDS for now      CODE STATUS:  DNR, no vent or CPR  Code Status and Medical Interventions:   Ordered at: 01/13/23 0518     Medical Intervention Limits:    NO intubation (DNI)     Level Of Support Discussed With:    Health Care Surrogate     Code Status (Patient has no pulse and is not breathing):    No CPR (Do Not Attempt to Resuscitate)     Medical Interventions (Patient has pulse or is breathing):    Limited Support     Comments:    per patient's daughter and POA       Expected Discharge 1/17/2023       Fernando Rivera MD  01/13/23

## 2023-01-14 NOTE — PLAN OF CARE
Problem: Adult Inpatient Plan of Care  Goal: Plan of Care Review  Recent Flowsheet Documentation  Taken 1/14/2023 1532 by Ayush Carney OT  Progress: (OT eval) no change  Plan of Care Reviewed With: patient  Outcome Evaluation: OT eval completed. Pt presents w/ L shoulder/neck pain alongside baseline confusion, impaired balance and motor planning, and generalized weakness warranting skilled IP OT services. Baseline assist levels at facility require further inquiry. Pt tolerated bed mobility and STS w/ ModA x2, Dep for LB ADLs, and MCLEAN for feeding. Rec d/c to SNF.

## 2023-01-14 NOTE — THERAPY EVALUATION
Acute Care - Speech Language Pathology   Swallow Initial Evaluation [facility]   Clinical Swallow Evaluation     Patient Name: Ramin Zaragoza  : 1935  MRN: [id number]  Today's Date: 2023               Admit Date: 2023    Visit Dx:     ICD-10-CM ICD-9-CM   1. Altered mental status, unspecified altered mental status type  R41.82 780.97   2. Injury of head, initial encounter  S09.90XA 959.01   3. Contusion of left shoulder, initial encounter  S40.012A 923.00   4. Contusion of left hip, initial encounter  S70.02XA 924.01   5. Hematuria, unspecified type  R31.9 599.70   6. Dysphagia, unspecified type  R13.10 787.20     Patient Active Problem List   Diagnosis   • Coronary artery disease involving native coronary artery of native heart without angina pectoris   • Premature ventricular contraction   • Essential hypertension   • Mixed hyperlipidemia   • Venous insufficiency   • Hypothyroidism   • Lactose intolerance   • Weakness   • Fall   • Altered mental status     Past Medical History:   Diagnosis Date   • CAD (coronary artery disease)    • Hyperlipidemia    • Hypertension    • Hypothyroidism     on chronic replacement therapy   • Lactose intolerance    • Lower extremity edema    • PVC's (premature ventricular contractions)     History of   • Thyroid disorder    • Venous insufficiency      Past Surgical History:   Procedure Laterality Date   • CARDIAC CATHETERIZATION     • CORONARY STENT PLACEMENT     • EYE SURGERY      Bilateral cataract extraction   • HERNIA REPAIR      Inguinal hernia repair   • OTHER SURGICAL HISTORY      Melanoma removed from back   • OTHER SURGICAL HISTORY      History of left elbow fracture with sunsequent fixation       SLP Recommendation and Plan  SLP Swallowing Diagnosis: mild, oral dysphagia, suspected pharyngeal dysphagia (23 0940)  SLP Diet Recommendation: regular textures, nectar thick liquids, water between meals after oral care, with supervision (23  0940)  Recommended Precautions and Strategies: upright posture during/after eating, general aspiration precautions, assist with feeding (01/14/23 0940)  SLP Rec. for Method of Medication Administration: meds whole, with puree (crush as needed) (01/14/23 0940)     Monitor for Signs of Aspiration: notify SLP if any concerns (01/14/23 0940)  Recommended Diagnostics: reassess via clinical swallow evaluation (01/14/23 0940)                                                       Plan of Care Reviewed With: patient, daughter      SWALLOW EVALUATION (last 72 hours)     SLP Adult Swallow Evaluation     Row Name 01/14/23 0940                   Rehab Evaluation    Document Type evaluation  -SM        Subjective Information no complaints  -SM        Patient Observations alert;cooperative  -           General Information    Patient Profile Reviewed yes  -SM        Pertinent History Of Current Problem Dementia, adm from memory care unit with increased confusion. Recent fall and hit head. CT negative.  -SM        Current Method of Nutrition regular textures;thin liquids  -        Prior Level of Function-Communication cognitive-linguistic impairment  -        Prior Level of Function-Swallowing safe, efficient swallowing in all situations  -        Plans/Goals Discussed with patient and family;agreed upon  -        Barriers to Rehab cognitive status  -        Patient's Goals for Discharge no concerns voiced  -        Family Goals for Discharge patient able to eat/drink without coughing/choking  though avoid instrumental swallow study for now  -           Pain    Additional Documentation Pain Scale: FACES Pre/Post-Treatment (Group)  -SM           Pain Scale: FACES Pre/Post-Treatment    Pain: FACES Scale, Pretreatment 4-->hurts little more  -SM        Posttreatment Pain Rating 4-->hurts little more  -SM        Pain Location - Side/Orientation Bilateral  -SM        Pain Location posterior  -SM        Pain Location - neck   -        Pre/Posttreatment Pain Comment RN aware and managing  -           Clinical Swallow Eval    Oral Prep Phase impaired  -        Pharyngeal Phase suspected pharyngeal impairment  -SM           Oral Prep Concerns    Oral Prep Concerns increased prep time;other (see comments)  -        Increased Prep Time regular consistencies  -        Oral Prep Concerns, Comment though adequate  -           Pharyngeal Phase Concerns    Pharyngeal Phase Concerns cough  -SM        Cough thin;other (see comments)  -SM        Pharyngeal Phase Concerns, Comment intermittent and delayed. Called and discussed results with pt's dtr. She conveys no hx dysphagia though some general chronic throat clearing which she has attributed to allergies. She does not wish to push for instrumental assessment at this time. Prefers diet modification and re-assess at the bedside to determine if can advance back to thin liquids.  -           SLP Evaluation Clinical Impression    SLP Swallowing Diagnosis mild;oral dysphagia;suspected pharyngeal dysphagia  -        Functional Impact risk of aspiration/pneumonia  -           Recommendations    SLP Diet Recommendation regular textures;nectar thick liquids;water between meals after oral care, with supervision  -        Recommended Diagnostics reassess via clinical swallow evaluation  -        Recommended Precautions and Strategies upright posture during/after eating;general aspiration precautions;assist with feeding  -        Oral Care Recommendations Oral Care BID/PRN  -        SLP Rec. for Method of Medication Administration meds whole;with puree  crush as needed  -        Monitor for Signs of Aspiration notify SLP if any concerns  -              User Key  (r) = Recorded By, (t) = Taken By, (c) = Cosigned By    Initials Name Effective Dates    Cathie Heard MS CCC-SLP 06/16/21 -                 EDUCATION  The patient's dtr has been educated in the following areas:    Dysphagia (Swallowing Impairment) Modified Diet Instruction.              Time Calculation:    Time Calculation- SLP     Row Name 01/14/23 1129             Time Calculation- SLP    SLP Start Time 0940  -      SLP Received On 01/14/23  -         Untimed Charges    83179-AT Eval Oral Pharyng Swallow Minutes 40  -SM         Total Minutes    Untimed Charges Total Minutes 40  -SM       Total Minutes 40  -SM            User Key  (r) = Recorded By, (t) = Taken By, (c) = Cosigned By    Initials Name Provider Type    Cathie Heard MS CCC-SLP Speech and Language Pathologist                Therapy Charges for Today     Code Description Service Date Service Provider Modifiers Qty    [id number] HC ST EVAL ORAL PHARYNG SWALLOW 3 1/14/2023 Cathie Yeung MS CCC-SLP GN 1               Cathie Yeung MS CCC-SLP  1/14/2023

## 2023-01-14 NOTE — PLAN OF CARE
SLP evaluated patient.  Patient has been drowsy most of the shift.  Max assist to chair.  To sling back to bed.  Decreased appetite.  VSS.  Incontinent urine.

## 2023-01-14 NOTE — PROGRESS NOTES
"    [facility] Medicine Services  PROGRESS NOTE    Patient Name: Ramin Zaragoza  : 1935  MRN: [id number]    Date of Admission: 2023  Primary Care Physician: Tez Satnos MD    Subjective   Subjective     CC:  fall    HPI:  Patient reports to me that he is doing well. Complains of neck pain that is chronic. Agrees when I discuss his daughter's concerns and he reports \"well, she's not here right now and I feel pretty good.\"    ROS:  Gen- No fevers, chills  CV- No chest pain, palpitations  Resp- No cough, dyspnea  GI- No N/V/D, abd pain    Objective   Objective     Vital Signs:   Temp:  [97 °F (36.1 °C)-98 °F (36.7 °C)] 97.5 °F (36.4 °C)  Heart Rate:  [64-91] 74  Resp:  [16-18] 16  BP: (107-144)/(61-79) 144/79     Physical Exam:  Constitutional: No acute distress, awake, alert  HENT: NCAT, mucous membranes moist  Respiratory: Clear to auscultation bilaterally, respiratory effort normal   Cardiovascular: RRR, no murmurs, rubs, or gallops  Gastrointestinal: Soft, nontender, nondistended  Musculoskeletal: Muscle tone decreased throughout c/w age, no joint effusions appreciated  Psychiatric: Appropriate affect, cooperative  Neurologic: Alert and oriented to location and self but not to date, facial movements symmetric and spontaneous movement of all 4 extremities grossly equal bilaterally, speech clear  Skin: No rashes    Results Reviewed:  LAB RESULTS:      Lab 23  0626 23  1640   WBC 8.68 11.13*   HEMOGLOBIN 12.7* 14.5   HEMATOCRIT 37.3* 41.9   PLATELETS 161 179   NEUTROS ABS  --  8.16*   IMMATURE GRANS (ABS)  --  0.05   LYMPHS ABS  --  1.43   MONOS ABS  --  1.44*   EOS ABS  --  0.03   MCV 95.9 94.4         Lab 23  0626 23  1640   SODIUM 137 137   POTASSIUM 3.4* 4.3   CHLORIDE 100 98   CO2 26.0 29.0   ANION GAP 11.0 10.0   BUN 11 13   CREATININE 0.71* 0.94   EGFR 88.8 78.5   GLUCOSE 109* 154*   CALCIUM 8.7 9.4   MAGNESIUM  --  1.7   TSH 10.670*  --          Lab " 01/13/23  1640   TOTAL PROTEIN 7.1   ALBUMIN 4.0   GLOBULIN 3.1   ALT (SGPT) 17   AST (SGOT) 40   BILIRUBIN 0.9   ALK PHOS 55         Lab 01/13/23  1640   TROPONIN T <0.010             Lab 01/14/23  0626   VITAMIN B 12 485         Brief Urine Lab Results  (Last result in the past 365 days)      Color   Clarity   Blood   Leuk Est   Nitrite   Protein   CREAT   Urine HCG        01/13/23 1858 Dark Yellow   Clear   Moderate (2+)   Negative   Negative   30 mg/dL (1+)                 Microbiology Results Abnormal     None          CT Abdomen Pelvis Without Contrast    Result Date: 1/13/2023  CT ABDOMEN PELVIS WO CONTRAST Date of Exam: 1/13/2023 3:59 PM EST Indication: fall, abd pain. Comparison: 2/12/2020 Technique: Axial CT images were obtained of the abdomen and pelvis without the administration of contrast. Reconstructed coronal and sagittal images were also obtained. Automated exposure control and iterative construction methods were used. Findings: There is chronic interstitial fibrosis in the lung bases. There is an hiatal hernia. There are no rib fractures identified. There are coronary artery calcifications. The liver and spleen have a normal appearance. The gallbladder is absent. The adrenal glands appear normal. There are bilateral renal cysts present. There are no solid renal masses. There is no evidence of hydronephrosis. There is minimal perinephric soft tissue stranding. There is a periumbilical hernia containing fat. There is sigmoid diverticulosis without diverticulitis. The prostate is markedly enlarged. The urinary bladder appears unremarkable. There are no pelvic masses or fluid collections. There is degenerative disc disease in the lower lumbar spine. There is a sclerotic bone lesion in the right iliac crest measuring 1.4 x 1.0 cm. There are no other bone lesions identified. This is present and unchanged from the patient's previous CT. No fractures are identified.     Impression: Impression: 1. No acute  findings in the abdomen or pelvis. 2. Interstitial fibrosis of the lung bases with coronary artery calcifications. 3. Hiatal and periumbilical hernias. 4. Bilateral renal cysts. 5. Sigmoid diverticulosis without diverticulitis. 6. Markedly enlarged prostate Electronically Signed: Rubens Rao  1/13/2023 4:26 PM EST  Workstation ID: [id number]    XR Shoulder 2+ View Left    Result Date: 1/13/2023  XR HIP W OR WO PELVIS 2-3 VIEW LEFT, XR SHOULDER 2+ VW LEFT Date of Exam: 1/13/2023 3:54 PM EST Indication: fall, l hip pain.  Shoulder pain. Comparison: None available. Findings: Pelvis and hip: Osteopenia. This limits evaluation for nonspecific fractures or focal osseous lesions. There is moderate arthritis of the hips. There is chondrocalcinosis of the hips. Sacroiliac joints are well aligned. There are no aggressive osseous lesions. Shoulder: Mild osteopenia. No acute fractures or dislocations. Limited evaluation glenohumeral  joint space. Large subacromial osteophyte. Mild chondrocalcinosis along the humeral head articular surface. Mild degenerative change of acromioclavicular joint. Visualized thorax is clear.     Impression: Impression: No fractures or dislocations of the shoulder or hip. Osteopenia. Degenerative changes as described. Electronically Signed: Arabella Sangeeta  1/13/2023 4:09 PM EST  Workstation ID: [id number]    CT Head Without Contrast    Result Date: 1/13/2023  CT HEAD WO CONTRAST Date of Exam: 1/13/2023 3:59 PM EST Indication: fall, head injury. Comparison: None available. Technique: Axial CT images were obtained of the head without contrast administration.  Reconstructed coronal and sagittal images were also obtained. Automated exposure control and iterative construction methods were used. FINDINGS: Brain/Ventricles: There is diffuse atrophy with somewhat asymmetric predominance of the lateral ventricle dilation. Findings are more prominent than the comparison exam. There is no acute intracranial hemorrhage  or suspicious extra axial fluid collection. Diffuse hypoattenuation of the white matter of the supratentorial brain represent small vessel ischemic disease or increased transependymal flow of CSF. No territorial areas of low-attenuation to suggest acute infarct are noted. Orbits: The visualized portion of the orbits demonstrate no acute abnormality. Sinuses: The visualized paranasal sinuses are clear. Chronic mild bilateral mastoid effusions noted Soft Tissues/Skull: No acute abnormality of the visualized skull or soft tissues.     Impression: 1. No acute hemorrhage. 2. Increased prominence of the lateral ventricles with respect to overlying diffuse atrophy. This is slightly more prominent than seen on the comparison study. Consider the possibility of normal pressure hydrocephalus  Electronically Signed: Riccardo Bustos  1/13/2023 4:31 PM EST  Workstation ID: [id number]    CT Cervical Spine Without Contrast    Result Date: 1/13/2023  CT CERVICAL SPINE WO CONTRAST Date of Exam: 1/13/2023 3:59 PM EST Indication: fall, head injury. Comparison: None available. Technique: Axial CT images were obtained of the cervical spine without contrast administration.  Reconstructed coronal and sagittal images were also obtained. Automated exposure control and iterative construction methods were used. Findings: No evidence of fracture or subluxation. Moderate degenerative disc disease at C6-C7, mild degenerative disc disease in the remainder of the cervical spine. Degenerative change at the articulation of the odontoid and anterior arch of C1. No acute soft tissue abnormality     Impression: Impression: Negative for fracture Electronically Signed: Jose Roberto Post  1/13/2023 4:28 PM EST  Workstation ID: [id number]    XR Chest 1 View    Result Date: 1/13/2023  XR CHEST 1 VW Date of Exam: 1/13/2023 3:02 PM EST Indication: Weak/Dizzy/AMS triage protocol. Comparison: 11/30/2022 Findings: Cardiac size is stable. There is atelectasis or scarring  in the lung bases. The lungs are otherwise clear and the vascular markings are normal.     Impression: Impression: Bibasilar atelectasis or scarring, unchanged from the patient's previous radiograph. The chest is otherwise clear. Electronically Signed: Rubens Rao  1/13/2023 3:23 PM EST  Workstation ID: [id number]    XR Hip With or Without Pelvis 2 - 3 View Left    Result Date: 1/13/2023  XR HIP W OR WO PELVIS 2-3 VIEW LEFT, XR SHOULDER 2+ VW LEFT Date of Exam: 1/13/2023 3:54 PM EST Indication: fall, l hip pain.  Shoulder pain. Comparison: None available. Findings: Pelvis and hip: Osteopenia. This limits evaluation for nonspecific fractures or focal osseous lesions. There is moderate arthritis of the hips. There is chondrocalcinosis of the hips. Sacroiliac joints are well aligned. There are no aggressive osseous lesions. Shoulder: Mild osteopenia. No acute fractures or dislocations. Limited evaluation glenohumeral  joint space. Large subacromial osteophyte. Mild chondrocalcinosis along the humeral head articular surface. Mild degenerative change of acromioclavicular joint. Visualized thorax is clear.     Impression: Impression: No fractures or dislocations of the shoulder or hip. Osteopenia. Degenerative changes as described. Electronically Signed: Arabella Rutherford  1/13/2023 4:09 PM EST  Workstation ID: [id number]      Results for orders placed during the hospital encounter of 02/23/22    Adult Transthoracic Echo Complete W/ Cont if Necessary Per Protocol    Interpretation Summary  · Normal left ventricular systolic function, estimated EF 60%.  · Mild mitral regurgitation.  · Mild tricuspid regurgitation with normal RVSP.  · Mild pulmonic insufficiency.      I have reviewed the medications:  Scheduled Meds:aspirin, 81 mg, Oral, Daily  divalproex, 250 mg, Oral, Q12H  donepezil, 10 mg, Oral, Nightly  fluticasone, 2 spray, Each Nare, BID  pantoprazole, 40 mg, Oral, Q AM  senna-docusate sodium, 2 tablet, Oral, BID  sodium  chloride, 10 mL, Intravenous, Q12H      Continuous Infusions:   PRN Meds:.•  acetaminophen  •  senna-docusate sodium **AND** polyethylene glycol **AND** bisacodyl **AND** bisacodyl  •  ondansetron **OR** ondansetron  •  sodium chloride  •  sodium chloride  •  sodium chloride    Assessment & Plan   Assessment & Plan     Active Hospital Problems    Diagnosis  POA   • **Altered mental status [R41.82]  Yes   • Dementia (HCC) [F03.90]  Yes   • Accident due to mechanical fall without injury [W19.XXXA]  Yes   • Hypothyroidism [E03.9]  Yes      Resolved Hospital Problems   No resolved problems to display.        Brief Hospital Course to date:  Ramin Zaragoza is a 87 y.o. male w dementia living on a memory care unit who presents for concerns for worsening confusion after a mechanical fall in which he hit his head.    1) Acute encephalopathy in setting of severe dementia  -W/u to now unrevealing including CT head and u/a. No clear s/s of infection  -Could be waxing/waning or post-concussive after head trauma  -Call out to family to assess better acute concern  -Home aricept, depakote    2) Head trauma 2/2 mechanical fall  -CT head, CT neck, Xrays left hip and left shoulder all unremarkable, no other identified injuries    3) Hypothyroidism  -TSH elevated on arrival, start low dose levothyroxine    4) Hematuria, likely 2/2 traumatic cath in ED - can f/u OP w repeat in weeks    5) Mild oropharyngeal dysphagia - speech following, OK for diet + thicks. Start flonase for patient's sensation of nasal/throat congestion    Osteoarthritis - restart home tramadol as needed  HTN - holding home lisinopril and bumex (both low dose) given normotension  BPH - allergy to flomax (s/e of hypotension)    PT/OT to see for help with dispo plan    Expected Discharge Location and Transportation: TBD  Expected Discharge medically ready 1/15 likely, likely d/c 1/16 based on placement?  Expected Discharge Date and Time     Expected Discharge Date Expected  Discharge Time    Jan 17, 2023            DVT prophylaxis:  Mechanical DVT prophylaxis orders are present.          CODE STATUS:   Code Status and Medical Interventions:   Ordered at: 01/13/23 2138     Medical Intervention Limits:    NO intubation (DNI)     Level Of Support Discussed With:    Health Care Surrogate     Code Status (Patient has no pulse and is not breathing):    No CPR (Do Not Attempt to Resuscitate)     Medical Interventions (Patient has pulse or is breathing):    Limited Support     Comments:    per patient's daughter and POA       Tamia Barahona MD  01/14/23

## 2023-01-15 ENCOUNTER — APPOINTMENT (OUTPATIENT)
Dept: GENERAL RADIOLOGY | Facility: HOSPITAL | Age: 88
End: 2023-01-15
Payer: MEDICARE

## 2023-01-15 ENCOUNTER — APPOINTMENT (OUTPATIENT)
Dept: MRI IMAGING | Facility: HOSPITAL | Age: 88
End: 2023-01-15
Payer: MEDICARE

## 2023-01-15 PROCEDURE — G0378 HOSPITAL OBSERVATION PER HR: HCPCS

## 2023-01-15 PROCEDURE — 92610 EVALUATE SWALLOWING FUNCTION: CPT

## 2023-01-15 PROCEDURE — 73000 X-RAY EXAM OF COLLAR BONE: CPT

## 2023-01-15 PROCEDURE — 99232 SBSQ HOSP IP/OBS MODERATE 35: CPT | Performed by: INTERNAL MEDICINE

## 2023-01-15 RX ORDER — HYDROXYZINE HYDROCHLORIDE 10 MG/1
10 TABLET, FILM COATED ORAL 3 TIMES DAILY PRN
Status: DISCONTINUED | OUTPATIENT
Start: 2023-01-15 | End: 2023-01-21 | Stop reason: HOSPADM

## 2023-01-15 RX ORDER — HYDROXYZINE HYDROCHLORIDE 25 MG/1
25 TABLET, FILM COATED ORAL 3 TIMES DAILY PRN
Status: DISCONTINUED | OUTPATIENT
Start: 2023-01-15 | End: 2023-01-15

## 2023-01-15 RX ADMIN — Medication 10 ML: at 08:59

## 2023-01-15 RX ADMIN — DONEPEZIL HYDROCHLORIDE 10 MG: 10 TABLET, FILM COATED ORAL at 20:06

## 2023-01-15 RX ADMIN — FLUTICASONE PROPIONATE 2 SPRAY: 50 SPRAY, METERED NASAL at 08:59

## 2023-01-15 RX ADMIN — PANTOPRAZOLE SODIUM 40 MG: 40 TABLET, DELAYED RELEASE ORAL at 05:30

## 2023-01-15 RX ADMIN — DIVALPROEX SODIUM 250 MG: 250 TABLET, DELAYED RELEASE ORAL at 20:06

## 2023-01-15 RX ADMIN — DIVALPROEX SODIUM 250 MG: 250 TABLET, DELAYED RELEASE ORAL at 08:59

## 2023-01-15 RX ADMIN — ASPIRIN 81 MG: 81 TABLET, COATED ORAL at 08:59

## 2023-01-15 RX ADMIN — SENNOSIDES AND DOCUSATE SODIUM 2 TABLET: 50; 8.6 TABLET ORAL at 08:59

## 2023-01-15 RX ADMIN — LEVOTHYROXINE SODIUM 50 MCG: 50 TABLET ORAL at 05:30

## 2023-01-15 RX ADMIN — Medication 10 ML: at 20:06

## 2023-01-15 RX ADMIN — SENNOSIDES AND DOCUSATE SODIUM 2 TABLET: 50; 8.6 TABLET ORAL at 20:06

## 2023-01-15 RX ADMIN — FLUTICASONE PROPIONATE 2 SPRAY: 50 SPRAY, METERED NASAL at 20:06

## 2023-01-15 NOTE — THERAPY RE-EVALUATION
Acute Care - Speech Language Pathology   Swallow Re-Evaluation [facility]   Clinical Swallow Evaluation     Patient Name: Ramin Zaragoza  : 1935  MRN: [id number]  Today's Date: 1/15/2023               Admit Date: 2023    Visit Dx:     ICD-10-CM ICD-9-CM   1. Altered mental status, unspecified altered mental status type  R41.82 780.97   2. Injury of head, initial encounter  S09.90XA 959.01   3. Contusion of left shoulder, initial encounter  S40.012A 923.00   4. Contusion of left hip, initial encounter  S70.02XA 924.01   5. Hematuria, unspecified type  R31.9 599.70   6. Dysphagia, unspecified type  R13.10 787.20     Patient Active Problem List   Diagnosis   • Coronary artery disease involving native coronary artery of native heart without angina pectoris   • Premature ventricular contraction   • Essential hypertension   • Mixed hyperlipidemia   • Venous insufficiency   • Hypothyroidism   • Lactose intolerance   • Weakness   • Accident due to mechanical fall without injury   • Altered mental status   • Dementia (HCC)     Past Medical History:   Diagnosis Date   • CAD (coronary artery disease)    • Hyperlipidemia    • Hypertension    • Hypothyroidism     on chronic replacement therapy   • Lactose intolerance    • Lower extremity edema    • PVC's (premature ventricular contractions)     History of   • Thyroid disorder    • Venous insufficiency      Past Surgical History:   Procedure Laterality Date   • CARDIAC CATHETERIZATION     • CORONARY STENT PLACEMENT     • EYE SURGERY      Bilateral cataract extraction   • HERNIA REPAIR      Inguinal hernia repair   • OTHER SURGICAL HISTORY      Melanoma removed from back   • OTHER SURGICAL HISTORY      History of left elbow fracture with sunsequent fixation       SLP Recommendation and Plan  SLP Swallowing Diagnosis: mild, oral dysphagia, suspected pharyngeal dysphagia (01/15/23 0900)  SLP Diet Recommendation: regular textures, nectar thick liquids, other (see  comments) (ok any thin liquids as desired between meals) (01/15/23 0900)  Recommended Precautions and Strategies: upright posture during/after eating, general aspiration precautions, assist with feeding (01/15/23 0900)  SLP Rec. for Method of Medication Administration: meds whole, with puree (01/15/23 0900)     Monitor for Signs of Aspiration: notify SLP if any concerns (01/15/23 0900)  Recommended Diagnostics: reassess via clinical swallow evaluation, other (see comments) (will check back tomorrow for ? changes. Though likely may be best to return to SNF on current modified diet and adjust accordingly from there.) (01/15/23 0900)                                                       Plan of Care Reviewed With: patient      SWALLOW EVALUATION (last 72 hours)     SLP Adult Swallow Evaluation     Row Name 01/15/23 0900 01/14/23 0940                Rehab Evaluation    Document Type re-evaluation  - evaluation  -       Subjective Information no complaints  - no complaints  -       Patient Observations alert;cooperative  - alert;cooperative  -          General Information    Patient Profile Reviewed -- yes  -SM       Pertinent History Of Current Problem -- Dementia, adm from memory care unit with increased confusion. Recent fall and hit head. CT negative.  -SM       Current Method of Nutrition regular textures;nectar/syrup-thick liquids;water between meals  - regular textures;thin liquids  -       Prior Level of Function-Communication -- cognitive-linguistic impairment  -       Prior Level of Function-Swallowing -- safe, efficient swallowing in all situations  -       Plans/Goals Discussed with patient  -SM patient and family;agreed upon  -       Barriers to Rehab cognitive status  - cognitive status  -       Patient's Goals for Discharge no concerns voiced  - no concerns voiced  -       Family Goals for Discharge -- patient able to eat/drink without coughing/choking  though avoid  instrumental swallow study for now  -SM          Pain    Additional Documentation -- Pain Scale: FACES Pre/Post-Treatment (Group)  -SM          Pain Scale: FACES Pre/Post-Treatment    Pain: FACES Scale, Pretreatment 2-->hurts little bit  -SM 4-->hurts little more  -SM       Posttreatment Pain Rating 2-->hurts little bit  -SM 4-->hurts little more  -SM       Pain Location - Side/Orientation -- Bilateral  -SM       Pain Location -- posterior  -SM       Pain Location - -- neck  -SM       Pre/Posttreatment Pain Comment -- RN aware and managing  -SM          Clinical Swallow Eval    Oral Prep Phase impaired  -SM impaired  -SM       Pharyngeal Phase suspected pharyngeal impairment  -SM suspected pharyngeal impairment  -SM          Oral Prep Concerns    Oral Prep Concerns increased prep time  -SM increased prep time;other (see comments)  -SM       Increased Prep Time -- regular consistencies  -SM       Oral Prep Concerns, Comment -- though adequate  -SM          Pharyngeal Phase Concerns    Pharyngeal Phase Concerns cough  -SM cough  -SM       Cough thin  -SM thin;other (see comments)  -SM       Pharyngeal Phase Concerns, Comment Working with pt on remaining breakfast tray. Tolerating regular diet and nectar-thick liquids, showing no obvious aversion. Began trialing thin liquids with breakfast tray. No s/s with first few bites and sips (even when large/consecutive). Then bite of eggs and appeared to clear most/all. Subsequent sip of water resulted in immediate wet coughing. Highly suspect aspiration. Remains at increased risk aspiration of thin liquids during meals.  -SM intermittent and delayed. Called and discussed results with pt's dtr. She conveys no hx dysphagia though some general chronic throat clearing which she has attributed to allergies. She does not wish to push for instrumental assessment at this time. Prefers diet modification and re-assess at the bedside to determine if can advance back to thin liquids.  -SM           SLP Evaluation Clinical Impression    SLP Swallowing Diagnosis mild;oral dysphagia;suspected pharyngeal dysphagia  -SM mild;oral dysphagia;suspected pharyngeal dysphagia  -       Functional Impact risk of aspiration/pneumonia  - risk of aspiration/pneumonia  -          Recommendations    SLP Diet Recommendation regular textures;nectar thick liquids;other (see comments)  ok any thin liquids as desired between meals  - regular textures;nectar thick liquids;water between meals after oral care, with supervision  -       Recommended Diagnostics reassess via clinical swallow evaluation;other (see comments)  will check back tomorrow for ? changes. Though likely may be best to return to SNF on current modified diet and adjust accordingly from there.  -SM reassess via clinical swallow evaluation  -       Recommended Precautions and Strategies upright posture during/after eating;general aspiration precautions;assist with feeding  -SM upright posture during/after eating;general aspiration precautions;assist with feeding  -SM       Oral Care Recommendations Oral Care BID/PRN  -SM Oral Care BID/PRN  -SM       SLP Rec. for Method of Medication Administration meds whole;with puree  -SM meds whole;with puree  crush as needed  -       Monitor for Signs of Aspiration notify SLP if any concerns  - notify SLP if any concerns  -             User Key  (r) = Recorded By, (t) = Taken By, (c) = Cosigned By    Initials Name Effective Dates    Cathie Heard MS CCC-SLP 06/16/21 -                 EDUCATION  The patient has been educated in the following areas:   Dysphagia (Swallowing Impairment) Modified Diet Instruction.              Time Calculation:    Time Calculation- SLP     Row Name 01/15/23 1042             Time Calculation- [facility]    SLP Start Time 0900  -      SLP Received On 01/15/23  -         Untimed Charges    [id number] Eval Oral Pharyng Swallow Minutes 39  -         Total Minutes    Untimed  Charges Total Minutes 39  -SM       Total Minutes 39  -SM            User Key  (r) = Recorded By, (t) = Taken By, (c) = Cosigned By    Initials Name Provider Type    Cathie Heard MS CCC-SLP Speech and Language Pathologist                Therapy Charges for Today     Code Description Service Date Service Provider Modifiers Qty    [id number]  ST EVAL ORAL PHARYNG SWALLOW 3 1/14/2023 Cathie Yeugn MS CCC-SLP GN 1    [id number]  ST EVAL ORAL PHARYNG SWALLOW 3 1/15/2023 Cathie Yeung, MS TAVARES-SLP GN 1            Patient was not wearing a face mask and did exhibit coughing during this therapy encounter.  Procedure performed was aerosolizing, involved close contact (within 6 feet for at least 15 minutes or longer), and did not involve contact with infectious secretions or specimens.  Therapist used appropriate personal protective equipment including gloves and standard procedure mask.  Appropriate PPE was worn during the entire therapy session.  Hand hygiene was completed before and after therapy session.       Cathie Yeung MS CCC-SLP  1/15/2023

## 2023-01-15 NOTE — PLAN OF CARE
Goal Outcome Evaluation:  Plan of Care Reviewed With: patient        Progress: no change  Outcome Evaluation: Alert to self only. Incontinent of bowel and bladder. VSS. Slept most of the day today, easily aroused. Will continue to monitor.

## 2023-01-15 NOTE — PLAN OF CARE
Patient remains confused without behavioral disturbance. C/o lateral neck pain on movement such as T&R. Communication achieved through fragmental words/sentences/yes/no questions. Incontinent in B&B, skin care provided at every incontinent episode. VSS on RA. SR on cardiac mx. Patient rests in bed without sx/si of pain/acute distress at this time. Will cont to mx. Frequent rounding in place,.

## 2023-01-15 NOTE — PLAN OF CARE
Goal Outcome Evaluation:  Plan of Care Reviewed With: patient            SLP re-evaluation completed. Remains at increased risk aspiration of thin liquids over the course of a meal. No s/s aspiration of thin liquids in isolation. Regular diet and nectar-thick liquids with meals. Ok thin liquids between meals as tolerated. Will check back tomorrow though may be best to return to SNF on current diet and for SNF SLP to work in conjunction with pt's dtr to best manage the balance of safety and comfort. Please see note for further details and recommendations.

## 2023-01-15 NOTE — PROGRESS NOTES
[facility] Medicine Services  PROGRESS NOTE    Patient Name: Ramin Zaragoza  : 1935  MRN: [id number]    Date of Admission: 2023  Primary Care Physician: Tez Santos MD    Subjective   Subjective     CC:  fall    HPI:  Patient with left shoulder pain and neck pain still with movement  Hoarseness improved. No congestion complaints and no longer coughing  On review, patient w left shoulder complaints back to 2022. D/w daughter and neck pain does appear new since fall.       ROS:  Gen- No fevers, chills  CV- No chest pain, palpitations  Resp- No cough, dyspnea  GI- No N/V/D, abd pain    Objective   Objective     Vital Signs:   Temp:  [96.2 °F (35.7 °C)-98.1 °F (36.7 °C)] 98.1 °F (36.7 °C)  Heart Rate:  [68-81] 68  Resp:  [16] 16  BP: (132-149)/(66-76) 132/76     Physical Exam:  Constitutional: No acute distress, awake, alert thin older male sitting up in bed  HENT: NCAT, mucous membranes moist  Neck: very stiff posturing, patient able to display range of motion but w significant trepidation and very slow deliberate movements. Left medial end of clavicle w some point tenderness. No point tenderness to left shoulder/elbow  Respiratory: Clear to auscultation bilaterally, respiratory effort normal   Cardiovascular: RRR, no murmurs, rubs, or gallops  Gastrointestinal: Soft, nontender, nondistended  Musculoskeletal: Muscle tone decreased throughout c/w age, no joint effusions appreciated  Psychiatric: Appropriate affect, cooperative  Neurologic: Alert and oriented to location, self and date but not more abstract conceptual questions about admission or recent events, facial movements symmetric and spontaneous movement of all 4 extremities grossly equal bilaterally, speech clear. Hard of hearing which affects evaluation  Skin: No rashes    Results Reviewed:  LAB RESULTS:      Lab 23  0626 23  1640   WBC 8.68 11.13*   HEMOGLOBIN 12.7* 14.5   HEMATOCRIT 37.3* 41.9    PLATELETS 161 179   NEUTROS ABS  --  8.16*   IMMATURE GRANS (ABS)  --  0.05   LYMPHS ABS  --  1.43   MONOS ABS  --  1.44*   EOS ABS  --  0.03   MCV 95.9 94.4         Lab 01/14/23  0626 01/13/23  1640   SODIUM 137 137   POTASSIUM 3.4* 4.3   CHLORIDE 100 98   CO2 26.0 29.0   ANION GAP 11.0 10.0   BUN 11 13   CREATININE 0.71* 0.94   EGFR 88.8 78.5   GLUCOSE 109* 154*   CALCIUM 8.7 9.4   MAGNESIUM  --  1.7   TSH 10.670*  --          Lab 01/13/23  1640   TOTAL PROTEIN 7.1   ALBUMIN 4.0   GLOBULIN 3.1   ALT (SGPT) 17   AST (SGOT) 40   BILIRUBIN 0.9   ALK PHOS 55         Lab 01/13/23  1640   TROPONIN T <0.010             Lab 01/14/23  0626   VITAMIN B 12 485         Brief Urine Lab Results  (Last result in the past 365 days)      Color   Clarity   Blood   Leuk Est   Nitrite   Protein   CREAT   Urine HCG        01/13/23 1858 Dark Yellow   Clear   Moderate (2+)   Negative   Negative   30 mg/dL (1+)                 Microbiology Results Abnormal     None          CT Abdomen Pelvis Without Contrast    Result Date: 1/13/2023  CT ABDOMEN PELVIS WO CONTRAST Date of Exam: 1/13/2023 3:59 PM EST Indication: fall, abd pain. Comparison: 2/12/2020 Technique: Axial CT images were obtained of the abdomen and pelvis without the administration of contrast. Reconstructed coronal and sagittal images were also obtained. Automated exposure control and iterative construction methods were used. Findings: There is chronic interstitial fibrosis in the lung bases. There is an hiatal hernia. There are no rib fractures identified. There are coronary artery calcifications. The liver and spleen have a normal appearance. The gallbladder is absent. The adrenal glands appear normal. There are bilateral renal cysts present. There are no solid renal masses. There is no evidence of hydronephrosis. There is minimal perinephric soft tissue stranding. There is a periumbilical hernia containing fat. There is sigmoid diverticulosis without diverticulitis. The  prostate is markedly enlarged. The urinary bladder appears unremarkable. There are no pelvic masses or fluid collections. There is degenerative disc disease in the lower lumbar spine. There is a sclerotic bone lesion in the right iliac crest measuring 1.4 x 1.0 cm. There are no other bone lesions identified. This is present and unchanged from the patient's previous CT. No fractures are identified.     Impression: Impression: 1. No acute findings in the abdomen or pelvis. 2. Interstitial fibrosis of the lung bases with coronary artery calcifications. 3. Hiatal and periumbilical hernias. 4. Bilateral renal cysts. 5. Sigmoid diverticulosis without diverticulitis. 6. Markedly enlarged prostate Electronically Signed: Rubens STACK Maira  1/13/2023 4:26 PM EST  Workstation ID: [id number]    XR Clavicle Left    Result Date: 1/15/2023  XR CLAVICLE LEFT Date of Exam: 1/15/2023 9:52 AM EST Indication: point tenderness medial side of clavicle. Comparison: Left shoulder radiographs 1/13/2023 Findings: No definite soft tissue swelling. No acute fracture or malalignment. No bony erosion or periostitis. The acromioclavicular joint is congruent with mild degenerative change and associated chondrocalcinosis. The sternoclavicular joints appear congruent. Unremarkable appearance of the partially imaged thorax.     Impression: Impression: No acute fracture or malalignment. Electronically Signed: Octavio Adamson  1/15/2023 10:15 AM EST  Workstation ID: [id number]    XR Shoulder 2+ View Left    Result Date: 1/13/2023  XR HIP W OR WO PELVIS 2-3 VIEW LEFT, XR SHOULDER 2+ VW LEFT Date of Exam: 1/13/2023 3:54 PM EST Indication: fall, l hip pain.  Shoulder pain. Comparison: None available. Findings: Pelvis and hip: Osteopenia. This limits evaluation for nonspecific fractures or focal osseous lesions. There is moderate arthritis of the hips. There is chondrocalcinosis of the hips. Sacroiliac joints are well aligned. There are no aggressive osseous  lesions. Shoulder: Mild osteopenia. No acute fractures or dislocations. Limited evaluation glenohumeral  joint space. Large subacromial osteophyte. Mild chondrocalcinosis along the humeral head articular surface. Mild degenerative change of acromioclavicular joint. Visualized thorax is clear.     Impression: Impression: No fractures or dislocations of the shoulder or hip. Osteopenia. Degenerative changes as described. Electronically Signed: Arabella Rutherford  1/13/2023 4:09 PM EST  Workstation ID: [id number]    CT Head Without Contrast    Result Date: 1/13/2023  CT HEAD WO CONTRAST Date of Exam: 1/13/2023 3:59 PM EST Indication: fall, head injury. Comparison: None available. Technique: Axial CT images were obtained of the head without contrast administration.  Reconstructed coronal and sagittal images were also obtained. Automated exposure control and iterative construction methods were used. FINDINGS: Brain/Ventricles: There is diffuse atrophy with somewhat asymmetric predominance of the lateral ventricle dilation. Findings are more prominent than the comparison exam. There is no acute intracranial hemorrhage or suspicious extra axial fluid collection. Diffuse hypoattenuation of the white matter of the supratentorial brain represent small vessel ischemic disease or increased transependymal flow of CSF. No territorial areas of low-attenuation to suggest acute infarct are noted. Orbits: The visualized portion of the orbits demonstrate no acute abnormality. Sinuses: The visualized paranasal sinuses are clear. Chronic mild bilateral mastoid effusions noted Soft Tissues/Skull: No acute abnormality of the visualized skull or soft tissues.     Impression: 1. No acute hemorrhage. 2. Increased prominence of the lateral ventricles with respect to overlying diffuse atrophy. This is slightly more prominent than seen on the comparison study. Consider the possibility of normal pressure hydrocephalus  Electronically Signed: Riccardo  Juli  1/13/2023 4:31 PM EST  Workstation ID: [id number]    CT Cervical Spine Without Contrast    Result Date: 1/13/2023  CT CERVICAL SPINE WO CONTRAST Date of Exam: 1/13/2023 3:59 PM EST Indication: fall, head injury. Comparison: None available. Technique: Axial CT images were obtained of the cervical spine without contrast administration.  Reconstructed coronal and sagittal images were also obtained. Automated exposure control and iterative construction methods were used. Findings: No evidence of fracture or subluxation. Moderate degenerative disc disease at C6-C7, mild degenerative disc disease in the remainder of the cervical spine. Degenerative change at the articulation of the odontoid and anterior arch of C1. No acute soft tissue abnormality     Impression: Impression: Negative for fracture Electronically Signed: Jose Roberto Sejal  1/13/2023 4:28 PM EST  Workstation ID: [id number]    XR Chest 1 View    Result Date: 1/13/2023  XR CHEST 1 VW Date of Exam: 1/13/2023 3:02 PM EST Indication: Weak/Dizzy/AMS triage protocol. Comparison: 11/30/2022 Findings: Cardiac size is stable. There is atelectasis or scarring in the lung bases. The lungs are otherwise clear and the vascular markings are normal.     Impression: Impression: Bibasilar atelectasis or scarring, unchanged from the patient's previous radiograph. The chest is otherwise clear. Electronically Signed: Rubens Rao  1/13/2023 3:23 PM EST  Workstation ID: [id number]    XR Hip With or Without Pelvis 2 - 3 View Left    Result Date: 1/13/2023  XR HIP W OR WO PELVIS 2-3 VIEW LEFT, XR SHOULDER 2+ VW LEFT Date of Exam: 1/13/2023 3:54 PM EST Indication: fall, l hip pain.  Shoulder pain. Comparison: None available. Findings: Pelvis and hip: Osteopenia. This limits evaluation for nonspecific fractures or focal osseous lesions. There is moderate arthritis of the hips. There is chondrocalcinosis of the hips. Sacroiliac joints are well aligned. There are no aggressive osseous  lesions. Shoulder: Mild osteopenia. No acute fractures or dislocations. Limited evaluation glenohumeral  joint space. Large subacromial osteophyte. Mild chondrocalcinosis along the humeral head articular surface. Mild degenerative change of acromioclavicular joint. Visualized thorax is clear.     Impression: Impression: No fractures or dislocations of the shoulder or hip. Osteopenia. Degenerative changes as described. Electronically Signed: Arabellajuan pablo Rutherford  1/13/2023 4:09 PM EST  Workstation ID: [id number]      Results for orders placed during the hospital encounter of 02/23/22    Adult Transthoracic Echo Complete W/ Cont if Necessary Per Protocol    Interpretation Summary  · Normal left ventricular systolic function, estimated EF 60%.  · Mild mitral regurgitation.  · Mild tricuspid regurgitation with normal RVSP.  · Mild pulmonic insufficiency.      I have reviewed the medications:  Scheduled Meds:aspirin, 81 mg, Oral, Daily  divalproex, 250 mg, Oral, Q12H  donepezil, 10 mg, Oral, Nightly  fluticasone, 2 spray, Each Nare, BID  levothyroxine, 50 mcg, Oral, Q AM  pantoprazole, 40 mg, Oral, Q AM  senna-docusate sodium, 2 tablet, Oral, BID  sodium chloride, 10 mL, Intravenous, Q12H      Continuous Infusions:   PRN Meds:.•  acetaminophen  •  senna-docusate sodium **AND** polyethylene glycol **AND** bisacodyl **AND** bisacodyl  •  ondansetron **OR** ondansetron  •  sodium chloride  •  sodium chloride  •  sodium chloride    Assessment & Plan   Assessment & Plan     Active Hospital Problems    Diagnosis  POA   • **Altered mental status [R41.82]  Yes   • Dementia (HCC) [F03.90]  Yes   • Accident due to mechanical fall without injury [W19.XXXA]  Yes   • Hypothyroidism [E03.9]  Yes      Resolved Hospital Problems   No resolved problems to display.        Brief Hospital Course to date:  Ramin Zaragoza is a 87 y.o. male w dementia living on a memory care unit who presents for concerns for worsening confusion after a mechanical fall in  which he hit his head.    1) Acute encephalopathy in setting of severe dementia - resolved  -W/u to now unrevealing including CT head and u/a. No clear s/s of infection  -Could be waxing/waning or post-concussive after head trauma  -D/w family who visited yesterday and seems at baseline. Neurology saw - no NPH w/u necessary, wouldn't benefit from shunt evaluation  -Home aricept, depakote    2) Head trauma 2/2 mechanical fall w residual neck pain  -CT head, CT C-spine, Xrays left hip and left shoulder all unremarkable, no other identified injuries  -Persistent left shoulder/clavicle/neck pain despite these unremarkable imaging. Shoulder pain appears chronic, clavicle Xray today negative for fracture. Will obtain MRI neck to r/o ligamentous C-spine injury    3) Hypothyroidism - new diagnosis  -TSH very elevated on arrival, started low dose levothyroxine    4) Hematuria, likely 2/2 traumatic cath in ED - can f/u OP w repeat in weeks    5) Mild oropharyngeal dysphagia - speech following, OK for diet + thicks, thins in between meals. flonase for nasal congestion w improvement    Osteoarthritis - restart home tramadol as needed  HTN - holding home lisinopril and bumex (both low dose) given normotension  BPH - allergy to flomax (s/e of hypotension)    PT/OT to see for help with dispo plan    Expected Discharge Location and Transportation: SNF  Expected Discharge likely d/c 1/16 based on placement + MRI  Expected Discharge Date and Time     Expected Discharge Date Expected Discharge Time    Jan 17, 2023            DVT prophylaxis:  Mechanical DVT prophylaxis orders are present.     AM-PAC 6 Clicks Score (PT): 10 (01/15/23 0800)    CODE STATUS:   Code Status and Medical Interventions:   Ordered at: 01/13/23 2138     Medical Intervention Limits:    NO intubation (DNI)     Level Of Support Discussed With:    Health Care Surrogate     Code Status (Patient has no pulse and is not breathing):    No CPR (Do Not Attempt to  Resuscitate)     Medical Interventions (Patient has pulse or is breathing):    Limited Support     Comments:    per patient's daughter and POA       Tamia Barahona MD  01/15/23

## 2023-01-16 LAB
ANION GAP SERPL CALCULATED.3IONS-SCNC: 8 MMOL/L (ref 5–15)
BASOPHILS # BLD AUTO: ABNORMAL 10*3/UL
BASOPHILS NFR BLD AUTO: ABNORMAL %
BUN SERPL-MCNC: 10 MG/DL (ref 8–23)
BUN/CREAT SERPL: 15.2 (ref 7–25)
BURR CELLS BLD QL SMEAR: NORMAL
CALCIUM SPEC-SCNC: 8.6 MG/DL (ref 8.6–10.5)
CHLORIDE SERPL-SCNC: 104 MMOL/L (ref 98–107)
CO2 SERPL-SCNC: 25 MMOL/L (ref 22–29)
CREAT SERPL-MCNC: 0.66 MG/DL (ref 0.76–1.27)
DEPRECATED RDW RBC AUTO: 41.7 FL (ref 37–54)
EGFRCR SERPLBLD CKD-EPI 2021: 90.8 ML/MIN/1.73
EOSINOPHIL # BLD AUTO: 0.24 10*3/MM3 (ref 0–0.4)
EOSINOPHIL NFR BLD AUTO: 2.8 % (ref 0.3–6.2)
ERYTHROCYTE [DISTWIDTH] IN BLOOD BY AUTOMATED COUNT: 12.1 % (ref 12.3–15.4)
GLUCOSE SERPL-MCNC: 93 MG/DL (ref 65–99)
HCT VFR BLD AUTO: 33.7 % (ref 37.5–51)
HGB BLD-MCNC: 11.5 G/DL (ref 13–17.7)
IMM GRANULOCYTES # BLD AUTO: 0.03 10*3/MM3 (ref 0–0.05)
IMM GRANULOCYTES NFR BLD AUTO: 0.4 % (ref 0–0.5)
LYMPHOCYTES # BLD AUTO: 2.43 10*3/MM3 (ref 0.7–3.1)
LYMPHOCYTES NFR BLD AUTO: 28.7 % (ref 19.6–45.3)
MAGNESIUM SERPL-MCNC: 1.8 MG/DL (ref 1.6–2.4)
MCH RBC QN AUTO: 32.4 PG (ref 26.6–33)
MCHC RBC AUTO-ENTMCNC: 34.1 G/DL (ref 31.5–35.7)
MCV RBC AUTO: 94.9 FL (ref 79–97)
MONOCYTES # BLD AUTO: 0.89 10*3/MM3 (ref 0.1–0.9)
MONOCYTES NFR BLD AUTO: 10.5 % (ref 5–12)
NEUTROPHILS NFR BLD AUTO: 4.87 10*3/MM3 (ref 1.7–7)
NEUTROPHILS NFR BLD AUTO: 57.4 % (ref 42.7–76)
PLAT MORPH BLD: NORMAL
PLATELET # BLD AUTO: 171 10*3/MM3 (ref 140–450)
PMV BLD AUTO: 9.5 FL (ref 6–12)
POTASSIUM SERPL-SCNC: 3.6 MMOL/L (ref 3.5–5.2)
RBC # BLD AUTO: 3.55 10*6/MM3 (ref 4.14–5.8)
SODIUM SERPL-SCNC: 137 MMOL/L (ref 136–145)
WBC MORPH BLD: NORMAL
WBC NRBC COR # BLD: 8.48 10*3/MM3 (ref 3.4–10.8)

## 2023-01-16 PROCEDURE — 83735 ASSAY OF MAGNESIUM: CPT | Performed by: NURSE PRACTITIONER

## 2023-01-16 PROCEDURE — 85007 BL SMEAR W/DIFF WBC COUNT: CPT | Performed by: NURSE PRACTITIONER

## 2023-01-16 PROCEDURE — 99232 SBSQ HOSP IP/OBS MODERATE 35: CPT | Performed by: NURSE PRACTITIONER

## 2023-01-16 PROCEDURE — G0378 HOSPITAL OBSERVATION PER HR: HCPCS

## 2023-01-16 PROCEDURE — 85025 COMPLETE CBC W/AUTO DIFF WBC: CPT | Performed by: NURSE PRACTITIONER

## 2023-01-16 PROCEDURE — 97110 THERAPEUTIC EXERCISES: CPT

## 2023-01-16 PROCEDURE — 97530 THERAPEUTIC ACTIVITIES: CPT

## 2023-01-16 PROCEDURE — 92610 EVALUATE SWALLOWING FUNCTION: CPT

## 2023-01-16 PROCEDURE — 80048 BASIC METABOLIC PNL TOTAL CA: CPT | Performed by: NURSE PRACTITIONER

## 2023-01-16 RX ADMIN — DIVALPROEX SODIUM 250 MG: 250 TABLET, DELAYED RELEASE ORAL at 09:55

## 2023-01-16 RX ADMIN — DIVALPROEX SODIUM 250 MG: 250 TABLET, DELAYED RELEASE ORAL at 21:27

## 2023-01-16 RX ADMIN — SENNOSIDES AND DOCUSATE SODIUM 2 TABLET: 50; 8.6 TABLET ORAL at 21:26

## 2023-01-16 RX ADMIN — ASPIRIN 81 MG: 81 TABLET, COATED ORAL at 09:54

## 2023-01-16 RX ADMIN — FLUTICASONE PROPIONATE 2 SPRAY: 50 SPRAY, METERED NASAL at 21:27

## 2023-01-16 RX ADMIN — DONEPEZIL HYDROCHLORIDE 10 MG: 10 TABLET, FILM COATED ORAL at 21:27

## 2023-01-16 RX ADMIN — PANTOPRAZOLE SODIUM 40 MG: 40 TABLET, DELAYED RELEASE ORAL at 04:32

## 2023-01-16 RX ADMIN — Medication 10 ML: at 09:55

## 2023-01-16 RX ADMIN — LEVOTHYROXINE SODIUM 50 MCG: 50 TABLET ORAL at 04:32

## 2023-01-16 RX ADMIN — FLUTICASONE PROPIONATE 2 SPRAY: 50 SPRAY, METERED NASAL at 09:55

## 2023-01-16 RX ADMIN — SENNOSIDES AND DOCUSATE SODIUM 2 TABLET: 50; 8.6 TABLET ORAL at 09:54

## 2023-01-16 RX ADMIN — Medication 10 ML: at 21:27

## 2023-01-16 NOTE — PROGRESS NOTES
"      [facility] Medicine Services  PROGRESS NOTE    Patient Name: Ramin Zaragoza  : 1935  MRN: [id number]    Date of Admission: 2023  Primary Care Physician: Tez Santos MD    Subjective   Subjective     CC:  fall    HPI:  Patient seen resting up in chair awake and alert.  No acute distress.  Pleasant and talkative but oriented to self only.  Very hard of hearing.  States he feels \"pretty bad.  Planing of left shoulder and neck pain.  Unable to rate frail and chronically ill-appearing.      ROS:  Gen- No fevers, chills  CV- No chest pain, palpitations  Resp- No cough, dyspnea  GI- No N/V/D, abd pain    Objective   Objective     Vital Signs:   Temp:  [97.6 °F (36.4 °C)-99.1 °F (37.3 °C)] 98.5 °F (36.9 °C)  Heart Rate:  [68-71] 71  Resp:  [16-17] 17  BP: (118-142)/(64-85) 131/65     Physical Exam:  Constitutional: No acute distress, awake, alert.  Resting up in the chair. Frail appearing elderly male.  HENT: NCAT, mucous membranes moist  Respiratory: Clear to auscultation bilaterally, respiratory effort normal on room air.  Cardiovascular: RRR, no murmurs, rubs, or gallops  Gastrointestinal tenderness to right positive bowel sounds, soft, nontender, nondistended  Neck: Tenderness to left neck and shoulder region.  No stiff neck.  Tender to move however able to move in all directions.  Musculoskeletal: No bilateral ankle edema.  MC spontaneously.  Psychiatric: Appropriate affect, cooperative and calm.  Neurologic: Awake and alert.  Oriented to person only.  Speech clear.  Short-term memory deficits.  Otherwise confused at baseline.  Very hard of hearing.  Follows simple commands.  Skin: No rashes      Results Reviewed:  LAB RESULTS:      Lab 23  0502 23  0626 23  1640   WBC 8.48 8.68 11.13*   HEMOGLOBIN 11.5* 12.7* 14.5   HEMATOCRIT 33.7* 37.3* 41.9   PLATELETS 171 161 179   NEUTROS ABS 4.87  --  8.16*   IMMATURE GRANS (ABS) 0.03  --  0.05   LYMPHS ABS 2.43  --  " 1.43   MONOS ABS 0.89  --  1.44*   EOS ABS 0.24  --  0.03   MCV 94.9 95.9 94.4         Lab 01/16/23  0502 01/14/23  0626 01/13/23  1640   SODIUM 137 137 137   POTASSIUM 3.6 3.4* 4.3   CHLORIDE 104 100 98   CO2 25.0 26.0 29.0   ANION GAP 8.0 11.0 10.0   BUN 10 11 13   CREATININE 0.66* 0.71* 0.94   EGFR 90.8 88.8 78.5   GLUCOSE 93 109* 154*   CALCIUM 8.6 8.7 9.4   MAGNESIUM 1.8  --  1.7   TSH  --  10.670*  --          Lab 01/13/23  1640   TOTAL PROTEIN 7.1   ALBUMIN 4.0   GLOBULIN 3.1   ALT (SGPT) 17   AST (SGOT) 40   BILIRUBIN 0.9   ALK PHOS 55         Lab 01/13/23  1640   TROPONIN T <0.010             Lab 01/14/23  0626   VITAMIN B 12 485         Brief Urine Lab Results  (Last result in the past 365 days)      Color   Clarity   Blood   Leuk Est   Nitrite   Protein   CREAT   Urine HCG        01/13/23 1858 Dark Yellow   Clear   Moderate (2+)   Negative   Negative   30 mg/dL (1+)                 Microbiology Results Abnormal     None          XR Clavicle Left    Result Date: 1/15/2023  XR CLAVICLE LEFT Date of Exam: 1/15/2023 9:52 AM EST Indication: point tenderness medial side of clavicle. Comparison: Left shoulder radiographs 1/13/2023 Findings: No definite soft tissue swelling. No acute fracture or malalignment. No bony erosion or periostitis. The acromioclavicular joint is congruent with mild degenerative change and associated chondrocalcinosis. The sternoclavicular joints appear congruent. Unremarkable appearance of the partially imaged thorax.     Impression: Impression: No acute fracture or malalignment. Electronically Signed: Octavio Adamson  1/15/2023 10:15 AM EST  Workstation ID: [id number]      Results for orders placed during the hospital encounter of 02/23/22    Adult Transthoracic Echo Complete W/ Cont if Necessary Per Protocol    Interpretation Summary  · Normal left ventricular systolic function, estimated EF 60%.  · Mild mitral regurgitation.  · Mild tricuspid regurgitation with normal RVSP.  · Mild  pulmonic insufficiency.      I have reviewed the medications:  Scheduled Meds:aspirin, 81 mg, Oral, Daily  divalproex, 250 mg, Oral, Q12H  donepezil, 10 mg, Oral, Nightly  fluticasone, 2 spray, Each Nare, BID  levothyroxine, 50 mcg, Oral, Q AM  pantoprazole, 40 mg, Oral, Q AM  senna-docusate sodium, 2 tablet, Oral, BID  sodium chloride, 10 mL, Intravenous, Q12H      Continuous Infusions:   PRN Meds:.•  acetaminophen  •  senna-docusate sodium **AND** polyethylene glycol **AND** bisacodyl **AND** bisacodyl  •  hydrOXYzine  •  ondansetron **OR** ondansetron  •  sodium chloride  •  sodium chloride  •  sodium chloride    Assessment & Plan   Assessment & Plan     Active Hospital Problems    Diagnosis  POA   • **Altered mental status [R41.82]  Yes   • Dementia (HCC) [F03.90]  Yes   • Accident due to mechanical fall without injury [W19.XXXA]  Yes   • Hypothyroidism [E03.9]  Yes      Resolved Hospital Problems   No resolved problems to display.        Brief Hospital Course to date:  Ramin Zaragoza is a 87 y.o. male w dementia living on a memory care unit who presents for concerns for worsening confusion after a mechanical fall in which he hit his head.    This patient's problems and plans were partially entered by my partner and updated as appropriate by me 01/16/23.    Assessment/plan:  Pt is new to me today     Acute encephalopathy in setting of severe dementia - resolved  -W/u to now unrevealing including CT head and u/a. No clear s/s of infection  -Could be waxing/waning or post-concussive after head trauma  -Partner prior D/w family who visited 1/14 and reports patient seems at baseline. Neurology saw - no NPH w/u necessary, wouldn't benefit from shunt evaluation  -Home aricept, depakote  - PT/OT to see for help with dispo plan  -- Notes today reflect likely not able to return to memory care unit and may be looking for skilled facility.  CM following.    Head trauma 2/2 mechanical fall w residual neck pain  -CT head, CT  C-spine, Xrays left hip and left shoulder all unremarkable, no other identified injuries  -Persistent left shoulder/clavicle/neck pain despite these unremarkable imaging. Shoulder pain appears chronic, clavicle Xray today negative for fracture.   --Will obtain MRI neck to r/o ligamentous C-spine injury, pending    Hypothyroidism - new diagnosis  -TSH very elevated on arrival, started low dose levothyroxine  -- Follow-up TSH 4-6 weeks, defer to PCP    Hematuria, likely 2/2 traumatic cath in ED   - can f/u OP w repeat in a couple of weeks.  Defer to PCP.    Mild oropharyngeal dysphagia - speech following, OK for diet + thicks, thins in between meals.  -- flonase for nasal congestion w improvement    Osteoarthritis -prior restarted home tramadol as needed    HTN - holding home lisinopril and bumex (both low dose) given normotension  -- Remains normotensive off medications.  Monitor.    BPH - allergy to flomax (s/e of hypotension)      Expected Discharge Location and Transportation: [facility] vs SNF  Expected Discharge likely transfer to SNF 1/18 pending placement.  CM following.  Expected Discharge Date and Time     Expected Discharge Date Expected Discharge Time    Jan 18, 2023            DVT prophylaxis:  Mechanical DVT prophylaxis orders are present.     AM-PAC 6 Clicks Score (PT): 10 (01/15/23 0800)    CODE STATUS:   Code Status and Medical Interventions:   Ordered at: 01/13/23 2138     Medical Intervention Limits:    NO intubation (DNI)     Level Of Support Discussed With:    Health Care Surrogate     Code Status (Patient has no pulse and is not breathing):    No CPR (Do Not Attempt to Resuscitate)     Medical Interventions (Patient has pulse or is breathing):    Limited Support     Comments:    per patient's daughter and ANGELES Vanegas, APRN  01/16/23

## 2023-01-16 NOTE — PLAN OF CARE
Patient remains confused, follows all commands, Speech garbled/fragmental. C/o lateral neck/shoulder pain on movement/T&R. Skin care provided at each incontinent episode. VSS on RA. SR on cardiac mx. Will cont to mx. Call light in reach.

## 2023-01-16 NOTE — CASE MANAGEMENT/SOCIAL WORK
Discharge Planning Assessment  [facility]     Patient Name: Ramin Zaragoza  MRN: [id number]  Today's Date: 1/16/2023    Admit Date: 1/13/2023    Plan: TBD   Discharge Needs Assessment     Row Name 01/16/23 1254       Living Environment    People in Home facility resident    Current Living Arrangements apartment    Primary Care Provided by other (see comments)    Provides Primary Care For no one, unable/limited ability to care for self    Family Caregiver if Needed none  has two children    Quality of Family Relationships helpful;involved;supportive    Able to Return to Prior Arrangements no       Resource/Environmental Concerns    Resource/Environmental Concerns none       Transition Planning    Patient/Family Anticipates Transition to long-term care facility    Patient/Family Anticipated Services at Transition skilled nursing    Transportation Anticipated family or friend will provide       Discharge Needs Assessment    Readmission Within the Last 30 Days no previous admission in last 30 days    Current Outpatient/Agency/Support Group assisted living facility    Equipment Currently Used at Home wheelchair;rollator    Concerns to be Addressed adjustment to diagnosis/illness;basic needs;discharge planning    Equipment Needed After Discharge --  TBD    Outpatient/Agency/Support Group Needs skilled nursing facility    Current Discharge Risk cognitively impaired;chronically ill;dependent with mobility/activities of daily living               Discharge Plan     Row Name 01/16/23 1255       Plan    Plan TBD    Plan Comments chart reviewed. I spoke with daughter ( Isatu [phone]) to initiate d/c planning, she i salos his legal guardian. He currently resides in an assisted living apt(which is also memory care) at [place]. He cannot return to that level of care. Daughter states he used a rollator in his room,but was in a wheelchair when outside his room. See PT boogie, currently requiring assist  of  2 for all mobility.  Discussed with his daughter and son by phone,that he will likely need skilled level of care. they had requested a representative from [facility] in [place] evaluate him for their personal care unit. I have faxed them the PT boogie to review,but doubtful he will be apppropriate since he would have to be minimal assist of one person.(Faxed to [facility] at [phone]) Referred to [facility] skilled unit, I spoke with Marilou who accepted referral. Await call back    Final Discharge Disposition Code 03 - skilled nursing facility (SNF)              Continued Care and Services - Admitted Since 1/13/2023    Coordination has not been started for this encounter.       Expected Discharge Date and Time     Expected Discharge Date Expected Discharge Time    Jan 17, 2023          Demographic Summary     Row Name 01/16/23 1251       General Information    Admission Type observation    Arrived From home    Referral Source admission list    Reason for Consult discharge planning    Preferred Language English    General Information Comments PCP- Tez Santos       Contact Information    Permission Granted to Share Info With ;family/designee    Contact Information Comments Isatu Menendez( daughter and legal guardian [phone])               Functional Status     Row Name 01/16/23 1253       Functional Status    Usual Activity Tolerance fair    Current Activity Tolerance --  see PT eval       Functional Status, IADL    Medications assistive person    Meal Preparation assistive person    Housekeeping assistive person    Laundry assistive person    Shopping assistive person       Mental Status    General Appearance WDL WDL       Mental Status Summary    Recent Changes in Mental Status/Cognitive Functioning unable to assess       Employment/    Employment Status retired    Employment/ Comments insurance- [employer]               Psychosocial    No documentation.                Abuse/Neglect    No  documentation.                Legal    No documentation.                Substance Abuse    No documentation.                Patient Forms    No documentation.                   Sonja C Kellerman, RN

## 2023-01-16 NOTE — PLAN OF CARE
Goal Outcome Evaluation:  Plan of Care Reviewed With: patient      SLP re-evaluation completed. Will address dysphagia. Please see note for further details and recommendations.

## 2023-01-16 NOTE — PLAN OF CARE
Goal Outcome Evaluation:  Plan of Care Reviewed With: patient        Progress: improving  Outcome Evaluation: Patient having much difficulty with all mobility today requiring moderate to max assist to get out of bed. His ambulation was limited by sever festination.

## 2023-01-16 NOTE — THERAPY RE-EVALUATION
Acute Care - Speech Language Pathology   Swallow Re-Evaluation [facility]   Clinical Swallow Evaluation     Patient Name: Ramin Zaragoza  : 1935  MRN: [id number]  Today's Date: 2023               Admit Date: 2023    Visit Dx:     ICD-10-CM ICD-9-CM   1. Altered mental status, unspecified altered mental status type  R41.82 780.97   2. Injury of head, initial encounter  S09.90XA 959.01   3. Contusion of left shoulder, initial encounter  S40.012A 923.00   4. Contusion of left hip, initial encounter  S70.02XA 924.01   5. Hematuria, unspecified type  R31.9 599.70   6. Dysphagia, unspecified type  R13.10 787.20     Patient Active Problem List   Diagnosis   • Coronary artery disease involving native coronary artery of native heart without angina pectoris   • Premature ventricular contraction   • Essential hypertension   • Mixed hyperlipidemia   • Venous insufficiency   • Hypothyroidism   • Lactose intolerance   • Weakness   • Accident due to mechanical fall without injury   • Altered mental status   • Dementia (HCC)     Past Medical History:   Diagnosis Date   • CAD (coronary artery disease)    • Hyperlipidemia    • Hypertension    • Hypothyroidism     on chronic replacement therapy   • Lactose intolerance    • Lower extremity edema    • PVC's (premature ventricular contractions)     History of   • Thyroid disorder    • Venous insufficiency      Past Surgical History:   Procedure Laterality Date   • CARDIAC CATHETERIZATION     • CORONARY STENT PLACEMENT     • EYE SURGERY      Bilateral cataract extraction   • HERNIA REPAIR      Inguinal hernia repair   • OTHER SURGICAL HISTORY      Melanoma removed from back   • OTHER SURGICAL HISTORY      History of left elbow fracture with sunsequent fixation       SLP Recommendation and Plan  SLP Swallowing Diagnosis: mild, oral dysphagia, suspected pharyngeal dysphagia (23 0815)  SLP Diet Recommendation: regular textures, thin liquids, other (see comments) (RN to  downgrade to nectar-thick liquids should it be warranted) (01/16/23 0815)  Recommended Precautions and Strategies: upright posture during/after eating, general aspiration precautions, assist with feeding (01/16/23 0815)  SLP Rec. for Method of Medication Administration: meds whole, with puree (01/16/23 0815)     Monitor for Signs of Aspiration: notify SLP if any concerns (01/16/23 0815)  Recommended Diagnostics: other (see comments) (diet tolerance) (01/16/23 0815)  Swallow Criteria for Skilled Therapeutic Interventions Met: demonstrates skilled criteria (01/16/23 0815)     Rehab Potential/Prognosis, Swallowing: re-evaluate goals as necessary (01/16/23 0815)  Therapy Frequency (Swallow): PRN (01/16/23 0815)  Predicted Duration Therapy Intervention (Days): until discharge (01/16/23 0815)        Plan of Care Reviewed With: patient      SWALLOW EVALUATION (last 72 hours)     SLP Adult Swallow Evaluation     Row Name 01/16/23 0815 01/15/23 0900 01/14/23 0940             Rehab Evaluation    Document Type re-evaluation  -EN re-evaluation  -SM evaluation  -SM      Subjective Information no complaints  -EN no complaints  -SM no complaints  -SM      Patient Observations alert;cooperative  -EN alert;cooperative  -SM alert;cooperative  -SM      Patient/Family/Caregiver Comments/Observations no family present  -EN -- --      Patient Effort good  -EN -- --      Symptoms Noted During/After Treatment none  -EN -- --         General Information    Patient Profile Reviewed yes  -EN -- yes  -SM      Pertinent History Of Current Problem See previous eval.  -EN -- Dementia, adm from memory care unit with increased confusion. Recent fall and hit head. CT negative.  -SM      Current Method of Nutrition regular textures;nectar/syrup-thick liquids;water between meals  -EN regular textures;nectar/syrup-thick liquids;water between meals  -SM regular textures;thin liquids  -SM      Prior Level of Function-Communication cognitive-linguistic  impairment  -EN -- cognitive-linguistic impairment  -SM      Prior Level of Function-Swallowing safe, efficient swallowing in all situations  -EN -- safe, efficient swallowing in all situations  -SM      Plans/Goals Discussed with patient  -EN patient  -SM patient and family;agreed upon  -      Barriers to Rehab cognitive status  -EN cognitive status  -SM cognitive status  -SM      Patient's Goals for Discharge no concerns voiced  -EN no concerns voiced  -SM no concerns voiced  -SM      Family Goals for Discharge -- -- patient able to eat/drink without coughing/choking  though avoid instrumental swallow study for now  -SM         Pain    Additional Documentation -- -- Pain Scale: FACES Pre/Post-Treatment (Group)  -SM         Pain Scale: FACES Pre/Post-Treatment    Pain: FACES Scale, Pretreatment 0-->no hurt  -EN 2-->hurts little bit  -SM 4-->hurts little more  -SM      Posttreatment Pain Rating 0-->no hurt  -EN 2-->hurts little bit  -SM 4-->hurts little more  -SM      Pain Location - Side/Orientation -- -- Bilateral  -SM      Pain Location -- -- posterior  -SM      Pain Location - -- -- neck  -SM      Pre/Posttreatment Pain Comment -- -- RN aware and managing  -         Clinical Swallow Eval    Oral Prep Phase impaired  -EN impaired  -SM impaired  -SM      Pharyngeal Phase -- suspected pharyngeal impairment  - suspected pharyngeal impairment  -         Oral Prep Concerns    Oral Prep Concerns increased prep time  -EN increased prep time  -SM increased prep time;other (see comments)  -SM      Increased Prep Time regular consistencies  -EN -- regular consistencies  -SM      Oral Prep Concerns, Comment -- -- though adequate  -SM         Pharyngeal Phase Concerns    Pharyngeal Phase Concerns cough  -EN cough  -SM cough  -SM      Cough thin  -EN thin  -SM thin;other (see comments)  -SM      Pharyngeal Phase Concerns, Comment Pt w/ only one episode of coughing this AM following thin liquid wash after trial of  scrambled eggs. SLP provided a few more of this same pattern of trials and pt w/ no s/s of aspiration in subsequent trials. No other s/s of aspiration across eval. Will upgrade pt to thin liquids; notified RN and advised to downgrade to nectar-thick liquids should increased amount of signs of aspiration be evident during subsequent meal times.  -EN Working with pt on remaining breakfast tray. Tolerating regular diet and nectar-thick liquids, showing no obvious aversion. Began trialing thin liquids with breakfast tray. No s/s with first few bites and sips (even when large/consecutive). Then bite of eggs and appeared to clear most/all. Subsequent sip of water resulted in immediate wet coughing. Highly suspect aspiration. Remains at increased risk aspiration of thin liquids during meals.  -SM intermittent and delayed. Called and discussed results with pt's dtr. She conveys no hx dysphagia though some general chronic throat clearing which she has attributed to allergies. She does not wish to push for instrumental assessment at this time. Prefers diet modification and re-assess at the bedside to determine if can advance back to thin liquids.  -SM         SLP Evaluation Clinical Impression    SLP Swallowing Diagnosis mild;oral dysphagia;suspected pharyngeal dysphagia  -EN mild;oral dysphagia;suspected pharyngeal dysphagia  -SM mild;oral dysphagia;suspected pharyngeal dysphagia  -SM      Functional Impact risk of aspiration/pneumonia  -EN risk of aspiration/pneumonia  -SM risk of aspiration/pneumonia  -SM      Rehab Potential/Prognosis, Swallowing re-evaluate goals as necessary  -EN -- --      Swallow Criteria for Skilled Therapeutic Interventions Met demonstrates skilled criteria  -EN -- --         Recommendations    Therapy Frequency (Swallow) PRN  -EN -- --      Predicted Duration Therapy Intervention (Days) until discharge  -EN -- --      SLP Diet Recommendation regular textures;thin liquids;other (see comments)  RN to  downgrade to nectar-thick liquids should it be warranted  -EN regular textures;nectar thick liquids;other (see comments)  ok any thin liquids as desired between meals  -SM regular textures;nectar thick liquids;water between meals after oral care, with supervision  -      Recommended Diagnostics other (see comments)  diet tolerance  -EN reassess via clinical swallow evaluation;other (see comments)  will check back tomorrow for ? changes. Though likely may be best to return to SNF on current modified diet and adjust accordingly from there.  -SM reassess via clinical swallow evaluation  -      Recommended Precautions and Strategies upright posture during/after eating;general aspiration precautions;assist with feeding  -EN upright posture during/after eating;general aspiration precautions;assist with feeding  -SM upright posture during/after eating;general aspiration precautions;assist with feeding  -SM      Oral Care Recommendations Oral Care BID/PRN  -EN Oral Care BID/PRN  -SM Oral Care BID/PRN  -SM      SLP Rec. for Method of Medication Administration meds whole;with puree  -EN meds whole;with puree  -SM meds whole;with puree  crush as needed  -SM      Monitor for Signs of Aspiration notify SLP if any concerns  -EN notify SLP if any concerns  -SM notify SLP if any concerns  -            User Key  (r) = Recorded By, (t) = Taken By, (c) = Cosigned By    Initials Name Effective Dates    Cathie Heard, MS CCC-SLP 06/16/21 -     EN Tamia Be, MS CCC-SLP 06/22/22 -                 EDUCATION  The patient has been educated in the following areas:   Dysphagia (Swallowing Impairment).        SLP GOALS     Row Name 01/16/23 0815             (LT) Patient will demonstrate functional swallow for    Diet Texture (Demonstrate functional swallow) regular textures  -EN      Liquid viscosity (Demonstrate functional swallow) thin liquids  -EN      [place] (Demonstrate functional swallow) with minimal cues  (75-90% accuracy)  -EN      Time Frame (Demonstrate functional swallow) by discharge  -EN         (STG) Patient will tolerate trials of    Consistencies Trialed (Tolerate trials) regular textures;thin liquids;mixed consistencies  -EN      Desired Outcome (Tolerate trials) without signs/symptoms of aspiration  -EN      [place] (Tolerate trials) with minimal cues (75-90% accuracy)  -EN      Time Frame (Tolerate trials) 1 week  -EN      Progress/Outcomes (Tolerate trials) new goal  -EN            User Key  (r) = Recorded By, (t) = Taken By, (c) = Cosigned By    Initials Name Provider Type    Tamia Darden MS CCC-SLP Speech and Language Pathologist                   Time Calculation:    Time Calculation- SLP     Row Name 01/16/23 1011             Time Calculation- SLP    SLP Start Time 0815  -EN      SLP Received On 01/16/23  -EN         Untimed Charges    SLP Eval/Re-eval  ST Eval Oral Pharyng Swallow - [id number]  -EN      [id number] Eval Oral Pharyng Swallow Minutes 38  -EN         Total Minutes    Untimed Charges Total Minutes 38  -EN       Total Minutes 38  -EN            User Key  (r) = Recorded By, (t) = Taken By, (c) = Cosigned By    Initials Name Provider Type    Tamia Darden MS CCC-SLP Speech and Language Pathologist                Therapy Charges for Today     Code Description Service Date Service Provider Modifiers Qty    [id number] HC ST EVAL ORAL PHARYNG SWALLOW 3 1/16/2023 Tamia Be MS CCC-SLP GN 1               Tamia Be MS CCC-ZONIA  1/16/2023

## 2023-01-16 NOTE — THERAPY TREATMENT NOTE
Patient Name: Ramin Zaragoza  : 1935    MRN: [id number]                              Today's Date: 2023       Admit Date: 2023    Visit Dx:     ICD-10-CM ICD-9-CM   1. Altered mental status, unspecified altered mental status type  R41.82 780.97   2. Injury of head, initial encounter  S09.90XA 959.01   3. Contusion of left shoulder, initial encounter  S40.012A 923.00   4. Contusion of left hip, initial encounter  S70.02XA 924.01   5. Hematuria, unspecified type  R31.9 599.70   6. Dysphagia, unspecified type  R13.10 787.20     Patient Active Problem List   Diagnosis   • Coronary artery disease involving native coronary artery of native heart without angina pectoris   • Premature ventricular contraction   • Essential hypertension   • Mixed hyperlipidemia   • Venous insufficiency   • Hypothyroidism   • Lactose intolerance   • Weakness   • Accident due to mechanical fall without injury   • Altered mental status   • Dementia (HCC)     Past Medical History:   Diagnosis Date   • CAD (coronary artery disease)    • Hyperlipidemia    • Hypertension    • Hypothyroidism     on chronic replacement therapy   • Lactose intolerance    • Lower extremity edema    • PVC's (premature ventricular contractions)     History of   • Thyroid disorder    • Venous insufficiency      Past Surgical History:   Procedure Laterality Date   • CARDIAC CATHETERIZATION     • CORONARY STENT PLACEMENT     • EYE SURGERY      Bilateral cataract extraction   • HERNIA REPAIR      Inguinal hernia repair   • OTHER SURGICAL HISTORY      Melanoma removed from back   • OTHER SURGICAL HISTORY      History of left elbow fracture with sunsequent fixation      General Information     Row Name 23 1108          Physical Therapy Time and Intention    Document Type therapy note (daily note)  -SC     Mode of Treatment physical therapy  -SC     Row Name 23 6110          General Information    Existing Precautions/Restrictions fall  L shoulder  pain, neck pain  -SC     Row Name 01/16/23 1109          Cognition    Orientation Status (Cognition) oriented to;person  -SC     Row Name 01/16/23 1109          Safety Issues, Functional Mobility    Impairments Affecting Function (Mobility) balance;cognition;coordination;motor planning;endurance/activity tolerance;strength;range of motion (ROM)  -SC     Cognitive Impairments, Mobility Safety/Performance problem-solving/reasoning;judgment;insight into deficits/self-awareness  -SC     Comment, Safety Issues/Impairments (Mobility) alert, [place], following commands  -SC           User Key  (r) = Recorded By, (t) = Taken By, (c) = Cosigned By    Initials Name Provider Type    SC Grant Higgins, PT Physical Therapist               Mobility     Row Name 01/16/23 1110          Bed Mobility    Bed Mobility sit-supine;scooting/bridging  -SC     Scooting/Bridging [place] (Bed Mobility) 1 person assist;dependent (less than 25% patient effort)  -SC     Supine-Sit [place] (Bed Mobility) moderate assist (50% patient effort);verbal cues;nonverbal cues (demo/gesture);1 person assist  -SC     Assistive Device (Bed Mobility) head of bed elevated;draw sheet;bed rails  -SC     Comment, (Bed Mobility) working on getting up to edge of bed with verbal cues for using bed rail . Unable to move legs over edge . Posterior lean on sitting requiring inhibition by PT. Time taken to work on sitting balance and with cues for shifting forward.  Patient unable to scoot  -SC     Row Name 01/16/23 1110          Transfers    Comment, (Transfers) STS from EOB wit cues for sequencing and for rocking . Time taken to worrk on standing balance with tactile cues and facilitation to maintain midline.  -SC     Row Name 01/16/23 1110          Bed-Chair Transfer    Bed-Chair [place] (Transfers) 2 person assist;moderate assist (50% patient effort)  -SC     Row Name 01/16/23 1110          Sit-Stand Transfer    Sit-Stand [place] (Transfers)  moderate assist (50% patient effort);2 person assist;verbal cues;nonverbal cues (demo/gesture)  -SC     Assistive Device (Sit-Stand Transfers) walker, front-wheeled  -SC     Row Name 01/16/23 1110          Gait/Stairs (Locomotion)    [place] Level (Gait) moderate assist (50% patient effort);2 person assist  -SC     Assistive Device (Gait) walker, front-wheeled  -SC     Distance in Feet (Gait) 3  -SC     Deviations/Abnormal Patterns (Gait) festinating/shuffling;weight shifting decreased;stride length decreased  -SC     Bilateral Gait Deviations forward flexed posture  -SC     Comment, (Gait/Stairs) attempted walking with tactile and verbala cues to take large steps. Patient festinating alot and unable to progerss with gait  -SC           User Key  (r) = Recorded By, (t) = Taken By, (c) = Cosigned By    Initials Name Provider Type    SC Grant Higgins, PT Physical Therapist               Obj/Interventions     Row Name 01/16/23 1115          Motor Skills    Therapeutic Exercise knee  -SC     Row Name 01/16/23 1115          Knee (Therapeutic Exercise)    Knee (Therapeutic Exercise) AAROM (active assistive range of motion)  -SC     Knee AAROM (Therapeutic Exercise) bilateral;flexion;extension;sitting;10 repetitions;2 sets  -[facility] Name 01/16/23 1115          Balance    Static Sitting Balance minimal assist  -SC     Dynamic Sitting Balance moderate assist  -SC     Static Standing Balance moderate assist;2-person assist  -SC     Dynamic Standing Balance maximum assist;2-person assist  -SC     Position/Device Used, Standing Balance supported;walker, rolling  -SC     Comment, Balance posterior lean, festinating with gait  -SC           User Key  (r) = Recorded By, (t) = Taken By, (c) = Cosigned By    Initials Name Provider Type    SC Grant Higgins, PT Physical Therapist               Goals/Plan    No documentation.                Clinical Impression     Row Name 01/16/23 1116          Pain    Pretreatment Pain  Rating --  -SC     Posttreatment Pain Rating --  -SC     Row Name 01/16/23 1116          Pain Scale: FACES Pre/Post-Treatment    Pain: FACES Scale, Pretreatment 2-->hurts little bit  -SC     Posttreatment Pain Rating 4-->hurts little more  -SC     Pain Location - neck  -SC     Row Name 01/16/23 1116          Plan of Care Review    Plan of Care Reviewed With patient  -SC     Progress improving  -SC     Outcome Evaluation Patient having much difficulty with all mobility today requiring moderate to max assist to get out of bed. His ambulation was limited by sever festination.  -SC     Row Name 01/16/23 1116          Therapy Assessment/Plan (PT)    Rehab Potential (PT) good, to achieve stated therapy goals  -SC     Criteria for Skilled Interventions Met (PT) yes;meets criteria;skilled treatment is necessary  -SC     Therapy Frequency (PT) daily  -[facility] Name 01/16/23 1116          Positioning and Restraints    Pre-Treatment Position in bed  -SC     Post Treatment Position chair  -SC     In Chair notified nsg;call light within reach;sitting;reclined;encouraged to call for assist  -SC           User Key  (r) = Recorded By, (t) = Taken By, (c) = Cosigned By    Initials Name Provider Type    SC Grant Higgins, PT Physical Therapist               Outcome Measures     Row Name 01/16/23 1119          Functional Assessment    Outcome Measure Options AM-PAC 6 Clicks Basic Mobility (PT)  -SC           User Key  (r) = Recorded By, (t) = Taken By, (c) = Cosigned By    Initials Name Provider Type    SC Grant Higgins A, PT Physical Therapist                             Physical Therapy Education     Title: PT OT SLP Therapies (In Progress)     Topic: Physical Therapy (Done)     Point: Mobility training (Done)     Learning Progress Summary           Patient DOUGLAS Tiwari VU,NR by SC at 1/16/2023 1119    Comment: reviewed benefits of getting oob    DOUGLAS Alcantara VU by  at 1/14/2023 [id number]                   Point: Home exercise  program (Done)     Learning Progress Summary           Patient Eager, E, VU,NR by SC at 1/16/2023 1119    Comment: reviewed benefits of getting oob                   Point: Body mechanics (Done)     Learning Progress Summary           Patient Eager, E, VU,NR by SC at 1/16/2023 1119    Comment: reviewed benefits of getting oob    Acceptance, E, VU by  at 1/14/2023 1445                   Point: Precautions (Done)     Learning Progress Summary           Patient Eager, E, VU,NR by SC at 1/16/2023 1119    Comment: reviewed benefits of getting oob    Acceptance, E, VU by  at 1/14/2023 1445                               User Key     Initials Effective Dates Name Provider Type Discipline    SC 06/16/21 -  Grant Higgins PT Physical Therapist PT     07/14/21 -  Bernadette Moss PT Physical Therapist PT              PT Recommendation and Plan     Plan of Care Reviewed With: patient  Progress: improving  Outcome Evaluation: Patient having much difficulty with all mobility today requiring moderate to max assist to get out of bed. His ambulation was limited by sever festination.     Time Calculation:    PT Charges     Row Name 01/16/23 0947             Time Calculation    Start Time 0947  -SC      PT Received On 01/16/23  -SC      PT Goal Re-Cert Due Date 01/24/23  -SC         Time Calculation- PT    Total Timed Code Minutes- PT 24 minute(s)  -SC         Timed Charges    98696 - PT Therapeutic Exercise Minutes 5  -SC      63042 - Gait Training Minutes  4  -SC      85097 - PT Therapeutic Activity Minutes 15  -SC         Total Minutes    Timed Charges Total Minutes 24  -SC       Total Minutes 24  -SC            User Key  (r) = Recorded By, (t) = Taken By, (c) = Cosigned By    Initials Name Provider Type    SC Grant Higgins PT Physical Therapist              Therapy Charges for Today     Code Description Service Date Service Provider Modifiers Qty    84766021070 HC PT THER PROC EA 15 MIN 1/16/2023 Grant Higgins PT  GP 1    [id number]  PT THERAPEUTIC ACT EA 15 MIN 1/16/2023 Gisela, Grant FOSTER, PT GP 1          PT G-Codes  Outcome Measure Options: AM-PAC 6 Clicks Basic Mobility (PT)  AM-PAC 6 Clicks Score (PT): 10  AM-PAC 6 Clicks Score (OT): 15  PT Discharge Summary  Anticipated Discharge Disposition (PT): skilled nursing facility    Grant Higgins, PT  1/16/2023

## 2023-01-17 ENCOUNTER — APPOINTMENT (OUTPATIENT)
Dept: MRI IMAGING | Facility: HOSPITAL | Age: 88
End: 2023-01-17
Payer: MEDICARE

## 2023-01-17 PROCEDURE — G0378 HOSPITAL OBSERVATION PER HR: HCPCS

## 2023-01-17 PROCEDURE — 72141 MRI NECK SPINE W/O DYE: CPT

## 2023-01-17 PROCEDURE — 97530 THERAPEUTIC ACTIVITIES: CPT

## 2023-01-17 PROCEDURE — 99232 SBSQ HOSP IP/OBS MODERATE 35: CPT | Performed by: NURSE PRACTITIONER

## 2023-01-17 PROCEDURE — 97110 THERAPEUTIC EXERCISES: CPT

## 2023-01-17 RX ADMIN — PANTOPRAZOLE SODIUM 40 MG: 40 TABLET, DELAYED RELEASE ORAL at 06:11

## 2023-01-17 RX ADMIN — DIVALPROEX SODIUM 250 MG: 250 TABLET, DELAYED RELEASE ORAL at 20:32

## 2023-01-17 RX ADMIN — SENNOSIDES AND DOCUSATE SODIUM 2 TABLET: 50; 8.6 TABLET ORAL at 09:17

## 2023-01-17 RX ADMIN — ACETAMINOPHEN 650 MG: 325 TABLET ORAL at 11:44

## 2023-01-17 RX ADMIN — Medication 10 ML: at 09:15

## 2023-01-17 RX ADMIN — ASPIRIN 81 MG: 81 TABLET, COATED ORAL at 09:17

## 2023-01-17 RX ADMIN — DIVALPROEX SODIUM 250 MG: 250 TABLET, DELAYED RELEASE ORAL at 09:17

## 2023-01-17 RX ADMIN — LEVOTHYROXINE SODIUM 50 MCG: 50 TABLET ORAL at 06:11

## 2023-01-17 RX ADMIN — DONEPEZIL HYDROCHLORIDE 10 MG: 10 TABLET, FILM COATED ORAL at 20:32

## 2023-01-17 RX ADMIN — FLUTICASONE PROPIONATE 2 SPRAY: 50 SPRAY, METERED NASAL at 09:18

## 2023-01-17 RX ADMIN — FLUTICASONE PROPIONATE 2 SPRAY: 50 SPRAY, METERED NASAL at 20:32

## 2023-01-17 RX ADMIN — SENNOSIDES AND DOCUSATE SODIUM 2 TABLET: 50; 8.6 TABLET ORAL at 20:32

## 2023-01-17 RX ADMIN — Medication 10 ML: at 20:32

## 2023-01-17 RX ADMIN — ACETAMINOPHEN 650 MG: 325 TABLET ORAL at 20:32

## 2023-01-17 NOTE — DISCHARGE PLACEMENT REQUEST
"Raymond Zaragoza (87 y.o. Male)     Date of Birth   1935    Social Security Number       Address   [address] [facility] [address]    Home Phone   [phone]    MRN   [id number]       [facility]    Marital Status                               Admission Date   23    Admission Type   Emergency    Admitting Provider   Tamia Barahona MD    Attending Provider   Tamia Barahona MD    Department, Room/Bed   [facility] 3H, S387/1       Discharge Date       Discharge Disposition       Discharge Destination                               Attending Provider: Tamia Barahona MD    Allergies: Flomax [Tamsulosin], Lactose Intolerance (Gi)    Isolation: None   Infection: COVID (History) (23)   Code Status: No CPR    Ht: 177.8 cm (70\")   Wt: 79.6 kg (175 lb 8 oz)    Admission Cmt: None   Principal Problem: Altered mental status [R41.82]                 Active Insurance as of 2023     Primary Coverage     Payor Plan Insurance Group Employer/Plan Group    [facility] MEDICARE REPLACEMENT [facility] MEDICARE REPLACEMENT [id number]     Payor Plan Address Payor Plan Phone Number Payor Plan Fax Number Effective Dates    [address] [address]   2023 - None Entered    [facility] [id number]       Subscriber Name Subscriber Birth Date Member ID       RAYMOND ZARAGOZA 1935 [id number]                 Emergency Contacts      (Rel.) Home Phone Work Phone Mobile Phone    ISABELLA MEYER (Daughter) [phone] -- [phone]               History & Physical      Fernando Rivera MD at 23 [address] [place] [address] Medicine Services  HISTORY AND PHYSICAL    Patient Name: Raymond Zaragoza  : 1935  MRN: [id number]  Primary Care Physician: Tez Santos MD  Date of admission: 2023      Subjective    Subjective     Chief Complaint:  AMS    HPI:  Raymond Zaragoza is a 87 y.o. male with h/o dementia in a memory care unit with worsening " confusion.  History is per daughter who is a pharmacist.  She states that yesterday patient fell and hit his head on closet door and EMS was called and assessed and did not feel needed to come to hospital.  She states that when she went to the facility to check on him he was slumped over in the chair and confusion has been worse.  But it sounds like his confusion is fairly severe at baseline, he will know who his daughter is but maybe not much else.  At baseline he does very limited walking.  Daughter states that he has been fine otherwise and has not shown any other signs of being sick.  Specifically no cough, soa, dysuria, abdominal pain, diarrhea, or fevers.   Note that there is some blood on u/a but daughter denies any history of hematuria, he did require I/o cath while in ER for sample.       Review of Systems   Unable to assess d/t patient's AMS  All other systems reviewed and are negative.     Personal History     Past Medical History:   Diagnosis Date   • CAD (coronary artery disease)    • Hyperlipidemia    • Hypertension    • Hypothyroidism     on chronic replacement therapy   • Lactose intolerance    • Lower extremity edema    • PVC's (premature ventricular contractions)     History of   • Thyroid disorder    • Venous insufficiency              Past Surgical History:   Procedure Laterality Date   • CARDIAC CATHETERIZATION     • CORONARY STENT PLACEMENT     • EYE SURGERY      Bilateral cataract extraction   • HERNIA REPAIR      Inguinal hernia repair   • OTHER SURGICAL HISTORY      Melanoma removed from back   • OTHER SURGICAL HISTORY      History of left elbow fracture with sunsequent fixation       Family History:  family history includes No Known Problems in his father and mother. Otherwise pertinent FHx was reviewed and unremarkable.     Social History:  reports that he has never smoked. He has never used smokeless tobacco. He reports that he does not drink alcohol and does not use drugs.  Social  History     Social History Narrative    Caffeine decaf coffee and half reg       Medications:  Available home medication information reviewed.  Medications Prior to Admission   Medication Sig Dispense Refill Last Dose   • acetaminophen (TYLENOL) 325 MG tablet Take 2 tablets by mouth Every 4 (Four) Hours As Needed for Mild Pain .   Past Week   • aspirin 81 MG EC tablet Take 81 mg by mouth daily.   1/13/2023   • bumetanide (BUMEX) 0.5 MG tablet TAKE ONE TABLET BY MOUTH DAILY 90 tablet 0 1/13/2023   • divalproex (DEPAKOTE) 250 MG DR tablet Take 1 tablet by mouth Every 12 (Twelve) Hours.   1/13/2023   • donepezil (ARICEPT) 10 MG tablet Take 1 tablet by mouth Every Night.   1/12/2023   • lisinopril (PRINIVIL,ZESTRIL) 2.5 MG tablet TAKE 1 TABLET BY MOUTH ONCE DAILY 90 tablet 1 1/13/2023   • omeprazole (priLOSEC) 20 MG capsule Take 20 mg by mouth Daily.   1/13/2023   • diclofenac (VOLTAREN) 75 MG EC tablet Take 75 mg by mouth 2 (Two) Times a Day.   Unknown   • fluticasone (FLONASE) 50 MCG/ACT nasal spray 2 sprays into each nostril daily. Administer 2 sprays in each nostril for each dose.   Unknown   • nitroglycerin (NITROSTAT) 0.4 MG SL tablet Place 1 tablet under the tongue every 5 (five) minutes as needed for chest pain. Take no more than 3 doses in 15 minutes. 25 tablet 11 Unknown   • traMADol (ULTRAM) 50 MG tablet Take 50 mg by mouth 2 (Two) Times a Day As Needed for Moderate Pain .   Unknown       Allergies   Allergen Reactions   • Flomax [Tamsulosin] Other (See Comments)     Unknown     • Lactose Intolerance (Gi)        Objective    Objective     Vital Signs:   Temp:  [98 °F (36.7 °C)] 98 °F (36.7 °C)  Heart Rate:  [77-91] 82  Resp:  [16-18] 16  BP: (107-123)/(61-67) 107/61       Physical Exam   Constitutional: Awake, alert  Eyes: PERRLA, sclerae anicteric, no conjunctival injection  HENT: NCAT, mucous membranes moist  Neck: Supple, no thyromegaly, no lymphadenopathy, trachea midline  Respiratory: Clear to  auscultation bilaterally, nonlabored respirations   Cardiovascular: RRR, no murmurs, rubs, or gallops, palpable pedal pulses bilaterally  Gastrointestinal: Positive bowel sounds, soft, nontender, nondistended  Musculoskeletal: 2-3+pitting edema bilateral LE's  Psychiatric: Appropriate affect, cooperative  Neurologic: Oriented x 1, pleasantly confused , moves all extremities, Cranial Nerves grossly intact to confrontation, speech clear  Skin: No rashes      Result Review:  I have personally reviewed the results from the time of this admission to 1/13/2023 21:39 EST and agree with these findings:  [x]  Laboratory list / accordion  [x]  Microbiology  []  Radiology  []  EKG/Telemetry   []  Cardiology/Vascular   []  Pathology  []  Old records  []  Other:  Most notable findings include:       LAB RESULTS:      Lab 01/13/23  1640   WBC 11.13*   HEMOGLOBIN 14.5   HEMATOCRIT 41.9   PLATELETS 179   NEUTROS ABS 8.16*   IMMATURE GRANS (ABS) 0.05   LYMPHS ABS 1.43   MONOS ABS 1.44*   EOS ABS 0.03   MCV 94.4         Lab 01/13/23  1640   SODIUM 137   POTASSIUM 4.3   CHLORIDE 98   CO2 29.0   ANION GAP 10.0   BUN 13   CREATININE 0.94   EGFR 78.5   GLUCOSE 154*   CALCIUM 9.4   MAGNESIUM 1.7         Lab 01/13/23  1640   TOTAL PROTEIN 7.1   ALBUMIN 4.0   GLOBULIN 3.1   ALT (SGPT) 17   AST (SGOT) 40   BILIRUBIN 0.9   ALK PHOS 55         Lab 01/13/23  1640   TROPONIN T <0.010                 UA    Urinalysis 11/30/22 1/13/23 1/13/23     1858 1858   Squamous Epithelial Cells, UA   0-2   Specific Gravity, UA 1.011 1.024    Ketones, UA Negative 15 mg/dL (1+) (A)    Blood, UA Negative Moderate (2+) (A)    Leukocytes, UA Negative Negative    Nitrite, UA Negative Negative    RBC, UA   Too Numerous to Count (A)   WBC, UA   0-2   Bacteria, UA   None Seen   (A) Abnormal value              Microbiology Results (last 10 days)     ** No results found for the last 240 hours. **          CT Abdomen Pelvis Without Contrast    Result Date: 1/13/2023  CT  ABDOMEN PELVIS WO CONTRAST Date of Exam: 1/13/2023 3:59 PM EST Indication: fall, abd pain. Comparison: 2/12/2020 Technique: Axial CT images were obtained of the abdomen and pelvis without the administration of contrast. Reconstructed coronal and sagittal images were also obtained. Automated exposure control and iterative construction methods were used. Findings: There is chronic interstitial fibrosis in the lung bases. There is an hiatal hernia. There are no rib fractures identified. There are coronary artery calcifications. The liver and spleen have a normal appearance. The gallbladder is absent. The adrenal glands appear normal. There are bilateral renal cysts present. There are no solid renal masses. There is no evidence of hydronephrosis. There is minimal perinephric soft tissue stranding. There is a periumbilical hernia containing fat. There is sigmoid diverticulosis without diverticulitis. The prostate is markedly enlarged. The urinary bladder appears unremarkable. There are no pelvic masses or fluid collections. There is degenerative disc disease in the lower lumbar spine. There is a sclerotic bone lesion in the right iliac crest measuring 1.4 x 1.0 cm. There are no other bone lesions identified. This is present and unchanged from the patient's previous CT. No fractures are identified.     Impression: Impression: 1. No acute findings in the abdomen or pelvis. 2. Interstitial fibrosis of the lung bases with coronary artery calcifications. 3. Hiatal and periumbilical hernias. 4. Bilateral renal cysts. 5. Sigmoid diverticulosis without diverticulitis. 6. Markedly enlarged prostate Electronically Signed: Rubens Rao  1/13/2023 4:26 PM EST  Workstation ID: [id number]    XR Shoulder 2+ View Left    Result Date: 1/13/2023  XR HIP W OR WO PELVIS 2-3 VIEW LEFT, XR SHOULDER 2+ VW LEFT Date of Exam: 1/13/2023 3:54 PM EST Indication: fall, l hip pain.  Shoulder pain. Comparison: None available. Findings: Pelvis and hip:  Osteopenia. This limits evaluation for nonspecific fractures or focal osseous lesions. There is moderate arthritis of the hips. There is chondrocalcinosis of the hips. Sacroiliac joints are well aligned. There are no aggressive osseous lesions. Shoulder: Mild osteopenia. No acute fractures or dislocations. Limited evaluation glenohumeral  joint space. Large subacromial osteophyte. Mild chondrocalcinosis along the humeral head articular surface. Mild degenerative change of acromioclavicular joint. Visualized thorax is clear.     Impression: Impression: No fractures or dislocations of the shoulder or hip. Osteopenia. Degenerative changes as described. Electronically Signed: Arabella Rutherford  1/13/2023 4:09 PM EST  Workstation ID: [id number]    CT Head Without Contrast    Result Date: 1/13/2023  CT HEAD WO CONTRAST Date of Exam: 1/13/2023 3:59 PM EST Indication: fall, head injury. Comparison: None available. Technique: Axial CT images were obtained of the head without contrast administration.  Reconstructed coronal and sagittal images were also obtained. Automated exposure control and iterative construction methods were used. FINDINGS: Brain/Ventricles: There is diffuse atrophy with somewhat asymmetric predominance of the lateral ventricle dilation. Findings are more prominent than the comparison exam. There is no acute intracranial hemorrhage or suspicious extra axial fluid collection. Diffuse hypoattenuation of the white matter of the supratentorial brain represent small vessel ischemic disease or increased transependymal flow of CSF. No territorial areas of low-attenuation to suggest acute infarct are noted. Orbits: The visualized portion of the orbits demonstrate no acute abnormality. Sinuses: The visualized paranasal sinuses are clear. Chronic mild bilateral mastoid effusions noted Soft Tissues/Skull: No acute abnormality of the visualized skull or soft tissues.     Impression: 1. No acute hemorrhage. 2. Increased  prominence of the lateral ventricles with respect to overlying diffuse atrophy. This is slightly more prominent than seen on the comparison study. Consider the possibility of normal pressure hydrocephalus  Electronically Signed: Riccardo Bustos  1/13/2023 4:31 PM EST  Workstation ID: [id number]    CT Cervical Spine Without Contrast    Result Date: 1/13/2023  CT CERVICAL SPINE WO CONTRAST Date of Exam: 1/13/2023 3:59 PM EST Indication: fall, head injury. Comparison: None available. Technique: Axial CT images were obtained of the cervical spine without contrast administration.  Reconstructed coronal and sagittal images were also obtained. Automated exposure control and iterative construction methods were used. Findings: No evidence of fracture or subluxation. Moderate degenerative disc disease at C6-C7, mild degenerative disc disease in the remainder of the cervical spine. Degenerative change at the articulation of the odontoid and anterior arch of C1. No acute soft tissue abnormality     Impression: Impression: Negative for fracture Electronically Signed: Jose Roberto Toledones  1/13/2023 4:28 PM EST  Workstation ID: [id number]    XR Chest 1 View    Result Date: 1/13/2023  XR CHEST 1 VW Date of Exam: 1/13/2023 3:02 PM EST Indication: Weak/Dizzy/AMS triage protocol. Comparison: 11/30/2022 Findings: Cardiac size is stable. There is atelectasis or scarring in the lung bases. The lungs are otherwise clear and the vascular markings are normal.     Impression: Impression: Bibasilar atelectasis or scarring, unchanged from the patient's previous radiograph. The chest is otherwise clear. Electronically Signed: Rubens Rao  1/13/2023 3:23 PM EST  Workstation ID: [id number]    XR Hip With or Without Pelvis 2 - 3 View Left    Result Date: 1/13/2023  XR HIP W OR WO PELVIS 2-3 VIEW LEFT, XR SHOULDER 2+ VW LEFT Date of Exam: 1/13/2023 3:54 PM EST Indication: fall, l hip pain.  Shoulder pain. Comparison: None available. Findings: Pelvis and hip:  Osteopenia. This limits evaluation for nonspecific fractures or focal osseous lesions. There is moderate arthritis of the hips. There is chondrocalcinosis of the hips. Sacroiliac joints are well aligned. There are no aggressive osseous lesions. Shoulder: Mild osteopenia. No acute fractures or dislocations. Limited evaluation glenohumeral  joint space. Large subacromial osteophyte. Mild chondrocalcinosis along the humeral head articular surface. Mild degenerative change of acromioclavicular joint. Visualized thorax is clear.     Impression: Impression: No fractures or dislocations of the shoulder or hip. Osteopenia. Degenerative changes as described. Electronically Signed: Arabella Rutherford  1/13/2023 4:09 PM EST  Workstation ID: [id number]      Results for orders placed during the hospital encounter of 02/23/22    Adult Transthoracic Echo Complete W/ Cont if Necessary Per Protocol    Interpretation Summary  · Normal left ventricular systolic function, estimated EF 60%.  · Mild mitral regurgitation.  · Mild tricuspid regurgitation with normal RVSP.  · Mild pulmonic insufficiency.      Assessment & Plan   Assessment & Plan     Active Hospital Problems    Diagnosis  POA   • **Altered mental status [R41.82]  Yes       Mr. Ramin Zaragoza is an 88yo with h/o dementia lives in a memory care unit for worsening confusion    AMS on baseline dementia  --?mildly worse  --infectious w/u negative so far including u/a and cxr  --follow cultures  --CT head showed increased prominence of the lateral ventricles with respect the diffuse atrophy, consider possibility of NPH  --unable to really assess other symptoms of NPH as patient doesn't walk much at baseline and also wears diapers/is incontinent  --will consult neuro in AM  --continue home aricept  --check TSH and B12  --consider MRI/EEG  --has been in memory care unit but likely needs to move to Skilled care/d/w daughter.  Consult PT/OT/CM    Osteoarthritis  H/o multiple falls with recent  fall  --hold tramadol right now that takes for pain with increased confusion but not a new med (was started while here 2021)    Hematuria  --no problems before.  May be d/t traumatic cath in ER.  Repeat u/a in a few days    HTN  HL  CAD  --hold lisinopril and bumex for now for lowish BP's    BPH  --allergy to flomax, apparently caused severe hypotension    Hypothyroid  --continue levothyroxine.  Check TSH        DVT prophylaxis:  Hematuria on U/a so TEDs/SCDS for now      CODE STATUS:  DNR, no vent or CPR  Code Status and Medical Interventions:   Ordered at: 23     Medical Intervention Limits:    NO intubation (DNI)     Level Of Support Discussed With:    Health Care Surrogate     Code Status (Patient has no pulse and is not breathing):    No CPR (Do Not Attempt to Resuscitate)     Medical Interventions (Patient has pulse or is breathing):    Limited Support     Comments:    per patient's daughter and POA       Expected Discharge 2023       Fernando Rivera MD  23      Electronically signed by Fernando Rivera MD at 23          Physician Progress Notes (most recent note)      Kassidy Vanegas, DARA at 23 0955                [facility] Medicine Services  PROGRESS NOTE    Patient Name: Ramin Zaragoza  : 1935  MRN: [id number]    Date of Admission: 2023  Primary Care Physician: Tez Santos MD    Subjective   Subjective     CC:  S/p fall    HPI:  Patient seen resting up in bed awake and alert.  Confused at baseline.  Oriented to self only.  Remains hard of hearing.  Appears comfortable.  Denies any issues.  Awaiting placement.      ROS:  Gen- No fevers, chills  CV- No chest pain, palpitations  Resp- No cough, dyspnea  GI- No N/V/D, abd pain    Objective   Objective     Vital Signs:   Temp:  [97.3 °F (36.3 °C)-98.8 °F (37.1 °C)] 98.3 °F (36.8 °C)  Heart Rate:  [70-87] 87  Resp:  [17-18] 18  BP: (131-160)/(65-82) 137/71     Physical  Exam:  Constitutional: No acute distress, awake, alert.  Resting up in the bed.  Frail appearing elderly male.  HENT: NCAT, mucous membranes moist  Respiratory: Clear to auscultation bilaterally, respiratory effort normal on room air.  Sats 94%.  Cardiovascular: RRR, no murmurs, rubs, or gallops  Gastrointestinal: positive bowel sounds, soft, nontender, nondistended  Neck: Tenderness to left neck and shoulder region (stable).  No stiff neck.. Tender but able to move in all directions.  Musculoskeletal: No bilateral ankle edema.  MC spontaneously.  Psychiatric: Appropriate affect, cooperative and calm.  Neurologic: Awake and alert.  Oriented to person only.  Speech clear.  Short-term memory deficits.  Otherwise confused at baseline.  Very hard of hearing.  Follows simple commands.  Skin: No rashes      Results Reviewed:  LAB RESULTS:      Lab 01/16/23  0502 01/14/23  0626 01/13/23  1640   WBC 8.48 8.68 11.13*   HEMOGLOBIN 11.5* 12.7* 14.5   HEMATOCRIT 33.7* 37.3* 41.9   PLATELETS 171 161 179   NEUTROS ABS 4.87  --  8.16*   IMMATURE GRANS (ABS) 0.03  --  0.05   LYMPHS ABS 2.43  --  1.43   MONOS ABS 0.89  --  1.44*   EOS ABS 0.24  --  0.03   MCV 94.9 95.9 94.4         Lab 01/16/23  0502 01/14/23  0626 01/13/23  1640   SODIUM 137 137 137   POTASSIUM 3.6 3.4* 4.3   CHLORIDE 104 100 98   CO2 25.0 26.0 29.0   ANION GAP 8.0 11.0 10.0   BUN 10 11 13   CREATININE 0.66* 0.71* 0.94   EGFR 90.8 88.8 78.5   GLUCOSE 93 109* 154*   CALCIUM 8.6 8.7 9.4   MAGNESIUM 1.8  --  1.7   TSH  --  10.670*  --          Lab 01/13/23  1640   TOTAL PROTEIN 7.1   ALBUMIN 4.0   GLOBULIN 3.1   ALT (SGPT) 17   AST (SGOT) 40   BILIRUBIN 0.9   ALK PHOS 55         Lab 01/13/23  1640   TROPONIN T <0.010             Lab 01/14/23 0626   VITAMIN B 12 485         Brief Urine Lab Results  (Last result in the past 365 days)      Color   Clarity   Blood   Leuk Est   Nitrite   Protein   CREAT   Urine HCG        01/13/23 1858 Dark Yellow   Clear   Moderate  (2+)   Negative   Negative   30 mg/dL (1+)                 Microbiology Results Abnormal     None          MRI Cervical Spine Without Contrast    Result Date: 1/17/2023  MRI CERVICAL SPINE WO CONTRAST Date of Exam: 1/17/2023 8:00 AM EST Indication: Neck trauma, ligament injury suspected (Age >= 16y).  Comparison: CT 1/13/2023 Technique:  Routine multiplanar/multisequence sequence images of the cervical spine were obtained without contrast administration.  Findings: Marrow signal is preserved and there is no evidence of acute fracture. Alignment is anatomic without evidence of listhesis or subluxation. There is no specific focal ligamentous edema to suggest disruption. The cervical spinal cord demonstrates no focal areas of intrinsic signal abnormality. Advanced multilevel spondylosis is present, with areas of disc osteophyte complex formation and facet arthropathy resulting in severe spinal canal and neuroforaminal narrowing at the C3-4, C4-5 and C5-6 levels. Moderate to severe spinal canal and neuroforaminal narrowing is also present at C6-7. The paraspinal soft tissues demonstrate no acute findings.     Impression: Impression: Severe multilevel cervical spondylosis is present as above. There is otherwise no ligamentous edema or malalignment to suggest derangement and there is no focal cord signal abnormality. Electronically Signed: Jorge Luis Pitts  1/17/2023 11:19 AM EST  Workstation ID: [id number]      Results for orders placed during the hospital encounter of 02/23/22    Adult Transthoracic Echo Complete W/ Cont if Necessary Per Protocol    Interpretation Summary  · Normal left ventricular systolic function, estimated EF 60%.  · Mild mitral regurgitation.  · Mild tricuspid regurgitation with normal RVSP.  · Mild pulmonic insufficiency.      I have reviewed the medications:  Scheduled Meds:aspirin, 81 mg, Oral, Daily  divalproex, 250 mg, Oral, Q12H  donepezil, 10 mg, Oral, Nightly  fluticasone, 2 spray, Each Nare,  BID  levothyroxine, 50 mcg, Oral, Q AM  pantoprazole, 40 mg, Oral, Q AM  senna-docusate sodium, 2 tablet, Oral, BID  sodium chloride, 10 mL, Intravenous, Q12H      Continuous Infusions:   PRN Meds:.•  acetaminophen  •  senna-docusate sodium **AND** polyethylene glycol **AND** bisacodyl **AND** bisacodyl  •  hydrOXYzine  •  ondansetron **OR** ondansetron  •  sodium chloride  •  sodium chloride  •  sodium chloride    Assessment & Plan   Assessment & Plan     Active Hospital Problems    Diagnosis  POA   • **Altered mental status [R41.82]  Yes   • Dementia (HCC) [F03.90]  Yes   • Accident due to mechanical fall without injury [W19.XXXA]  Yes   • Hypothyroidism [E03.9]  Yes      Resolved Hospital Problems   No resolved problems to display.        Brief Hospital Course to date:  Ramin Zaragoza is a 87 y.o. male w dementia living on a memory care unit who presents for concerns for worsening confusion after a mechanical fall in which he hit his head.    This patient's problems and plans were partially entered by my partner and updated as appropriate by me 01/17/23.    Assessment/plan:    Acute encephalopathy in setting of severe dementia - resolved  -W/u to now unrevealing including CT head and u/a. No clear s/s of infection  -Could be waxing/waning or post-concussive after head trauma  -Partner prior D/w family who visited 1/14 and reports patient seems at baseline. Neurology saw - no NPH w/u necessary, wouldn't benefit from shunt evaluation  -Home aricept, depakote  - PT/OT consulted  -- Notes reflect likely not able to return to memory care unit and may be looking for skilled facility.  CM following.    Head trauma 2/2 mechanical fall w residual neck pain  -CT head, CT C-spine, Xrays left hip and left shoulder all unremarkable, no other identified injuries  -Persistent left shoulder/clavicle/neck pain despite these unremarkable imaging. Shoulder pain appears chronic, clavicle Xray today negative for fracture.   --Will obtain  MRI neck to r/o ligamentous C-spine injury  Impression:   Severe multilevel cervical spondylosis is present as above. There is otherwise no ligamentous edema or malalignment to suggest derangement and there is no focal cord signal abnormality.  --We will discuss with family.  Consider neurosurgery consult, however likely best to treat medically due to age and chronic dementia.  -- Appears more comfortable today.     Hypothyroidism - new diagnosis  -TSH very elevated on arrival, started low dose levothyroxine  -- Follow-up TSH 4-6 weeks, defer to PCP    Hematuria, likely 2/2 traumatic cath in ED   - can f/u OP w repeat in a couple of weeks.    --Defer to PCP.    Mild oropharyngeal dysphagia - speech following, OK for diet + thicks, thins in between meals.  -- flonase for nasal congestion w improvement    Osteoarthritis -prior restarted home tramadol as needed    HTN - holding home lisinopril and bumex (both low dose) given normotension  -- Remains normotensive off medications.  Monitor.    BPH - allergy to flomax (s/e of hypotension)      Expected Discharge Location and Transportation: [facility] vs SNF  Expected Discharge likely transfer to SNF 1/18 pending placement.  CM following.  Expected Discharge Date and Time     Expected Discharge Date Expected Discharge Time    Jan 18, 2023            DVT prophylaxis:  Mechanical DVT prophylaxis orders are present.     AM-PAC 6 Clicks Score (PT): 10 (01/15/23 0800)    CODE STATUS:   Code Status and Medical Interventions:   Ordered at: 01/13/23 2138     Medical Intervention Limits:    NO intubation (DNI)     Level Of Support Discussed With:    Health Care Surrogate     Code Status (Patient has no pulse and is not breathing):    No CPR (Do Not Attempt to Resuscitate)     Medical Interventions (Patient has pulse or is breathing):    Limited Support     Comments:    per patient's daughter and POA       Kassidy Vanegas, APRN  01/17/23                Electronically signed by  Kassidy Vanegas, APRN at 23 1208          Physical Therapy Notes (most recent note)      Grant Higigns, PT at 23 0947  Version 1 of 1         Patient Name: Ramin Zaragoza  : 1935    MRN: [id number]                              Today's Date: 2023       Admit Date: 2023    Visit Dx:     ICD-10-CM ICD-9-CM   1. Altered mental status, unspecified altered mental status type  R41.82 780.97   2. Injury of head, initial encounter  S09.90XA 959.01   3. Contusion of left shoulder, initial encounter  S40.012A 923.00   4. Contusion of left hip, initial encounter  S70.02XA 924.01   5. Hematuria, unspecified type  R31.9 599.70   6. Dysphagia, unspecified type  R13.10 787.20     Patient Active Problem List   Diagnosis   • Coronary artery disease involving native coronary artery of native heart without angina pectoris   • Premature ventricular contraction   • Essential hypertension   • Mixed hyperlipidemia   • Venous insufficiency   • Hypothyroidism   • Lactose intolerance   • Weakness   • Accident due to mechanical fall without injury   • Altered mental status   • Dementia (HCC)     Past Medical History:   Diagnosis Date   • CAD (coronary artery disease)    • Hyperlipidemia    • Hypertension    • Hypothyroidism     on chronic replacement therapy   • Lactose intolerance    • Lower extremity edema    • PVC's (premature ventricular contractions)     History of   • Thyroid disorder    • Venous insufficiency      Past Surgical History:   Procedure Laterality Date   • CARDIAC CATHETERIZATION     • CORONARY STENT PLACEMENT     • EYE SURGERY      Bilateral cataract extraction   • HERNIA REPAIR      Inguinal hernia repair   • OTHER SURGICAL HISTORY      Melanoma removed from back   • OTHER SURGICAL HISTORY      History of left elbow fracture with sunsequent fixation      General Information     Row Name 23 1109          Physical Therapy Time and Intention    Document Type therapy note (daily  note)  -SC     Mode of Treatment physical therapy  -SC     Row Name 01/16/23 1109          General Information    Existing Precautions/Restrictions fall  L shoulder pain, neck pain  -SC     Row Name 01/16/23 1109          Cognition    Orientation Status (Cognition) oriented to;person  -SC     Row Name 01/16/23 1109          Safety Issues, Functional Mobility    Impairments Affecting Function (Mobility) balance;cognition;coordination;motor planning;endurance/activity tolerance;strength;range of motion (ROM)  -SC     Cognitive Impairments, Mobility Safety/Performance problem-solving/reasoning;judgment;insight into deficits/self-awareness  -SC     Comment, Safety Issues/Impairments (Mobility) alert, [place], following commands  -SC           User Key  (r) = Recorded By, (t) = Taken By, (c) = Cosigned By    Initials Name Provider Type    SC Grant Higgins PT Physical Therapist               Mobility     Row Name 01/16/23 1110          Bed Mobility    Bed Mobility sit-supine;scooting/bridging  -SC     Scooting/Bridging [place] (Bed Mobility) 1 person assist;dependent (less than 25% patient effort)  -SC     Supine-Sit [place] (Bed Mobility) moderate assist (50% patient effort);verbal cues;nonverbal cues (demo/gesture);1 person assist  -SC     Assistive Device (Bed Mobility) head of bed elevated;draw sheet;bed rails  -SC     Comment, (Bed Mobility) working on getting up to edge of bed with verbal cues for using bed rail . Unable to move legs over edge . Posterior lean on sitting requiring inhibition by PT. Time taken to work on sitting balance and with cues for shifting forward.  Patient unable to scoot  -[facility] Name 01/16/23 1110          Transfers    Comment, (Transfers) STS from EOB wit cues for sequencing and for rocking . Time taken to worrk on standing balance with tactile cues and facilitation to maintain midline.  -SC     Row Name 01/16/23 1110          Bed-Chair Transfer    Bed-Chair [place]  (Transfers) 2 person assist;moderate assist (50% patient effort)  -SC     Row Name 01/16/23 1110          Sit-Stand Transfer    Sit-Stand [place] (Transfers) moderate assist (50% patient effort);2 person assist;verbal cues;nonverbal cues (demo/gesture)  -SC     Assistive Device (Sit-Stand Transfers) walker, front-wheeled  -SC     Row Name 01/16/23 1110          Gait/Stairs (Locomotion)    [place] Level (Gait) moderate assist (50% patient effort);2 person assist  -SC     Assistive Device (Gait) walker, front-wheeled  -SC     Distance in Feet (Gait) 3  -SC     Deviations/Abnormal Patterns (Gait) festinating/shuffling;weight shifting decreased;stride length decreased  -SC     Bilateral Gait Deviations forward flexed posture  -SC     Comment, (Gait/Stairs) attempted walking with tactile and verbala cues to take large steps. Patient festinating alot and unable to progerss with gait  -SC           User Key  (r) = Recorded By, (t) = Taken By, (c) = Cosigned By    Initials Name Provider Type    SC Grant Higgins, PT Physical Therapist               Obj/Interventions     [facility] Name 01/16/23 1115          Motor Skills    Therapeutic Exercise knee  -[facility] Name 01/16/23 1115          Knee (Therapeutic Exercise)    Knee (Therapeutic Exercise) AAROM (active assistive range of motion)  -SC     Knee AAROM (Therapeutic Exercise) bilateral;flexion;extension;sitting;10 repetitions;2 sets  -[facility] Name 01/16/23 1115          Balance    Static Sitting Balance minimal assist  -SC     Dynamic Sitting Balance moderate assist  -SC     Static Standing Balance moderate assist;2-person assist  -SC     Dynamic Standing Balance maximum assist;2-person assist  -SC     Position/Device Used, Standing Balance supported;walker, rolling  -SC     Comment, Balance posterior lean, festinating with gait  -SC           User Key  (r) = Recorded By, (t) = Taken By, (c) = Cosigned By    Initials Name Provider Type    SC Grant Higgins, PT  Physical Therapist               Goals/Plan    No documentation.                Clinical Impression     Row Name 01/16/23 1116          Pain    Pretreatment Pain Rating --  -SC     Posttreatment Pain Rating --  -SC     Row Name 01/16/23 1116          Pain Scale: FACES Pre/Post-Treatment    Pain: FACES Scale, Pretreatment 2-->hurts little bit  -SC     Posttreatment Pain Rating 4-->hurts little more  -SC     Pain Location - neck  -SC     Row Name 01/16/23 1116          Plan of Care Review    Plan of Care Reviewed With patient  -SC     Progress improving  -SC     Outcome Evaluation Patient having much difficulty with all mobility today requiring moderate to max assist to get out of bed. His ambulation was limited by sever festination.  -SC     Row Name 01/16/23 1116          Therapy Assessment/Plan (PT)    Rehab Potential (PT) good, to achieve stated therapy goals  -SC     Criteria for Skilled Interventions Met (PT) yes;meets criteria;skilled treatment is necessary  -SC     Therapy Frequency (PT) daily  -SC     Row Name 01/16/23 1116          Positioning and Restraints    Pre-Treatment Position in bed  -SC     Post Treatment Position chair  -SC     In Chair notified nsg;call light within reach;sitting;reclined;encouraged to call for assist  -SC           User Key  (r) = Recorded By, (t) = Taken By, (c) = Cosigned By    Initials Name Provider Type    Grant Durán PT Physical Therapist               Outcome Measures     Row Name 01/16/23 1119          Functional Assessment    Outcome Measure Options AM-PAC 6 Clicks Basic Mobility (PT)  -SC           User Key  (r) = Recorded By, (t) = Taken By, (c) = Cosigned By    Initials Name Provider Type    Grant Durán PT Physical Therapist                             Physical Therapy Education     Title: PT OT SLP Therapies (In Progress)     Topic: Physical Therapy (Done)     Point: Mobility training (Done)     Learning Progress Summary           Patient DOUGLAS Tiwari,  VU,NR by SC at 1/16/2023 1119    Comment: reviewed benefits of getting oob    Acceptance, E, VU by  at 1/14/2023 1445                   Point: Home exercise program (Done)     Learning Progress Summary           Patient Eager, E, VU,NR by SC at 1/16/2023 1119    Comment: reviewed benefits of getting oob                   Point: Body mechanics (Done)     Learning Progress Summary           Patient Eager, E, VU,NR by SC at 1/16/2023 1119    Comment: reviewed benefits of getting oob    Acceptance, E, VU by  at 1/14/2023 1445                   Point: Precautions (Done)     Learning Progress Summary           Patient Eager, E, VU,NR by SC at 1/16/2023 1119    Comment: reviewed benefits of getting oob    Acceptance, E, VU by  at 1/14/2023 1445                               User Key     Initials Effective Dates Name Provider Type [facility] 06/16/21 -  Grant Higgins, PT Physical Therapist PT     07/14/21 -  Bernadette Moss PT Physical Therapist PT              PT Recommendation and Plan     Plan of Care Reviewed With: patient  Progress: improving  Outcome Evaluation: Patient having much difficulty with all mobility today requiring moderate to max assist to get out of bed. His ambulation was limited by sever festination.     Time Calculation:    PT Charges     Row Name 01/16/23 0947             Time Calculation    Start Time 0947  -SC      PT Received On 01/16/23  -SC      PT Goal Re-Cert Due Date 01/24/23  -SC         Time Calculation- PT    Total Timed Code Minutes- PT 24 minute(s)  -SC         Timed Charges    73490 - PT Therapeutic Exercise Minutes 5  -SC      99664 - Gait Training Minutes  4  -SC      45931 - PT Therapeutic Activity Minutes 15  -SC         Total Minutes    Timed Charges Total Minutes 24  -SC       Total Minutes 24  -SC            User Key  (r) = Recorded By, (t) = Taken By, (c) = Cosigned By    Initials Name Provider Type    SC Grant Higgins, PT Physical Therapist               Therapy Charges for Today     Code Description Service Date Service Provider Modifiers Qty    [id number]  PT THER PROC EA 15 MIN 2023 Grant Higgins, PT GP 1    [id number] HC PT THERAPEUTIC ACT EA 15 MIN 2023 Grant Higgins PT GP 1          PT G-Codes  Outcome Measure Options: AM-PAC 6 Clicks Basic Mobility (PT)  AM-PAC 6 Clicks Score (PT): 10  AM-PAC 6 Clicks Score (OT): 15  PT Discharge Summary  Anticipated Discharge Disposition (PT): skilled nursing facility    Grant Higgins PT  2023      Electronically signed by Grant Higgins PT at 23 1121          Occupational Therapy Notes (most recent note)      Ayush Carney, OT at 23 1430          Patient Name: Ramin Zaragoza  : 1935    MRN: [id number]                              Today's Date: 2023       Admit Date: 2023    Visit Dx:     ICD-10-CM ICD-9-CM   1. Altered mental status, unspecified altered mental status type  R41.82 780.97   2. Injury of head, initial encounter  S09.90XA 959.01   3. Contusion of left shoulder, initial encounter  S40.012A 923.00   4. Contusion of left hip, initial encounter  S70.02XA 924.01   5. Hematuria, unspecified type  R31.9 599.70   6. Dysphagia, unspecified type  R13.10 787.20     Patient Active Problem List   Diagnosis   • Coronary artery disease involving native coronary artery of native heart without angina pectoris   • Premature ventricular contraction   • Essential hypertension   • Mixed hyperlipidemia   • Venous insufficiency   • Hypothyroidism   • Lactose intolerance   • Weakness   • Accident due to mechanical fall without injury   • Altered mental status   • Dementia (HCC)     Past Medical History:   Diagnosis Date   • CAD (coronary artery disease)    • Hyperlipidemia    • Hypertension    • Hypothyroidism     on chronic replacement therapy   • Lactose intolerance    • Lower extremity edema    • PVC's (premature ventricular contractions)     History of   • Thyroid  disorder    • Venous insufficiency      Past Surgical History:   Procedure Laterality Date   • CARDIAC CATHETERIZATION     • CORONARY STENT PLACEMENT     • EYE SURGERY      Bilateral cataract extraction   • HERNIA REPAIR      Inguinal hernia repair   • OTHER SURGICAL HISTORY      Melanoma removed from back   • OTHER SURGICAL HISTORY      History of left elbow fracture with sunsequent fixation      General Information     Row Name 01/14/23 1518          OT Time and Intention    Document Type evaluation  -CS     Mode of Treatment occupational therapy  -CS     Row Name 01/14/23 1518          General Information    Patient Profile Reviewed yes  -CS     Prior Level of Function --   Pt limited historian 2/2 confusion and dementia, specific levels of assist at [facility] require further inquiry. No family present.  -CS     Existing Precautions/Restrictions fall;other (see comments)  baseline dementia, LUQ/neck pain, [place]  -CS     Barriers to Rehab medically complex;previous functional deficit;cognitive status  -CS     Row Name 01/14/23 1518          Living Environment    People in Home facility resident  -CS     Row Name 01/14/23 1518          Home Main Entrance    Number of Stairs, Main Entrance none  -CS     Row Name 01/14/23 1518          Stairs Within Home, Primary    Number of Stairs, Within Home, Primary none  -CS     Row Name 01/14/23 1518          Cognition    Orientation Status (Cognition) oriented to;person  -CS     Row Name 01/14/23 1518          Safety Issues, Functional Mobility    Safety Issues Affecting Function (Mobility) insight into deficits/self-awareness;safety precaution awareness;awareness of need for assistance;safety precautions follow-through/compliance;sequencing abilities;problem-solving;judgment  -CS     Impairments Affecting Function (Mobility) balance;cognition;coordination;motor planning;endurance/activity tolerance;strength;range of motion (ROM)  -CS     Cognitive Impairments,  Mobility Safety/Performance safety precaution awareness;safety precaution follow-through;awareness, need for assistance;problem-solving/reasoning;insight into deficits/self-awareness;judgment;impulsivity  -CS           User Key  (r) = Recorded By, (t) = Taken By, (c) = Cosigned By    Initials Name Provider Type    CS Ayush Carney OT Occupational Therapist                 Mobility/ADL's     Row Name 01/14/23 1524          Bed Mobility    Bed Mobility supine-sit;scooting/bridging  -CS     Scooting/Bridging [place] (Bed Mobility) moderate assist (50% patient effort);2 person assist;verbal cues;nonverbal cues (demo/gesture)  -CS     Supine-Sit [place] (Bed Mobility) moderate assist (50% patient effort);verbal cues;nonverbal cues (demo/gesture)  -CS     Bed Mobility, Safety Issues decreased use of arms for pushing/pulling;decreased use of legs for bridging/pushing;impaired trunk control for bed mobility;cognitive deficits limit understanding  -CS     Assistive Device (Bed Mobility) bed rails;draw sheet;head of bed elevated  -CS     Comment, (Bed Mobility) verbal/tactile cues for sequencing BLEs to EOB, increased assist at trunk to achieve sitting, no dizziness reported, mild posterior lean  -CS     Row Name 01/14/23 1524          Transfers    Transfers sit-stand transfer  -CS     Comment, (Transfers) forward flexed posture upon standing, improved w/ tactile cues, poor safety awareness  -     Row Name 01/14/23 1524          Sit-Stand Transfer    Sit-Stand [place] (Transfers) moderate assist (50% patient effort);2 person assist;verbal cues;nonverbal cues (demo/gesture)  -     Assistive Device (Sit-Stand Transfers) walker, front-wheeled  -     Row Name 01/14/23 1524          Functional Mobility    Functional Mobility- Comment defer to PT for specifics, limited at baseline, shuffled/small steps observed  -     Row Name 01/14/23 1524          Activities of Daily Living    BADL  Assessment/Intervention lower body dressing;grooming;feeding  -CS     Row Name 01/14/23 1524          Grooming Assessment/Training    [place] Level (Grooming) wash face, hands;minimum assist (75% patient effort)  -CS     Position (Grooming) supported sitting  -CS     Comment, (Grooming) assist for quality/completion  -CS     Row Name 01/14/23 1524          Self-Feeding Assessment/Training    [place] Level (Feeding) prepare tray/open items;moderate assist (50% patient effort);liquids to mouth;scoop food and bring to mouth;set up  -CS     Position (Self-Feeding) supported sitting  -CS     Comment, (Feeding) mild tremor observed, demo'd functional use of fork to eat w/out spillage  -           User Key  (r) = Recorded By, (t) = Taken By, (c) = Cosigned By    Initials Name Provider Type     Ayush Carney, OT Occupational Therapist               Obj/Interventions     Row Name 01/14/23 1529          Sensory Assessment (Somatosensory)    Sensory Assessment (Somatosensory) UE sensation intact  -[facility] Name 01/14/23 1529          Vision Assessment/Intervention    Visual Impairment/Limitations unable/difficult to assess  -     Row Name 01/14/23 1529          Range of Motion Comprehensive    General Range of Motion upper extremity range of motion deficits identified  -CS     Comment, General Range of Motion L shoulder limited by pain, demo'd BUE PROM WFL,  -     Row Name 01/14/23 1529          Strength Comprehensive (MMT)    General Manual Muscle Testing (MMT) Assessment upper extremity strength deficits identified  -CS     Comment, General Manual Muscle Testing (MMT) Assessment BUE grossly 4-/5  -     Row Name 01/14/23 1529          Motor Skills    Motor Skills coordination;functional endurance;neuro-muscular function  -     Coordination bimanual skills;gross motor deficit;fine motor deficit;minimal impairment  -     Functional Endurance O2 sats stable on RA  -CS     Neuromuscular Function  bilateral;lower extremity;tremor, resting;tremor, intention;minimal impairment  -CS     Row Name 01/14/23 1529          Balance    Balance Assessment sitting static balance;standing static balance;sitting dynamic balance;standing dynamic balance  -CS     Static Sitting Balance standby assist  -CS     Dynamic Sitting Balance contact guard  -CS     Position, Sitting Balance unsupported;sitting edge of bed  -CS     Static Standing Balance minimal assist;2-person assist  -CS     Dynamic Standing Balance 2-person assist;moderate assist  -CS     Position/Device Used, Standing Balance walker, front-wheeled;supported  -CS     Balance Interventions standing;sit to stand;occupation based/functional task;sitting  -CS           User Key  (r) = Recorded By, (t) = Taken By, (c) = Cosigned By    Initials Name Provider Type    Ayush Stroud, OT Occupational Therapist               Goals/Plan     Row Name 01/14/23 1537          Bed Mobility Goal 1 (OT)    Activity/Assistive Device (Bed Mobility Goal 1, OT) supine to sit;sit to supine;scooting  -CS     [place] Level/Cues Needed (Bed Mobility Goal 1, OT) contact guard required  -CS     Time Frame (Bed Mobility Goal 1, OT) long term goal (LTG);10 days  -CS     Progress/Outcomes (Bed Mobility Goal 1, OT) new goal  -CS     Row Name 01/14/23 1537          Bathing Goal 1 (OT)    Activity/Device (Bathing Goal 1, OT) upper body bathing  -CS     [place] Level/Cues Needed (Bathing Goal 1, OT) minimum assist (75% or more patient effort)  -CS     Time Frame (Bathing Goal 1, OT) 10 days  -CS     Progress/Outcomes (Bathing Goal 1, OT) new goal  -CS     Row Name 01/14/23 1537          Dressing Goal 1 (OT)    Activity/Device (Dressing Goal 1, OT) upper body dressing  -CS     [place]/Cues Needed (Dressing Goal 1, OT) minimum assist (75% or more patient effort)  -CS     Time Frame (Dressing Goal 1, OT) long term goal (LTG);10 days  -CS     Strategies/Barriers (Dressing Goal 1, OT)  marino tech prn, LUE pain  -CS     Progress/Outcome (Dressing Goal 1, OT) goal ongoing  -CS     Row Name 01/14/23 1537          Strength Goal 1 (OT)    Strength Goal 1 (OT) Increase gross BUE strength 1/2 muscle grade  -CS     Time Frame (Strength Goal 1, OT) long term goal (LTG);10 days  -CS     Progress/Outcome (Strength Goal 1, OT) new goal  -CS     Row Name 01/14/23 1537          Therapy Assessment/Plan (OT)    Planned Therapy Interventions (OT) functional balance retraining;occupation/activity based interventions;ROM/therapeutic exercise;transfer/mobility retraining;patient/caregiver education/training;neuromuscular control/coordination retraining;strengthening exercise;adaptive equipment training  -CS           User Key  (r) = Recorded By, (t) = Taken By, (c) = Cosigned By    Initials Name Provider Type    CS Ayush Carney, OT Occupational Therapist               Clinical Impression     Row Name 01/14/23 1532          Pain Assessment    Pretreatment Pain Rating 0/10 - no pain  -CS     Posttreatment Pain Rating 0/10 - no pain  -CS     Pre/Posttreatment Pain Comment tolerated  -CS     Pain Intervention(s) Repositioned;Ambulation/increased activity  -CS     Row Name 01/14/23 1532          Plan of Care Review    Plan of Care Reviewed With patient  -CS     Progress no change  OT eval  -CS     Outcome Evaluation OT eval completed. Pt presents w/ L shoulder/neck pain alongside baseline confusion, impaired balance and motor planning, and generalized weakness warranting skilled IP OT services. Baseline assist levels at facility require further inquiry. Pt tolerated bed mobility and STS w/ ModA x2, Dep for LB ADLs, and MCLEAN for feeding. Rec d/c to SNF.  -CS     Row Name 01/14/23 1532          Therapy Assessment/Plan (OT)    Rehab Potential (OT) fair, will monitor progress closely  -CS     Criteria for Skilled Therapeutic Interventions Met (OT) yes;meets criteria;skilled treatment is necessary  -CS     Therapy Frequency  (OT) daily  -CS     Row Name 01/14/23 1532          Therapy Plan Review/Discharge Plan (OT)    Anticipated Discharge Disposition (OT) skilled nursing facility  -CS     Row Name 01/14/23 1532          Vital Signs    Pre Systolic BP Rehab --  RN cleared for tx  -CS     Post Systolic BP Rehab 125  -CS     Post Treatment Diastolic BP 79  -CS     Posttreatment Heart Rate (beats/min) 79  -CS     O2 Delivery Pre Treatment room air  -CS     O2 Delivery Intra Treatment room air  -CS     Post SpO2 (%) 94  -CS     O2 Delivery Post Treatment room air  -CS     Pre Patient Position Supine  -CS     Intra Patient Position Standing  -CS     Post Patient Position Sitting  -CS     Row Name 01/14/23 1532          Positioning and Restraints    Pre-Treatment Position in bed  -CS     Post Treatment Position bed  -CS     In Bed sitting EOB;with PT  -CS           User Key  (r) = Recorded By, (t) = Taken By, (c) = Cosigned By    Initials Name Provider Type    Ayush Stroud OT Occupational Therapist               Outcome Measures     Row Name 01/14/23 1538          How much help from another is currently needed...    Putting on and taking off regular lower body clothing? 2  -CS     Bathing (including washing, rinsing, and drying) 2  -CS     Toileting (which includes using toilet bed pan or urinal) 2  -CS     Putting on and taking off regular upper body clothing 3  -CS     Taking care of personal grooming (such as brushing teeth) 3  -CS     Eating meals 3  -CS     AM-PAC 6 Clicks Score (OT) 15  -CS     Row Name 01/14/23 1538          Functional Assessment    Outcome Measure Options AM-PAC 6 Clicks Daily Activity (OT)  -CS           User Key  (r) = Recorded By, (t) = Taken By, (c) = Cosigned By    Initials Name Provider Type    Ayush Stroud OT Occupational Therapist                Occupational Therapy Education     Title: PT OT SLP Therapies (Not Started)     Topic: Occupational Therapy (Not Started)     Point: ADL training (Not  Started)     Description:   Instruct learner(s) on proper safety adaptation and remediation techniques during self care or transfers.   Instruct in proper use of assistive devices.              Learner Progress:  Not documented in this visit.          Point: Home exercise program (Not Started)     Description:   Instruct learner(s) on appropriate technique for monitoring, assisting and/or progressing therapeutic exercises/activities.              Learner Progress:  Not documented in this visit.          Point: Precautions (Not Started)     Description:   Instruct learner(s) on prescribed precautions during self-care and functional transfers.              Learner Progress:  Not documented in this visit.          Point: Body mechanics (Not Started)     Description:   Instruct learner(s) on proper positioning and spine alignment during self-care, functional mobility activities and/or exercises.              Learner Progress:  Not documented in this visit.                          OT Recommendation and Plan  Planned Therapy Interventions (OT): functional balance retraining, occupation/activity based interventions, ROM/therapeutic exercise, transfer/mobility retraining, patient/caregiver education/training, neuromuscular control/coordination retraining, strengthening exercise, adaptive equipment training  Therapy Frequency (OT): daily  Plan of Care Review  Plan of Care Reviewed With: patient  Progress: no change (OT eval)  Outcome Evaluation: OT eval completed. Pt presents w/ L shoulder/neck pain alongside baseline confusion, impaired balance and motor planning, and generalized weakness warranting skilled IP OT services. Baseline assist levels at facility require further inquiry. Pt tolerated bed mobility and STS w/ ModA x2, Dep for LB ADLs, and MCLEAN for feeding. Rec d/c to SNF.     Time Calculation:    Time Calculation- OT     Row Name 01/14/23 1539             Time Calculation- OT    OT Start Time 1430  -      OT  Received On 23  -CS      OT Goal Re-Cert Due Date 23  -CS         Timed Charges    95839 - OT Therapeutic Activity Minutes 4  -CS      19421 - OT Self Care/Mgmt Minutes 4  -CS         Untimed Charges    OT Eval/Re-eval Minutes 33  -CS         Total Minutes    Timed Charges Total Minutes 8  -CS      Untimed Charges Total Minutes 33  -CS       Total Minutes 41  -CS            User Key  (r) = Recorded By, (t) = Taken By, (c) = Cosigned By    Initials Name Provider Type    CS Ayush Carney OT Occupational Therapist              Therapy Charges for Today     Code Description Service Date Service Provider Modifiers Qty    78598015795 HC OT THERAPEUTIC ACT EA 15 MIN 2023 Ayush Carney OT GO 1    57324435282 HC OT EVAL MOD COMPLEXITY 3 2023 Ayush Carney OT GO 1               Ayush Carney OT  2023    Electronically signed by Ayush Carney OT at 23 1541          Speech Language Pathology Notes (most recent note)      Tamia Be MS CCC-SLP at 23 1012          Acute Care - Speech Language Pathology   Swallow Re-Evaluation [facility]   Clinical Swallow Evaluation     Patient Name: Ramin Zaragoza  : 1935  MRN: [id number]  Today's Date: 2023               Admit Date: 2023    Visit Dx:     ICD-10-CM ICD-9-CM   1. Altered mental status, unspecified altered mental status type  R41.82 780.97   2. Injury of head, initial encounter  S09.90XA 959.01   3. Contusion of left shoulder, initial encounter  S40.012A 923.00   4. Contusion of left hip, initial encounter  S70.02XA 924.01   5. Hematuria, unspecified type  R31.9 599.70   6. Dysphagia, unspecified type  R13.10 787.20     Patient Active Problem List   Diagnosis   • Coronary artery disease involving native coronary artery of native heart without angina pectoris   • Premature ventricular contraction   • Essential hypertension   • Mixed hyperlipidemia   • Venous insufficiency   • Hypothyroidism   • Lactose  intolerance   • Weakness   • Accident due to mechanical fall without injury   • Altered mental status   • Dementia (HCC)     Past Medical History:   Diagnosis Date   • CAD (coronary artery disease)    • Hyperlipidemia    • Hypertension    • Hypothyroidism     on chronic replacement therapy   • Lactose intolerance    • Lower extremity edema    • PVC's (premature ventricular contractions)     History of   • Thyroid disorder    • Venous insufficiency      Past Surgical History:   Procedure Laterality Date   • CARDIAC CATHETERIZATION     • CORONARY STENT PLACEMENT     • EYE SURGERY      Bilateral cataract extraction   • HERNIA REPAIR      Inguinal hernia repair   • OTHER SURGICAL HISTORY      Melanoma removed from back   • OTHER SURGICAL HISTORY      History of left elbow fracture with sunsequent fixation       SLP Recommendation and Plan  SLP Swallowing Diagnosis: mild, oral dysphagia, suspected pharyngeal dysphagia (01/16/23 0815)  SLP Diet Recommendation: regular textures, thin liquids, other (see comments) (RN to downgrade to nectar-thick liquids should it be warranted) (01/16/23 0815)  Recommended Precautions and Strategies: upright posture during/after eating, general aspiration precautions, assist with feeding (01/16/23 0815)  SLP Rec. for Method of Medication Administration: meds whole, with puree (01/16/23 0815)     Monitor for Signs of Aspiration: notify SLP if any concerns (01/16/23 0815)  Recommended Diagnostics: other (see comments) (diet tolerance) (01/16/23 0815)  Swallow Criteria for Skilled Therapeutic Interventions Met: demonstrates skilled criteria (01/16/23 0815)     Rehab Potential/Prognosis, Swallowing: re-evaluate goals as necessary (01/16/23 0815)  Therapy Frequency (Swallow): PRN (01/16/23 0815)  Predicted Duration Therapy Intervention (Days): until discharge (01/16/23 0815)        Plan of Care Reviewed With: patient      SWALLOW EVALUATION (last 72 hours)     SLP Adult Swallow Evaluation      Row Name 01/16/23 0815 01/15/23 0900 01/14/23 0940             Rehab Evaluation    Document Type re-evaluation  -EN re-evaluation  -SM evaluation  -SM      Subjective Information no complaints  -EN no complaints  -SM no complaints  -SM      Patient Observations alert;cooperative  -EN alert;cooperative  -SM alert;cooperative  -SM      Patient/Family/Caregiver Comments/Observations no family present  -EN -- --      Patient Effort good  -EN -- --      Symptoms Noted During/After Treatment none  -EN -- --         General Information    Patient Profile Reviewed yes  -EN -- yes  -SM      Pertinent History Of Current Problem See previous eval.  -EN -- Dementia, adm from memory care unit with increased confusion. Recent fall and hit head. CT negative.  -SM      Current Method of Nutrition regular textures;nectar/syrup-thick liquids;water between meals  -EN regular textures;nectar/syrup-thick liquids;water between meals  -SM regular textures;thin liquids  -SM      Prior Level of Function-Communication cognitive-linguistic impairment  -EN -- cognitive-linguistic impairment  -SM      Prior Level of Function-Swallowing safe, efficient swallowing in all situations  -EN -- safe, efficient swallowing in all situations  -      Plans/Goals Discussed with patient  -EN patient  -SM patient and family;agreed upon  -      Barriers to Rehab cognitive status  -EN cognitive status  -SM cognitive status  -      Patient's Goals for Discharge no concerns voiced  -EN no concerns voiced  - no concerns voiced  -      Family Goals for Discharge -- -- patient able to eat/drink without coughing/choking  though avoid instrumental swallow study for now  -         Pain    Additional Documentation -- -- Pain Scale: FACES Pre/Post-Treatment (Group)  -         Pain Scale: FACES Pre/Post-Treatment    Pain: FACES Scale, Pretreatment 0-->no hurt  -EN 2-->hurts little bit  -SM 4-->hurts little more  -SM      Posttreatment Pain Rating 0-->no  hurt  -EN 2-->hurts little bit  -SM 4-->hurts little more  -SM      Pain Location - Side/Orientation -- -- Bilateral  -SM      Pain Location -- -- posterior  -SM      Pain Location - -- -- neck  -SM      Pre/Posttreatment Pain Comment -- -- RN aware and managing  -SM         Clinical Swallow Eval    Oral Prep Phase impaired  -EN impaired  -SM impaired  -SM      Pharyngeal Phase -- suspected pharyngeal impairment  -SM suspected pharyngeal impairment  -SM         Oral Prep Concerns    Oral Prep Concerns increased prep time  -EN increased prep time  -SM increased prep time;other (see comments)  -SM      Increased Prep Time regular consistencies  -EN -- regular consistencies  -SM      Oral Prep Concerns, Comment -- -- though adequate  -SM         Pharyngeal Phase Concerns    Pharyngeal Phase Concerns cough  -EN cough  -SM cough  -SM      Cough thin  -EN thin  -SM thin;other (see comments)  -SM      Pharyngeal Phase Concerns, Comment Pt w/ only one episode of coughing this AM following thin liquid wash after trial of scrambled eggs. SLP provided a few more of this same pattern of trials and pt w/ no s/s of aspiration in subsequent trials. No other s/s of aspiration across eval. Will upgrade pt to thin liquids; notified RN and advised to downgrade to nectar-thick liquids should increased amount of signs of aspiration be evident during subsequent meal times.  -EN Working with pt on remaining breakfast tray. Tolerating regular diet and nectar-thick liquids, showing no obvious aversion. Began trialing thin liquids with breakfast tray. No s/s with first few bites and sips (even when large/consecutive). Then bite of eggs and appeared to clear most/all. Subsequent sip of water resulted in immediate wet coughing. Highly suspect aspiration. Remains at increased risk aspiration of thin liquids during meals.  -SM intermittent and delayed. Called and discussed results with pt's dtr. She conveys no hx dysphagia though some general  chronic throat clearing which she has attributed to allergies. She does not wish to push for instrumental assessment at this time. Prefers diet modification and re-assess at the bedside to determine if can advance back to thin liquids.  -SM         SLP Evaluation Clinical Impression    SLP Swallowing Diagnosis mild;oral dysphagia;suspected pharyngeal dysphagia  -EN mild;oral dysphagia;suspected pharyngeal dysphagia  -SM mild;oral dysphagia;suspected pharyngeal dysphagia  -SM      Functional Impact risk of aspiration/pneumonia  -EN risk of aspiration/pneumonia  -SM risk of aspiration/pneumonia  -SM      Rehab Potential/Prognosis, Swallowing re-evaluate goals as necessary  -EN -- --      Swallow Criteria for Skilled Therapeutic Interventions Met demonstrates skilled criteria  -EN -- --         Recommendations    Therapy Frequency (Swallow) PRN  -EN -- --      Predicted Duration Therapy Intervention (Days) until discharge  -EN -- --      SLP Diet Recommendation regular textures;thin liquids;other (see comments)  RN to downgrade to nectar-thick liquids should it be warranted  -EN regular textures;nectar thick liquids;other (see comments)  ok any thin liquids as desired between meals  -SM regular textures;nectar thick liquids;water between meals after oral care, with supervision  -SM      Recommended Diagnostics other (see comments)  diet tolerance  -EN reassess via clinical swallow evaluation;other (see comments)  will check back tomorrow for ? changes. Though likely may be best to return to SNF on current modified diet and adjust accordingly from there.  -SM reassess via clinical swallow evaluation  -      Recommended Precautions and Strategies upright posture during/after eating;general aspiration precautions;assist with feeding  -EN upright posture during/after eating;general aspiration precautions;assist with feeding  -SM upright posture during/after eating;general aspiration precautions;assist with feeding  -SM       Oral Care Recommendations Oral Care BID/PRN  -EN Oral Care BID/PRN  -SM Oral Care BID/PRN  -SM      SLP Rec. for Method of Medication Administration meds whole;with puree  -EN meds whole;with puree  -SM meds whole;with puree  crush as needed  -SM      Monitor for Signs of Aspiration notify SLP if any concerns  -EN notify SLP if any concerns  -SM notify SLP if any concerns  -SM            User Key  (r) = Recorded By, (t) = Taken By, (c) = Cosigned By    Initials Name Effective Dates    Cathie Heard MS CCC-SLP 06/16/21 -     EN Tamia Be, MS CCC-SLP 06/22/22 -                 EDUCATION  The patient has been educated in the following areas:   Dysphagia (Swallowing Impairment).        SLP GOALS     Row Name 01/16/23 0815             (LTG) Patient will demonstrate functional swallow for    Diet Texture (Demonstrate functional swallow) regular textures  -EN      Liquid viscosity (Demonstrate functional swallow) thin liquids  -EN      [place] (Demonstrate functional swallow) with minimal cues (75-90% accuracy)  -EN      Time Frame (Demonstrate functional swallow) by discharge  -EN         (STG) Patient will tolerate trials of    Consistencies Trialed (Tolerate trials) regular textures;thin liquids;mixed consistencies  -EN      Desired Outcome (Tolerate trials) without signs/symptoms of aspiration  -EN      [place] (Tolerate trials) with minimal cues (75-90% accuracy)  -EN      Time Frame (Tolerate trials) 1 week  -EN      Progress/Outcomes (Tolerate trials) new goal  -EN            User Key  (r) = Recorded By, (t) = Taken By, (c) = Cosigned By    Initials Name Provider Type    EN Tamia Be, MS CCC-SLP Speech and Language Pathologist                   Time Calculation:    Time Calculation- SLP     Row Name 01/16/23 1011             Time Calculation- SLP    SLP Start Time 0815  -EN      SLP Received On 01/16/23  -EN         Untimed Charges    SLP Eval/Re-eval  ST Eval Oral Pharyng Swallow  - 47622  -EN      41674-OI Eval Oral Pharyng Swallow Minutes 38  -EN         Total Minutes    Untimed Charges Total Minutes 38  -EN       Total Minutes 38  -EN            User Key  (r) = Recorded By, (t) = Taken By, (c) = Cosigned By    Initials Name Provider Type    EN Tamia Be, MS CCC-SLP Speech and Language Pathologist                Therapy Charges for Today     Code Description Service Date Service Provider Modifiers Qty    33865555596  ST EVAL ORAL PHARYNG SWALLOW 3 1/16/2023 Tamia Be MS CCC-SLP GN 1               Tamia Be MS CCC-SLP  1/16/2023    Electronically signed by Tamia Be MS CCC-SLP at 01/16/23 1013

## 2023-01-17 NOTE — CASE MANAGEMENT/SOCIAL WORK
Continued Stay Note  [facility]     Patient Name: Ramin Zaragoza  MRN: [id number]  Today's Date: 1/17/2023    Admit Date: 1/13/2023    Plan: SNF   Discharge Plan     Row Name 01/17/23 1747       Plan    Plan SNF    Plan Comments Case mgt f/u. Lengthy discussion with daughter re: options for SNFs , list provided. She visited 4 facilities today. Her preference is [facility], they will do an on site visit, await call back. His insurance will require pre cert, so d/c not likely to be possible until 1/19 at the earliest. Case t will con't to follow               Discharge Codes    No documentation.               Expected Discharge Date and Time     Expected Discharge Date Expected Discharge Time    Jan 20, 2023             Sonja C Kellerman, RN

## 2023-01-17 NOTE — THERAPY TREATMENT NOTE
Patient Name: Ramin Zaragoza  : 1935    MRN: [id number]                              Today's Date: 2023       Admit Date: 2023    Visit Dx:     ICD-10-CM ICD-9-CM   1. Altered mental status, unspecified altered mental status type  R41.82 780.97   2. Injury of head, initial encounter  S09.90XA 959.01   3. Contusion of left shoulder, initial encounter  S40.012A 923.00   4. Contusion of left hip, initial encounter  S70.02XA 924.01   5. Hematuria, unspecified type  R31.9 599.70   6. Dysphagia, unspecified type  R13.10 787.20     Patient Active Problem List   Diagnosis   • Coronary artery disease involving native coronary artery of native heart without angina pectoris   • Premature ventricular contraction   • Essential hypertension   • Mixed hyperlipidemia   • Venous insufficiency   • Hypothyroidism   • Lactose intolerance   • Weakness   • Accident due to mechanical fall without injury   • Altered mental status   • Dementia (HCC)     Past Medical History:   Diagnosis Date   • CAD (coronary artery disease)    • Hyperlipidemia    • Hypertension    • Hypothyroidism     on chronic replacement therapy   • Lactose intolerance    • Lower extremity edema    • PVC's (premature ventricular contractions)     History of   • Thyroid disorder    • Venous insufficiency      Past Surgical History:   Procedure Laterality Date   • CARDIAC CATHETERIZATION     • CORONARY STENT PLACEMENT     • EYE SURGERY      Bilateral cataract extraction   • HERNIA REPAIR      Inguinal hernia repair   • OTHER SURGICAL HISTORY      Melanoma removed from back   • OTHER SURGICAL HISTORY      History of left elbow fracture with sunsequent fixation      General Information     Row Name 23 1454          Physical Therapy Time and Intention    Document Type therapy note (daily note)  -     Mode of Treatment physical therapy  -     Row Name 23 1454          General Information    Existing Precautions/Restrictions fall;other (see  comments)  L shoulder and neck pain, AMS  -     Barriers to Rehab medically complex;previous functional deficit;cognitive status;hearing deficit  -     Row Name 01/17/23 1454          Cognition    Orientation Status (Cognition) oriented to;person  -[facility] Name 01/17/23 1454          Safety Issues, Functional Mobility    Safety Issues Affecting Function (Mobility) ability to follow commands;awareness of need for assistance;insight into deficits/self-awareness;judgment;positioning of assistive device;problem-solving;safety precaution awareness;safety precautions follow-through/compliance;sequencing abilities  -     Impairments Affecting Function (Mobility) balance;cognition;coordination;motor planning;endurance/activity tolerance;strength;range of motion (ROM)  -     Comment, Safety Issues/Impairments (Mobility) Prolonged increase in time to follow commands  -           User Key  (r) = Recorded By, (t) = Taken By, (c) = Cosigned By    Initials Name Provider Type     Monroe Aldana, PT Physical Therapist               Mobility     Row Name 01/17/23 1456          Bed Mobility    Bed Mobility supine-sit;sit-supine  -     Supine-Sit [place] (Bed Mobility) moderate assist (50% patient effort);2 person assist;verbal cues;nonverbal cues (demo/gesture)  -     Sit-Supine [place] (Bed Mobility) moderate assist (50% patient effort);2 person assist;verbal cues;nonverbal cues (demo/gesture)  -     Assistive Device (Bed Mobility) head of bed elevated;draw sheet;bed rails  -     Comment, (Bed Mobility) Pt required max verbal and tactile cues for proper sequencing, increased time to perform task. Upon sitting EOB pt with moderate L lateral lean requiring physical assist to correct. Pt requires frequent rest breaks although pt unable to verbalize why he needs to rest.  -     Row Name 01/17/23 1456          Bed-Chair Transfer    Bed-Chair [place] (Transfers) maximum assist (25% patient  effort);2 person assist;verbal cues;nonverbal cues (demo/gesture)  -     Assistive Device (Bed-Chair Transfers) walker, front-wheeled  -     Comment, (Bed-Chair Transfer) Pt performed bed <> chair stand pivot transfer requiring maxA for positioning of BLEs as pt unable to reposition feet despite max verbal cues. Pt occasionally attempts to move feet although very little movement produced. Pt requires physical assist to lower back to seated position.  -     Row Name 01/17/23 1456          Sit-Stand Transfer    Sit-Stand [place] (Transfers) moderate assist (50% patient effort);2 person assist;verbal cues;nonverbal cues (demo/gesture)  -     Assistive Device (Sit-Stand Transfers) walker, front-wheeled  -     Comment, (Sit-Stand Transfer) Pt performed STS x1 from EOB and x1 from recliner. Pt required max cues for proper hand placement. Pt with significant foward flexed posture upon standing, unable to extend neck d/t complaints of pain. Pt with L lateral lean.  -[facility] Name 01/17/23 1456          Gait/Stairs (Locomotion)    [place] Level (Gait) unable to assess  -     Comment, (Gait/Stairs) Pt unable to ambulate this session d/t inability to follow verbal cues, requiring maxA for advancement of BLEs during transfers.  -           User Key  (r) = Recorded By, (t) = Taken By, (c) = Cosigned By    Initials Name Provider Type     Monroe Aldana, PT Physical Therapist               Obj/Interventions     [facility] Name 01/17/23 1500          Motor Skills    Therapeutic Exercise knee;ankle  -[facility] Name 01/17/23 1500          Knee (Therapeutic Exercise)    Knee AAROM (Therapeutic Exercise) bilateral;flexion;extension;sitting;5 repetitions  -[facility] Name 01/17/23 1500          Ankle (Therapeutic Exercise)    Ankle (Therapeutic Exercise) AAROM (active assistive range of motion)  -     Ankle AAROM (Therapeutic Exercise) bilateral;supine;10 repetitions  -[facility] Name 01/17/23 1500           Balance    Balance Assessment sitting static balance;sitting dynamic balance;standing static balance;standing dynamic balance  -     Static Sitting Balance minimal assist;1-person assist  -     Dynamic Sitting Balance moderate assist;1-person assist  -     Position, Sitting Balance unsupported;sitting edge of bed  -     Static Standing Balance moderate assist;2-person assist  -     Dynamic Standing Balance maximum assist;2-person assist  -     Comment, Balance Pt with moderate L lateral lean during sitting requiring physical assist to correct.  -           User Key  (r) = Recorded By, (t) = Taken By, (c) = Cosigned By    Initials Name Provider Type     Monroe Aldana, PT Physical Therapist               Goals/Plan    No documentation.                Clinical Impression     [facility] Name 01/17/23 1501          Pain    Pain Intervention(s) Ambulation/increased activity;Repositioned  -[facility] Name 01/17/23 1501          Pain Scale: FACES Pre/Post-Treatment    Pain: FACES Scale, Pretreatment 2-->hurts little bit  -     Posttreatment Pain Rating 4-->hurts little more  -     Pain Location generalized  -     Pain Location - neck  -     Row Name 01/17/23 1501          Plan of Care Review    Plan of Care Reviewed With patient  -     Progress no change  -     Outcome Evaluation Pt continuing to be grossly limited d/t AMS/confusion as pt with poor ability to follow single step commands during OOB mobility. Pt required MaxAx2 for stand pivot transfers bed <> chair. Pt with significant episodes of freezing/festinating gait as pt unable to advance BLEs requiring MaxA. PT plans to continue current POC as able.  -     Row Name 01/17/23 1501          Vital Signs    Pre Systolic BP Rehab 137  -     Pre Treatment Diastolic BP 71  -     Pretreatment Heart Rate (beats/min) 66  -     Posttreatment Heart Rate (beats/min) 68  -     Pre SpO2 (%) 94  -     O2 Delivery Pre Treatment room air  -     O2  Delivery Intra Treatment room air  -LH     Post SpO2 (%) 97  -LH     O2 Delivery Post Treatment room air  -LH     Pre Patient Position Supine  -LH     Intra Patient Position Standing  -LH     Post Patient Position Supine  -LH     Row Name 01/17/23 1501          Positioning and Restraints    Pre-Treatment Position in bed  -LH     Post Treatment Position bed  -LH     In Bed notified nsg;fowlers;call light within reach;encouraged to call for assist;exit alarm on  -           User Key  (r) = Recorded By, (t) = Taken By, (c) = Cosigned By    Initials Name Provider Type     Monroe Aldana, PT Physical Therapist               Outcome Measures     Row Name 01/17/23 1511          How much help from another person do you currently need...    Turning from your back to your side while in flat bed without using bedrails? 2  -LH     Moving from lying on back to sitting on the side of a flat bed without bedrails? 2  -LH     Moving to and from a bed to a chair (including a wheelchair)? 2  -LH     Standing up from a chair using your arms (e.g., wheelchair, bedside chair)? 2  -LH     Climbing 3-5 steps with a railing? 1  -LH     To walk in hospital room? 2  -     AM-PAC 6 Clicks Score (PT) 11  -     Highest level of mobility 4 --> Transferred to chair/commode  -     Row Name 01/17/23 1511          Functional Assessment    Outcome Measure Options AM-PAC 6 Clicks Basic Mobility (PT)  -           User Key  (r) = Recorded By, (t) = Taken By, (c) = Cosigned By    Initials Name Provider Type     Monroe Aldana, PT Physical Therapist                             Physical Therapy Education     Title: PT OT SLP Therapies (In Progress)     Topic: Physical Therapy (Done)     Point: Mobility training (Done)     Learning Progress Summary           Patient AcceptanceDOUGLAS VU by  at 1/17/2023 1511    DOUGLAS Tiwari VU,NR by SC at 1/16/2023 1119    Comment: reviewed benefits of getting oob    AcceptanceDOUGLAS VU by  at 1/14/2023 1449                    Point: Home exercise program (Done)     Learning Progress Summary           Patient Acceptance, E, VU by  at 1/17/2023 1511    Eager, E, VU,NR by SC at 1/16/2023 1119    Comment: reviewed benefits of getting oob                   Point: Body mechanics (Done)     Learning Progress Summary           Patient Acceptance, E, VU by  at 1/17/2023 1511    Eager, E, VU,NR by SC at 1/16/2023 1119    Comment: reviewed benefits of getting oob    Acceptance, E, VU by  at 1/14/2023 1445                   Point: Precautions (Done)     Learning Progress Summary           Patient Acceptance, E, VU by  at 1/17/2023 1511    Eager, E, VU,NR by SC at 1/16/2023 1119    Comment: reviewed benefits of getting oob    Acceptance, E, VU by  at 1/14/2023 1445                               User Key     Initials Effective Dates Name Provider Type Discipline    SC 06/16/21 -  Grant Higgins, PT Physical Therapist PT     09/21/21 -  Monroe Aldana, PT Physical Therapist PT     07/14/21 -  Bernadette Moss, PT Physical Therapist PT              PT Recommendation and Plan     Plan of Care Reviewed With: patient  Progress: no change  Outcome Evaluation: Pt continuing to be grossly limited d/t AMS/confusion as pt with poor ability to follow single step commands during OOB mobility. Pt required MaxAx2 for stand pivot transfers bed <> chair. Pt with significant episodes of freezing/festinating gait as pt unable to advance BLEs requiring MaxA. PT plans to continue current POC as able.     Time Calculation:    PT Charges     Row Name 01/17/23 1512             Time Calculation    Start Time 1426  -LH      PT Received On 01/17/23  -      PT Goal Re-Cert Due Date 01/24/23  -         Timed Charges    44120 - PT Therapeutic Exercise Minutes 6  -LH      00313 - PT Therapeutic Activity Minutes 21  -LH         Total Minutes    Timed Charges Total Minutes 27  -       Total Minutes 27  -            User Key  (r) = Recorded  By, (t) = Taken By, (c) = Cosigned By    Initials Name Provider Type     Monroe Aldana, PT Physical Therapist              Therapy Charges for Today     Code Description Service Date Service Provider Modifiers Qty    52605974255 HC PT THER PROC EA 15 MIN 1/17/2023 Monroe Aldana, PT GP 1    71709688764 HC PT THERAPEUTIC ACT EA 15 MIN 1/17/2023 Monroe Aldana, PT GP 1          PT G-Codes  Outcome Measure Options: AM-PAC 6 Clicks Basic Mobility (PT)  AM-PAC 6 Clicks Score (PT): 11  AM-PAC 6 Clicks Score (OT): 15  PT Discharge Summary  Anticipated Discharge Disposition (PT): skilled nursing facility    Monroe Aldana PT  1/17/2023

## 2023-01-17 NOTE — PLAN OF CARE
Goal Outcome Evaluation:  Plan of Care Reviewed With: patient        Progress: no change  Outcome Evaluation: Pt continuing to be grossly limited d/t AMS/confusion as pt with poor ability to follow single step commands during OOB mobility. Pt required MaxAx2 for stand pivot transfers bed <> chair. Pt with significant episodes of freezing/festinating gait as pt unable to advance BLEs requiring MaxA. PT plans to continue current POC as able.

## 2023-01-17 NOTE — PROGRESS NOTES
[facility] Medicine Services  PROGRESS NOTE    Patient Name: Ramin Zaragoza  : 1935  MRN: [id number]    Date of Admission: 2023  Primary Care Physician: Tez Santos MD    Subjective   Subjective     CC:  S/p fall    HPI:  Patient seen resting up in bed awake and alert.  Confused at baseline.  Oriented to self only.  Remains hard of hearing.  Appears comfortable.  Denies any issues.  Awaiting placement.      ROS:  Gen- No fevers, chills  CV- No chest pain, palpitations  Resp- No cough, dyspnea  GI- No N/V/D, abd pain    Objective   Objective     Vital Signs:   Temp:  [97.3 °F (36.3 °C)-98.8 °F (37.1 °C)] 98.3 °F (36.8 °C)  Heart Rate:  [70-87] 87  Resp:  [17-18] 18  BP: (131-160)/(65-82) 137/71     Physical Exam:  Constitutional: No acute distress, awake, alert.  Resting up in the bed.  Frail appearing elderly male.  HENT: NCAT, mucous membranes moist  Respiratory: Clear to auscultation bilaterally, respiratory effort normal on room air.  Sats 94%.  Cardiovascular: RRR, no murmurs, rubs, or gallops  Gastrointestinal: positive bowel sounds, soft, nontender, nondistended  Neck: Tenderness to left neck and shoulder region (stable).  No stiff neck.. Tender but able to move in all directions.  Musculoskeletal: No bilateral ankle edema.  MC spontaneously.  Psychiatric: Appropriate affect, cooperative and calm.  Neurologic: Awake and alert.  Oriented to person only.  Speech clear.  Short-term memory deficits.  Otherwise confused at baseline.  Very hard of hearing.  Follows simple commands.  Skin: No rashes      Results Reviewed:  LAB RESULTS:      Lab 23  0502 23  0626 23  1640   WBC 8.48 8.68 11.13*   HEMOGLOBIN 11.5* 12.7* 14.5   HEMATOCRIT 33.7* 37.3* 41.9   PLATELETS 171 161 179   NEUTROS ABS 4.87  --  8.16*   IMMATURE GRANS (ABS) 0.03  --  0.05   LYMPHS ABS 2.43  --  1.43   MONOS ABS 0.89  --  1.44*   EOS ABS 0.24  --  0.03   MCV 94.9 95.9 94.4         Lab  01/16/23  0502 01/14/23  0626 01/13/23  1640   SODIUM 137 137 137   POTASSIUM 3.6 3.4* 4.3   CHLORIDE 104 100 98   CO2 25.0 26.0 29.0   ANION GAP 8.0 11.0 10.0   BUN 10 11 13   CREATININE 0.66* 0.71* 0.94   EGFR 90.8 88.8 78.5   GLUCOSE 93 109* 154*   CALCIUM 8.6 8.7 9.4   MAGNESIUM 1.8  --  1.7   TSH  --  10.670*  --          Lab 01/13/23  1640   TOTAL PROTEIN 7.1   ALBUMIN 4.0   GLOBULIN 3.1   ALT (SGPT) 17   AST (SGOT) 40   BILIRUBIN 0.9   ALK PHOS 55         Lab 01/13/23  1640   TROPONIN T <0.010             Lab 01/14/23  0626   VITAMIN B 12 485         Brief Urine Lab Results  (Last result in the past 365 days)      Color   Clarity   Blood   Leuk Est   Nitrite   Protein   CREAT   Urine HCG        01/13/23 1858 Dark Yellow   Clear   Moderate (2+)   Negative   Negative   30 mg/dL (1+)                 Microbiology Results Abnormal     None          MRI Cervical Spine Without Contrast    Result Date: 1/17/2023  MRI CERVICAL SPINE WO CONTRAST Date of Exam: 1/17/2023 8:00 AM EST Indication: Neck trauma, ligament injury suspected (Age >= 16y).  Comparison: CT 1/13/2023 Technique:  Routine multiplanar/multisequence sequence images of the cervical spine were obtained without contrast administration.  Findings: Marrow signal is preserved and there is no evidence of acute fracture. Alignment is anatomic without evidence of listhesis or subluxation. There is no specific focal ligamentous edema to suggest disruption. The cervical spinal cord demonstrates no focal areas of intrinsic signal abnormality. Advanced multilevel spondylosis is present, with areas of disc osteophyte complex formation and facet arthropathy resulting in severe spinal canal and neuroforaminal narrowing at the C3-4, C4-5 and C5-6 levels. Moderate to severe spinal canal and neuroforaminal narrowing is also present at C6-7. The paraspinal soft tissues demonstrate no acute findings.     Impression: Impression: Severe multilevel cervical spondylosis is  present as above. There is otherwise no ligamentous edema or malalignment to suggest derangement and there is no focal cord signal abnormality. Electronically Signed: Jorge Luis Pitts  1/17/2023 11:19 AM EST  Workstation ID: [id number]      Results for orders placed during the hospital encounter of 02/23/22    Adult Transthoracic Echo Complete W/ Cont if Necessary Per Protocol    Interpretation Summary  · Normal left ventricular systolic function, estimated EF 60%.  · Mild mitral regurgitation.  · Mild tricuspid regurgitation with normal RVSP.  · Mild pulmonic insufficiency.      I have reviewed the medications:  Scheduled Meds:aspirin, 81 mg, Oral, Daily  divalproex, 250 mg, Oral, Q12H  donepezil, 10 mg, Oral, Nightly  fluticasone, 2 spray, Each Nare, BID  levothyroxine, 50 mcg, Oral, Q AM  pantoprazole, 40 mg, Oral, Q AM  senna-docusate sodium, 2 tablet, Oral, BID  sodium chloride, 10 mL, Intravenous, Q12H      Continuous Infusions:   PRN Meds:.•  acetaminophen  •  senna-docusate sodium **AND** polyethylene glycol **AND** bisacodyl **AND** bisacodyl  •  hydrOXYzine  •  ondansetron **OR** ondansetron  •  sodium chloride  •  sodium chloride  •  sodium chloride    Assessment & Plan   Assessment & Plan     Active Hospital Problems    Diagnosis  POA   • **Altered mental status [R41.82]  Yes   • Dementia (HCC) [F03.90]  Yes   • Accident due to mechanical fall without injury [W19.XXXA]  Yes   • Hypothyroidism [E03.9]  Yes      Resolved Hospital Problems   No resolved problems to display.        Brief Hospital Course to date:  Ramin Zaragoza is a 87 y.o. male w dementia living on a memory care unit who presents for concerns for worsening confusion after a mechanical fall in which he hit his head.    This patient's problems and plans were partially entered by my partner and updated as appropriate by me 01/17/23.    Assessment/plan:    Acute encephalopathy in setting of severe dementia - resolved  -W/u to now unrevealing including  CT head and u/a. No clear s/s of infection  -Could be waxing/waning or post-concussive after head trauma  -Partner prior D/w family who visited 1/14 and reports patient seems at baseline. Neurology saw - no NPH w/u necessary, wouldn't benefit from shunt evaluation  -Home aricept, depakote  - PT/OT consulted  -- Notes reflect likely not able to return to memory care unit and may be looking for skilled facility.  CM following.    Head trauma 2/2 mechanical fall w residual neck pain  -CT head, CT C-spine, Xrays left hip and left shoulder all unremarkable, no other identified injuries  -Persistent left shoulder/clavicle/neck pain despite these unremarkable imaging. Shoulder pain appears chronic, clavicle Xray today negative for fracture.   --Will obtain MRI neck to r/o ligamentous C-spine injury  Impression:   Severe multilevel cervical spondylosis is present as above. There is otherwise no ligamentous edema or malalignment to suggest derangement and there is no focal cord signal abnormality.  --We will discuss with family.  Consider neurosurgery consult, however likely best to treat medically due to age and chronic dementia.  -- Appears more comfortable today.     Hypothyroidism - new diagnosis  -TSH very elevated on arrival, started low dose levothyroxine  -- Follow-up TSH 4-6 weeks, defer to PCP    Hematuria, likely 2/2 traumatic cath in ED   - can f/u OP w repeat in a couple of weeks.    --Defer to PCP.    Mild oropharyngeal dysphagia - speech following, OK for diet + thicks, thins in between meals.  -- flonase for nasal congestion w improvement    Osteoarthritis -prior restarted home tramadol as needed    HTN - holding home lisinopril and bumex (both low dose) given normotension  -- Remains normotensive off medications.  Monitor.    BPH - allergy to flomax (s/e of hypotension)      Daily Care Communication  Due to current limited visitation policies, an attempt will be made daily to update patient's identified best  point-of-contact(s)   Contact: Isatu Menendez   Relation: Daughter   Time of communication: 2227   Notes (if applicable): Spoke with patient's daughter/POA.  Discussed results of MRI findings.  No acute injury noted.  Explained severe chronic cervical disc disease but nothing at this point acute.  Continue with conservative management at this time.  She also asked that I relay to case management that their first preference of facility for her father would be [facility], and her second preference would be [facility].  He had been out evaluating various facilities all day today and are at [place] getting ready to tour it now.         Expected Discharge Location and Transportation: [facility] vs SNF  Expected Discharge likely transfer to SNF 1/18 pending placement.  CM following.  Expected Discharge Date and Time     Expected Discharge Date Expected Discharge Time    Jan 18, 2023            DVT prophylaxis:  Mechanical DVT prophylaxis orders are present.     AM-PAC 6 Clicks Score (PT): 10 (01/15/23 0800)    CODE STATUS:   Code Status and Medical Interventions:   Ordered at: 01/13/23 2139     Medical Intervention Limits:    NO intubation (DNI)     Level Of Support Discussed With:    Health Care Surrogate     Code Status (Patient has no pulse and is not breathing):    No CPR (Do Not Attempt to Resuscitate)     Medical Interventions (Patient has pulse or is breathing):    Limited Support     Comments:    per patient's daughter and POA       Kassdiy Vanegas, APRN  01/17/23

## 2023-01-18 PROCEDURE — G0378 HOSPITAL OBSERVATION PER HR: HCPCS

## 2023-01-18 PROCEDURE — 97530 THERAPEUTIC ACTIVITIES: CPT

## 2023-01-18 PROCEDURE — 97535 SELF CARE MNGMENT TRAINING: CPT | Performed by: OCCUPATIONAL THERAPIST

## 2023-01-18 PROCEDURE — 99231 SBSQ HOSP IP/OBS SF/LOW 25: CPT | Performed by: NURSE PRACTITIONER

## 2023-01-18 PROCEDURE — 92526 ORAL FUNCTION THERAPY: CPT

## 2023-01-18 RX ADMIN — ACETAMINOPHEN 650 MG: 325 TABLET ORAL at 06:15

## 2023-01-18 RX ADMIN — Medication 10 ML: at 09:20

## 2023-01-18 RX ADMIN — DIVALPROEX SODIUM 250 MG: 250 TABLET, DELAYED RELEASE ORAL at 09:19

## 2023-01-18 RX ADMIN — PANTOPRAZOLE SODIUM 40 MG: 40 TABLET, DELAYED RELEASE ORAL at 06:15

## 2023-01-18 RX ADMIN — DONEPEZIL HYDROCHLORIDE 10 MG: 10 TABLET, FILM COATED ORAL at 21:14

## 2023-01-18 RX ADMIN — ASPIRIN 81 MG: 81 TABLET, COATED ORAL at 09:20

## 2023-01-18 RX ADMIN — DIVALPROEX SODIUM 250 MG: 250 TABLET, DELAYED RELEASE ORAL at 21:14

## 2023-01-18 RX ADMIN — FLUTICASONE PROPIONATE 2 SPRAY: 50 SPRAY, METERED NASAL at 09:00

## 2023-01-18 RX ADMIN — Medication 10 ML: at 21:15

## 2023-01-18 RX ADMIN — LEVOTHYROXINE SODIUM 50 MCG: 50 TABLET ORAL at 06:15

## 2023-01-18 NOTE — CASE MANAGEMENT/SOCIAL WORK
Continued Stay Note  [facility]     Patient Name: Ramin Zaragoza  MRN: [id number]  Today's Date: 1/18/2023    Admit Date: 1/13/2023    Plan: SNF   Discharge Plan     Row Name 01/18/23 1050       Plan    Plan Comments Carlee with [facility] can offer pt a bed pending insurance precert which they will start today. CM to follow.               Discharge Codes    No documentation.               Expected Discharge Date and Time     Expected Discharge Date Expected Discharge Time    Jan 20, 2023             Rosario Fuentes RN

## 2023-01-18 NOTE — PLAN OF CARE
Goal Outcome Evaluation:  Plan of Care Reviewed With: patient           Outcome Evaluation: Pt continues with deficits in cognition, functional strength, balance, and activity tolerance limiting mobility. Pt STS x3 standing to tolerance ~15-25 seconds. Attempted gait; however, pt was able to advance his RLE but unable to advance his LLE. Pt tolerated therex well. Recommend dc SnF.

## 2023-01-18 NOTE — THERAPY TREATMENT NOTE
Patient Name: Ramin Zaragoza  : 1935    MRN: [id number]                              Today's Date: 2023       Admit Date: 2023    Visit Dx:     ICD-10-CM ICD-9-CM   1. Altered mental status, unspecified altered mental status type  R41.82 780.97   2. Injury of head, initial encounter  S09.90XA 959.01   3. Contusion of left shoulder, initial encounter  S40.012A 923.00   4. Contusion of left hip, initial encounter  S70.02XA 924.01   5. Hematuria, unspecified type  R31.9 599.70   6. Dysphagia, unspecified type  R13.10 787.20     Patient Active Problem List   Diagnosis   • Coronary artery disease involving native coronary artery of native heart without angina pectoris   • Premature ventricular contraction   • Essential hypertension   • Mixed hyperlipidemia   • Venous insufficiency   • Hypothyroidism   • Lactose intolerance   • Weakness   • Accident due to mechanical fall without injury   • Altered mental status   • Dementia (HCC)     Past Medical History:   Diagnosis Date   • CAD (coronary artery disease)    • Hyperlipidemia    • Hypertension    • Hypothyroidism     on chronic replacement therapy   • Lactose intolerance    • Lower extremity edema    • PVC's (premature ventricular contractions)     History of   • Thyroid disorder    • Venous insufficiency      Past Surgical History:   Procedure Laterality Date   • CARDIAC CATHETERIZATION     • CORONARY STENT PLACEMENT     • EYE SURGERY      Bilateral cataract extraction   • HERNIA REPAIR      Inguinal hernia repair   • OTHER SURGICAL HISTORY      Melanoma removed from back   • OTHER SURGICAL HISTORY      History of left elbow fracture with sunsequent fixation      General Information     Row Name 23 0922          OT Time and Intention    Document Type therapy note (daily note)  -AR     Mode of Treatment individual therapy;occupational therapy  -AR     Row Name 23 0922          General Information    Existing Precautions/Restrictions fall;other  (see comments)  Koi, dementia, left shoulder and neck pain  -AR     Row Name 01/18/23 0922          Cognition    Orientation Status (Cognition) oriented to;person;disoriented to;place;situation;time;other (see comments)  asking  where he is  -AR     Row Name 01/18/23 0922          Safety Issues, Functional Mobility    Impairments Affecting Function (Mobility) balance;cognition;coordination;motor planning;endurance/activity tolerance;strength;range of motion (ROM);postural/trunk control  -AR           User Key  (r) = Recorded By, (t) = Taken By, (c) = Cosigned By    Initials Name Provider Type    AR Felicia Turner, OT Occupational Therapist                 Mobility/ADL's     Row Name 01/18/23 0923          Bed Mobility    Bed Mobility scooting/bridging;supine-sit  -AR     Scooting/Bridging [place] (Bed Mobility) maximum assist (25% patient effort);verbal cues  -AR     Supine-Sit [place] (Bed Mobility) moderate assist (50% patient effort);2 person assist;verbal cues  -AR     Bed Mobility, Safety Issues decreased use of arms for pushing/pulling;decreased use of legs for bridging/pushing;impaired trunk control for bed mobility;cognitive deficits limit understanding  -AR     Assistive Device (Bed Mobility) bed rails;draw sheet;head of bed elevated  -AR     Comment, (Bed Mobility) Ongoing verbal and tactile cues to attend to and sequence task, mild-moderate left lateral lean  -AR     Row Name 01/18/23 0923          Transfers    Transfers sit-stand transfer;stand-sit transfer  -AR     Comment, (Transfers) Pt required verbal and tactile cues for initiation and sequencing, however pt able to perform task with cues without stopping.  -AR     Row Name 01/18/23 0923          Bed-Chair Transfer    Bed-Chair [place] (Transfers) 2 person assist;verbal cues;maximum assist (25% patient effort)  -AR     Assistive Device (Bed-Chair Transfers) other (see comments)  SIMONA HHA  -AR     Row Name 01/18/23 0923           Sit-Stand Transfer    Sit-Stand [place] (Transfers) moderate assist (50% patient effort);2 person assist;verbal cues  -AR     Assistive Device (Sit-Stand Transfers) other (see comments)  -AR     Row Name 01/18/23 0923          Stand-Sit Transfer    Stand-Sit [place] (Transfers) maximum assist (25% patient effort);2 person assist;verbal cues  -AR     Assistive Device (Stand-Sit Transfers) other (see comments)  -AR     Row Name 01/18/23 0923          Activities of Daily Living    BADL Assessment/Intervention upper body dressing;lower body dressing;grooming;feeding  -AR     Row Name 01/18/23 0923          Lower Body Dressing Assessment/Training    [place] Level (Lower Body Dressing) don;socks;dependent (less than 25% patient effort)  -AR     Position (Lower Body Dressing) supine  -AR     Row Name 01/18/23 0923          Grooming Assessment/Training    [place] Level (Grooming) wash face, hands;maximum assist (25% patient effort)  -AR     Position (Grooming) supported sitting  -AR     Row Name 01/18/23 0923          Self-Feeding Assessment/Training    [place] Level (Feeding) liquids to mouth;minimum assist (75% patient effort);scoop food and bring to mouth;contact guard assist  -AR     Assistive Devices (Feeding) adapted cup  -AR     Position (Self-Feeding) supported sitting  -AR     Comment, (Feeding) Issued adaptive cup to assist with self-feeding, pt able to use standard utensils without issue  -AR     Row Name 01/18/23 0923          Upper Body Dressing Assessment/Training    [place] Level (Upper Body Dressing) don;pajama/robe;maximum assist (25% patient effort)  -AR     Position (Upper Body Dressing) edge of bed sitting  -AR           User Key  (r) = Recorded By, (t) = Taken By, (c) = Cosigned By    Initials Name Provider Type    Felicia Ballard OT Occupational Therapist               Obj/Interventions     Row Name 01/18/23 0937          Balance    Balance Assessment sitting  static balance;sitting dynamic balance;standing static balance;standing dynamic balance  -AR     Static Sitting Balance minimal assist  -AR     Dynamic Sitting Balance moderate assist  -AR     Position, Sitting Balance unsupported;sitting edge of bed  -AR     Static Standing Balance moderate assist;2-person assist;verbal cues;non-verbal cues (demo/gesture)  -AR     Dynamic Standing Balance maximum assist;2-person assist;verbal cues;non-verbal cues (demo/gesture)  -AR           User Key  (r) = Recorded By, (t) = Taken By, (c) = Cosigned By    Initials Name Provider Type    Felicia Ballard OT Occupational Therapist               Goals/Plan     Row Name 01/18/23 0942          Bed Mobility Goal 1 (OT)    Progress/Outcomes (Bed Mobility Goal 1, OT) goal ongoing  -AR     Row Name 01/18/23 0942          Bathing Goal 1 (OT)    Progress/Outcomes (Bathing Goal 1, OT) goal ongoing  -AR     Row Name 01/18/23 0942          Dressing Goal 1 (OT)    Progress/Outcome (Dressing Goal 1, OT) goal ongoing  -AR     Row Name 01/18/23 0942          Strength Goal 1 (OT)    Progress/Outcome (Strength Goal 1, OT) goal ongoing  -AR           User Key  (r) = Recorded By, (t) = Taken By, (c) = Cosigned By    Initials Name Provider Type    Felicia Ballard, OT Occupational Therapist               Clinical Impression     Row Name 01/18/23 0937          Pain Assessment    Pretreatment Pain Rating 0/10 - no pain  -AR     Posttreatment Pain Rating 0/10 - no pain  -AR     Row Name 01/18/23 0937          Plan of Care Review    Plan of Care Reviewed With patient  -AR     Progress improving  -AR     Outcome Evaluation Pt oriented to self and follows commands with verbal and tactile cues to participate, significant processing delays noted. Pt required mod assist x2 bed mobility, max assist x2 bed to chair transfer and self-feeding with min assist-CGA using adaptive cup and standard utensils. Pt limited with decreased processing, impaired  static and dynamic sitting and standing balance, impaired motor planning. Recommend SNF.  -AR     Row Name 01/18/23 0937          Therapy Plan Review/Discharge Plan (OT)    Anticipated Discharge Disposition (OT) skilled nursing facility  -AR     Row Name 01/18/23 0937          Vital Signs    Pre Patient Position Supine  -AR     Intra Patient Position Standing  -AR     Post Patient Position Sitting  -AR     Row Name 01/18/23 0937          Positioning and Restraints    Pre-Treatment Position in bed  -AR     Post Treatment Position chair  -AR     In Chair reclined;call light within reach;encouraged to call for assist;exit alarm on;with nsg;compression device;waffle cushion;on mechanical lift sling;legs elevated  -AR           User Key  (r) = Recorded By, (t) = Taken By, (c) = Cosigned By    Initials Name Provider Type    Felicia Ballard OT Occupational Therapist               Outcome Measures     Row Name 01/18/23 0942          How much help from another is currently needed...    Putting on and taking off regular lower body clothing? 1  -AR     Bathing (including washing, rinsing, and drying) 2  -AR     Toileting (which includes using toilet bed pan or urinal) 2  -AR     Putting on and taking off regular upper body clothing 2  -AR     Taking care of personal grooming (such as brushing teeth) 2  -AR     Eating meals 3  -AR     AM-PAC 6 Clicks Score (OT) 12  -AR     Row Name 01/18/23 0942          Functional Assessment    Outcome Measure Options AM-PAC 6 Clicks Daily Activity (OT)  -AR           User Key  (r) = Recorded By, (t) = Taken By, (c) = Cosigned By    Initials Name Provider Type    Felicia Ballard OT Occupational Therapist                Occupational Therapy Education     Title: PT OT SLP Therapies (In Progress)     Topic: Occupational Therapy (In Progress)     Point: ADL training (Done)     Description:   Instruct learner(s) on proper safety adaptation and remediation techniques during self care  or transfers.   Instruct in proper use of assistive devices.              Learning Progress Summary           Patient Eager, E,TB,D, VU,NR by AR at 1/18/2023 0943   Family Eager, E,TB,D, VU,NR by AR at 1/18/2023 0943                   Point: Home exercise program (Not Started)     Description:   Instruct learner(s) on appropriate technique for monitoring, assisting and/or progressing therapeutic exercises/activities.              Learner Progress:  Not documented in this visit.          Point: Precautions (Done)     Description:   Instruct learner(s) on prescribed precautions during self-care and functional transfers.              Learning Progress Summary           Patient Eager, E,TB,D, VU,NR by AR at 1/18/2023 0943   Family Eager, E,TB,D, VU,NR by AR at 1/18/2023 0943                   Point: Body mechanics (Done)     Description:   Instruct learner(s) on proper positioning and spine alignment during self-care, functional mobility activities and/or exercises.              Learning Progress Summary           Patient Eager, E,TB,D, VU,NR by AR at 1/18/2023 0943   Family Eager, E,TB,D, VU,NR by AR at 1/18/2023 0943                               User Key     Initials Effective Dates Name Provider Type Discipline    AR 06/16/21 -  Felicia Turner, MACRINA Occupational Therapist OT              OT Recommendation and Plan     Plan of Care Review  Plan of Care Reviewed With: patient  Progress: improving  Outcome Evaluation: Pt oriented to self and follows commands with verbal and tactile cues to participate, significant processing delays noted. Pt required mod assist x2 bed mobility, max assist x2 bed to chair transfer and self-feeding with min assist-CGA using adaptive cup and standard utensils. Pt limited with decreased processing, impaired static and dynamic sitting and standing balance, impaired motor planning. Recommend SNF.     Time Calculation:    Time Calculation- OT     Row Name 01/18/23 0943             Time  Calculation- OT    OT Start Time 0850  -AR      OT Received On 01/18/23  -AR      OT Goal Re-Cert Due Date 01/24/24  -AR         Timed Charges    77933 - OT Self Care/Mgmt Minutes 41  -AR         Total Minutes    Timed Charges Total Minutes 41  -AR       Total Minutes 41  -AR            User Key  (r) = Recorded By, (t) = Taken By, (c) = Cosigned By    Initials Name Provider Type    AR Felicia Turner OT Occupational Therapist              Therapy Charges for Today     Code Description Service Date Service Provider Modifiers Qty    36650209698 HC OT SELF CARE/MGMT/TRAIN EA 15 MIN 1/18/2023 Felicia Turner, OT GO 3    75148792035 HC OT THER SUPP EA 15 MIN 1/18/2023 Felicia Turner OT GO 3               Felicia Turner OT  1/18/2023

## 2023-01-18 NOTE — THERAPY TREATMENT NOTE
Acute Care - Speech Language Pathology   Swallow Treatment Note [facility]     Patient Name: Ramin Zaragoza  : 1935  MRN: [id number]  Today's Date: 2023               Admit Date: 2023    Visit Dx:     ICD-10-CM ICD-9-CM   1. Altered mental status, unspecified altered mental status type  R41.82 780.97   2. Injury of head, initial encounter  S09.90XA 959.01   3. Contusion of left shoulder, initial encounter  S40.012A 923.00   4. Contusion of left hip, initial encounter  S70.02XA 924.01   5. Hematuria, unspecified type  R31.9 599.70   6. Dysphagia, unspecified type  R13.10 787.20     Patient Active Problem List   Diagnosis   • Coronary artery disease involving native coronary artery of native heart without angina pectoris   • Premature ventricular contraction   • Essential hypertension   • Mixed hyperlipidemia   • Venous insufficiency   • Hypothyroidism   • Lactose intolerance   • Weakness   • Accident due to mechanical fall without injury   • Altered mental status   • Dementia (HCC)     Past Medical History:   Diagnosis Date   • CAD (coronary artery disease)    • Hyperlipidemia    • Hypertension    • Hypothyroidism     on chronic replacement therapy   • Lactose intolerance    • Lower extremity edema    • PVC's (premature ventricular contractions)     History of   • Thyroid disorder    • Venous insufficiency      Past Surgical History:   Procedure Laterality Date   • CARDIAC CATHETERIZATION     • CORONARY STENT PLACEMENT     • EYE SURGERY      Bilateral cataract extraction   • HERNIA REPAIR      Inguinal hernia repair   • OTHER SURGICAL HISTORY      Melanoma removed from back   • OTHER SURGICAL HISTORY      History of left elbow fracture with sunsequent fixation       SLP Recommendation and Plan     SLP Diet Recommendation: regular textures, thin liquids (23 1515)  Recommended Precautions and Strategies: upright posture during/after eating, general aspiration precautions (23 1515)  SLP Rec.  for Method of Medication Administration: as tolerated (01/18/23 1515)     Monitor for Signs of Aspiration: notify SLP if any concerns (01/18/23 1515)              Therapy Frequency (Swallow): evaluation only (01/18/23 1515)           Daily Summary of Progress (SLP): progress toward functional goals is good (01/18/23 1515)               Treatment Assessment (SLP): improved, no clinical signs of, aspiration (01/18/23 1515)  Treatment Assessment Comments (SLP): Assisted pt w/ part of late lunch tray still on table; had eaten minimal. Took few bites of chicken/rice stir rodriguez, mandarin oranges w/ juice (mixed consistency), and straw sips of lemonade. No s/sxs aspiration/distress/difficulty w/ any. No further\ SLP needs indicated @ this time. If any new concerns arise, please re-consult. (01/18/23 1515)  Plan for Continued Treatment (SLP): treatment no longer indicated as all goals met (01/18/23 1515)         Plan of Care Reviewed With: patient      SWALLOW EVALUATION (last 72 hours)     SLP Adult Swallow Evaluation     Row Name 01/18/23 1515 01/16/23 0815                Rehab Evaluation    Document Type therapy note (daily note)  -MP re-evaluation  -EN       Subjective Information no complaints  -MP no complaints  -EN       Patient Observations alert;cooperative  -MP alert;cooperative  -EN       Patient/Family/Caregiver Comments/Observations No family present  -MP no family present  -EN       Patient Effort adequate  -MP good  -EN       Symptoms Noted During/After Treatment -- none  -EN          General Information    Patient Profile Reviewed -- yes  -EN       Pertinent History Of Current Problem -- See previous eval.  -EN       Current Method of Nutrition -- regular textures;nectar/syrup-thick liquids;water between meals  -EN       Prior Level of Function-Communication -- cognitive-linguistic impairment  -EN       Prior Level of Function-Swallowing -- safe, efficient swallowing in all situations  -EN       Plans/Goals  Discussed with -- patient  -EN       Barriers to Rehab -- cognitive status  -EN       Patient's Goals for Discharge -- no concerns voiced  -EN          Pain    Additional Documentation Pain Scale: FACES Pre/Post-Treatment (Group)  -MP --          Pain Scale: FACES Pre/Post-Treatment    Pain: FACES Scale, Pretreatment 0-->no hurt  -MP 0-->no hurt  -EN       Posttreatment Pain Rating 0-->no hurt  -MP 0-->no hurt  -EN          Clinical Swallow Eval    Oral Prep Phase -- impaired  -EN          Oral Prep Concerns    Oral Prep Concerns -- increased prep time  -EN       Increased Prep Time -- regular consistencies  -EN          Pharyngeal Phase Concerns    Pharyngeal Phase Concerns -- cough  -EN       Cough -- thin  -EN       Pharyngeal Phase Concerns, Comment -- Pt w/ only one episode of coughing this AM following thin liquid wash after trial of scrambled eggs. SLP provided a few more of this same pattern of trials and pt w/ no s/s of aspiration in subsequent trials. No other s/s of aspiration across eval. Will upgrade pt to thin liquids; notified RN and advised to downgrade to nectar-thick liquids should increased amount of signs of aspiration be evident during subsequent meal times.  -EN          SLP Evaluation Clinical Impression    SLP Swallowing Diagnosis -- mild;oral dysphagia;suspected pharyngeal dysphagia  -EN       Functional Impact -- risk of aspiration/pneumonia  -EN       Rehab Potential/Prognosis, Swallowing -- re-evaluate goals as necessary  -EN       Swallow Criteria for Skilled Therapeutic Interventions Met -- demonstrates skilled criteria  -EN          SLP Treatment Clinical Impressions    Treatment Assessment (SLP) improved;no clinical signs of;aspiration  -MP --       Treatment Assessment Comments (SLP) Assisted pt w/ part of late lunch tray still on table; had eaten minimal. Took few bites of chicken/rice stir rodriguez, mandarin oranges w/ juice (mixed consistency), and straw sips of lemonade. No s/sxs  aspiration/distress/difficulty w/ any. No further\ SLP needs indicated @ this time. If any new concerns arise, please re-consult.  -MP --       Daily Summary of Progress (SLP) progress toward functional goals is good  -MP --       Plan for Continued Treatment (SLP) treatment no longer indicated as all goals met  -MP --       Care Plan Review care plan/treatment goals reviewed;patient/other agree to care plan  -MP --          Recommendations    Therapy Frequency (Swallow) evaluation only  -MP PRN  -EN       Predicted Duration Therapy Intervention (Days) -- until discharge  -EN       SLP Diet Recommendation regular textures;thin liquids  -MP regular textures;thin liquids;other (see comments)  RN to downgrade to nectar-thick liquids should it be warranted  -EN       Recommended Diagnostics -- other (see comments)  diet tolerance  -EN       Recommended Precautions and Strategies upright posture during/after eating;general aspiration precautions  -MP upright posture during/after eating;general aspiration precautions;assist with feeding  -EN       Oral Care Recommendations Oral Care BID/PRN  -MP Oral Care BID/PRN  -EN       SLP Rec. for Method of Medication Administration as tolerated  -MP meds whole;with puree  -EN       Monitor for Signs of Aspiration notify SLP if any concerns  -MP notify SLP if any concerns  -EN             User Key  (r) = Recorded By, (t) = Taken By, (c) = Cosigned By    Initials Name Effective Dates    MP Boogie Escobedo, MS CCC-SLP 12/28/21 -     EN Tamia Be MS CCC-SLP 06/22/22 -                 EDUCATION  The patient has been educated in the following areas:   Dysphagia (Swallowing Impairment).        SLP GOALS     Row Name 01/18/23 1515 01/16/23 0815          (LTG) Patient will demonstrate functional swallow for    Diet Texture (Demonstrate functional swallow) regular textures  -MP regular textures  -EN     Liquid viscosity (Demonstrate functional swallow) thin liquids  -MP thin  liquids  -EN     [place] (Demonstrate functional swallow) with minimal cues (75-90% accuracy)  -MP with minimal cues (75-90% accuracy)  -EN     Time Frame (Demonstrate functional swallow) by discharge  -MP by discharge  -EN     Progress/Outcomes (Demonstrate functional swallow) goal met  -MP --        (STG) Patient will tolerate trials of    Consistencies Trialed (Tolerate trials) regular textures;thin liquids;mixed consistencies  -MP regular textures;thin liquids;mixed consistencies  -EN     Desired Outcome (Tolerate trials) without signs/symptoms of aspiration  -MP without signs/symptoms of aspiration  -EN     [place] (Tolerate trials) with minimal cues (75-90% accuracy)  -MP with minimal cues (75-90% accuracy)  -EN     Time Frame (Tolerate trials) 1 week  -MP 1 week  -EN     Progress/Outcomes (Tolerate trials) goal met  -MP new goal  -EN           User Key  (r) = Recorded By, (t) = Taken By, (c) = Cosigned By    Initials Name Provider Type    MP Boogie Escobedo MS CCC-SLP Speech and Language Pathologist    EN Tamia Be MS CCC-SLP Speech and Language Pathologist                   Time Calculation:    Time Calculation- SLP     Row Name 01/18/23 1551             Time Calculation- SLP    SLP Start Time 1515  -MP      SLP Received On 01/18/23  -MP         Untimed Charges    [id number] Treatment Swallow Minutes 40  -MP         Total Minutes    Untimed Charges Total Minutes 40  -MP       Total Minutes 40  -MP            User Key  (r) = Recorded By, (t) = Taken By, (c) = Cosigned By    Initials Name Provider Type     Boogie Escobedo, MS CCC-SLP Speech and Language Pathologist                Therapy Charges for Today     Code Description Service Date Service Provider Modifiers Qty    [id number]  ST TREATMENT SWALLOW 3 1/18/2023 Boogie Escobedo MS CCC-SLP GN 1               Boogie Fuentes MS CCC-ZONIA  1/18/2023

## 2023-01-18 NOTE — PLAN OF CARE
Goal Outcome Evaluation:  Plan of Care Reviewed With: patient        Progress: improving  Outcome Evaluation: Pt oriented to self and follows commands with verbal and tactile cues to participate, significant processing delays noted. Pt required mod assist x2 bed mobility, max assist x2 bed to chair transfer and self-feeding with min assist-CGA using adaptive cup and standard utensils. Pt limited with decreased processing, impaired static and dynamic sitting and standing balance, impaired motor planning. Recommend SNF.

## 2023-01-18 NOTE — PROGRESS NOTES
[facility] Medicine Services  PROGRESS NOTE    Patient Name: Ramin Zaragoza  : 1935  MRN: [id number]    Date of Admission: 2023  Primary Care Physician: Tez Santos MD    Subjective   Subjective     CC:  S/p fall    HPI:  Patient up in chair napping. Awakes to voice. No complaints of pain. Follows command, but exam limited by underlying dementia      ROS:  ? Reliability 2/2 dementia    Objective   Objective     Vital Signs:   Temp:  [95.6 °F (35.3 °C)-98.7 °F (37.1 °C)] 96.7 °F (35.9 °C)  Heart Rate:  [52-70] 52  Resp:  [14-18] 14  BP: (118-140)/(61-79) 126/65     Physical Exam:  Constitutional: No acute distress, sleeping, but arouses to voice  HENT: NCAT, mucous membranes moist  Respiratory: Clear to auscultation bilaterally, respiratory effort normal   Cardiovascular: RRR, no murmurs, rubs, or gallops  Gastrointestinal: Positive bowel sounds, soft, nontender, nondistended  Musculoskeletal: No bilateral ankle edema  Psychiatric:calm, cooperative  Neurologic: Oriented to self, follows simple command and answers simple questions, MC freely, speech clear  Skin: No rashes       Results Reviewed:  LAB RESULTS:      Lab 23  0502 23  0626 23  1640   WBC 8.48 8.68 11.13*   HEMOGLOBIN 11.5* 12.7* 14.5   HEMATOCRIT 33.7* 37.3* 41.9   PLATELETS 171 161 179   NEUTROS ABS 4.87  --  8.16*   IMMATURE GRANS (ABS) 0.03  --  0.05   LYMPHS ABS 2.43  --  1.43   MONOS ABS 0.89  --  1.44*   EOS ABS 0.24  --  0.03   MCV 94.9 95.9 94.4         Lab 23  0502 23  0626 23  1640   SODIUM 137 137 137   POTASSIUM 3.6 3.4* 4.3   CHLORIDE 104 100 98   CO2 25.0 26.0 29.0   ANION GAP 8.0 11.0 10.0   BUN 10 11 13   CREATININE 0.66* 0.71* 0.94   EGFR 90.8 88.8 78.5   GLUCOSE 93 109* 154*   CALCIUM 8.6 8.7 9.4   MAGNESIUM 1.8  --  1.7   TSH  --  10.670*  --          Lab 23  1640   TOTAL PROTEIN 7.1   ALBUMIN 4.0   GLOBULIN 3.1   ALT (SGPT) 17   AST (SGOT) 40    BILIRUBIN 0.9   ALK PHOS 55         Lab 01/13/23  1640   TROPONIN T <0.010             Lab 01/14/23  0626   VITAMIN B 12 485         Brief Urine Lab Results  (Last result in the past 365 days)      Color   Clarity   Blood   Leuk Est   Nitrite   Protein   CREAT   Urine HCG        01/13/23 1858 Dark Yellow   Clear   Moderate (2+)   Negative   Negative   30 mg/dL (1+)                 Microbiology Results Abnormal     None          MRI Cervical Spine Without Contrast    Result Date: 1/17/2023  MRI CERVICAL SPINE WO CONTRAST Date of Exam: 1/17/2023 8:00 AM EST Indication: Neck trauma, ligament injury suspected (Age >= 16y).  Comparison: CT 1/13/2023 Technique:  Routine multiplanar/multisequence sequence images of the cervical spine were obtained without contrast administration.  Findings: Marrow signal is preserved and there is no evidence of acute fracture. Alignment is anatomic without evidence of listhesis or subluxation. There is no specific focal ligamentous edema to suggest disruption. The cervical spinal cord demonstrates no focal areas of intrinsic signal abnormality. Advanced multilevel spondylosis is present, with areas of disc osteophyte complex formation and facet arthropathy resulting in severe spinal canal and neuroforaminal narrowing at the C3-4, C4-5 and C5-6 levels. Moderate to severe spinal canal and neuroforaminal narrowing is also present at C6-7. The paraspinal soft tissues demonstrate no acute findings.     Impression: Impression: Severe multilevel cervical spondylosis is present as above. There is otherwise no ligamentous edema or malalignment to suggest derangement and there is no focal cord signal abnormality. Electronically Signed: Jorge Luis Pitts  1/17/2023 11:19 AM EST  Workstation ID: [id number]      Results for orders placed during the hospital encounter of 02/23/22    Adult Transthoracic Echo Complete W/ Cont if Necessary Per Protocol    Interpretation Summary  · Normal left ventricular  systolic function, estimated EF 60%.  · Mild mitral regurgitation.  · Mild tricuspid regurgitation with normal RVSP.  · Mild pulmonic insufficiency.      I have reviewed the medications:  Scheduled Meds:aspirin, 81 mg, Oral, Daily  divalproex, 250 mg, Oral, Q12H  donepezil, 10 mg, Oral, Nightly  fluticasone, 2 spray, Each Nare, BID  levothyroxine, 50 mcg, Oral, Q AM  pantoprazole, 40 mg, Oral, Q AM  senna-docusate sodium, 2 tablet, Oral, BID  sodium chloride, 10 mL, Intravenous, Q12H      Continuous Infusions:   PRN Meds:.•  acetaminophen  •  senna-docusate sodium **AND** polyethylene glycol **AND** bisacodyl **AND** bisacodyl  •  hydrOXYzine  •  ondansetron **OR** ondansetron  •  sodium chloride  •  sodium chloride  •  sodium chloride    Assessment & Plan   Assessment & Plan     Active Hospital Problems    Diagnosis  POA   • **Altered mental status [R41.82]  Yes   • Dementia (HCC) [F03.90]  Yes   • Accident due to mechanical fall without injury [W19.XXXA]  Yes   • Hypothyroidism [E03.9]  Yes      Resolved Hospital Problems   No resolved problems to display.        Brief Hospital Course to date:  Ramin Zaragoza is a 87 y.o. male w dementia living on a memory care unit who presents for concerns for worsening confusion after a mechanical fall in which he hit his head.    This patient's problems and plans were partially entered by my partner and updated as appropriate by me 01/18/23.    Assessment/plan:    Acute encephalopathy in setting of severe dementia - now at baseline  -Unrevealing CT head and u/a. No clear s/s of infection  -Could be waxing/waning or post-concussive syndrome after head trauma  -Neurology saw - CT head could not exclude NPH. After eval, no NPH w/u necessary and wouldn't benefit from shunt evaluation  -Home aricept, depakote  - PT/OT recommends rehab/ SNF    Head trauma 2/2 mechanical fall w residual neck pain  -CT head, CT C-spine, Xrays left hip and left shoulder all unremarkable, no other  identified injuries  -Clavicle Xray negative for fracture.   --MRI cspine: Severe multilevel cervical spondylosis, otherwise no acute finding.   -- Appears comfortable today and denies pain. Partners discussed with family and will treat symptomatically.     Hypothyroidism - new diagnosis  -TSH very elevated on arrival, started low dose levothyroxine  -- Follow-up TSH 4-6 weeks, defer to PCP    Hematuria, likely 2/2 traumatic cath in ED   - can f/u OP w repeat in a couple of weeks.    --Defer to PCP.    Mild oropharyngeal dysphagia   - speech following, OK for regular diet with thin liquids  -- flonase for nasal congestion w improvement    Osteoarthritis -prior restarted home tramadol as needed    HTN - holding home lisinopril and bumex (both low dose) given normotension  -- Remains normotensive off medications.  Euvolemic, monitor    BPH - allergy to flomax (s/e of hypotension)          Expected Discharge Location and Transportation: [facility]  Expected Discharge 1/19    DVT prophylaxis:  Mechanical DVT prophylaxis orders are present.     AM-PAC 6 Clicks Score (PT): 11 (01/17/23 1511)    CODE STATUS:   Code Status and Medical Interventions:   Ordered at: 01/13/23 8691     Medical Intervention Limits:    NO intubation (DNI)     Level Of Support Discussed With:    Health Care Surrogate     Code Status (Patient has no pulse and is not breathing):    No CPR (Do Not Attempt to Resuscitate)     Medical Interventions (Patient has pulse or is breathing):    Limited Support     Comments:    per patient's daughter and POA       Geovanna Price, APRN  01/18/23

## 2023-01-18 NOTE — THERAPY TREATMENT NOTE
Patient Name: Ramin Zaragoza  : 1935    MRN: [id number]                              Today's Date: 2023       Admit Date: 2023    Visit Dx:     ICD-10-CM ICD-9-CM   1. Altered mental status, unspecified altered mental status type  R41.82 780.97   2. Injury of head, initial encounter  S09.90XA 959.01   3. Contusion of left shoulder, initial encounter  S40.012A 923.00   4. Contusion of left hip, initial encounter  S70.02XA 924.01   5. Hematuria, unspecified type  R31.9 599.70   6. Dysphagia, unspecified type  R13.10 787.20     Patient Active Problem List   Diagnosis   • Coronary artery disease involving native coronary artery of native heart without angina pectoris   • Premature ventricular contraction   • Essential hypertension   • Mixed hyperlipidemia   • Venous insufficiency   • Hypothyroidism   • Lactose intolerance   • Weakness   • Accident due to mechanical fall without injury   • Altered mental status   • Dementia (HCC)     Past Medical History:   Diagnosis Date   • CAD (coronary artery disease)    • Hyperlipidemia    • Hypertension    • Hypothyroidism     on chronic replacement therapy   • Lactose intolerance    • Lower extremity edema    • PVC's (premature ventricular contractions)     History of   • Thyroid disorder    • Venous insufficiency      Past Surgical History:   Procedure Laterality Date   • CARDIAC CATHETERIZATION     • CORONARY STENT PLACEMENT     • EYE SURGERY      Bilateral cataract extraction   • HERNIA REPAIR      Inguinal hernia repair   • OTHER SURGICAL HISTORY      Melanoma removed from back   • OTHER SURGICAL HISTORY      History of left elbow fracture with sunsequent fixation      General Information     Row Name 23 1155          Physical Therapy Time and Intention    Document Type therapy note (daily note)  -FW     Mode of Treatment physical therapy  -FW     Row Name 23 1155          General Information    Existing Precautions/Restrictions fall;other (see  comments)  -     Row Name 01/18/23 1155          Safety Issues, Functional Mobility    Impairments Affecting Function (Mobility) balance;cognition;coordination;motor planning;endurance/activity tolerance;strength;range of motion (ROM);postural/trunk control  -FW           User Key  (r) = Recorded By, (t) = Taken By, (c) = Cosigned By    Initials Name Provider Type     Singh Salas PT Physical Therapist               Mobility     Row Name 01/18/23 1156          Bed Mobility    Comment, (Bed Mobility) UIC  -FW     Row Name 01/18/23 1156          Transfers    Comment, (Transfers) max A to scoot EOB  -     Row Name 01/18/23 1156          Sit-Stand Transfer    Sit-Stand Thomas (Transfers) moderate assist (50% patient effort)  -FW     Comment, (Sit-Stand Transfer) STS x3- to standing tolerance- vc for seqencing- left posterolateral lean initially with improvement w/ time  -FW     Row Name 01/18/23 1156          Gait/Stairs (Locomotion)    Comment, (Gait/Stairs) attempted gait; however, pt would advance his RLE but was unable to advance his LLE  -FW           User Key  (r) = Recorded By, (t) = Taken By, (c) = Cosigned By    Initials Name Provider Type     Singh Salas PT Physical Therapist               Obj/Interventions     Row Name 01/18/23 1157          Motor Skills    Therapeutic Exercise hip;knee;ankle  10 reps of: LAQ, AA SLR, hip abd/add, heel slides, ankle pumps  -     Row Name 01/18/23 1157          Balance    Balance Assessment sitting static balance;sitting dynamic balance;standing static balance  -FW     Static Sitting Balance standby assist  -FW     Dynamic Sitting Balance standby assist  -FW     Position, Sitting Balance supported;sitting in chair  -FW     Static Standing Balance minimal assist  -FW     Dynamic Standing Balance moderate assist  -FW     Position/Device Used, Standing Balance supported;walker, front-wheeled  -FW           User Key  (r) = Recorded By, (t) = Taken  By, (c) = Cosigned By    Initials Name Provider Type    Singh Lindo PT Physical Therapist               Goals/Plan    No documentation.                Clinical Impression     Row Name 01/18/23 1158          Pain    Pretreatment Pain Rating 0/10 - no pain  -FW     Posttreatment Pain Rating 0/10 - no pain  -FW     Row Name 01/18/23 1158          Plan of Care Review    Plan of Care Reviewed With patient  -FW     Outcome Evaluation Pt continues with deficits in cognition, functional strength, balance, and activity tolerance limiting mobility. Pt STS x3 standing to tolerance ~15-25 seconds. Attempted gait; however, pt was able to advance his RLE but unable to advance his LLE. Pt tolerated therex well. Recommend dc SnF.  -FW     Row Name 01/18/23 1158          Vital Signs    Pre Systolic BP Rehab --  vss  -FW     O2 Delivery Pre Treatment room air  -FW     O2 Delivery Intra Treatment room air  -FW     O2 Delivery Post Treatment room air  -FW     Pre Patient Position Sitting  -FW     Intra Patient Position Standing  -FW     Post Patient Position Sitting  -FW     Row Name 01/18/23 1158          Positioning and Restraints    Pre-Treatment Position sitting in chair/recliner  -FW     Post Treatment Position chair  -FW     In Chair notified nsg;reclined;sitting;call light within reach;encouraged to call for assist;exit alarm on;legs elevated;waffle cushion;on mechanical lift sling  -FW           User Key  (r) = Recorded By, (t) = Taken By, (c) = Cosigned By    Initials Name Provider Type    Singh Lindo PT Physical Therapist               Outcome Measures     Row Name 01/18/23 1201          How much help from another person do you currently need...    Turning from your back to your side while in flat bed without using bedrails? 2  -FW     Moving from lying on back to sitting on the side of a flat bed without bedrails? 2  -FW     Moving to and from a bed to a chair (including a wheelchair)? 2  -FW      Standing up from a chair using your arms (e.g., wheelchair, bedside chair)? 2  -FW     Climbing 3-5 steps with a railing? 1  -FW     To walk in hospital room? 2  -FW     AM-PAC 6 Clicks Score (PT) 11  -FW     Highest level of mobility 4 --> Transferred to chair/commode  -FW     Row Name 01/18/23 1201 01/18/23 0942       Functional Assessment    Outcome Measure Options AM-PAC 6 Clicks Basic Mobility (PT)  -FW AM-PAC 6 Clicks Daily Activity (OT)  -AR          User Key  (r) = Recorded By, (t) = Taken By, (c) = Cosigned By    Initials Name Provider Type    Felicia Ballard, OT Occupational Therapist    Singh Lindo, PT Physical Therapist                             Physical Therapy Education     Title: PT OT SLP Therapies (In Progress)     Topic: Physical Therapy (Done)     Point: Mobility training (Done)     Learning Progress Summary           Patient Acceptance, E, VU,NR by  at 1/18/2023 1201    Acceptance, E, VU by  at 1/17/2023 1511    Eager, E, VU,NR by SC at 1/16/2023 1119    Comment: reviewed benefits of getting oob    Acceptance, E, VU by  at 1/14/2023 1445                   Point: Home exercise program (Done)     Learning Progress Summary           Patient Acceptance, E, VU,NR by  at 1/18/2023 1201    Acceptance, E, VU by  at 1/17/2023 1511    Eager, E, VU,NR by SC at 1/16/2023 1119    Comment: reviewed benefits of getting oob                   Point: Body mechanics (Done)     Learning Progress Summary           Patient Acceptance, E, VU,NR by  at 1/18/2023 1201    Acceptance, E, VU by  at 1/17/2023 1511    Eager, E, VU,NR by SC at 1/16/2023 1119    Comment: reviewed benefits of getting oob    Acceptance, E, VU by  at 1/14/2023 1445                   Point: Precautions (Done)     Learning Progress Summary           Patient Acceptance, E, VU,NR by  at 1/18/2023 1201    Acceptance, E, VU by  at 1/17/2023 1511    Eager, E, VU,NR by SC at 1/16/2023 1119    Comment: reviewed  benefits of getting oob    Acceptance, E, VU by  at 1/14/2023 1445                               User Key     Initials Effective Dates Name Provider Type Discipline    SC 06/16/21 -  Grant Higgins, PT Physical Therapist PT     05/05/22 -  Singh Salas, ASHLEY Physical Therapist PT     09/21/21 -  Monroe Aldana, PT Physical Therapist PT     07/14/21 -  Bernadette Moss, PT Physical Therapist PT              PT Recommendation and Plan     Plan of Care Reviewed With: patient  Outcome Evaluation: Pt continues with deficits in cognition, functional strength, balance, and activity tolerance limiting mobility. Pt STS x3 standing to tolerance ~15-25 seconds. Attempted gait; however, pt was able to advance his RLE but unable to advance his LLE. Pt tolerated therex well. Recommend dc SnF.     Time Calculation:    PT Charges     Row Name 01/18/23 1202             Time Calculation    Start Time 1140  -FW      PT Received On 01/18/23  -FW      PT Goal Re-Cert Due Date 01/24/23  -FW         Timed Charges    72703 - PT Therapeutic Exercise Minutes 5  -FW      49684 - PT Therapeutic Activity Minutes 9  -FW         Total Minutes    Timed Charges Total Minutes 14  -FW       Total Minutes 14  -FW            User Key  (r) = Recorded By, (t) = Taken By, (c) = Cosigned By    Initials Name Provider Type     Singh Salas, ASHLEY Physical Therapist              Therapy Charges for Today     Code Description Service Date Service Provider Modifiers Qty    71911589024  PT THERAPEUTIC ACT EA 15 MIN 1/18/2023 Singh Salas, PT GP 1          PT G-Codes  Outcome Measure Options: AM-PAC 6 Clicks Basic Mobility (PT)  AM-PAC 6 Clicks Score (PT): 11  AM-PAC 6 Clicks Score (OT): 12  PT Discharge Summary  Anticipated Discharge Disposition (PT): skilled nursing facility    Singh Salas PT  1/18/2023

## 2023-01-19 PROCEDURE — 99232 SBSQ HOSP IP/OBS MODERATE 35: CPT | Performed by: NURSE PRACTITIONER

## 2023-01-19 PROCEDURE — G0378 HOSPITAL OBSERVATION PER HR: HCPCS

## 2023-01-19 RX ORDER — TRAMADOL HYDROCHLORIDE 50 MG/1
25 TABLET ORAL 2 TIMES DAILY PRN
Status: DISCONTINUED | OUTPATIENT
Start: 2023-01-19 | End: 2023-01-21 | Stop reason: HOSPADM

## 2023-01-19 RX ORDER — VALPROIC ACID 250 MG/5ML
250 SOLUTION ORAL EVERY 12 HOURS SCHEDULED
Status: DISCONTINUED | OUTPATIENT
Start: 2023-01-20 | End: 2023-01-21 | Stop reason: HOSPADM

## 2023-01-19 RX ORDER — LIDOCAINE 50 MG/G
1 PATCH TOPICAL
Status: DISCONTINUED | OUTPATIENT
Start: 2023-01-19 | End: 2023-01-21 | Stop reason: HOSPADM

## 2023-01-19 RX ADMIN — SENNOSIDES AND DOCUSATE SODIUM 2 TABLET: 50; 8.6 TABLET ORAL at 21:22

## 2023-01-19 RX ADMIN — DICLOFENAC 2 G: 10 GEL TOPICAL at 21:22

## 2023-01-19 RX ADMIN — DIVALPROEX SODIUM 250 MG: 250 TABLET, DELAYED RELEASE ORAL at 09:01

## 2023-01-19 RX ADMIN — DONEPEZIL HYDROCHLORIDE 10 MG: 10 TABLET, FILM COATED ORAL at 21:22

## 2023-01-19 RX ADMIN — PANTOPRAZOLE SODIUM 40 MG: 40 TABLET, DELAYED RELEASE ORAL at 05:30

## 2023-01-19 RX ADMIN — Medication 10 ML: at 21:23

## 2023-01-19 RX ADMIN — ASPIRIN 81 MG: 81 TABLET, COATED ORAL at 09:01

## 2023-01-19 RX ADMIN — DIVALPROEX SODIUM 250 MG: 250 TABLET, DELAYED RELEASE ORAL at 21:22

## 2023-01-19 RX ADMIN — TRAMADOL HYDROCHLORIDE 25 MG: 50 TABLET, COATED ORAL at 23:51

## 2023-01-19 RX ADMIN — DICLOFENAC 2 G: 10 GEL TOPICAL at 11:16

## 2023-01-19 RX ADMIN — FLUTICASONE PROPIONATE 2 SPRAY: 50 SPRAY, METERED NASAL at 21:22

## 2023-01-19 RX ADMIN — FLUTICASONE PROPIONATE 2 SPRAY: 50 SPRAY, METERED NASAL at 09:02

## 2023-01-19 RX ADMIN — LIDOCAINE 1 PATCH: 50 PATCH CUTANEOUS at 21:23

## 2023-01-19 RX ADMIN — ACETAMINOPHEN 650 MG: 325 TABLET ORAL at 21:22

## 2023-01-19 RX ADMIN — ACETAMINOPHEN 650 MG: 325 TABLET ORAL at 09:01

## 2023-01-19 RX ADMIN — LEVOTHYROXINE SODIUM 50 MCG: 50 TABLET ORAL at 05:30

## 2023-01-19 RX ADMIN — DICLOFENAC 2 G: 10 GEL TOPICAL at 16:45

## 2023-01-19 RX ADMIN — HYDROXYZINE HYDROCHLORIDE 10 MG: 10 TABLET ORAL at 23:51

## 2023-01-19 NOTE — PLAN OF CARE
Goal Outcome Evaluation:  Plan of Care Reviewed With: patient        Progress: no change     Pt is confused. VSS.RA. Voiding spontaneously.

## 2023-01-19 NOTE — PROGRESS NOTES
[facility] Medicine Services  PROGRESS NOTE    Patient Name: Ramin Zaragoza  : 1935  MRN: [id number]    Date of Admission: 2023  Primary Care Physician: Tez Santos MD    Subjective   Subjective     CC:  S/p fall    HPI:  Patient up in bed eating breakfast on exam this morning. Reports some neck pain today. Very alert and interactive. Denies soa, pain elsewhere, nausea, dizziness      ROS:  Gen- No fevers, chills  CV- No chest pain, palpitations  Resp- No cough, dyspnea  GI- No N/V/D, abd pain  Musc- as above      Objective   Objective     Vital Signs:   Temp:  [96.7 °F (35.9 °C)-97.8 °F (36.6 °C)] 96.7 °F (35.9 °C)  Heart Rate:  [56-73] 56  Resp:  [14-16] 14  BP: (130-140)/(64-74) 134/69     Physical Exam:  Constitutional: No acute distress, sitting up in bed eating  HENT: NCAT, mucous membranes moist  Respiratory: Clear to auscultation bilaterally, respiratory effort normal   Cardiovascular: RRR, no murmurs, rubs, or gallops  Gastrointestinal: Positive bowel sounds, soft, nontender, nondistended  Musculoskeletal: No bilateral ankle edema  Psychiatric:calm, cooperative  Neurologic: Oriented to self, follows simple command and answers ROS questions, MC freely, speech clear  Skin: No rashes       Results Reviewed:  LAB RESULTS:      Lab 23  0502 23  0626 23  1640   WBC 8.48 8.68 11.13*   HEMOGLOBIN 11.5* 12.7* 14.5   HEMATOCRIT 33.7* 37.3* 41.9   PLATELETS 171 161 179   NEUTROS ABS 4.87  --  8.16*   IMMATURE GRANS (ABS) 0.03  --  0.05   LYMPHS ABS 2.43  --  1.43   MONOS ABS 0.89  --  1.44*   EOS ABS 0.24  --  0.03   MCV 94.9 95.9 94.4         Lab 23  0502 23  0626 23  1640   SODIUM 137 137 137   POTASSIUM 3.6 3.4* 4.3   CHLORIDE 104 100 98   CO2 25.0 26.0 29.0   ANION GAP 8.0 11.0 10.0   BUN 10 11 13   CREATININE 0.66* 0.71* 0.94   EGFR 90.8 88.8 78.5   GLUCOSE 93 109* 154*   CALCIUM 8.6 8.7 9.4   MAGNESIUM 1.8  --  1.7   TSH  --  10.670*   --          Lab 01/13/23  1640   TOTAL PROTEIN 7.1   ALBUMIN 4.0   GLOBULIN 3.1   ALT (SGPT) 17   AST (SGOT) 40   BILIRUBIN 0.9   ALK PHOS 55         Lab 01/13/23  1640   TROPONIN T <0.010             Lab 01/14/23  0626   VITAMIN B 12 485         Brief Urine Lab Results  (Last result in the past 365 days)      Color   Clarity   Blood   Leuk Est   Nitrite   Protein   CREAT   Urine HCG        01/13/23 1858 Dark Yellow   Clear   Moderate (2+)   Negative   Negative   30 mg/dL (1+)                 Microbiology Results Abnormal     None          No radiology results from the last 24 hrs    Results for orders placed during the hospital encounter of 02/23/22    Adult Transthoracic Echo Complete W/ Cont if Necessary Per Protocol    Interpretation Summary  · Normal left ventricular systolic function, estimated EF 60%.  · Mild mitral regurgitation.  · Mild tricuspid regurgitation with normal RVSP.  · Mild pulmonic insufficiency.      I have reviewed the medications:  Scheduled Meds:aspirin, 81 mg, Oral, Daily  Diclofenac Sodium, 2 g, Topical, TID  divalproex, 250 mg, Oral, Q12H  donepezil, 10 mg, Oral, Nightly  fluticasone, 2 spray, Each Nare, BID  levothyroxine, 50 mcg, Oral, Q AM  lidocaine, 1 patch, Transdermal, Q24H  pantoprazole, 40 mg, Oral, Q AM  senna-docusate sodium, 2 tablet, Oral, BID  sodium chloride, 10 mL, Intravenous, Q12H      Continuous Infusions:   PRN Meds:.•  acetaminophen  •  senna-docusate sodium **AND** polyethylene glycol **AND** bisacodyl **AND** bisacodyl  •  hydrOXYzine  •  ondansetron **OR** ondansetron  •  sodium chloride  •  sodium chloride  •  sodium chloride    Assessment & Plan   Assessment & Plan     Active Hospital Problems    Diagnosis  POA   • **Altered mental status [R41.82]  Yes   • Dementia (HCC) [F03.90]  Yes   • Accident due to mechanical fall without injury [W19.XXXA]  Yes   • Hypothyroidism [E03.9]  Yes      Resolved Hospital Problems   No resolved problems to display.        Brief  Hospital Course to date:  Ramin Zaragoza is a 87 y.o. male w dementia living on a memory care unit who presents for concerns for worsening confusion after a mechanical fall in which he hit his head.    This patient's problems and plans were partially entered by my partner and updated as appropriate by me 01/19/23.    Assessment/plan:    Acute encephalopathy in setting of severe dementia - now at baseline  -Unrevealing CT head and u/a. No clear s/s of infection  -Could be waxing/waning or post-concussive syndrome after head trauma  -Neurology saw - CT head could not exclude NPH. After eval, NO NPH w/u necessary and wouldn't benefit from shunt evaluation  -Home aricept, depakote  - PT/OT recommends rehab/ SNF    Head trauma 2/2 mechanical fall w residual neck pain  -CT head, CT C-spine, Xrays left hip and left shoulder all unremarkable, no other identified injuries  -Clavicle Xray negative for fracture.   --MRI cspine: Severe multilevel cervical spondylosis, otherwise no acute finding.   -- Partners discussed with family and will treat symptomatically.  -- trial voltaren topical during the day and lidoderm patch at night     Hypothyroidism - new diagnosis  -TSH very elevated on arrival, started low dose levothyroxine  -- Follow-up TSH 4-6 weeks, defer to PCP    Hematuria, likely 2/2 traumatic cath in ED   - can f/u OP w repeat in a couple of weeks.    --Defer to PCP.    Mild oropharyngeal dysphagia   - speech following, OK for regular diet with thin liquids  -- flonase for nasal congestion w improvement    Osteoarthritis -restart 1/2 dose home tramadol as needed    HTN - holding home lisinopril and bumex (both low dose) given normotension  -- Remains normotensive off medications.  Euvolemic, monitor    BPH - allergy to flomax (s/e of hypotension)          Expected Discharge Location and Transportation: [facility]  Expected Discharge 1/19, whenever insurance approves    DVT prophylaxis:  Mechanical DVT prophylaxis orders are  present.     AM-PAC 6 Clicks Score (PT): 11 (01/18/23 1201)    CODE STATUS:   Code Status and Medical Interventions:   Ordered at: 01/13/23 5158     Medical Intervention Limits:    NO intubation (DNI)     Level Of Support Discussed With:    Health Care Surrogate     Code Status (Patient has no pulse and is not breathing):    No CPR (Do Not Attempt to Resuscitate)     Medical Interventions (Patient has pulse or is breathing):    Limited Support     Comments:    per patient's daughter and POA       Geovanna Price, APRN  01/19/23

## 2023-01-19 NOTE — CASE MANAGEMENT/SOCIAL WORK
Continued Stay Note  [facility]     Patient Name: Ramin Zaragoza  MRN: [id number]  Today's Date: 1/19/2023    Admit Date: 1/13/2023    Plan: SNF   Discharge Plan     Row Name 01/19/23 0935       Plan    Plan Comments Spoke to Isatu, daughter by phone. CM notified her that [facility] has started the insurance precert as of yesterday. Plan is [facility] pending precert. CM will continue to follow.               Discharge Codes    No documentation.               Expected Discharge Date and Time     Expected Discharge Date Expected Discharge Time    Jan 19, 2023             Artur Magaña RN

## 2023-01-20 LAB
MAGNESIUM SERPL-MCNC: 1.9 MG/DL (ref 1.6–2.4)
POTASSIUM SERPL-SCNC: 4.4 MMOL/L (ref 3.5–5.2)
SARS-COV-2 RDRP RESP QL NAA+PROBE: NORMAL

## 2023-01-20 PROCEDURE — 97530 THERAPEUTIC ACTIVITIES: CPT

## 2023-01-20 PROCEDURE — 96366 THER/PROPH/DIAG IV INF ADDON: CPT

## 2023-01-20 PROCEDURE — 83735 ASSAY OF MAGNESIUM: CPT | Performed by: NURSE PRACTITIONER

## 2023-01-20 PROCEDURE — G0378 HOSPITAL OBSERVATION PER HR: HCPCS

## 2023-01-20 PROCEDURE — 87635 SARS-COV-2 COVID-19 AMP PRB: CPT | Performed by: FAMILY MEDICINE

## 2023-01-20 PROCEDURE — 0 MAGNESIUM SULFATE 4 GM/100ML SOLUTION: Performed by: FAMILY MEDICINE

## 2023-01-20 PROCEDURE — 97110 THERAPEUTIC EXERCISES: CPT

## 2023-01-20 PROCEDURE — 84132 ASSAY OF SERUM POTASSIUM: CPT | Performed by: NURSE PRACTITIONER

## 2023-01-20 PROCEDURE — 99232 SBSQ HOSP IP/OBS MODERATE 35: CPT | Performed by: NURSE PRACTITIONER

## 2023-01-20 PROCEDURE — 96365 THER/PROPH/DIAG IV INF INIT: CPT

## 2023-01-20 RX ORDER — MAGNESIUM SULFATE HEPTAHYDRATE 40 MG/ML
4 INJECTION, SOLUTION INTRAVENOUS ONCE
Status: COMPLETED | OUTPATIENT
Start: 2023-01-20 | End: 2023-01-20

## 2023-01-20 RX ORDER — BUMETANIDE 1 MG/1
0.5 TABLET ORAL DAILY
Status: DISCONTINUED | OUTPATIENT
Start: 2023-01-20 | End: 2023-01-21 | Stop reason: HOSPADM

## 2023-01-20 RX ADMIN — MAGNESIUM SULFATE HEPTAHYDRATE 4 G: 40 INJECTION, SOLUTION INTRAVENOUS at 10:26

## 2023-01-20 RX ADMIN — SENNOSIDES AND DOCUSATE SODIUM 2 TABLET: 50; 8.6 TABLET ORAL at 09:34

## 2023-01-20 RX ADMIN — ASPIRIN 81 MG: 81 TABLET, COATED ORAL at 09:34

## 2023-01-20 RX ADMIN — FLUTICASONE PROPIONATE 2 SPRAY: 50 SPRAY, METERED NASAL at 09:33

## 2023-01-20 RX ADMIN — PANTOPRAZOLE SODIUM 40 MG: 40 TABLET, DELAYED RELEASE ORAL at 06:06

## 2023-01-20 RX ADMIN — DICLOFENAC 2 G: 10 GEL TOPICAL at 17:53

## 2023-01-20 RX ADMIN — ACETAMINOPHEN 650 MG: 325 TABLET ORAL at 20:49

## 2023-01-20 RX ADMIN — FLUTICASONE PROPIONATE 2 SPRAY: 50 SPRAY, METERED NASAL at 20:52

## 2023-01-20 RX ADMIN — BUMETANIDE 0.5 MG: 1 TABLET ORAL at 14:39

## 2023-01-20 RX ADMIN — VALPROIC ACID 250 MG: 250 SOLUTION ORAL at 09:34

## 2023-01-20 RX ADMIN — LEVOTHYROXINE SODIUM 50 MCG: 50 TABLET ORAL at 06:06

## 2023-01-20 RX ADMIN — Medication 10 ML: at 20:49

## 2023-01-20 RX ADMIN — DONEPEZIL HYDROCHLORIDE 10 MG: 10 TABLET, FILM COATED ORAL at 20:49

## 2023-01-20 RX ADMIN — LIDOCAINE 1 PATCH: 50 PATCH CUTANEOUS at 09:41

## 2023-01-20 RX ADMIN — DICLOFENAC 2 G: 10 GEL TOPICAL at 09:33

## 2023-01-20 RX ADMIN — Medication 10 ML: at 09:41

## 2023-01-20 RX ADMIN — VALPROIC ACID 250 MG: 250 SOLUTION ORAL at 20:49

## 2023-01-20 RX ADMIN — DICLOFENAC 2 G: 10 GEL TOPICAL at 20:52

## 2023-01-20 RX ADMIN — SENNOSIDES AND DOCUSATE SODIUM 2 TABLET: 50; 8.6 TABLET ORAL at 20:49

## 2023-01-20 NOTE — THERAPY TREATMENT NOTE
Patient Name: Ramin Zaragoza  : 1935    MRN: [id number]                              Today's Date: 2023       Admit Date: 2023    Visit Dx:     ICD-10-CM ICD-9-CM   1. Altered mental status, unspecified altered mental status type  R41.82 780.97   2. Injury of head, initial encounter  S09.90XA 959.01   3. Contusion of left shoulder, initial encounter  S40.012A 923.00   4. Contusion of left hip, initial encounter  S70.02XA 924.01   5. Hematuria, unspecified type  R31.9 599.70   6. Dysphagia, unspecified type  R13.10 787.20     Patient Active Problem List   Diagnosis   • Coronary artery disease involving native coronary artery of native heart without angina pectoris   • Premature ventricular contraction   • Essential hypertension   • Mixed hyperlipidemia   • Venous insufficiency   • Hypothyroidism   • Lactose intolerance   • Weakness   • Accident due to mechanical fall without injury   • Altered mental status   • Dementia (HCC)     Past Medical History:   Diagnosis Date   • CAD (coronary artery disease)    • Hyperlipidemia    • Hypertension    • Hypothyroidism     on chronic replacement therapy   • Lactose intolerance    • Lower extremity edema    • PVC's (premature ventricular contractions)     History of   • Thyroid disorder    • Venous insufficiency      Past Surgical History:   Procedure Laterality Date   • CARDIAC CATHETERIZATION     • CORONARY STENT PLACEMENT     • EYE SURGERY      Bilateral cataract extraction   • HERNIA REPAIR      Inguinal hernia repair   • OTHER SURGICAL HISTORY      Melanoma removed from back   • OTHER SURGICAL HISTORY      History of left elbow fracture with sunsequent fixation      General Information     Row Name 23 1136          Physical Therapy Time and Intention    Document Type therapy note (daily note)  -FW     Mode of Treatment physical therapy  -FW     Row Name 23 1136          General Information    Patient Profile Reviewed yes  -FW     Existing  Precautions/Restrictions fall;other (see comments)  L shoulder and neck pain, AMS  -     Row Name 01/20/23 1136          Cognition    Orientation Status (Cognition) oriented to;person;disoriented to;place;situation;time;other (see comments)  -     Row Name 01/20/23 1136          Safety Issues, Functional Mobility    Impairments Affecting Function (Mobility) balance;cognition;coordination;motor planning;endurance/activity tolerance;strength;range of motion (ROM);postural/trunk control  -           User Key  (r) = Recorded By, (t) = Taken By, (c) = Cosigned By    Initials Name Provider Type    FW Singh Salas PT Physical Therapist               Mobility     Row Name 01/20/23 1137          Bed Mobility    Supine-Sit [place] (Bed Mobility) moderate assist (50% patient effort);verbal cues  -     Row Name 01/20/23 1137          Bed-Chair Transfer    Bed-Chair [place] (Transfers) maximum assist (25% patient effort)  -     Assistive Device (Bed-Chair Transfers) walker, front-wheeled  -FW     Comment, (Bed-Chair Transfer) SPT from bed to chair- pt with difficulty advancing BLE requiring max assist to pivot; pt impulsive let go of walker to reach for recliner before being centered in front of the chair  -     Row Name 01/20/23 1137          Sit-Stand Transfer    Sit-Stand [place] (Transfers) moderate assist (50% patient effort)  -     Assistive Device (Sit-Stand Transfers) walker, front-wheeled  -FW     Comment, (Sit-Stand Transfer) pt w/ left posterolateral  lean initially but was able to correct with min A  -     Row Name 01/20/23 1137          Gait/Stairs (Locomotion)    [place] Level (Gait) unable to assess  -     Comment, (Gait/Stairs) attempted gait; however, pt would advance his RLE but was unable to advance his LLE  -FW           User Key  (r) = Recorded By, (t) = Taken By, (c) = Cosigned By    Initials Name Provider Type    FW Singh Salas PT Physical Therapist                Obj/Interventions     Row Name 01/20/23 1143          Motor Skills    Therapeutic Exercise hip;knee;ankle  10 reps of: LAQ, AA SLR, hip abd/add, heel slides, ankle pumps  -     Row Name 01/20/23 1143          Balance    Balance Assessment sitting static balance;sitting dynamic balance;standing static balance;standing dynamic balance  -     Static Sitting Balance standby assist  -FW     Dynamic Sitting Balance standby assist  -FW     Position, Sitting Balance unsupported;sitting in chair;sitting edge of bed  -     Static Standing Balance minimal assist  -FW     Dynamic Standing Balance moderate assist  -FW     Position/Device Used, Standing Balance supported;walker, front-wheeled  -           User Key  (r) = Recorded By, (t) = Taken By, (c) = Cosigned By    Initials Name Provider Type    FW Singh Salas PT Physical Therapist               Goals/Plan    No documentation.                Clinical Impression     Row Name 01/20/23 1144          Pain    Pain Intervention(s) Repositioned;Ambulation/increased activity  -     Row Name 01/20/23 1144          Pain Scale: FACES Pre/Post-Treatment    Pain: FACES Scale, Pretreatment 2-->hurts little bit  -FW     Posttreatment Pain Rating 2-->hurts little bit  -FW     Pain Location - neck  -     Row Name 01/20/23 1144          Plan of Care Review    Outcome Evaluation Pt continues with deficits in cognition, functional strength, balance, and activity tolerance limiting mobility. Pt performed bed mobility mod A, STS mod A, and SPT from bed to recliner max A. Unable to ambulate pt as he demonstrates difficulty w/ sequencing, weight shifting, and LLE advancement limiting gait. Continue PT POC. Recommend dc SnF.  -     Row Name 01/20/23 1144          Vital Signs    O2 Delivery Pre Treatment room air  -FW     O2 Delivery Intra Treatment room air  -FW     O2 Delivery Post Treatment room air  -FW     Pre Patient Position Supine  -FW     Intra Patient  Position Standing  -FW     Post Patient Position Sitting  -FW     Row Name 01/20/23 1144          Positioning and Restraints    Pre-Treatment Position in bed  -FW     Post Treatment Position chair  -FW     In Chair notified nsg;reclined;sitting;call light within reach;encouraged to call for assist;exit alarm on;on mechanical lift sling;waffle cushion;R heel elevated;L heel elevated;legs elevated  SCDs applied  -FW           User Key  (r) = Recorded By, (t) = Taken By, (c) = Cosigned By    Initials Name Provider Type    Singh Lindo PT Physical Therapist               Outcome Measures     Row Name 01/20/23 1149          How much help from another person do you currently need...    Turning from your back to your side while in flat bed without using bedrails? 2  -FW     Moving from lying on back to sitting on the side of a flat bed without bedrails? 2  -FW     Moving to and from a bed to a chair (including a wheelchair)? 2  -FW     Standing up from a chair using your arms (e.g., wheelchair, bedside chair)? 2  -FW     Climbing 3-5 steps with a railing? 1  -FW     To walk in hospital room? 2  -FW     AM-PAC 6 Clicks Score (PT) 11  -FW     Highest level of mobility 4 --> Transferred to chair/commode  -FW     Row Name 01/20/23 1149          Functional Assessment    Outcome Measure Options AM-PAC 6 Clicks Basic Mobility (PT)  -FW           User Key  (r) = Recorded By, (t) = Taken By, (c) = Cosigned By    Initials Name Provider Type    Singh Lindo PT Physical Therapist                             Physical Therapy Education     Title: PT OT SLP Therapies (In Progress)     Topic: Physical Therapy (Done)     Point: Mobility training (Done)     Learning Progress Summary           Patient Acceptance, E, VU,NR by  at 1/20/2023 1149    Acceptance, E, VU,NR by  at 1/18/2023 1201    Acceptance, E, VU by  at 1/17/2023 1511    Eager, E, VU,NR by SC at 1/16/2023 1119    Comment: reviewed benefits of getting  oob    Acceptance, E, VU by  at 1/14/2023 1445                   Point: Home exercise program (Done)     Learning Progress Summary           Patient Acceptance, E, VU,NR by  at 1/20/2023 1149    Acceptance, E, VU,NR by  at 1/18/2023 1201    Acceptance, E, VU by  at 1/17/2023 1511    Eager, E, VU,NR by SC at 1/16/2023 1119    Comment: reviewed benefits of getting oob                   Point: Body mechanics (Done)     Learning Progress Summary           Patient Acceptance, E, VU,NR by  at 1/20/2023 1149    Acceptance, E, VU,NR by  at 1/18/2023 1201    Acceptance, E, VU by  at 1/17/2023 1511    Eager, E, VU,NR by SC at 1/16/2023 1119    Comment: reviewed benefits of getting oob    Acceptance, E, VU by  at 1/14/2023 1445                   Point: Precautions (Done)     Learning Progress Summary           Patient Acceptance, E, VU,NR by  at 1/20/2023 1149    Acceptance, E, VU,NR by  at 1/18/2023 1201    Acceptance, E, VU by  at 1/17/2023 1511    Eager, E, VU,NR by SC at 1/16/2023 1119    Comment: reviewed benefits of getting oob    Acceptance, E, VU by  at 1/14/2023 1445                               User Key     Initials Effective Dates Name Provider Type Discipline    SC 06/16/21 -  Grant Higgins, PT Physical Therapist PT     05/05/22 -  Singh Salas, PT Physical Therapist PT     09/21/21 -  Monroe Aldana, PT Physical Therapist PT     07/14/21 -  Bernadette Moss, PT Physical Therapist PT              PT Recommendation and Plan     Plan of Care Reviewed With: patient  Outcome Evaluation: Pt continues with deficits in cognition, functional strength, balance, and activity tolerance limiting mobility. Pt performed bed mobility mod A, STS mod A, and SPT from bed to recliner max A. Unable to ambulate pt as he demonstrates difficulty w/ sequencing, weight shifting, and LLE advancement limiting gait. Continue PT POC. Recommend dc SnF.     Time Calculation:    PT Charges     Row Name  01/20/23 1152             Time Calculation    Start Time 1105  -FW      PT Received On 01/20/23  -FW      PT Goal Re-Cert Due Date 01/24/23  -FW         Timed Charges    74178 - PT Therapeutic Exercise Minutes 8  -FW      79650 - PT Therapeutic Activity Minutes 18  -FW         Total Minutes    Timed Charges Total Minutes 26  -FW       Total Minutes 26  -FW            User Key  (r) = Recorded By, (t) = Taken By, (c) = Cosigned By    Initials Name Provider Type    FW Snigh Salas PT Physical Therapist              Therapy Charges for Today     Code Description Service Date Service Provider Modifiers Qty    02935438918 HC PT THERAPEUTIC ACT EA 15 MIN 1/20/2023 Singh Salas, PT GP 1    89493997327 HC PT THER PROC EA 15 MIN 1/20/2023 Singh Salas, PT GP 1          PT G-Codes  Outcome Measure Options: AM-PAC 6 Clicks Basic Mobility (PT)  AM-PAC 6 Clicks Score (PT): 11  AM-PAC 6 Clicks Score (OT): 12  PT Discharge Summary  Anticipated Discharge Disposition (PT): skilled nursing facility    Singh Salas PT  1/20/2023

## 2023-01-20 NOTE — CASE MANAGEMENT/SOCIAL WORK
Continued Stay Note  [facility]     Patient Name: Ramin Zaragoza  MRN: [id number]  Today's Date: 1/20/2023    Admit Date: 1/13/2023    Plan: SNF   Discharge Plan     Row Name 01/20/23 1321       Plan    Plan Comments Cm left a VM with the liaison for [facility] to inquire if they had heard back about precert. Cm will continue to follow.               Discharge Codes    No documentation.               Expected Discharge Date and Time     Expected Discharge Date Expected Discharge Time    Jan 19, 2023             Rosario Fuentes RN

## 2023-01-20 NOTE — PROGRESS NOTES
Clinical Nutrition     Patient Name: Ramin Zaragoza  YOB: 1935  MRN: [id number]  Date of Encounter: 23 11:28 EST  Admission date: 2023    Reason for Visit   Follow up    EMR reviewed    Yes    Diet Nutrition Related History     Patient reports a good appetite/intake. Patient endorses drinking his Boost+ sent at breakfast and dinner. Patient denies any nausea or vomiting but noted he did have diarrhea yesterday. Last recorded BM was yesterday, 23.     23 Previous diet tech note:  Pt admitted d/t AMS. Attempted to visit pt. Pt was sleeping. No family at bedside. Pt has a PMH of dementia and noted for confusion. Attempted to call pt's daughter but no answer. SLP following and currently recommends regular textures and nectar thick liquids. Per EMR wt (22) pt may have had a wt loss of 14# (7.4%) within 11 months (insigificant to malnutrition timeframe). Per allergies list pt has lactose intolerance. Will add Boost Plus BID.     Current Nutrition Prescription    Diet: Cardiac Diets; Healthy Heart (2-3 Na+); Texture: Regular Texture (IDDSI 7); Fluid Consistency: Thin (IDDSI 0)  Orders Placed This Encounter      Dietary Nutrition Supplements Boost Plus    Average Intake from [employer]% x last 6 meals. (Patient ate 75% of his breakfast this am)    Actions:    Follow treatment progress, Care plan reviewed, Advise alternate selection, Interview for preferences, Menu provided, Encourage intake    Monitor Per Protocol    Prudence Magaña,   Time Spent: 20 minutes

## 2023-01-20 NOTE — PROGRESS NOTES
[facility] Medicine Services  PROGRESS NOTE    Patient Name: Ramin Zaargoza  : 1935  MRN: [id number]    Date of Admission: 2023  Primary Care Physician: Tez Santos MD    Subjective   Subjective     CC:  S/p fall    HPI:  Patient resting in bed this morning. No overnight issues reported. Patient denies pain today.     ROS:  Gen- No fevers, chills  CV- No chest pain, palpitations  Resp- No cough, dyspnea  GI- No N/V/D, abd pain      Objective   Objective     Vital Signs:   Temp:  [97 °F (36.1 °C)-98.7 °F (37.1 °C)] 97 °F (36.1 °C)  Heart Rate:  [55-69] 55  Resp:  [16-18] 18  BP: (106-127)/(59-89) 127/63     Physical Exam:  Constitutional: No acute distress, resting in bed  HENT: NCAT, mucous membranes moist  Respiratory: few fine crackles bilat bases, respiratory effort normal on RA  Cardiovascular: RRR, no murmurs, rubs, or gallops  Gastrointestinal: Positive bowel sounds, soft, nontender, nondistended  Musculoskeletal: No bilateral ankle edema  Psychiatric:calm, cooperative  Neurologic: Oriented to self, follows command and answers ROS questions, MC freely, speech clear  Skin: No rashes       Results Reviewed:  LAB RESULTS:      Lab 23  0502 23  0626 23  1640   WBC 8.48 8.68 11.13*   HEMOGLOBIN 11.5* 12.7* 14.5   HEMATOCRIT 33.7* 37.3* 41.9   PLATELETS 171 161 179   NEUTROS ABS 4.87  --  8.16*   IMMATURE GRANS (ABS) 0.03  --  0.05   LYMPHS ABS 2.43  --  1.43   MONOS ABS 0.89  --  1.44*   EOS ABS 0.24  --  0.03   MCV 94.9 95.9 94.4         Lab 23  0453 23  0502 23  0626 23  1640   SODIUM  --  137 137 137   POTASSIUM 4.4 3.6 3.4* 4.3   CHLORIDE  --  104 100 98   CO2  --  25.0 26.0 29.0   ANION GAP  --  8.0 11.0 10.0   BUN  --  10 11 13   CREATININE  --  0.66* 0.71* 0.94   EGFR  --  90.8 88.8 78.5   GLUCOSE  --  93 109* 154*   CALCIUM  --  8.6 8.7 9.4   MAGNESIUM 1.9 1.8  --  1.7   TSH  --   --  10.670*  --          Lab 23  1640    TOTAL PROTEIN 7.1   ALBUMIN 4.0   GLOBULIN 3.1   ALT (SGPT) 17   AST (SGOT) 40   BILIRUBIN 0.9   ALK PHOS 55         Lab 01/13/23  1640   TROPONIN T <0.010             Lab 01/14/23  0626   VITAMIN B 12 485         Brief Urine Lab Results  (Last result in the past 365 days)      Color   Clarity   Blood   Leuk Est   Nitrite   Protein   CREAT   Urine HCG        01/13/23 1858 Dark Yellow   Clear   Moderate (2+)   Negative   Negative   30 mg/dL (1+)                 Microbiology Results Abnormal     None          No radiology results from the last 24 hrs    Results for orders placed during the hospital encounter of 02/23/22    Adult Transthoracic Echo Complete W/ Cont if Necessary Per Protocol    Interpretation Summary  · Normal left ventricular systolic function, estimated EF 60%.  · Mild mitral regurgitation.  · Mild tricuspid regurgitation with normal RVSP.  · Mild pulmonic insufficiency.      I have reviewed the medications:  Scheduled Meds:aspirin, 81 mg, Oral, Daily  Diclofenac Sodium, 2 g, Topical, TID  donepezil, 10 mg, Oral, Nightly  fluticasone, 2 spray, Each Nare, BID  levothyroxine, 50 mcg, Oral, Q AM  lidocaine, 1 patch, Transdermal, Q24H  magnesium sulfate, 4 g, Intravenous, Once  pantoprazole, 40 mg, Oral, Q AM  senna-docusate sodium, 2 tablet, Oral, BID  sodium chloride, 10 mL, Intravenous, Q12H  Valproic Acid, 250 mg, Oral, Q12H      Continuous Infusions:   PRN Meds:.•  acetaminophen  •  senna-docusate sodium **AND** polyethylene glycol **AND** bisacodyl **AND** bisacodyl  •  Calcium Replacement - Initiate Nurse / BPA Driven Protocol  •  hydrOXYzine  •  Magnesium Standard Dose Replacement - Initiate Nurse / BPA Driven Protocol  •  ondansetron **OR** ondansetron  •  Phosphorus Replacement - Initiate Nurse / BPA Driven Protocol  •  Potassium Replacement - Initiate Nurse / BPA Driven Protocol  •  sodium chloride  •  sodium chloride  •  sodium chloride  •  traMADol    Assessment & Plan   Assessment & Plan      Active Hospital Problems    Diagnosis  POA   • **Altered mental status [R41.82]  Yes   • Dementia (HCC) [F03.90]  Yes   • Accident due to mechanical fall without injury [W19.XXXA]  Yes   • Hypothyroidism [E03.9]  Yes      Resolved Hospital Problems   No resolved problems to display.        Brief Hospital Course to date:  Ramin Zaragoza is a 87 y.o. male w dementia living on a memory care unit who presents for concerns for worsening confusion after a mechanical fall in which he hit his head.    This patient's problems and plans were partially entered by my partner and updated as appropriate by me 01/20/23.    Assessment/plan:    Acute encephalopathy in setting of severe dementia - now at baseline  -Unrevealing CT head and u/a. No clear s/s of infection  -Could be waxing/waning or post-concussive syndrome after head trauma  -Neurology saw - CT head could not exclude NPH. After eval, NO NPH w/u necessary and wouldn't benefit from shunt evaluation  - continue home aricept, depakote  - PT/OT recommends rehab/ SNF-  Pending insurance for [facility]     Head trauma 2/2 mechanical fall w residual neck pain  -CT head, CT C-spine, Xrays left hip and left shoulder all unremarkable, no other identified injuries  -Clavicle Xray negative for fracture.   --MRI cspine: Severe multilevel cervical spondylosis, otherwise no acute finding.   -- Partners discussed with family and will treat symptomatically.  -- trial voltaren topical during the day and lidoderm patch at night, seems to be helping     Hypothyroidism - new diagnosis  -TSH very elevated on arrival, started low dose levothyroxine  -- Follow-up TSH 4-6 weeks, defer to PCP    Hematuria, likely 2/2 traumatic cath in ED   - can f/u OP w repeat in a couple of weeks.    --Defer to PCP.    Mild oropharyngeal dysphagia   - speech following, OK for regular diet with thin liquids  -- flonase for nasal congestion w improvement    Osteoarthritis -restart 1/2 dose home tramadol as  needed    HTN - held home lisinopril and bumex given normotension  -- will restart home lasix today with mild chest congestion, BP stable- monitor  --am bmp    BPH - allergy to flomax (s/e of hypotension)      Expected Discharge Location and Transportation: [facility]  Expected Discharge 1/19, whenever insurance approves    DVT prophylaxis:  Mechanical DVT prophylaxis orders are present.     AM-PAC 6 Clicks Score (PT): 11 (01/18/23 1201)    CODE STATUS:   Code Status and Medical Interventions:   Ordered at: 01/13/23 8282     Medical Intervention Limits:    NO intubation (DNI)     Level Of Support Discussed With:    Health Care Surrogate     Code Status (Patient has no pulse and is not breathing):    No CPR (Do Not Attempt to Resuscitate)     Medical Interventions (Patient has pulse or is breathing):    Limited Support     Comments:    per patient's daughter and POA       Geovanna Price, APRN  01/20/23

## 2023-01-20 NOTE — PLAN OF CARE
Goal Outcome Evaluation:  Plan of Care Reviewed With: patient           Outcome Evaluation: Pt continues with deficits in cognition, functional strength, balance, and activity tolerance limiting mobility. Pt performed bed mobility mod A, STS mod A, and SPT from bed to recliner max A. Unable to ambulate pt as he demonstrates difficulty w/ sequencing, weight shifting, and LLE advancement limiting gait. Continue PT POC. Recommend dc SnF.

## 2023-01-20 NOTE — CASE MANAGEMENT/SOCIAL WORK
Case Management Discharge Note      Final Note: Pt has been accepted and approved to transfer to [facility] on 1/21 via [facility] at 1300. Report can be called o [phone] and CM will fax the dc summary to [phone]. Family in agreement with plan.         Selected Continued Care - Admitted Since 1/13/2023     Destination Coordination complete.    Service Provider Selected Services Address Phone Fax Patient Preferred    [facility] [address], [place] [id number] [phone] --          Durable Medical Equipment    No services have been selected for the patient.              Dialysis/Infusion    No services have been selected for the patient.              Home Medical Care    No services have been selected for the patient.              Therapy    No services have been selected for the patient.              Community Resources    No services have been selected for the patient.              Community & DME    No services have been selected for the patient.                       Final Discharge Disposition Code: 03 - skilled nursing facility (SNF)

## 2023-01-21 VITALS
RESPIRATION RATE: 18 BRPM | BODY MASS INDEX: 25.13 KG/M2 | HEART RATE: 54 BPM | WEIGHT: 175.5 LBS | TEMPERATURE: 97.7 F | SYSTOLIC BLOOD PRESSURE: 103 MMHG | HEIGHT: 70 IN | OXYGEN SATURATION: 96 % | DIASTOLIC BLOOD PRESSURE: 65 MMHG

## 2023-01-21 PROBLEM — R41.82 ALTERED MENTAL STATUS: Status: RESOLVED | Noted: 2023-01-13 | Resolved: 2023-01-21

## 2023-01-21 LAB
ANION GAP SERPL CALCULATED.3IONS-SCNC: 7 MMOL/L (ref 5–15)
BUN SERPL-MCNC: 13 MG/DL (ref 8–23)
BUN/CREAT SERPL: 18.8 (ref 7–25)
CALCIUM SPEC-SCNC: 8.1 MG/DL (ref 8.6–10.5)
CHLORIDE SERPL-SCNC: 105 MMOL/L (ref 98–107)
CO2 SERPL-SCNC: 26 MMOL/L (ref 22–29)
CREAT SERPL-MCNC: 0.69 MG/DL (ref 0.76–1.27)
EGFRCR SERPLBLD CKD-EPI 2021: 89.6 ML/MIN/1.73
GLUCOSE SERPL-MCNC: 98 MG/DL (ref 65–99)
MAGNESIUM SERPL-MCNC: 2.1 MG/DL (ref 1.6–2.4)
POTASSIUM SERPL-SCNC: 4.3 MMOL/L (ref 3.5–5.2)
SODIUM SERPL-SCNC: 138 MMOL/L (ref 136–145)

## 2023-01-21 PROCEDURE — 83735 ASSAY OF MAGNESIUM: CPT | Performed by: FAMILY MEDICINE

## 2023-01-21 PROCEDURE — 99239 HOSP IP/OBS DSCHRG MGMT >30: CPT | Performed by: FAMILY MEDICINE

## 2023-01-21 PROCEDURE — 80048 BASIC METABOLIC PNL TOTAL CA: CPT | Performed by: NURSE PRACTITIONER

## 2023-01-21 PROCEDURE — G0378 HOSPITAL OBSERVATION PER HR: HCPCS

## 2023-01-21 RX ORDER — VALPROIC ACID 250 MG/5ML
250 SOLUTION ORAL EVERY 12 HOURS SCHEDULED
Qty: 300 ML | Refills: 0 | Status: SHIPPED | OUTPATIENT
Start: 2023-01-21

## 2023-01-21 RX ORDER — LEVOTHYROXINE SODIUM 0.05 MG/1
50 TABLET ORAL
Qty: 30 TABLET | Refills: 0 | Status: SHIPPED | OUTPATIENT
Start: 2023-01-22

## 2023-01-21 RX ORDER — TRAMADOL HYDROCHLORIDE 50 MG/1
25 TABLET ORAL 2 TIMES DAILY PRN
Qty: 3 TABLET | Refills: 0 | Status: SHIPPED | OUTPATIENT
Start: 2023-01-21

## 2023-01-21 RX ORDER — HYDROXYZINE HYDROCHLORIDE 10 MG/1
10 TABLET, FILM COATED ORAL 3 TIMES DAILY PRN
Qty: 30 TABLET | Refills: 0 | Status: SHIPPED | OUTPATIENT
Start: 2023-01-21

## 2023-01-21 RX ADMIN — FLUTICASONE PROPIONATE 2 SPRAY: 50 SPRAY, METERED NASAL at 08:31

## 2023-01-21 RX ADMIN — DICLOFENAC 2 G: 10 GEL TOPICAL at 08:31

## 2023-01-21 RX ADMIN — LEVOTHYROXINE SODIUM 50 MCG: 50 TABLET ORAL at 05:39

## 2023-01-21 RX ADMIN — ACETAMINOPHEN 650 MG: 325 TABLET ORAL at 05:39

## 2023-01-21 RX ADMIN — LIDOCAINE 1 PATCH: 50 PATCH CUTANEOUS at 08:31

## 2023-01-21 RX ADMIN — VALPROIC ACID 250 MG: 250 SOLUTION ORAL at 08:27

## 2023-01-21 RX ADMIN — BUMETANIDE 0.5 MG: 1 TABLET ORAL at 08:27

## 2023-01-21 RX ADMIN — PANTOPRAZOLE SODIUM 40 MG: 40 TABLET, DELAYED RELEASE ORAL at 05:39

## 2023-01-21 RX ADMIN — ASPIRIN 81 MG: 81 TABLET, COATED ORAL at 08:27

## 2023-01-21 NOTE — DISCHARGE SUMMARY
[facility] Medicine Services  DISCHARGE SUMMARY    Patient Name: Ramin Zaragoza  : 1935  MRN: [id number]    Date of Admission: 2023  5:28 PM  Date of Discharge:  2023  Primary Care Physician: Tez Santos MD    Consults     Date and Time Order Name Status Description    2023 12:34 AM Inpatient Neurology Consult General Completed           Hospital Course     Presenting Problem:   Altered mental status [R41.82]    Active Hospital Problems    Diagnosis  POA   • Dementia (HCC) [F03.90]  Yes   • Accident due to mechanical fall without injury [W19.XXXA]  Yes   • Hypothyroidism [E03.9]  Yes      Resolved Hospital Problems    Diagnosis Date Resolved POA   • **Altered mental status [R41.82] 2023 Yes          Hospital Course:  Ramin Zaragoza is a 87 y.o. male w dementia living on a memory care unit who presents for concerns for worsening confusion after a mechanical fall in which he hit his head.     Acute encephalopathy in setting of severe dementia - now at baseline  -Unrevealing CT head and u/a. No clear s/s of infection  -Could be waxing/waning or post-concussive syndrome after head trauma  -Neurology saw - CT head could not exclude NPH. After eval, NO NPH w/u necessary and wouldn't benefit from shunt evaluation  - continue home aricept, depakote  - PT/OT recommends rehab/ SNF-  plan for LCP today     Head trauma 2/2 mechanical fall w residual neck pain  -CT head, CT C-spine, Xrays left hip and left shoulder all unremarkable, no other identified injuries  -Clavicle Xray negative for fracture.   --MRI cspine: Severe multilevel cervical spondylosis, otherwise no acute finding.   -- Partners discussed with family and will treat symptomatically.     Hypothyroidism - new diagnosis  -TSH very elevated on arrival, started low dose levothyroxine  -- Follow-up TSH 4-6 weeks, defer to PCP     Hematuria, likely 2/2 traumatic cath in ED   - can f/u OP w repeat in a couple of weeks.     --Defer to PCP.     Mild oropharyngeal dysphagia   - speech following, OK for regular diet with thin liquids  -- flonase for nasal congestion w improvement     Osteoarthritis -restart 1/2 dose home tramadol as needed     HTN   -Continue home Bumex  -Lisinopril DC'd      BPH - allergy to flomax (s/e of hypotension)    Discharge Follow Up Recommendations for outpatient labs/diagnostics:  -PCP 1 week, Thyroid panel in 4-6 weeks   -Dr Stafford, Neurology, 1 month    Day of Discharge     HPI:   Patient seen and examined. Tells me he is cold. Asks for another blanket. No other complaints.     Review of Systems  UTO reliable ROS     Vital Signs:   Temp:  [96.8 °F (36 °C)-98.3 °F (36.8 °C)] 98.3 °F (36.8 °C)  Heart Rate:  [52-63] 52  Resp:  [18-19] 18  BP: (108-138)/(60-67) 121/65      Physical Exam:  Constitutional: No acute distress, awake, alert  HENT: NCAT, mucous membranes moist  Respiratory: Clear to auscultation bilaterally, respiratory effort normal   Cardiovascular: RRR, no murmurs, rubs, or gallops  Gastrointestinal: Positive bowel sounds, soft, nontender, nondistended  Musculoskeletal: No bilateral ankle edema  Psychiatric: Appropriate affect, cooperative  Neurologic: Oriented to self, no focal deficits   Skin: No rashes    Pertinent  and/or Most Recent Results     LAB RESULTS:      Lab 01/16/23  0502   WBC 8.48   HEMOGLOBIN 11.5*   HEMATOCRIT 33.7*   PLATELETS 171   NEUTROS ABS 4.87   IMMATURE GRANS (ABS) 0.03   LYMPHS ABS 2.43   MONOS ABS 0.89   EOS ABS 0.24   MCV 94.9         Lab 01/20/23  0453 01/16/23  0502   SODIUM  --  137   POTASSIUM 4.4 3.6   CHLORIDE  --  104   CO2  --  25.0   ANION GAP  --  8.0   BUN  --  10   CREATININE  --  0.66*   EGFR  --  90.8   GLUCOSE  --  93   CALCIUM  --  8.6   MAGNESIUM 1.9 1.8                         Brief Urine Lab Results  (Last result in the past 365 days)      Color   Clarity   Blood   Leuk Est   Nitrite   Protein   CREAT   Urine HCG        01/13/23 1858 Dark Yellow    Clear   Moderate (2+)   Negative   Negative   30 mg/dL (1+)               Microbiology Results (last 10 days)     Procedure Component Value - Date/Time    COVID PRE-OP / PRE-PROCEDURE SCREENING ORDER (NO ISOLATION) - Swab, Nasopharynx [[id number]]  (Normal) Collected: 01/20/23 1813    Lab Status: Final result Specimen: Swab from Nasopharynx Updated: 01/20/23 1837    Narrative:      The following orders were created for panel order COVID PRE-OP / PRE-PROCEDURE SCREENING ORDER (NO ISOLATION) - Swab, Nasopharynx.  Procedure                               Abnormality         Status                     ---------                               -----------         ------                     COVID-19, ABBOTT IN-HOUS...[[id number]]  Normal              Final result                 Please view results for these tests on the individual orders.    COVID-19, ABBOTT IN-HOUSE,NASAL Swab (NO TRANSPORT MEDIA) 2 HR TAT - Swab, Nasopharynx [[id number]]  (Normal) Collected: 01/20/23 1813    Lab Status: Final result Specimen: Swab from Nasopharynx Updated: 01/20/23 1837     COVID19 Presumptive Negative    Narrative:      Fact sheet for providers: https://www.fda.gov/media/987607/download     Fact sheet for patients: https://www.fda.gov/media/285072/download    Test performed by PCR.  If inconsistent with clinical signs and symptoms patient should be tested with different authorized molecular test.          CT Abdomen Pelvis Without Contrast    Result Date: 1/13/2023  CT ABDOMEN PELVIS WO CONTRAST Date of Exam: 1/13/2023 3:59 PM EST Indication: fall, abd pain. Comparison: 2/12/2020 Technique: Axial CT images were obtained of the abdomen and pelvis without the administration of contrast. Reconstructed coronal and sagittal images were also obtained. Automated exposure control and iterative construction methods were used. Findings: There is chronic interstitial fibrosis in the lung bases. There is an hiatal hernia. There are no rib fractures  identified. There are coronary artery calcifications. The liver and spleen have a normal appearance. The gallbladder is absent. The adrenal glands appear normal. There are bilateral renal cysts present. There are no solid renal masses. There is no evidence of hydronephrosis. There is minimal perinephric soft tissue stranding. There is a periumbilical hernia containing fat. There is sigmoid diverticulosis without diverticulitis. The prostate is markedly enlarged. The urinary bladder appears unremarkable. There are no pelvic masses or fluid collections. There is degenerative disc disease in the lower lumbar spine. There is a sclerotic bone lesion in the right iliac crest measuring 1.4 x 1.0 cm. There are no other bone lesions identified. This is present and unchanged from the patient's previous CT. No fractures are identified.     Impression: 1. No acute findings in the abdomen or pelvis. 2. Interstitial fibrosis of the lung bases with coronary artery calcifications. 3. Hiatal and periumbilical hernias. 4. Bilateral renal cysts. 5. Sigmoid diverticulosis without diverticulitis. 6. Markedly enlarged prostate Electronically Signed: Rubens Rao  1/13/2023 4:26 PM EST  Workstation ID: [id number]    XR Clavicle Left    Result Date: 1/15/2023  XR CLAVICLE LEFT Date of Exam: 1/15/2023 9:52 AM EST Indication: point tenderness medial side of clavicle. Comparison: Left shoulder radiographs 1/13/2023 Findings: No definite soft tissue swelling. No acute fracture or malalignment. No bony erosion or periostitis. The acromioclavicular joint is congruent with mild degenerative change and associated chondrocalcinosis. The sternoclavicular joints appear congruent. Unremarkable appearance of the partially imaged thorax.     Impression: No acute fracture or malalignment. Electronically Signed: Octavio Adamson  1/15/2023 10:15 AM EST  Workstation ID: [id number]    XR Shoulder 2+ View Left    Result Date: 1/13/2023  XR HIP W OR WO PELVIS 2-3  VIEW LEFT, XR SHOULDER 2+ VW LEFT Date of Exam: 1/13/2023 3:54 PM EST Indication: fall, l hip pain.  Shoulder pain. Comparison: None available. Findings: Pelvis and hip: Osteopenia. This limits evaluation for nonspecific fractures or focal osseous lesions. There is moderate arthritis of the hips. There is chondrocalcinosis of the hips. Sacroiliac joints are well aligned. There are no aggressive osseous lesions. Shoulder: Mild osteopenia. No acute fractures or dislocations. Limited evaluation glenohumeral  joint space. Large subacromial osteophyte. Mild chondrocalcinosis along the humeral head articular surface. Mild degenerative change of acromioclavicular joint. Visualized thorax is clear.     Impression: No fractures or dislocations of the shoulder or hip. Osteopenia. Degenerative changes as described. Electronically Signed: Arabella Rutherford  1/13/2023 4:09 PM EST  Workstation ID: [id number]    CT Head Without Contrast    Result Date: 1/13/2023  CT HEAD WO CONTRAST Date of Exam: 1/13/2023 3:59 PM EST Indication: fall, head injury. Comparison: None available. Technique: Axial CT images were obtained of the head without contrast administration.  Reconstructed coronal and sagittal images were also obtained. Automated exposure control and iterative construction methods were used. FINDINGS: Brain/Ventricles: There is diffuse atrophy with somewhat asymmetric predominance of the lateral ventricle dilation. Findings are more prominent than the comparison exam. There is no acute intracranial hemorrhage or suspicious extra axial fluid collection. Diffuse hypoattenuation of the white matter of the supratentorial brain represent small vessel ischemic disease or increased transependymal flow of CSF. No territorial areas of low-attenuation to suggest acute infarct are noted. Orbits: The visualized portion of the orbits demonstrate no acute abnormality. Sinuses: The visualized paranasal sinuses are clear. Chronic mild bilateral mastoid  effusions noted Soft Tissues/Skull: No acute abnormality of the visualized skull or soft tissues.     1. No acute hemorrhage. 2. Increased prominence of the lateral ventricles with respect to overlying diffuse atrophy. This is slightly more prominent than seen on the comparison study. Consider the possibility of normal pressure hydrocephalus  Electronically Signed: Riccardo Bustos  1/13/2023 4:31 PM EST  Workstation ID: [id number]    CT Cervical Spine Without Contrast    Result Date: 1/13/2023  CT CERVICAL SPINE WO CONTRAST Date of Exam: 1/13/2023 3:59 PM EST Indication: fall, head injury. Comparison: None available. Technique: Axial CT images were obtained of the cervical spine without contrast administration.  Reconstructed coronal and sagittal images were also obtained. Automated exposure control and iterative construction methods were used. Findings: No evidence of fracture or subluxation. Moderate degenerative disc disease at C6-C7, mild degenerative disc disease in the remainder of the cervical spine. Degenerative change at the articulation of the odontoid and anterior arch of C1. No acute soft tissue abnormality     Impression: Negative for fracture Electronically Signed: Jose Roberto Post  1/13/2023 4:28 PM EST  Workstation ID: [id number]    MRI Cervical Spine Without Contrast    Result Date: 1/17/2023  MRI CERVICAL SPINE WO CONTRAST Date of Exam: 1/17/2023 8:00 AM EST Indication: Neck trauma, ligament injury suspected (Age >= 16y).  Comparison: CT 1/13/2023 Technique:  Routine multiplanar/multisequence sequence images of the cervical spine were obtained without contrast administration.  Findings: Marrow signal is preserved and there is no evidence of acute fracture. Alignment is anatomic without evidence of listhesis or subluxation. There is no specific focal ligamentous edema to suggest disruption. The cervical spinal cord demonstrates no focal areas of intrinsic signal abnormality. Advanced multilevel spondylosis  is present, with areas of disc osteophyte complex formation and facet arthropathy resulting in severe spinal canal and neuroforaminal narrowing at the C3-4, C4-5 and C5-6 levels. Moderate to severe spinal canal and neuroforaminal narrowing is also present at C6-7. The paraspinal soft tissues demonstrate no acute findings.     Impression: Severe multilevel cervical spondylosis is present as above. There is otherwise no ligamentous edema or malalignment to suggest derangement and there is no focal cord signal abnormality. Electronically Signed: Jorge Luis Pitts  1/17/2023 11:19 AM EST  Workstation ID: [id number]    XR Chest 1 View    Result Date: 1/13/2023  XR CHEST 1 VW Date of Exam: 1/13/2023 3:02 PM EST Indication: Weak/Dizzy/AMS triage protocol. Comparison: 11/30/2022 Findings: Cardiac size is stable. There is atelectasis or scarring in the lung bases. The lungs are otherwise clear and the vascular markings are normal.     Impression: Bibasilar atelectasis or scarring, unchanged from the patient's previous radiograph. The chest is otherwise clear. Electronically Signed: Rubens Rao  1/13/2023 3:23 PM EST  Workstation ID: [id number]    XR Hip With or Without Pelvis 2 - 3 View Left    Result Date: 1/13/2023  XR HIP W OR WO PELVIS 2-3 VIEW LEFT, XR SHOULDER 2+ VW LEFT Date of Exam: 1/13/2023 3:54 PM EST Indication: fall, l hip pain.  Shoulder pain. Comparison: None available. Findings: Pelvis and hip: Osteopenia. This limits evaluation for nonspecific fractures or focal osseous lesions. There is moderate arthritis of the hips. There is chondrocalcinosis of the hips. Sacroiliac joints are well aligned. There are no aggressive osseous lesions. Shoulder: Mild osteopenia. No acute fractures or dislocations. Limited evaluation glenohumeral  joint space. Large subacromial osteophyte. Mild chondrocalcinosis along the humeral head articular surface. Mild degenerative change of acromioclavicular joint. Visualized thorax is clear.      Impression: No fractures or dislocations of the shoulder or hip. Osteopenia. Degenerative changes as described. Electronically Signed: Arabella Rutherford  1/13/2023 4:09 PM EST  Workstation ID: [id number]              Results for orders placed during the hospital encounter of 02/23/22    Adult Transthoracic Echo Complete W/ Cont if Necessary Per Protocol    Interpretation Summary  · Normal left ventricular systolic function, estimated EF 60%.  · Mild mitral regurgitation.  · Mild tricuspid regurgitation with normal RVSP.  · Mild pulmonic insufficiency.      Plan for Follow-up of Pending Labs/Results: Inbox     Discharge Details        Discharge Medications      New Medications      Instructions Start Date   Diclofenac Sodium 1 % gel gel  Commonly known as: VOLTAREN   2 g, Topical, 3 Times Daily      hydrOXYzine 10 MG tablet  Commonly known as: ATARAX   10 mg, Oral, 3 Times Daily PRN      levothyroxine 50 MCG tablet  Commonly known as: SYNTHROID, LEVOTHROID   50 mcg, Oral, Every Early Morning   Start Date: January 22, 2023     Valproic Acid 250 MG/5ML solution syrup  Commonly known as: DEPAKENE   250 mg, Oral, Every 12 Hours Scheduled         Changes to Medications      Instructions Start Date   traMADol 50 MG tablet  Commonly known as: ULTRAM  What changed: how much to take   25 mg, Oral, 2 Times Daily PRN         Continue These Medications      Instructions Start Date   acetaminophen 325 MG tablet  Commonly known as: TYLENOL   650 mg, Oral, Every 4 Hours PRN      aspirin 81 MG EC tablet   81 mg, Oral, Daily      bumetanide 0.5 MG tablet  Commonly known as: BUMEX   TAKE ONE TABLET BY MOUTH DAILY      donepezil 10 MG tablet  Commonly known as: ARICEPT   10 mg, Oral, Nightly      fluticasone 50 MCG/ACT nasal spray  Commonly known as: FLONASE   2 sprays, Nasal, Daily, Administer 2 sprays in each nostril for each dose.       nitroglycerin 0.4 MG SL tablet  Commonly known as: NITROSTAT   0.4 mg, Sublingual, Every 5 Minutes  PRN, Take no more than 3 doses in 15 minutes.       omeprazole 20 MG capsule  Commonly known as: priLOSEC   20 mg, Oral, Daily         Stop These Medications    diclofenac 75 MG EC tablet  Commonly known as: VOLTAREN     divalproex 250 MG DR tablet  Commonly known as: DEPAKOTE     lisinopril 2.5 MG tablet  Commonly known as: PRINIVIL,ZESTRIL            Allergies   Allergen Reactions   • Flomax [Tamsulosin] Other (See Comments)     Unknown     • Lactose Intolerance (Gi)          Discharge Disposition:  Skilled Nursing Facility (DC - External)    Diet:  Hospital:  Diet Order   Procedures   • Diet: Cardiac Diets; Healthy Heart (2-3 Na+); Texture: Regular Texture (IDDSI 7); Fluid Consistency: Thin (IDDSI 0)       Activity:      Restrictions or Other Recommendations:       CODE STATUS:    Code Status and Medical Interventions:   Ordered at: 01/13/23 2138     Medical Intervention Limits:    NO intubation (DNI)     Level Of Support Discussed With:    Health Care Surrogate     Code Status (Patient has no pulse and is not breathing):    No CPR (Do Not Attempt to Resuscitate)     Medical Interventions (Patient has pulse or is breathing):    Limited Support     Comments:    per patient's daughter and POA       Future Appointments   Date Time Provider Department Center   5/11/2023  3:00 PM Vesta Marley MD [facility]       Additional Instructions for the Follow-ups that You Need to Schedule     Discharge Follow-up with PCP   As directed       Currently Documented PCP:    Tez Santos MD    PCP Phone Number:    [phone]     Follow Up Details: 1 week                     Yulia Coley DO  01/21/23    Time Spent on Discharge:  I spent  45 minutes on this discharge activity which included: face-to-face encounter with the patient, reviewing the data in the system, coordination of the care with the nursing staff as well as consultants, documentation, and entering orders.

## 2023-01-21 NOTE — CASE MANAGEMENT/SOCIAL WORK
Case Management Discharge Note      Final Note: Pt has been accepted and approved to transfer to [facility] on 1/21 via [facility] at 1300. Report can be called o [phone] and CM will fax the dc summary to [phone]. Family in agreement with plan.         Selected Continued Care - Admitted Since 1/13/2023     Destination Coordination complete.    Service Provider Selected Services Address Phone Fax Patient Preferred    [facility] [address] [place] [id number] [phone] --          Durable Medical Equipment    No services have been selected for the patient.              Dialysis/Infusion    No services have been selected for the patient.              Home Medical Care    No services have been selected for the patient.              Therapy    No services have been selected for the patient.              Community Resources    No services have been selected for the patient.              Community & DME    No services have been selected for the patient.                       Final Discharge Disposition Code: 03 - skilled nursing facility (SNF)

## 2023-01-21 NOTE — PLAN OF CARE
AXO x1. [place]. VSS. RA. Covid obtained. Q2 turn. Up in chair. Barrier applied, SCDS. Heel boots. Mag replaced. Incontinent of B/B. No c/o of pain. Voiding. MD notified PRN, will continue to monitor throughout shift.

## 2023-01-21 NOTE — DISCHARGE PLACEMENT REQUEST
"Raymond Zaragoza (87 y.o. Male)     Mehreen Yepez, RN  [phone]      Date of Birth   1935    Social Security Number       Address   [address] [id number]    Home Phone   [phone]    MRN   [id number]       [facility]    Marital Status                               Admission Date   23    Admission Type   Emergency    Admitting Provider   Yulia Coley DO    Attending Provider   Yulia Coley DO    Department, Room/Bed   [facility] 3H, S387/1       Discharge Date       Discharge Disposition   Skilled Nursing Facility (DC - External)    Discharge Destination                               Attending Provider: Yulia Coley DO    Allergies: Flomax [Tamsulosin], Lactose Intolerance (Gi)    Isolation: None   Infection: COVID (History) (23)   Code Status: No CPR    Ht: 177.8 cm (70\")   Wt: 79.6 kg (175 lb 8 oz)    Admission Cmt: None   Principal Problem: Altered mental status [R41.82]                 Active Insurance as of 2023     Primary Coverage     Payor Plan Insurance Group Employer/Plan Group    [facility] MEDICARE REPLACEMENT [facility] MEDICARE REPLACEMENT [id number]     Payor Plan Address Payor Plan Phone Number Payor Plan Fax Number Effective Dates    [address] [address]   2023 - None Entered    [facility] [id number]       Subscriber Name Subscriber Birth Date Member ID       RAYMOND ZARAGOZA 1935 [id number]                 Emergency Contacts      (Rel.) Home Phone Work Phone Mobile Phone    ISABELLA MEYER (Daughter) [phone] -- [phone]                 Discharge Summary      Yulia Coley DO at 23 0809              [facility] Medicine Services  DISCHARGE SUMMARY    Patient Name: Raymond Zaragoza  : 1935  MRN: [id number]    Date of Admission: 2023  5:28 PM  Date of Discharge:  2023  Primary Care Physician: Tez Santos MD    Consults     " Date and Time Order Name Status Description    1/14/2023 12:34 AM Inpatient Neurology Consult General Completed           Hospital Course     Presenting Problem:   Altered mental status [R41.82]    Active Hospital Problems    Diagnosis  POA   • Dementia (HCC) [F03.90]  Yes   • Accident due to mechanical fall without injury [W19.XXXA]  Yes   • Hypothyroidism [E03.9]  Yes      Resolved Hospital Problems    Diagnosis Date Resolved POA   • **Altered mental status [R41.82] 01/21/2023 Yes          Hospital Course:  Ramin Zaragoza is a 87 y.o. male w dementia living on a memory care unit who presents for concerns for worsening confusion after a mechanical fall in which he hit his head.     Acute encephalopathy in setting of severe dementia - now at baseline  -Unrevealing CT head and u/a. No clear s/s of infection  -Could be waxing/waning or post-concussive syndrome after head trauma  -Neurology saw - CT head could not exclude NPH. After eval, NO NPH w/u necessary and wouldn't benefit from shunt evaluation  - continue home aricept, depakote  - PT/OT recommends rehab/ SNF-  plan for LCP today     Head trauma 2/2 mechanical fall w residual neck pain  -CT head, CT C-spine, Xrays left hip and left shoulder all unremarkable, no other identified injuries  -Clavicle Xray negative for fracture.   --MRI cspine: Severe multilevel cervical spondylosis, otherwise no acute finding.   -- Partners discussed with family and will treat symptomatically.     Hypothyroidism - new diagnosis  -TSH very elevated on arrival, started low dose levothyroxine  -- Follow-up TSH 4-6 weeks, defer to PCP     Hematuria, likely 2/2 traumatic cath in ED   - can f/u OP w repeat in a couple of weeks.    --Defer to PCP.     Mild oropharyngeal dysphagia   - speech following, OK for regular diet with thin liquids  -- flonase for nasal congestion w improvement     Osteoarthritis -restart 1/2 dose home tramadol as needed     HTN   -Continue home Bumex  -Lisinopril DC'd       BPH - allergy to flomax (s/e of hypotension)    Discharge Follow Up Recommendations for outpatient labs/diagnostics:  -PCP 1 week, Thyroid panel in 4-6 weeks   -Dr Stafford, Neurology, 1 month    Day of Discharge     HPI:   Patient seen and examined. Tells me he is cold. Asks for another blanket. No other complaints.     Review of Systems  UTO reliable ROS     Vital Signs:   Temp:  [96.8 °F (36 °C)-98.3 °F (36.8 °C)] 98.3 °F (36.8 °C)  Heart Rate:  [52-63] 52  Resp:  [18-19] 18  BP: (108-138)/(60-67) 121/65      Physical Exam:  Constitutional: No acute distress, awake, alert  HENT: NCAT, mucous membranes moist  Respiratory: Clear to auscultation bilaterally, respiratory effort normal   Cardiovascular: RRR, no murmurs, rubs, or gallops  Gastrointestinal: Positive bowel sounds, soft, nontender, nondistended  Musculoskeletal: No bilateral ankle edema  Psychiatric: Appropriate affect, cooperative  Neurologic: Oriented to self, no focal deficits   Skin: No rashes    Pertinent  and/or Most Recent Results     LAB RESULTS:      Lab 01/16/23  0502   WBC 8.48   HEMOGLOBIN 11.5*   HEMATOCRIT 33.7*   PLATELETS 171   NEUTROS ABS 4.87   IMMATURE GRANS (ABS) 0.03   LYMPHS ABS 2.43   MONOS ABS 0.89   EOS ABS 0.24   MCV 94.9         Lab 01/20/23  0453 01/16/23  0502   SODIUM  --  137   POTASSIUM 4.4 3.6   CHLORIDE  --  104   CO2  --  25.0   ANION GAP  --  8.0   BUN  --  10   CREATININE  --  0.66*   EGFR  --  90.8   GLUCOSE  --  93   CALCIUM  --  8.6   MAGNESIUM 1.9 1.8                         Brief Urine Lab Results  (Last result in the past 365 days)      Color   Clarity   Blood   Leuk Est   Nitrite   Protein   CREAT   Urine HCG        01/13/23 1858 Dark Yellow   Clear   Moderate (2+)   Negative   Negative   30 mg/dL (1+)               Microbiology Results (last 10 days)     Procedure Component Value - Date/Time    COVID PRE-OP / PRE-PROCEDURE SCREENING ORDER (NO ISOLATION) - Swab, Nasopharynx [[id number]]  (Normal) Collected:  01/20/23 1813    Lab Status: Final result Specimen: Swab from Nasopharynx Updated: 01/20/23 1837    Narrative:      The following orders were created for panel order COVID PRE-OP / PRE-PROCEDURE SCREENING ORDER (NO ISOLATION) - Swab, Nasopharynx.  Procedure                               Abnormality         Status                     ---------                               -----------         ------                     COVID-19, ABBOTT IN-HOUS...[[id number]]  Normal              Final result                 Please view results for these tests on the individual orders.    COVID-19, ABBOTT IN-HOUSE,NASAL Swab (NO TRANSPORT MEDIA) 2 HR TAT - Swab, Nasopharynx [[id number]]  (Normal) Collected: 01/20/23 1813    Lab Status: Final result Specimen: Swab from Nasopharynx Updated: 01/20/23 1837     COVID19 Presumptive Negative    Narrative:      Fact sheet for providers: https://www.fda.gov/media/332062/download     Fact sheet for patients: https://www.fda.gov/media/358935/download    Test performed by PCR.  If inconsistent with clinical signs and symptoms patient should be tested with different authorized molecular test.          CT Abdomen Pelvis Without Contrast    Result Date: 1/13/2023  CT ABDOMEN PELVIS WO CONTRAST Date of Exam: 1/13/2023 3:59 PM EST Indication: fall, abd pain. Comparison: 2/12/2020 Technique: Axial CT images were obtained of the abdomen and pelvis without the administration of contrast. Reconstructed coronal and sagittal images were also obtained. Automated exposure control and iterative construction methods were used. Findings: There is chronic interstitial fibrosis in the lung bases. There is an hiatal hernia. There are no rib fractures identified. There are coronary artery calcifications. The liver and spleen have a normal appearance. The gallbladder is absent. The adrenal glands appear normal. There are bilateral renal cysts present. There are no solid renal masses. There is no evidence of  hydronephrosis. There is minimal perinephric soft tissue stranding. There is a periumbilical hernia containing fat. There is sigmoid diverticulosis without diverticulitis. The prostate is markedly enlarged. The urinary bladder appears unremarkable. There are no pelvic masses or fluid collections. There is degenerative disc disease in the lower lumbar spine. There is a sclerotic bone lesion in the right iliac crest measuring 1.4 x 1.0 cm. There are no other bone lesions identified. This is present and unchanged from the patient's previous CT. No fractures are identified.     Impression: 1. No acute findings in the abdomen or pelvis. 2. Interstitial fibrosis of the lung bases with coronary artery calcifications. 3. Hiatal and periumbilical hernias. 4. Bilateral renal cysts. 5. Sigmoid diverticulosis without diverticulitis. 6. Markedly enlarged prostate Electronically Signed: Rubens Rao  1/13/2023 4:26 PM EST  Workstation ID: [id number]    XR Clavicle Left    Result Date: 1/15/2023  XR CLAVICLE LEFT Date of Exam: 1/15/2023 9:52 AM EST Indication: point tenderness medial side of clavicle. Comparison: Left shoulder radiographs 1/13/2023 Findings: No definite soft tissue swelling. No acute fracture or malalignment. No bony erosion or periostitis. The acromioclavicular joint is congruent with mild degenerative change and associated chondrocalcinosis. The sternoclavicular joints appear congruent. Unremarkable appearance of the partially imaged thorax.     Impression: No acute fracture or malalignment. Electronically Signed: Octavio Adamson  1/15/2023 10:15 AM EST  Workstation ID: [id number]    XR Shoulder 2+ View Left    Result Date: 1/13/2023  XR HIP W OR WO PELVIS 2-3 VIEW LEFT, XR SHOULDER 2+ VW LEFT Date of Exam: 1/13/2023 3:54 PM EST Indication: fall, l hip pain.  Shoulder pain. Comparison: None available. Findings: Pelvis and hip: Osteopenia. This limits evaluation for nonspecific fractures or focal osseous lesions.  There is moderate arthritis of the hips. There is chondrocalcinosis of the hips. Sacroiliac joints are well aligned. There are no aggressive osseous lesions. Shoulder: Mild osteopenia. No acute fractures or dislocations. Limited evaluation glenohumeral  joint space. Large subacromial osteophyte. Mild chondrocalcinosis along the humeral head articular surface. Mild degenerative change of acromioclavicular joint. Visualized thorax is clear.     Impression: No fractures or dislocations of the shoulder or hip. Osteopenia. Degenerative changes as described. Electronically Signed: Arabella Rutherford  1/13/2023 4:09 PM EST  Workstation ID: [id number]    CT Head Without Contrast    Result Date: 1/13/2023  CT HEAD WO CONTRAST Date of Exam: 1/13/2023 3:59 PM EST Indication: fall, head injury. Comparison: None available. Technique: Axial CT images were obtained of the head without contrast administration.  Reconstructed coronal and sagittal images were also obtained. Automated exposure control and iterative construction methods were used. FINDINGS: Brain/Ventricles: There is diffuse atrophy with somewhat asymmetric predominance of the lateral ventricle dilation. Findings are more prominent than the comparison exam. There is no acute intracranial hemorrhage or suspicious extra axial fluid collection. Diffuse hypoattenuation of the white matter of the supratentorial brain represent small vessel ischemic disease or increased transependymal flow of CSF. No territorial areas of low-attenuation to suggest acute infarct are noted. Orbits: The visualized portion of the orbits demonstrate no acute abnormality. Sinuses: The visualized paranasal sinuses are clear. Chronic mild bilateral mastoid effusions noted Soft Tissues/Skull: No acute abnormality of the visualized skull or soft tissues.     1. No acute hemorrhage. 2. Increased prominence of the lateral ventricles with respect to overlying diffuse atrophy. This is slightly more prominent than  seen on the comparison study. Consider the possibility of normal pressure hydrocephalus  Electronically Signed: Riccardo Bustos  1/13/2023 4:31 PM EST  Workstation ID: [id number]    CT Cervical Spine Without Contrast    Result Date: 1/13/2023  CT CERVICAL SPINE WO CONTRAST Date of Exam: 1/13/2023 3:59 PM EST Indication: fall, head injury. Comparison: None available. Technique: Axial CT images were obtained of the cervical spine without contrast administration.  Reconstructed coronal and sagittal images were also obtained. Automated exposure control and iterative construction methods were used. Findings: No evidence of fracture or subluxation. Moderate degenerative disc disease at C6-C7, mild degenerative disc disease in the remainder of the cervical spine. Degenerative change at the articulation of the odontoid and anterior arch of C1. No acute soft tissue abnormality     Impression: Negative for fracture Electronically Signed: Jose Roberto Post  1/13/2023 4:28 PM EST  Workstation ID: [id number]    MRI Cervical Spine Without Contrast    Result Date: 1/17/2023  MRI CERVICAL SPINE WO CONTRAST Date of Exam: 1/17/2023 8:00 AM EST Indication: Neck trauma, ligament injury suspected (Age >= 16y).  Comparison: CT 1/13/2023 Technique:  Routine multiplanar/multisequence sequence images of the cervical spine were obtained without contrast administration.  Findings: Marrow signal is preserved and there is no evidence of acute fracture. Alignment is anatomic without evidence of listhesis or subluxation. There is no specific focal ligamentous edema to suggest disruption. The cervical spinal cord demonstrates no focal areas of intrinsic signal abnormality. Advanced multilevel spondylosis is present, with areas of disc osteophyte complex formation and facet arthropathy resulting in severe spinal canal and neuroforaminal narrowing at the C3-4, C4-5 and C5-6 levels. Moderate to severe spinal canal and neuroforaminal narrowing is also present  at C6-7. The paraspinal soft tissues demonstrate no acute findings.     Impression: Severe multilevel cervical spondylosis is present as above. There is otherwise no ligamentous edema or malalignment to suggest derangement and there is no focal cord signal abnormality. Electronically Signed: Jorge Luis Pitts  1/17/2023 11:19 AM EST  Workstation ID: [id number]    XR Chest 1 View    Result Date: 1/13/2023  XR CHEST 1 VW Date of Exam: 1/13/2023 3:02 PM EST Indication: Weak/Dizzy/AMS triage protocol. Comparison: 11/30/2022 Findings: Cardiac size is stable. There is atelectasis or scarring in the lung bases. The lungs are otherwise clear and the vascular markings are normal.     Impression: Bibasilar atelectasis or scarring, unchanged from the patient's previous radiograph. The chest is otherwise clear. Electronically Signed: Rubens Rao  1/13/2023 3:23 PM EST  Workstation ID: [id number]    XR Hip With or Without Pelvis 2 - 3 View Left    Result Date: 1/13/2023  XR HIP W OR WO PELVIS 2-3 VIEW LEFT, XR SHOULDER 2+ VW LEFT Date of Exam: 1/13/2023 3:54 PM EST Indication: fall, l hip pain.  Shoulder pain. Comparison: None available. Findings: Pelvis and hip: Osteopenia. This limits evaluation for nonspecific fractures or focal osseous lesions. There is moderate arthritis of the hips. There is chondrocalcinosis of the hips. Sacroiliac joints are well aligned. There are no aggressive osseous lesions. Shoulder: Mild osteopenia. No acute fractures or dislocations. Limited evaluation glenohumeral  joint space. Large subacromial osteophyte. Mild chondrocalcinosis along the humeral head articular surface. Mild degenerative change of acromioclavicular joint. Visualized thorax is clear.     Impression: No fractures or dislocations of the shoulder or hip. Osteopenia. Degenerative changes as described. Electronically Signed: Arabella Rutherford  1/13/2023 4:09 PM EST  Workstation ID: [id number]              Results for orders placed during the hospital  encounter of 02/23/22    Adult Transthoracic Echo Complete W/ Cont if Necessary Per Protocol    Interpretation Summary  · Normal left ventricular systolic function, estimated EF 60%.  · Mild mitral regurgitation.  · Mild tricuspid regurgitation with normal RVSP.  · Mild pulmonic insufficiency.      Plan for Follow-up of Pending Labs/Results: Inbox     Discharge Details        Discharge Medications      New Medications      Instructions Start Date   Diclofenac Sodium 1 % gel gel  Commonly known as: VOLTAREN   2 g, Topical, 3 Times Daily      hydrOXYzine 10 MG tablet  Commonly known as: ATARAX   10 mg, Oral, 3 Times Daily PRN      levothyroxine 50 MCG tablet  Commonly known as: SYNTHROID, LEVOTHROID   50 mcg, Oral, Every Early Morning   Start Date: January 22, 2023     Valproic Acid 250 MG/5ML solution syrup  Commonly known as: DEPAKENE   250 mg, Oral, Every 12 Hours Scheduled         Changes to Medications      Instructions Start Date   traMADol 50 MG tablet  Commonly known as: ULTRAM  What changed: how much to take   25 mg, Oral, 2 Times Daily PRN         Continue These Medications      Instructions Start Date   acetaminophen 325 MG tablet  Commonly known as: TYLENOL   650 mg, Oral, Every 4 Hours PRN      aspirin 81 MG EC tablet   81 mg, Oral, Daily      bumetanide 0.5 MG tablet  Commonly known as: BUMEX   TAKE ONE TABLET BY MOUTH DAILY      donepezil 10 MG tablet  Commonly known as: ARICEPT   10 mg, Oral, Nightly      fluticasone 50 MCG/ACT nasal spray  Commonly known as: FLONASE   2 sprays, Nasal, Daily, Administer 2 sprays in each nostril for each dose.       nitroglycerin 0.4 MG SL tablet  Commonly known as: NITROSTAT   0.4 mg, Sublingual, Every 5 Minutes PRN, Take no more than 3 doses in 15 minutes.       omeprazole 20 MG capsule  Commonly known as: priLOSEC   20 mg, Oral, Daily         Stop These Medications    diclofenac 75 MG EC tablet  Commonly known as: VOLTAREN     divalproex 250 MG DR tablet  Commonly  known as: DEPAKOTE     lisinopril 2.5 MG tablet  Commonly known as: PRINIVIL,ZESTRIL            Allergies   Allergen Reactions   • Flomax [Tamsulosin] Other (See Comments)     Unknown     • Lactose Intolerance (Gi)          Discharge Disposition:  Skilled Nursing Facility (DC - External)    Diet:  Hospital:  Diet Order   Procedures   • Diet: Cardiac Diets; Healthy Heart (2-3 Na+); Texture: Regular Texture (IDDSI 7); Fluid Consistency: Thin (IDDSI 0)       Activity:      Restrictions or Other Recommendations:       CODE STATUS:    Code Status and Medical Interventions:   Ordered at: 01/13/23 2138     Medical Intervention Limits:    NO intubation (DNI)     Level Of Support Discussed With:    Health Care Surrogate     Code Status (Patient has no pulse and is not breathing):    No CPR (Do Not Attempt to Resuscitate)     Medical Interventions (Patient has pulse or is breathing):    Limited Support     Comments:    per patient's daughter and POA       Future Appointments   Date Time Provider Department Center   5/11/2023  3:00 PM Vesta Marley MD [facility]       Additional Instructions for the Follow-ups that You Need to Schedule     Discharge Follow-up with PCP   As directed       Currently Documented PCP:    Tez Santos MD    PCP Phone Number:    [phone]     Follow Up Details: 1 week                     Yulia Coley DO  01/21/23    Time Spent on Discharge:  I spent  45 minutes on this discharge activity which included: face-to-face encounter with the patient, reviewing the data in the system, coordination of the care with the nursing staff as well as consultants, documentation, and entering orders.            Electronically signed by Yulia Coley DO at 01/21/23 2789

## 2023-09-21 ENCOUNTER — APPOINTMENT (OUTPATIENT)
Dept: CT IMAGING | Facility: HOSPITAL | Age: 88
DRG: 871 | End: 2023-09-21
Payer: MEDICARE

## 2023-09-21 ENCOUNTER — HOSPITAL ENCOUNTER (INPATIENT)
Facility: HOSPITAL | Age: 88
LOS: 5 days | Discharge: NURSING, REHAB OR LONG-TERM CARE | DRG: 871 | End: 2023-09-26
Attending: EMERGENCY MEDICINE | Admitting: STUDENT IN AN ORGANIZED HEALTH CARE EDUCATION/TRAINING PROGRAM
Payer: MEDICARE

## 2023-09-21 ENCOUNTER — APPOINTMENT (OUTPATIENT)
Dept: GENERAL RADIOLOGY | Facility: HOSPITAL | Age: 88
DRG: 871 | End: 2023-09-21
Payer: MEDICARE

## 2023-09-21 DIAGNOSIS — J18.9 HCAP (HEALTHCARE-ASSOCIATED PNEUMONIA): Primary | ICD-10-CM

## 2023-09-21 DIAGNOSIS — J18.9 SEPSIS DUE TO PNEUMONIA: ICD-10-CM

## 2023-09-21 DIAGNOSIS — R13.12 OROPHARYNGEAL DYSPHAGIA: ICD-10-CM

## 2023-09-21 DIAGNOSIS — A41.9 SEPSIS DUE TO PNEUMONIA: ICD-10-CM

## 2023-09-21 DIAGNOSIS — D72.829 LEUKOCYTOSIS, UNSPECIFIED TYPE: ICD-10-CM

## 2023-09-21 DIAGNOSIS — Z86.59 HISTORY OF DEMENTIA: ICD-10-CM

## 2023-09-21 LAB
ALBUMIN SERPL-MCNC: 3.3 G/DL (ref 3.5–5.2)
ALBUMIN/GLOB SERPL: 1.3 G/DL
ALP SERPL-CCNC: 62 U/L (ref 39–117)
ALT SERPL W P-5'-P-CCNC: 12 U/L (ref 1–41)
ANION GAP SERPL CALCULATED.3IONS-SCNC: 10 MMOL/L (ref 5–15)
AST SERPL-CCNC: 19 U/L (ref 1–40)
B PARAPERT DNA SPEC QL NAA+PROBE: NOT DETECTED
B PERT DNA SPEC QL NAA+PROBE: NOT DETECTED
BACTERIA UR QL AUTO: ABNORMAL /HPF
BASOPHILS # BLD AUTO: 0.02 10*3/MM3 (ref 0–0.2)
BASOPHILS NFR BLD AUTO: 0.1 % (ref 0–1.5)
BILIRUB SERPL-MCNC: 0.4 MG/DL (ref 0–1.2)
BILIRUB UR QL STRIP: NEGATIVE
BUN SERPL-MCNC: 14 MG/DL (ref 8–23)
BUN/CREAT SERPL: 20.3 (ref 7–25)
C PNEUM DNA NPH QL NAA+NON-PROBE: NOT DETECTED
CALCIUM SPEC-SCNC: 8.6 MG/DL (ref 8.6–10.5)
CHLORIDE SERPL-SCNC: 103 MMOL/L (ref 98–107)
CLARITY UR: CLEAR
CO2 SERPL-SCNC: 25 MMOL/L (ref 22–29)
COLOR UR: YELLOW
CREAT SERPL-MCNC: 0.69 MG/DL (ref 0.76–1.27)
D-LACTATE SERPL-SCNC: 2.3 MMOL/L (ref 0.5–2)
DEPRECATED RDW RBC AUTO: 44.5 FL (ref 37–54)
EGFRCR SERPLBLD CKD-EPI 2021: 89.6 ML/MIN/1.73
EOSINOPHIL # BLD AUTO: 0.02 10*3/MM3 (ref 0–0.4)
EOSINOPHIL NFR BLD AUTO: 0.1 % (ref 0.3–6.2)
ERYTHROCYTE [DISTWIDTH] IN BLOOD BY AUTOMATED COUNT: 13 % (ref 12.3–15.4)
FLUAV SUBTYP SPEC NAA+PROBE: NOT DETECTED
FLUBV RNA ISLT QL NAA+PROBE: NOT DETECTED
GLOBULIN UR ELPH-MCNC: 2.5 GM/DL
GLUCOSE SERPL-MCNC: 163 MG/DL (ref 65–99)
GLUCOSE UR STRIP-MCNC: NEGATIVE MG/DL
HADV DNA SPEC NAA+PROBE: NOT DETECTED
HCOV 229E RNA SPEC QL NAA+PROBE: NOT DETECTED
HCOV HKU1 RNA SPEC QL NAA+PROBE: NOT DETECTED
HCOV NL63 RNA SPEC QL NAA+PROBE: NOT DETECTED
HCOV OC43 RNA SPEC QL NAA+PROBE: NOT DETECTED
HCT VFR BLD AUTO: 36.7 % (ref 37.5–51)
HGB BLD-MCNC: 12.4 G/DL (ref 13–17.7)
HGB UR QL STRIP.AUTO: ABNORMAL
HMPV RNA NPH QL NAA+NON-PROBE: NOT DETECTED
HPIV1 RNA ISLT QL NAA+PROBE: NOT DETECTED
HPIV2 RNA SPEC QL NAA+PROBE: NOT DETECTED
HPIV3 RNA NPH QL NAA+PROBE: NOT DETECTED
HPIV4 P GENE NPH QL NAA+PROBE: NOT DETECTED
HYALINE CASTS UR QL AUTO: ABNORMAL /LPF
IMM GRANULOCYTES # BLD AUTO: 0.05 10*3/MM3 (ref 0–0.05)
IMM GRANULOCYTES NFR BLD AUTO: 0.4 % (ref 0–0.5)
INR PPP: 1.23 (ref 0.89–1.12)
KETONES UR QL STRIP: NEGATIVE
LEUKOCYTE ESTERASE UR QL STRIP.AUTO: NEGATIVE
LYMPHOCYTES # BLD AUTO: 1.11 10*3/MM3 (ref 0.7–3.1)
LYMPHOCYTES NFR BLD AUTO: 8 % (ref 19.6–45.3)
M PNEUMO IGG SER IA-ACNC: NOT DETECTED
MAGNESIUM SERPL-MCNC: 1.7 MG/DL (ref 1.6–2.4)
MCH RBC QN AUTO: 31.6 PG (ref 26.6–33)
MCHC RBC AUTO-ENTMCNC: 33.8 G/DL (ref 31.5–35.7)
MCV RBC AUTO: 93.4 FL (ref 79–97)
MONOCYTES # BLD AUTO: 0.82 10*3/MM3 (ref 0.1–0.9)
MONOCYTES NFR BLD AUTO: 5.9 % (ref 5–12)
NEUTROPHILS NFR BLD AUTO: 11.94 10*3/MM3 (ref 1.7–7)
NEUTROPHILS NFR BLD AUTO: 85.5 % (ref 42.7–76)
NITRITE UR QL STRIP: NEGATIVE
NRBC BLD AUTO-RTO: 0 /100 WBC (ref 0–0.2)
PH UR STRIP.AUTO: 6.5 [PH] (ref 5–8)
PLATELET # BLD AUTO: 197 10*3/MM3 (ref 140–450)
PMV BLD AUTO: 8.6 FL (ref 6–12)
POTASSIUM SERPL-SCNC: 4.3 MMOL/L (ref 3.5–5.2)
PROCALCITONIN SERPL-MCNC: 0.41 NG/ML (ref 0–0.25)
PROT SERPL-MCNC: 5.8 G/DL (ref 6–8.5)
PROT UR QL STRIP: NEGATIVE
PROTHROMBIN TIME: 15.6 SECONDS (ref 12.2–14.5)
RBC # BLD AUTO: 3.93 10*6/MM3 (ref 4.14–5.8)
RBC # UR STRIP: ABNORMAL /HPF
REF LAB TEST METHOD: ABNORMAL
RHINOVIRUS RNA SPEC NAA+PROBE: NOT DETECTED
RSV RNA NPH QL NAA+NON-PROBE: NOT DETECTED
SARS-COV-2 RNA NPH QL NAA+NON-PROBE: NOT DETECTED
SODIUM SERPL-SCNC: 138 MMOL/L (ref 136–145)
SP GR UR STRIP: 1.02 (ref 1–1.03)
SQUAMOUS #/AREA URNS HPF: ABNORMAL /HPF
UROBILINOGEN UR QL STRIP: ABNORMAL
VALPROATE SERPL-MCNC: 30.8 MCG/ML (ref 50–125)
WBC # UR STRIP: ABNORMAL /HPF
WBC NRBC COR # BLD: 13.96 10*3/MM3 (ref 3.4–10.8)

## 2023-09-21 PROCEDURE — 25510000001 IOPAMIDOL 61 % SOLUTION: Performed by: EMERGENCY MEDICINE

## 2023-09-21 PROCEDURE — 85025 COMPLETE CBC W/AUTO DIFF WBC: CPT | Performed by: EMERGENCY MEDICINE

## 2023-09-21 PROCEDURE — 25010000002 VANCOMYCIN 10 G RECONSTITUTED SOLUTION: Performed by: EMERGENCY MEDICINE

## 2023-09-21 PROCEDURE — 0202U NFCT DS 22 TRGT SARS-COV-2: CPT | Performed by: EMERGENCY MEDICINE

## 2023-09-21 PROCEDURE — 25010000002 PIPERACILLIN SOD-TAZOBACTAM PER 1 G: Performed by: EMERGENCY MEDICINE

## 2023-09-21 PROCEDURE — 85610 PROTHROMBIN TIME: CPT | Performed by: EMERGENCY MEDICINE

## 2023-09-21 PROCEDURE — 81001 URINALYSIS AUTO W/SCOPE: CPT | Performed by: EMERGENCY MEDICINE

## 2023-09-21 PROCEDURE — 87040 BLOOD CULTURE FOR BACTERIA: CPT | Performed by: EMERGENCY MEDICINE

## 2023-09-21 PROCEDURE — 80164 ASSAY DIPROPYLACETIC ACD TOT: CPT | Performed by: EMERGENCY MEDICINE

## 2023-09-21 PROCEDURE — 83735 ASSAY OF MAGNESIUM: CPT | Performed by: EMERGENCY MEDICINE

## 2023-09-21 PROCEDURE — 83605 ASSAY OF LACTIC ACID: CPT | Performed by: EMERGENCY MEDICINE

## 2023-09-21 PROCEDURE — 80053 COMPREHEN METABOLIC PANEL: CPT | Performed by: EMERGENCY MEDICINE

## 2023-09-21 PROCEDURE — P9612 CATHETERIZE FOR URINE SPEC: HCPCS

## 2023-09-21 PROCEDURE — 84145 PROCALCITONIN (PCT): CPT | Performed by: EMERGENCY MEDICINE

## 2023-09-21 PROCEDURE — 74177 CT ABD & PELVIS W/CONTRAST: CPT

## 2023-09-21 PROCEDURE — 71045 X-RAY EXAM CHEST 1 VIEW: CPT

## 2023-09-21 PROCEDURE — 70450 CT HEAD/BRAIN W/O DYE: CPT

## 2023-09-21 PROCEDURE — 36415 COLL VENOUS BLD VENIPUNCTURE: CPT

## 2023-09-21 PROCEDURE — 99285 EMERGENCY DEPT VISIT HI MDM: CPT

## 2023-09-21 RX ORDER — ACETAMINOPHEN 650 MG/1
650 SUPPOSITORY RECTAL ONCE
Status: COMPLETED | OUTPATIENT
Start: 2023-09-21 | End: 2023-09-21

## 2023-09-21 RX ORDER — IPRATROPIUM BROMIDE AND ALBUTEROL SULFATE 2.5; .5 MG/3ML; MG/3ML
3 SOLUTION RESPIRATORY (INHALATION)
Status: DISCONTINUED | OUTPATIENT
Start: 2023-09-22 | End: 2023-09-26

## 2023-09-21 RX ORDER — MIDODRINE HYDROCHLORIDE 5 MG/1
5 TABLET ORAL AS NEEDED
COMMUNITY

## 2023-09-21 RX ORDER — PAROXETINE 10 MG/1
10 TABLET, FILM COATED ORAL EVERY MORNING
COMMUNITY

## 2023-09-21 RX ORDER — SODIUM CHLORIDE 0.9 % (FLUSH) 0.9 %
10 SYRINGE (ML) INJECTION AS NEEDED
Status: DISCONTINUED | OUTPATIENT
Start: 2023-09-21 | End: 2023-09-26 | Stop reason: HOSPADM

## 2023-09-21 RX ORDER — SODIUM CHLORIDE 9 MG/ML
100 INJECTION, SOLUTION INTRAVENOUS CONTINUOUS
Status: DISCONTINUED | OUTPATIENT
Start: 2023-09-21 | End: 2023-09-24

## 2023-09-21 RX ORDER — SODIUM CHLORIDE 9 MG/ML
40 INJECTION, SOLUTION INTRAVENOUS AS NEEDED
Status: DISCONTINUED | OUTPATIENT
Start: 2023-09-21 | End: 2023-09-26 | Stop reason: HOSPADM

## 2023-09-21 RX ORDER — DEXAMETHASONE SODIUM PHOSPHATE 4 MG/ML
4 INJECTION, SOLUTION INTRA-ARTICULAR; INTRALESIONAL; INTRAMUSCULAR; INTRAVENOUS; SOFT TISSUE EVERY 6 HOURS PRN
Status: DISCONTINUED | OUTPATIENT
Start: 2023-09-21 | End: 2023-09-26 | Stop reason: HOSPADM

## 2023-09-21 RX ORDER — ACETAMINOPHEN 325 MG/1
650 TABLET ORAL EVERY 4 HOURS PRN
Status: DISCONTINUED | OUTPATIENT
Start: 2023-09-21 | End: 2023-09-26 | Stop reason: HOSPADM

## 2023-09-21 RX ORDER — IPRATROPIUM BROMIDE AND ALBUTEROL SULFATE 2.5; .5 MG/3ML; MG/3ML
3 SOLUTION RESPIRATORY (INHALATION) EVERY 4 HOURS PRN
Status: DISCONTINUED | OUTPATIENT
Start: 2023-09-21 | End: 2023-09-26 | Stop reason: HOSPADM

## 2023-09-21 RX ORDER — HYDROCODONE BITARTRATE AND ACETAMINOPHEN 5; 325 MG/1; MG/1
1 TABLET ORAL EVERY 4 HOURS PRN
Status: DISCONTINUED | OUTPATIENT
Start: 2023-09-21 | End: 2023-09-26 | Stop reason: HOSPADM

## 2023-09-21 RX ORDER — CHOLECALCIFEROL (VITAMIN D3) 125 MCG
5 CAPSULE ORAL NIGHTLY PRN
Status: DISCONTINUED | OUTPATIENT
Start: 2023-09-21 | End: 2023-09-26 | Stop reason: HOSPADM

## 2023-09-21 RX ORDER — VANCOMYCIN/0.9 % SOD CHLORIDE 1.5G/250ML
20 PLASTIC BAG, INJECTION (ML) INTRAVENOUS ONCE
Status: COMPLETED | OUTPATIENT
Start: 2023-09-21 | End: 2023-09-21

## 2023-09-21 RX ORDER — NITROGLYCERIN 0.4 MG/1
0.4 TABLET SUBLINGUAL
Status: DISCONTINUED | OUTPATIENT
Start: 2023-09-21 | End: 2023-09-26 | Stop reason: HOSPADM

## 2023-09-21 RX ORDER — SODIUM CHLORIDE 0.9 % (FLUSH) 0.9 %
10 SYRINGE (ML) INJECTION EVERY 12 HOURS SCHEDULED
Status: DISCONTINUED | OUTPATIENT
Start: 2023-09-21 | End: 2023-09-26 | Stop reason: HOSPADM

## 2023-09-21 RX ADMIN — ACETAMINOPHEN 650 MG: 650 SUPPOSITORY RECTAL at 19:45

## 2023-09-21 RX ADMIN — VANCOMYCIN HYDROCHLORIDE 1500 MG: 10 INJECTION, POWDER, LYOPHILIZED, FOR SOLUTION INTRAVENOUS at 22:21

## 2023-09-21 RX ADMIN — IOPAMIDOL 80 ML: 612 INJECTION, SOLUTION INTRAVENOUS at 20:39

## 2023-09-21 RX ADMIN — PIPERACILLIN SODIUM AND TAZOBACTAM SODIUM 3.38 G: 3; .375 INJECTION, SOLUTION INTRAVENOUS at 21:46

## 2023-09-21 NOTE — Clinical Note
Level of Care: Telemetry [5]   Diagnosis: Sepsis due to pneumonia [[id number]]   Admitting Physician: RAYMOND SQUIRES III [[id number]]   Attending Physician: RAYMOND SQUIRES III [[id number]]   Isolate for COVID?: No [0]   Bed Request Comments: tele inpt   Certification: I Certify That Inpatient Hospital Services Are Medically Necessary For Greater Than 2 Midnights

## 2023-09-21 NOTE — LETTER
EMS Transport Request  For use at [facility], [place], [place], and [place] only   Patient Name: Ramin Zaragoza : 1935   Weight:72 kg (158 lb 11.7 oz) [facility] BLS/ALS: BLS/ALS: BLS   Insurance: [employer] MEDICARE REPLACEMENT Auth End Date:    Pre-Cert #: D/C Summary complete:    Destination: Other [place]   Contact Precautions: None   Equipment (O2, Fluids, etc.): None   Arrive By Date/Time: 23 after 1200. Stretcher/WC: Stretcher   CM Requesting: Fernando Rivera RN Ext: [phone]   Notes/Medical Necessity: dementia bedbound, wheelchair bound w/lift device     ______________________________________________________________________    *Only 2 patient bags OR 1 carry-on size bag are permitted.  Wheelchairs and walkers CANNOT transported with the patient. Acknowledge: Yes

## 2023-09-22 ENCOUNTER — APPOINTMENT (OUTPATIENT)
Dept: GENERAL RADIOLOGY | Facility: HOSPITAL | Age: 88
DRG: 871 | End: 2023-09-22
Payer: MEDICARE

## 2023-09-22 PROBLEM — J18.9 PNEUMONIA: Status: ACTIVE | Noted: 2023-09-22

## 2023-09-22 LAB
ALBUMIN SERPL-MCNC: 2.9 G/DL (ref 3.5–5.2)
ALBUMIN/GLOB SERPL: 1.3 G/DL
ALP SERPL-CCNC: 53 U/L (ref 39–117)
ALT SERPL W P-5'-P-CCNC: 11 U/L (ref 1–41)
ANION GAP SERPL CALCULATED.3IONS-SCNC: 6 MMOL/L (ref 5–15)
AST SERPL-CCNC: 20 U/L (ref 1–40)
BASOPHILS # BLD AUTO: 0.03 10*3/MM3 (ref 0–0.2)
BASOPHILS NFR BLD AUTO: 0.3 % (ref 0–1.5)
BILIRUB SERPL-MCNC: 0.5 MG/DL (ref 0–1.2)
BUN SERPL-MCNC: 11 MG/DL (ref 8–23)
BUN/CREAT SERPL: 15.7 (ref 7–25)
CALCIUM SPEC-SCNC: 8.3 MG/DL (ref 8.6–10.5)
CHLORIDE SERPL-SCNC: 107 MMOL/L (ref 98–107)
CO2 SERPL-SCNC: 28 MMOL/L (ref 22–29)
CREAT SERPL-MCNC: 0.7 MG/DL (ref 0.76–1.27)
D-LACTATE SERPL-SCNC: 1.6 MMOL/L (ref 0.5–2)
DEPRECATED RDW RBC AUTO: 48.7 FL (ref 37–54)
EGFRCR SERPLBLD CKD-EPI 2021: 89.2 ML/MIN/1.73
EOSINOPHIL # BLD AUTO: 0.12 10*3/MM3 (ref 0–0.4)
EOSINOPHIL NFR BLD AUTO: 1 % (ref 0.3–6.2)
ERYTHROCYTE [DISTWIDTH] IN BLOOD BY AUTOMATED COUNT: 13.3 % (ref 12.3–15.4)
GLOBULIN UR ELPH-MCNC: 2.2 GM/DL
GLUCOSE SERPL-MCNC: 99 MG/DL (ref 65–99)
HCT VFR BLD AUTO: 40.1 % (ref 37.5–51)
HGB BLD-MCNC: 12.8 G/DL (ref 13–17.7)
IMM GRANULOCYTES # BLD AUTO: 0.04 10*3/MM3 (ref 0–0.05)
IMM GRANULOCYTES NFR BLD AUTO: 0.3 % (ref 0–0.5)
L PNEUMO1 AG UR QL IA: NEGATIVE
LYMPHOCYTES # BLD AUTO: 2.81 10*3/MM3 (ref 0.7–3.1)
LYMPHOCYTES NFR BLD AUTO: 24.2 % (ref 19.6–45.3)
MAGNESIUM SERPL-MCNC: 1.8 MG/DL (ref 1.6–2.4)
MCH RBC QN AUTO: 31.8 PG (ref 26.6–33)
MCHC RBC AUTO-ENTMCNC: 31.9 G/DL (ref 31.5–35.7)
MCV RBC AUTO: 99.5 FL (ref 79–97)
MONOCYTES # BLD AUTO: 1.01 10*3/MM3 (ref 0.1–0.9)
MONOCYTES NFR BLD AUTO: 8.7 % (ref 5–12)
MRSA DNA SPEC QL NAA+PROBE: NEGATIVE
NEUTROPHILS NFR BLD AUTO: 65.5 % (ref 42.7–76)
NEUTROPHILS NFR BLD AUTO: 7.62 10*3/MM3 (ref 1.7–7)
NRBC BLD AUTO-RTO: 0 /100 WBC (ref 0–0.2)
PLATELET # BLD AUTO: 183 10*3/MM3 (ref 140–450)
PMV BLD AUTO: 9 FL (ref 6–12)
POTASSIUM SERPL-SCNC: 4 MMOL/L (ref 3.5–5.2)
PROT SERPL-MCNC: 5.1 G/DL (ref 6–8.5)
RBC # BLD AUTO: 4.03 10*6/MM3 (ref 4.14–5.8)
S PNEUM AG SPEC QL LA: NEGATIVE
SODIUM SERPL-SCNC: 141 MMOL/L (ref 136–145)
WBC NRBC COR # BLD: 11.63 10*3/MM3 (ref 3.4–10.8)

## 2023-09-22 PROCEDURE — 25010000002 PIPERACILLIN SOD-TAZOBACTAM PER 1 G: Performed by: INTERNAL MEDICINE

## 2023-09-22 PROCEDURE — 80053 COMPREHEN METABOLIC PANEL: CPT | Performed by: INTERNAL MEDICINE

## 2023-09-22 PROCEDURE — 99223 1ST HOSP IP/OBS HIGH 75: CPT | Performed by: INTERNAL MEDICINE

## 2023-09-22 PROCEDURE — 25010000002 VANCOMYCIN PER 500 MG

## 2023-09-22 PROCEDURE — 83735 ASSAY OF MAGNESIUM: CPT | Performed by: INTERNAL MEDICINE

## 2023-09-22 PROCEDURE — 87449 NOS EACH ORGANISM AG IA: CPT | Performed by: INTERNAL MEDICINE

## 2023-09-22 PROCEDURE — 87641 MR-STAPH DNA AMP PROBE: CPT

## 2023-09-22 PROCEDURE — 94640 AIRWAY INHALATION TREATMENT: CPT

## 2023-09-22 PROCEDURE — 92610 EVALUATE SWALLOWING FUNCTION: CPT

## 2023-09-22 PROCEDURE — 74230 X-RAY XM SWLNG FUNCJ C+: CPT

## 2023-09-22 PROCEDURE — G0378 HOSPITAL OBSERVATION PER HR: HCPCS

## 2023-09-22 PROCEDURE — 87899 AGENT NOS ASSAY W/OPTIC: CPT | Performed by: INTERNAL MEDICINE

## 2023-09-22 PROCEDURE — 83605 ASSAY OF LACTIC ACID: CPT | Performed by: EMERGENCY MEDICINE

## 2023-09-22 PROCEDURE — 92611 MOTION FLUOROSCOPY/SWALLOW: CPT

## 2023-09-22 PROCEDURE — 85025 COMPLETE CBC W/AUTO DIFF WBC: CPT | Performed by: INTERNAL MEDICINE

## 2023-09-22 RX ORDER — VALPROIC ACID 250 MG/5ML
250 SOLUTION ORAL EVERY 12 HOURS SCHEDULED
Status: DISCONTINUED | OUTPATIENT
Start: 2023-09-22 | End: 2023-09-26 | Stop reason: HOSPADM

## 2023-09-22 RX ORDER — LEVOTHYROXINE SODIUM 0.07 MG/1
75 TABLET ORAL
Status: DISCONTINUED | OUTPATIENT
Start: 2023-09-22 | End: 2023-09-26 | Stop reason: HOSPADM

## 2023-09-22 RX ORDER — PAROXETINE 10 MG/1
10 TABLET, FILM COATED ORAL EVERY MORNING
Status: DISCONTINUED | OUTPATIENT
Start: 2023-09-22 | End: 2023-09-26 | Stop reason: HOSPADM

## 2023-09-22 RX ORDER — ASPIRIN 81 MG/1
81 TABLET ORAL DAILY
Status: DISCONTINUED | OUTPATIENT
Start: 2023-09-22 | End: 2023-09-26 | Stop reason: HOSPADM

## 2023-09-22 RX ADMIN — Medication 10 ML: at 00:17

## 2023-09-22 RX ADMIN — ASPIRIN 81 MG: 81 TABLET, COATED ORAL at 09:22

## 2023-09-22 RX ADMIN — VALPROIC ACID 250 MG: 250 SOLUTION ORAL at 20:11

## 2023-09-22 RX ADMIN — Medication 10 ML: at 09:22

## 2023-09-22 RX ADMIN — BARIUM SULFATE 10 ML: 400 SUSPENSION ORAL at 12:06

## 2023-09-22 RX ADMIN — VANCOMYCIN HYDROCHLORIDE 750 MG: 750 INJECTION, SOLUTION INTRAVENOUS at 13:08

## 2023-09-22 RX ADMIN — BARIUM SULFATE 20 ML: 400 PASTE ORAL at 12:06

## 2023-09-22 RX ADMIN — PIPERACILLIN SODIUM AND TAZOBACTAM SODIUM 3.38 G: 3; .375 INJECTION, SOLUTION INTRAVENOUS at 20:10

## 2023-09-22 RX ADMIN — LEVOTHYROXINE SODIUM 75 MCG: 0.07 TABLET ORAL at 06:16

## 2023-09-22 RX ADMIN — PIPERACILLIN SODIUM AND TAZOBACTAM SODIUM 3.38 G: 3; .375 INJECTION, SOLUTION INTRAVENOUS at 14:15

## 2023-09-22 RX ADMIN — SODIUM CHLORIDE 100 ML/HR: 9 INJECTION, SOLUTION INTRAVENOUS at 00:08

## 2023-09-22 RX ADMIN — PAROXETINE HYDROCHLORIDE 10 MG: 10 TABLET, FILM COATED ORAL at 09:22

## 2023-09-22 RX ADMIN — BARIUM SULFATE 50 ML: 400 SUSPENSION ORAL at 12:06

## 2023-09-22 RX ADMIN — SODIUM CHLORIDE 100 ML/HR: 9 INJECTION, SOLUTION INTRAVENOUS at 15:42

## 2023-09-22 RX ADMIN — BARIUM SULFATE 100 ML: 0.81 POWDER, FOR SUSPENSION ORAL at 12:06

## 2023-09-22 RX ADMIN — VALPROIC ACID 250 MG: 250 SOLUTION ORAL at 09:26

## 2023-09-22 RX ADMIN — PIPERACILLIN SODIUM AND TAZOBACTAM SODIUM 3.38 G: 3; .375 INJECTION, SOLUTION INTRAVENOUS at 05:10

## 2023-09-22 NOTE — MBS/VFSS/FEES
Acute Care - Speech Language Pathology   Swallow Initial Evaluation  [place]  Modified Barium Swallow Study (MBS)     Patient Name: Ramin Zaragoza  : 1935  MRN: [id number]  Today's Date: 2023               Admit Date: 2023    Visit Dx:     ICD-10-CM ICD-9-CM   1. HCAP (healthcare-associated pneumonia)  J18.9 486   2. Leukocytosis, unspecified type  D72.829 288.60   3. Sepsis due to pneumonia  J18.9 486    A41.9 995.91   4. History of dementia  Z86.59 V11.8   5. Oropharyngeal dysphagia  R13.12 787.22     Patient Active Problem List   Diagnosis    Coronary artery disease involving native coronary artery of native heart without angina pectoris    Premature ventricular contraction    Essential hypertension    Mixed hyperlipidemia    Venous insufficiency    Hypothyroidism    Lactose intolerance    Weakness    Accident due to mechanical fall without injury    Dementia    Sepsis due to pneumonia     Past Medical History:   Diagnosis Date    CAD (coronary artery disease)     Hyperlipidemia     Hypertension     Hypothyroidism     on chronic replacement therapy    Lactose intolerance     Lower extremity edema     PVC's (premature ventricular contractions)     History of    Thyroid disorder     Venous insufficiency      Past Surgical History:   Procedure Laterality Date    CARDIAC CATHETERIZATION      CORONARY STENT PLACEMENT      EYE SURGERY      Bilateral cataract extraction    HERNIA REPAIR      Inguinal hernia repair    OTHER SURGICAL HISTORY      Melanoma removed from back    OTHER SURGICAL HISTORY      History of left elbow fracture with sunsequent fixation       SLP Recommendation and Plan  SLP Swallowing Diagnosis: suspect acute-on-chronic, moderate, oral dysphagia, mod-severe, pharyngeal dysphagia (23 1130)  SLP Diet Recommendation: comfort diet, per physician discretion, other (see comments) (High risk for aspiration w/ all consistencies. May consider comfort diet of mechanical ground and  thin liquids, pending further GOC discussion/per physician discretion.) (09/22/23 1130)  Recommended Precautions and Strategies: upright posture during/after eating, small bites of food and sips of liquid, 1:1 supervision, assist with feeding, check mouth frequently for oral residue/pocketing (if opts for comfort diet) (09/22/23 1130)  SLP Rec. for Method of Medication Administration: meds via alternate route (09/22/23 1130)           Swallow Criteria for Skilled Therapeutic Interventions Met: demonstrates skilled criteria (09/22/23 1130)  Anticipated Discharge Disposition (SLP): skilled nursing facility (09/22/23 1130)  Rehab Potential/Prognosis, Swallowing: re-evaluate goals as necessary (09/22/23 1130)  Therapy Frequency (Swallow): PRN (09/22/23 1130)  Predicted Duration Therapy Intervention (Days): until discharge (09/22/23 1130)  Oral Care Recommendations: Oral Care BID/PRN, Suction toothbrush (09/22/23 1130)  Demonstrates Need for Referral to Another Service: palliative care services (09/22/23 1130)                                   Oral Care Recommendations: Oral Care BID/PRN, Suction toothbrush (09/22/23 1130)           SWALLOW EVALUATION (last 72 hours)       SLP Adult Swallow Evaluation       Row Name 09/22/23 1130 09/22/23 0820                Rehab Evaluation    Document Type evaluation  -AC evaluation  -RS       Subjective Information no complaints  -AC no complaints  -RS       Patient Observations alert;cooperative  -AC agree to therapy  -RS       Patient/Family/Caregiver Comments/Observations No family present.  -AC no family present  -RS       Patient Effort adequate  -AC good  -RS       Symptoms Noted During/After Treatment -- none  -RS          General Information    Patient Profile Reviewed yes  -AC yes  -RS       Pertinent History Of Current Problem See previous eval.  -AC Pt is an 88 yo male admitted with possible sepsis d/t pna. MD notes documented that fmaily has reported hx dysphagia  -RS        Current Method of Nutrition regular textures;pudding thick  SLP recommended NPO x meds in puree per clinical eval this morning  - regular textures;thin liquids  -RS       Precautions/Limitations, Vision -- WFL;for purposes of eval;difficult to assess  -RS       Precautions/Limitations, Hearing -- WFL;for purposes of eval;difficult to assess  -RS       Prior Level of Function-Communication -- cognitive-linguistic impairment  -RS       Prior Level of Function-Swallowing -- unknown  pt with baseline hx significant dementia and no family present  -RS       Plans/Goals Discussed with patient  -AC patient;agreed upon  -RS       Barriers to Rehab cognitive status;previous functional deficit  - cognitive status  -RS       Patient's Goals for Discharge patient could not state  - patient did not state  -RS          Pain    Additional Documentation -- Pain Scale: FACES Pre/Post-Treatment (Group)  -RS          Pain Scale: FACES Pre/Post-Treatment    Pain: FACES Scale, Pretreatment 0-->no hurt  -AC 0-->no hurt  -RS       Posttreatment Pain Rating 0-->no hurt  -AC 0-->no hurt  -RS          Oral Motor Structure and Function    Dentition Assessment -- poor oral hygiene;missing teeth;teeth are in poor condition  -RS       Secretion Management -- WNL/WFL  -RS       Mucosal Quality -- dry;sticky  -RS       Volitional Swallow -- delayed  -RS       Volitional Cough -- unable to elicit  -RS          Oral Musculature and Cranial Nerve Assessment    Oral Motor General Assessment -- unable to assess  pt unable to follow directions in isolation to fully participate in oral mechanism assessment  -RS       Oral Motor, Comment -- appears grossly functional with PO trials  -RS          General Eating/Swallowing Observations    Respiratory Support Currently in Use room air  -AC nasal cannula  -RS       Eating/Swallowing Skills fed by SLP  - unable to perform self-feeding  -RS       Positioning During Eating upright 90 degree;upright in  chair  -AC upright 90 degree  -RS       Utensils Used -- spoon;cup;straw  -RS       Consistencies Trialed -- pureed;thin liquids;nectar/syrup-thick liquids  -RS          Respiratory    Respiratory Status -- WFL  -RS          Clinical Swallow Eval    Oral Prep Phase -- WFL  -RS       Oral Transit -- impaired  -RS       Oral Residue -- WFL  -RS       Pharyngeal Phase -- suspected pharyngeal impairment  -RS       Esophageal Phase -- suspected esophageal impairment  -RS       Clinical Swallow Evaluation Summary -- Pt seen for CSE. He is minimally verbal and is only able to tell me his first name. He does not follow any directions even with increased loudness from SLP. Oral transit is lengthy and therefore solids were deferred. Wet, squelching throat clear with thins, multiple swallows with nectar. No overt s/s with puree.  -RS          Oral Prep Concerns    Oral Prep Concerns -- prolonged mastication  -RS          Oral Transit Concerns    Oral Transit Concerns -- delayed initiation of bolus transit  -RS          Pharyngeal Phase Concerns    Pharyngeal Phase Concerns -- wet vocal quality;multiple swallows;throat clear  -RS       Wet Vocal Quality -- thin  -RS       Multiple Swallows -- nectar  -RS       Throat Clear -- thin  -RS          Esophageal Phase Concerns    Esophageal Phase Concerns -- belching  -RS          MBS/VFSS    Utensils Used spoon;cup;straw  -AC --       Consistencies Trialed thin liquids;nectar/syrup-thick liquids;honey-thick liquids;pudding thick;soft to chew textures;chopped  -AC --          MBS/VFSS Interpretation    Oral Prep Phase impaired oral phase of swallowing  -AC --       Oral Transit Phase impaired  -AC --       Oral Residue impaired  -AC --          Oral Preparatory Phase    Oral Preparatory Phase prolonged manipulation  -AC --       Prolonged Manipulation mechanical soft;secondary to reduced lingual strength;secondary to impaired cognitive status  -AC --          Oral Transit Phase     Impaired Oral Transit Phase premature spillage of liquids into pharynx;increased A-P transit time  -AC --       Increased A-P Transit Time all consistencies tested;discoordination of lingual movement;secondary to impaired cognitive status  -AC --       Premature Spillage of Liquids into Pharynx thin liquids;secondary to reduced lingual control;secondary to impaired cognitive status  -AC --          Oral Residue    Impaired Oral Residue diffuse residue throughout oral cavity  -AC --       Diffuse Residue throughout Oral Cavity all consistencies tested;secondary to reduced lingual strength  -AC --       Response to Oral Residue partial residue clearance;with spontaneous subsequent swallow  -AC --          Initiation of Pharyngeal Swallow    Initiation of Pharyngeal Swallow bolus in pyriform sinuses  -AC --       Pharyngeal Phase impaired pharyngeal phase of swallowing  -AC --       Penetration During the Swallow thin liquids;secondary to delayed swallow initiation or mistiming;secondary to reduced laryngeal elevation;secondary to reduced vestibular closure  -AC --       Penetration After the Swallow thin liquids;nectar-thick liquids;honey-thick liquids;secondary to residue;in valleculae;in pyriform sinuses  -AC --       Aspiration After the Swallow thin liquids;nectar-thick liquids;honey-thick liquids;secondary to residue;in laryngeal vestibule  -AC --       Depth of Penetration deep  -AC --       Response to Penetration No  -AC --       No spontaneous response to penetration and could not produce cough response despite cue  -AC --       Response to Aspiration No  -AC --       No spontaneous response to aspiration and could not produce cough response despite cue  -AC --       Rosenbek's Scale thin:;nectar:;honey:;8--->level 8  -AC --       Pharyngeal Residue all consistencies tested;diffuse within pharynx;secondary to reduced base of tongue retraction;secondary to reduced laryngeal elevation;secondary to reduced  posterior pharyngeal wall stripping;secondary to reduced hyolaryngeal excursion;other (see comments)  severe at it's worst--increased as study progressed  -AC --       Response to Residue unable to clear residue  -AC --       Attempted Compensatory Maneuvers bolus size;bolus presentation style;other (see comments)  pt u/a to follow commands to complete any other compensations  -AC --       Response to Attempted Compensatory Maneuvers did not prevent aspiration;did not reduce residue  -AC --       Pharyngeal Phase, Comment Though no aspiration or pudding or soft solids directly appreciated during study, severe diffuse pharyngeal residue remained. Pt was u/a to fully clear residue & exhibited poor sensation to penetration/residue. Suspect eventual aspiration of all consistencies inevitable. Discussed w/ Dr. Baez. Pt w/ dysphagia/aspiration concerns when admitted Jan 2023. Pt/family opted for clinical mgt @ that time. Given likely chronic dysphagia + dementia dx, feeding tube may be contraindicated. May consider comfort diet of mechanical ground, thin liquids w/ 1:1 assist to use small bolus size & oral suctioning PRN.  -AC --          SLP Evaluation Clinical Impression    SLP Swallowing Diagnosis suspect acute-on-chronic;moderate;oral dysphagia;mod-severe;pharyngeal dysphagia  -AC suspected pharyngeal dysphagia;suspected esophageal dysphagia  -RS       Functional Impact risk of aspiration/pneumonia;risk of malnutrition;risk of dehydration  -AC risk of aspiration/pneumonia  -RS       Rehab Potential/Prognosis, Swallowing re-evaluate goals as necessary  -AC adequate, monitor progress closely  -RS       Swallow Criteria for Skilled Therapeutic Interventions Met demonstrates skilled criteria  -AC demonstrates skilled criteria  -RS          Recommendations    Therapy Frequency (Swallow) PRN  -AC --       Predicted Duration Therapy Intervention (Days) until discharge  -AC --       SLP Diet Recommendation comfort diet, per  physician discretion;other (see comments)  High risk for aspiration w/ all consistencies. May consider comfort diet of mechanical ground and thin liquids, pending further GOC discussion/per physician discretion.  -AC NPO  -RS       Recommended Diagnostics -- VFSS (MBS)  -RS       Recommended Precautions and Strategies upright posture during/after eating;small bites of food and sips of liquid;1:1 supervision;assist with feeding;check mouth frequently for oral residue/pocketing  if opts for comfort diet  - --       Oral Care Recommendations Oral Care BID/PRN;Suction toothbrush  - Oral Care BID/PRN  -RS       SLP Rec. for Method of Medication Administration meds via alternate route  - meds crushed;with puree  -RS       Monitor for Signs of Aspiration -- yes  -RS       Anticipated Discharge Disposition (SLP) skilled nursing facility  - inpatient rehabilitation facility  -RS       Demonstrates Need for Referral to Another Service palliative care services  - --                 User Key  (r) = Recorded By, (t) = Taken By, (c) = Cosigned By      Initials Name Effective Dates    AC Denisse Flowers MS CCC-SLP 02/03/23 -     RS Lazarus Barahona MS CCC-SLP 09/14/23 -                     EDUCATION  The patient has been educated in the following areas:   Dysphagia (Swallowing Impairment).        SLP GOALS       Row Name 09/22/23 1130             (LTG) Patient will demonstrate functional swallow for    Diet Texture (Demonstrate functional swallow) desired comfort diet  -AC      Liquid viscosity (Demonstrate functional swallow) desired comfort liquid viscosity  -AC      [place] (Demonstrate functional swallow) with 1:1 assist/ supervision  -AC      Time Frame (Demonstrate functional swallow) by discharge  -         (STG) Patient will tolerate trials of    Consistencies Trialed (Tolerate trials) mechanical ground textures;thin liquids  -AC      Desired Outcome (Tolerate trials) without signs of distress;for  pleasure/comfort  -AC      [place] (Tolerate trials) with 1:1 assist/ supervision  -AC      Time Frame (Tolerate trials) by discharge  -AC         (STG) Swallow Management Recall Goal 1 (SLP)    Activity (Swallow Management Recall Goal 1, SLP) recall of;comfort diet textures and liquid viscosities;oral care recommendations;compensatory swallow strategies/techniques;other (see comments)  family/caregiver recall  -AC      [place]/Accuracy (Swallow Management Recall Goal 1, SLP) with minimal cues (75-90% accuracy)  -AC      Time Frame (Swallow Management Recall Goal 1, SLP) short term goal (STG)  -AC                User Key  (r) = Recorded By, (t) = Taken By, (c) = Cosigned By      Initials Name Provider Type    Denisse Bradshaw MS CCC-SLP Speech and Language Pathologist                       Time Calculation:    Time Calculation- SLP       Row Name 09/22/23 1459 09/22/23 0935          Time Calculation- SLP    SLP Start Time 1130  -AC 0820  -RS     SLP Received On 09/22/23  -AC 09/22/23  -RS        Untimed Charges    54019-SG Eval Oral Pharyng Swallow Minutes -- 75  -RS     10701-KI Motion Fluoro Eval Swallow Minutes 56  -AC --        Total Minutes    Untimed Charges Total Minutes 56  -AC 75  -RS      Total Minutes 56  -AC 75  -RS               User Key  (r) = Recorded By, (t) = Taken By, (c) = Cosigned By      Initials Name Provider Type    Denisse Bradshaw MS CCC-SLP Speech and Language Pathologist    Lazarus Harris MS CCC-SLP Speech and Language Pathologist                    Therapy Charges for Today       Code Description Service Date Service Provider Modifiers Qty    [id number]  ST MOTION FLUORO EVAL SWALLOW 4 9/22/2023 Denisse Flowers MS CCC-SLP GN 1                 Denisse Flowers MS CCC-SLP  9/22/2023

## 2023-09-22 NOTE — CASE MANAGEMENT/SOCIAL WORK
Discharge Planning Assessment  [facility]     Patient Name: Ramin Zaragoza  MRN: [id number]  Today's Date: 9/22/2023    Admit Date: 9/21/2023    Plan: to return to [facility] bed   Discharge Needs Assessment       Row Name 09/22/23 1316       Living Environment    People in Home facility resident    Current Living Arrangements extended care facility;residential facility    Primary Care Provided by other (see comments)    Provides Primary Care For no one    Family Caregiver if Needed child(tano), adult    Family Caregiver Names Isatu Menendez daughter.    Able to Return to Prior Arrangements yes    Living Arrangement Comments In LTC bed @ [facility]       Transition Planning    Patient/Family Anticipates Transition to long-term care facility    Transportation Anticipated health plan transportation       Discharge Needs Assessment    Readmission Within the Last 30 Days no previous admission in last 30 days    Current Outpatient/Agency/Support Group long-term acute care facility    Equipment Currently Used at Home wheelchair;walker, rolling                   Discharge Plan       Row Name 09/22/23 4337       Plan    Plan to return to [facility] bed    Patient/Family in Agreement with Plan yes    Plan Comments I spoke wAnissa @ [facility], [facility] bed on hold. I met w/daughter Isatu in room. Insurance confirmed of [employer]. Pt does not wear oxygen @ baseline, able to shuffle [facility] in [facility]  Medications filled @ LT facility. Speech scheduled MBS today. Per daughter, plan is to return to [facility] when medically ready. He will need medical transportation back. Discussed in MDR, d/c early next week. CM will continue to follow.    Final Discharge Disposition Code 04 - intermediate care facility      Row Name 09/22/23 4121       Plan    Plan [facility] bed    Patient/Family in Agreement with Plan other (see  comments)    Plan Comments I left  w/[facility] to return my call. I also left  w/daughter Isatu to return my call.    Final Discharge Disposition Code 04 - intermediate care facility                  Continued Care and Services - Admitted Since 9/21/2023    Coordination has not been started for this encounter.       Expected Discharge Date and Time       Expected Discharge Date Expected Discharge Time    Sep 27, 2023            Demographic Summary       Row Name 09/22/23 1315       General Information    Arrived From long-term care    Referral Source emergency department    General Information Comments PCP @ [facility] paperwork on file.                   Functional Status       Row Name 09/22/23 1315       Functional Status    Usual Activity Tolerance fair    Current Activity Tolerance fair       Functional Status, IADL    Medications completely dependent    Meal Preparation completely dependent    Housekeeping completely dependent    Laundry completely dependent    Shopping completely dependent                   Psychosocial    No documentation.                  Abuse/Neglect    No documentation.                  Legal    No documentation.                  Substance Abuse    No documentation.                  Patient Forms    No documentation.                     Fernando Rivera RN

## 2023-09-22 NOTE — PROGRESS NOTES
[facility] Medicine Services  ADMISSION FOLLOW-UP NOTE          Patient admitted after midnight, H&P by my partner performed earlier on today's date reviewed.  Interim findings, labs, and charting also reviewed.        The [facility] Hospital Problem List has been managed and updated to include any new diagnoses:  Active Hospital Problems    Diagnosis  POA    **Sepsis due to pneumonia [J18.9, A41.9]  Yes      Resolved Hospital Problems   No resolved problems to display.         ADDITIONAL PLAN:  - detailed assessment and plan from admission reviewed    87M with history of coronary artery disease, hypertension, hyperlipidemia, hypothyroidism and dementia who presented from nursing home with somnolence, coughing and fevers.  On arrival the patient had a fever of 102.  CMP was unremarkable.  Procalcitonin was mildly elevated 0.41.  Lactate was mildly elevated at 2.3.  INR elevated 1.23.  CBC showed a mild leukocytosis of 13.9.  CT of the abdomen pelvis showed by basilar opacities.     Sepsis secondary to suspected pneumonia  -Presents with fevers, leukocytosis, tachycardia, currently on room air.  Symptoms suggestive of pneumonia.  -Continue IV vancomycin and Zosyn, de-escalate pending cultures  -Urine strep pneumo and Legionella antigens pending  -Blood cultures obtained and are pending.  -Continue IV fluids.  -Continue home midodrine    Lactic acidosis  -Clinically significant, trended down with fluids.     Hyperlipidemia  - No statin listed on his home medications.     History of coronary artery disease  - Continue aspirin from home regimen.  - Continue telemetry.     Hypothyroidism  - Continue Synthroid from home regimen.     Dementia  - No medications listed for this on his home reviewed medication list.  - Daughter states the patient has difficulty swallowing on occasion, uses soft diet at the nursing facility.  -We will request SLP evaluation here.    I had a long discussion about advance  care planning with the daughter (POA) at the bedside regarding his clinical and condition, concern for aspiration pneumonia as well as dysphagia. I previously had a discussion with SLP earlier in the day regarding his swallow eval and he is unsafe for all consistencies.  We had an in-depth discussion regarding comfort diet versus PEG.  I discussed the risks of a comfort diet including respiratory failure, death, recurrent pneumonia.  I told her we can try a diet of thickened liquids/pureed foods to see how he tolerates it to minimize the risks but if he is not enjoying those things we may have to liberalize to thin liquids and other foods.  She seemed to understand this and our goal is to let him enjoy the time he has left.  She would like to have further discussions with her family regarding this before making final decision.  We will also provide boost to see how he does with that.  Overall the goal is to get him back to [facility], hopefully next week if all goes well with his treatment.  I also told her that he may be a candidate for hospice if he has recurrent aspiration pneumonia and we would need to talk with them if they have the service at [facility].       Expected Discharge   Expected Discharge Date: 9/27/2023; Expected Discharge Time:      Carolyn Baez MD  09/22/23

## 2023-09-22 NOTE — ED NOTES
Ramin Zaragoza    [facility] symptoms. Pt had a rectal temp of 102 and it is currently 99.7 rectally after tylenol. Hx of dementia but is normally able to talk more. Pt has only been able to do moans a groans. Daughter is at bedside and is very pleasant. Pt is in a brief and has had all of his antibiotics that have been ordered so far. He does have a infrequent cough non productive, resp. Swab was negative.    Nursing Report ED to Floor:  Mental status: AOx1  Ambulatory status: Unknown  Oxygen Therapy:  room air  Cardiac Rhythm: normal sinus  Admitted from: ER  Safety Concerns:  fall risk  Social Issues:  required    ED Nurse Phone Extension - [phone] or may call [phone].      HPI:   Chief Complaint   Patient presents with    Difficulty Urinating    Altered Mental Status       Past Medical History:  Past Medical History:   Diagnosis Date    CAD (coronary artery disease)     Hyperlipidemia     Hypertension     Hypothyroidism     on chronic replacement therapy    Lactose intolerance     Lower extremity edema     PVC's (premature ventricular contractions)     History of    Thyroid disorder     Venous insufficiency         Past Surgical History:  Past Surgical History:   Procedure Laterality Date    CARDIAC CATHETERIZATION      CORONARY STENT PLACEMENT      EYE SURGERY      Bilateral cataract extraction    HERNIA REPAIR      Inguinal hernia repair    OTHER SURGICAL HISTORY      Melanoma removed from back    OTHER SURGICAL HISTORY      History of left elbow fracture with sunsequent fixation        Admitting Doctor:   Ramin Oates III, DO    Consulting Provider(s):  Consults       No orders found from 8/23/2023 to 9/22/2023.             Admitting Diagnosis:   The primary encounter diagnosis was HCAP (healthcare-associated pneumonia). Diagnoses of Leukocytosis, unspecified type, Sepsis due to pneumonia, and History of dementia were also pertinent to this visit.    Most Recent  "Vitals:   Vitals:    09/21/23 1925 09/21/23 1938 09/21/23 1951 09/21/23 2201   BP: 99/59      Pulse: 101      Resp: 16      Temp: (!) 101.9 °F (38.8 °C) (!) 102.5 °F (39.2 °C)  99.7 °F (37.6 °C)   TempSrc: Axillary Rectal  Rectal   SpO2: 91%      Weight:   72 kg (158 lb 11.7 oz)    Height:   175.3 cm (69\")        Active LDAs/IV Access:   Lines, Drains & Airways       Active LDAs       Name Placement date Placement time Site Days    Peripheral IV 09/21/23 1924 Left;Posterior Hand 09/21/23 1924  Hand  less than 1    Peripheral IV 09/21/23 2006 Anterior;Right Forearm 09/21/23 2006  Forearm  less than 1                    Labs (abnormal labs have a star):   Labs Reviewed   COMPREHENSIVE METABOLIC PANEL - Abnormal; Notable for the following components:       Result Value    Glucose 163 (*)     Creatinine 0.69 (*)     Total Protein 5.8 (*)     Albumin 3.3 (*)     All other components within normal limits    Narrative:     GFR Normal >60  Chronic Kidney Disease <60  Kidney Failure <15    The GFR formula is only valid for adults with stable renal function between ages 18 and 70.   URINALYSIS W/ CULTURE IF INDICATED - Abnormal; Notable for the following components:    Blood, UA Small (1+) (*)     All other components within normal limits    Narrative:     In absence of clinical symptoms, the presence of pyuria, bacteria, and/or nitrites on the urinalysis result does not correlate with infection.   PROTIME-INR - Abnormal; Notable for the following components:    Protime 15.6 (*)     INR 1.23 (*)     All other components within normal limits   LACTIC ACID, PLASMA - Abnormal; Notable for the following components:    Lactate 2.3 (*)     All other components within normal limits   PROCALCITONIN - Abnormal; Notable for the following components:    Procalcitonin 0.41 (*)     All other components within normal limits    Narrative:     As a Marker for Sepsis (Non-Neonates):    1. <0.5 ng/mL represents a low risk of severe sepsis " "and/or septic shock.  2. >2 ng/mL represents a high risk of severe sepsis and/or septic shock.    As a Marker for Lower Respiratory Tract Infections that require antibiotic therapy:    PCT on Admission    Antibiotic Therapy       6-12 Hrs later    >0.5                Strongly Recommended  >0.25 - <0.5        Recommended   0.1 - 0.25          Discouraged              Remeasure/reassess PCT  <0.1                Strongly Discouraged     Remeasure/reassess PCT    As 28 day mortality risk marker: \"Change in Procalcitonin Result\" (>80% or <=80%) if Day 0 (or Day 1) and Day 4 values are available. Refer to [email or web address]    Change in PCT <=80%  A decrease of PCT levels below or equal to 80% defines a positive change in PCT test result representing a higher risk for 28-day all-cause mortality of patients diagnosed with severe sepsis for septic shock.    Change in PCT >80%  A decrease of PCT levels of more than 80% defines a negative change in PCT result representing a lower risk for 28-day all-cause mortality of patients diagnosed with severe sepsis or septic shock.      VALPROIC ACID LEVEL, TOTAL - Abnormal; Notable for the following components:    Valproic Acid 30.8 (*)     All other components within normal limits    Narrative:     Therapeutic Ranges for Valproic Acid    Epilepsy:       mcg/ml  Bipolar/Nicole  up to 125 mcg/ml     CBC WITH AUTO DIFFERENTIAL - Abnormal; Notable for the following components:    WBC 13.96 (*)     RBC 3.93 (*)     Hemoglobin 12.4 (*)     Hematocrit 36.7 (*)     Neutrophil % 85.5 (*)     Lymphocyte % 8.0 (*)     Eosinophil % 0.1 (*)     Neutrophils, Absolute 11.94 (*)     All other components within normal limits   URINALYSIS, MICROSCOPIC ONLY - Abnormal; Notable for the following components:    RBC, UA 31-50 (*)     All other components within normal limits   RESPIRATORY PANEL PCR W/ COVID-19 (SARS-COV-2) DEEP/JOSEPH/ESTHER/PAD/COR/MAD/MARLINE IN-HOUSE, NP SWAB IN [facility], " 3-4 HR TAT - Normal    Narrative:     In the setting of a positive respiratory panel with a viral infection PLUS a negative procalcitonin without other underlying concern for bacterial infection, consider observing off antibiotics or discontinuation of antibiotics and continue supportive care. If the respiratory panel is positive for atypical bacterial infection (Bordetella pertussis, Chlamydophila pneumoniae, or Mycoplasma pneumoniae), consider antibiotic de-escalation to target atypical bacterial infection.   MAGNESIUM - Normal   BLOOD CULTURE   BLOOD CULTURE   MRSA SCREEN, PCR   LEGIONELLA ANTIGEN, URINE   STREP PNEUMO AG, URINE OR CSF   LACTIC ACID, REFLEX   CBC WITH AUTO DIFFERENTIAL   COMPREHENSIVE METABOLIC PANEL   MAGNESIUM   CBC AND DIFFERENTIAL    Narrative:     The following orders were created for panel order CBC & Differential.  Procedure                               Abnormality         Status                     ---------                               -----------         ------                     CBC Auto Differential[[id number]]        Abnormal            Final result                 Please view results for these tests on the individual orders.       Meds Given in ED:   Medications   vancomycin IVPB 1500 mg in 0.9% NaCl (Premix) 500 mL (1,500 mg Intravenous New Bag 9/21/23 2221)   sodium chloride 0.9 % flush 10 mL (has no administration in time range)   sodium chloride 0.9 % flush 10 mL (has no administration in time range)   sodium chloride 0.9 % infusion 40 mL (has no administration in time range)   nitroglycerin (NITROSTAT) SL tablet 0.4 mg (has no administration in time range)   sodium chloride 0.9 % infusion (has no administration in time range)   Potassium Replacement - Follow Nurse / BPA Driven Protocol (has no administration in time range)   Magnesium Standard Dose Replacement - Follow Nurse / BPA Driven Protocol (has no administration in time range)   Phosphorus Replacement - Follow Nurse /  BPA Driven Protocol (has no administration in time range)   Calcium Replacement - Follow Nurse / BPA Driven Protocol (has no administration in time range)   dexAMETHasone (DECADRON) injection 4 mg (has no administration in time range)   acetaminophen (TYLENOL) tablet 650 mg (has no administration in time range)   HYDROcodone-acetaminophen (NORCO) 5-325 MG per tablet 1 tablet (has no administration in time range)   melatonin tablet 5 mg (has no administration in time range)   Pharmacy to dose vancomycin (has no administration in time range)   piperacillin-tazobactam (ZOSYN) 3.375 g in iso-osmotic dextrose 50 ml (premix) (has no administration in time range)   ipratropium-albuterol (DUO-NEB) nebulizer solution 3 mL (has no administration in time range)   ipratropium-albuterol (DUO-NEB) nebulizer solution 3 mL (has no administration in time range)   acetaminophen (TYLENOL) suppository 650 mg (650 mg Rectal Given 9/21/23 1945)   iopamidol (ISOVUE-300) 61 % injection 100 mL (80 mL Intravenous Given 9/21/23 2039)   piperacillin-tazobactam (ZOSYN) 3.375 g in iso-osmotic dextrose 50 ml (premix) (0 g Intravenous Stopped 9/21/23 2222)     Pharmacy to dose vancomycin,   sodium chloride, 100 mL/hr

## 2023-09-22 NOTE — THERAPY EVALUATION
Acute Care - Speech Language Pathology   Swallow Initial Evaluation [facility]  Clinical Swallow Evaluation     Patient Name: Ramin Zaragoza  : 1935  MRN: [id number]  Today's Date: 2023               Admit Date: 2023    Visit Dx:     ICD-10-CM ICD-9-CM   1. HCAP (healthcare-associated pneumonia)  J18.9 486   2. Leukocytosis, unspecified type  D72.829 288.60   3. Sepsis due to pneumonia  J18.9 486    A41.9 995.91   4. History of dementia  Z86.59 V11.8   5. Dysphagia, unspecified type  R13.10 787.20     Patient Active Problem List   Diagnosis    Coronary artery disease involving native coronary artery of native heart without angina pectoris    Premature ventricular contraction    Essential hypertension    Mixed hyperlipidemia    Venous insufficiency    Hypothyroidism    Lactose intolerance    Weakness    Accident due to mechanical fall without injury    Dementia    Sepsis due to pneumonia     Past Medical History:   Diagnosis Date    CAD (coronary artery disease)     Hyperlipidemia     Hypertension     Hypothyroidism     on chronic replacement therapy    Lactose intolerance     Lower extremity edema     PVC's (premature ventricular contractions)     History of    Thyroid disorder     Venous insufficiency      Past Surgical History:   Procedure Laterality Date    CARDIAC CATHETERIZATION      CORONARY STENT PLACEMENT      EYE SURGERY      Bilateral cataract extraction    HERNIA REPAIR      Inguinal hernia repair    OTHER SURGICAL HISTORY      Melanoma removed from back    OTHER SURGICAL HISTORY      History of left elbow fracture with sunsequent fixation       SLP Recommendation and Plan  SLP Swallowing Diagnosis: suspected pharyngeal dysphagia, suspected esophageal dysphagia (23)  SLP Diet Recommendation: NPO (23)     SLP Rec. for Method of Medication Administration: meds crushed, with puree (23)     Monitor for Signs of Aspiration: yes (23)  Recommended  Diagnostics: [facility]SS ([facility]) (09/22/23 0820)  Swallow Criteria for Skilled Therapeutic Interventions Met: demonstrates skilled criteria (09/22/23 0820)  Anticipated Discharge Disposition (SLP): inpatient rehabilitation facility (09/22/23 0820)  Rehab Potential/Prognosis, Swallowing: adequate, monitor progress closely (09/22/23 0820)        Oral Care Recommendations: Oral Care BID/PRN (09/22/23 0820)                                      Oral Care Recommendations: Oral Care BID/PRN (09/22/23 0820)           SWALLOW EVALUATION (last 72 hours)       SLP Adult Swallow Evaluation       Row Name 09/22/23 0820                   Rehab Evaluation    Document Type evaluation  -RS        Subjective Information no complaints  -RS        Patient Observations agree to therapy  -RS        Patient/Family/Caregiver Comments/Observations no family present  -RS        Patient Effort good  -RS        Symptoms Noted During/After Treatment none  -RS           General Information    Patient Profile Reviewed yes  -RS        Pertinent History Of Current Problem Pt is an 86 yo male admitted with possible sepsis d/t pna. MD notes documented that fmaily has reported hx dysphagia  -RS        Current Method of Nutrition regular textures;thin liquids  -RS        Precautions/Limitations, Vision WFL;for purposes of eval;difficult to assess  -RS        Precautions/Limitations, Hearing WFL;for purposes of eval;difficult to assess  -RS        Prior Level of Function-Communication cognitive-linguistic impairment  -RS        Prior Level of Function-Swallowing unknown  pt with baseline hx significant dementia and no family present  -RS        Plans/Goals Discussed with patient;agreed upon  -RS        Barriers to Rehab cognitive status  -RS        Patient's Goals for Discharge patient did not state  -RS           Pain    Additional Documentation Pain Scale: FACES Pre/Post-Treatment (Group)  -RS           Pain Scale: FACES Pre/Post-Treatment    Pain: FACES  Scale, Pretreatment 0-->no hurt  -RS        Posttreatment Pain Rating 0-->no hurt  -RS           Oral Motor Structure and Function    Dentition Assessment poor oral hygiene;missing teeth;teeth are in poor condition  -RS        Secretion Management WNL/WFL  -RS        Mucosal Quality dry;sticky  -RS        Volitional Swallow delayed  -RS        Volitional Cough unable to elicit  -RS           Oral Musculature and Cranial Nerve Assessment    Oral Motor General Assessment unable to assess  pt unable to follow directions in isolation to fully participate in oral mechanism assessment  -RS        Oral Motor, Comment appears grossly functional with PO trials  -RS           General Eating/Swallowing Observations    Respiratory Support Currently in Use nasal cannula  -RS        Eating/Swallowing Skills unable to perform self-feeding  -RS        Positioning During Eating upright 90 degree  -RS        Utensils Used spoon;cup;straw  -RS        Consistencies Trialed pureed;thin liquids;nectar/syrup-thick liquids  -RS           Respiratory    Respiratory Status WFL  -RS           Clinical Swallow Eval    Oral Prep Phase WFL  -RS        Oral Transit impaired  -RS        Oral Residue WFL  -RS        Pharyngeal Phase suspected pharyngeal impairment  -RS        Esophageal Phase suspected esophageal impairment  -RS        Clinical Swallow Evaluation Summary Pt seen for CSE. He is minimally verbal and is only able to tell me his first name. He does not follow any directions even with increased loudness from SLP. Oral transit is lengthy and therefore solids were deferred. Wet, squelching throat clear with thins, multiple swallows with nectar. No overt s/s with puree.  -RS           Oral Prep Concerns    Oral Prep Concerns prolonged mastication  -RS           Oral Transit Concerns    Oral Transit Concerns delayed initiation of bolus transit  -RS           Pharyngeal Phase Concerns    Pharyngeal Phase Concerns wet vocal quality;multiple  swallows;throat clear  -RS        Wet Vocal Quality thin  -RS        Multiple Swallows nectar  -RS        Throat Clear thin  -RS           Esophageal Phase Concerns    Esophageal Phase Concerns belching  -RS           SLP Evaluation Clinical Impression    SLP Swallowing Diagnosis suspected pharyngeal dysphagia;suspected esophageal dysphagia  -RS        Functional Impact risk of aspiration/pneumonia  -RS        Rehab Potential/Prognosis, Swallowing adequate, monitor progress closely  -RS        Swallow Criteria for Skilled Therapeutic Interventions Met demonstrates skilled criteria  -RS           Recommendations    SLP Diet Recommendation NPO  -RS        Recommended Diagnostics VFSS ([facility])  -RS        Oral Care Recommendations Oral Care BID/PRN  -RS        SLP Rec. for Method of Medication Administration meds crushed;with puree  -RS        Monitor for Signs of Aspiration yes  -RS        Anticipated Discharge Disposition (SLP) inpatient rehabilitation facility  -RS                  User Key  (r) = Recorded By, (t) = Taken By, (c) = Cosigned By      Initials Name Effective Dates    Lazarus Harris MS CCC-SLP 09/14/23 -                     EDUCATION  The patient has been educated in the following areas:   Dysphagia (Swallowing Impairment).              Time Calculation:    Time Calculation- SLP       Row Name 09/22/23 0935             Time Calculation- SLP    SLP Start Time 0820  -RS      SLP Received On 09/22/23  -RS         Untimed Charges    [id number] Eval Oral Pharyng Swallow Minutes 75  -RS         Total Minutes    Untimed Charges Total Minutes 75  -RS       Total Minutes 75  -RS                User Key  (r) = Recorded By, (t) = Taken By, (c) = Cosigned By      Initials Name Provider Type    Lazarus Harris MS CCC-SLP Speech and Language Pathologist                    Therapy Charges for Today       Code Description Service Date Service Provider Modifiers Qty    [id number] HC ST EVAL ORAL PHARYNG SWALLOW 5 9/22/2023  Lazarus Barahona, MS CCC-SLP GN 1                 Lazarus Barahona MS CCC-SLP  9/22/2023

## 2023-09-22 NOTE — CASE MANAGEMENT/SOCIAL WORK
Continued Stay Note  [facility]     Patient Name: Ramin Zaragoza  MRN: [id number]  Today's Date: 9/22/2023    Admit Date: 9/21/2023    Plan: LTC bed   Discharge Plan       Row Name 09/22/23 1117       Plan    Plan LTC bed    Patient/Family in Agreement with Plan other (see comments)    Plan Comments I left [facility] to return my call. I also left  w/daughter Isatu to return my call.    Final Discharge Disposition Code 04 - intermediate care facility                   Discharge Codes    No documentation.                 Expected Discharge Date and Time       Expected Discharge Date Expected Discharge Time    Sep 27, 2023               Fernando Rivera RN

## 2023-09-22 NOTE — ED PROVIDER NOTES
"Subjective   History of Present Illness  87-year-old male with a history of dementia presents from his nursing home, [facility], to be evaluated for generalized weakness, lethargy, fever, cough, and \"possible UTI.\"  He was noted by staff today to be more somnolent than normal.  There was concern for UTI so he was sent to the emergency department to be evaluated.  Per EMS personnel, the patient is not hypoglycemic.  Staff reported a cough.  He is not typically oxygen dependent.  Current oxygen saturations on arrival are hovering between 90 and 92% on room air.  No known exposures to anyone with COVID-19.    Review of Systems   Unable to perform ROS: Dementia   Constitutional:  Positive for fever.   Respiratory:  Positive for cough.    Neurological:  Positive for weakness.   Psychiatric/Behavioral:  Positive for confusion.      Past Medical History:   Diagnosis Date    CAD (coronary artery disease)     Hyperlipidemia     Hypertension     Hypothyroidism     on chronic replacement therapy    Lactose intolerance     Lower extremity edema     PVC's (premature ventricular contractions)     History of    Thyroid disorder     Venous insufficiency        Allergies   Allergen Reactions    Flomax [Tamsulosin] Other (See Comments)     Unknown      Lactose Intolerance (Gi)        Past Surgical History:   Procedure Laterality Date    CARDIAC CATHETERIZATION      CORONARY STENT PLACEMENT      EYE SURGERY      Bilateral cataract extraction    HERNIA REPAIR      Inguinal hernia repair    OTHER SURGICAL HISTORY      Melanoma removed from back    OTHER SURGICAL HISTORY      History of left elbow fracture with sunsequent fixation       Family History   Problem Relation Age of Onset    No Known Problems Mother     No Known Problems Father        Social History     Socioeconomic History    Marital status:    Tobacco Use    Smoking status: Never    Smokeless tobacco: Never   Vaping Use    Vaping Use: Never used " "  Substance and Sexual Activity    Alcohol use: No    Drug use: No    Sexual activity: Defer           Objective   Physical Exam  Vitals and nursing note reviewed.   Constitutional:       Appearance: He is well-developed. He is not diaphoretic.      Comments: Chronically ill-appearing elderly male   HENT:      Head: Normocephalic and atraumatic.   Neck:      Vascular: No JVD.      Comments: No meningeal signs or nuchal rigidity  Cardiovascular:      Rate and Rhythm: Regular rhythm. Tachycardia present.      Heart sounds: Normal heart sounds. No murmur heard.    No friction rub. No gallop.   Pulmonary:      Effort: Pulmonary effort is normal. No respiratory distress.      Breath sounds: Normal breath sounds. No wheezing or rales.   Abdominal:      General: Bowel sounds are normal. There is no distension.      Palpations: Abdomen is soft. There is no mass.      Tenderness: There is no abdominal tenderness. There is no guarding.      Comments: No focal abdominal tenderness, no peritoneal signs, no pain out of proportion to exam   Musculoskeletal:         General: Normal range of motion.      Cervical back: Normal range of motion.   Skin:     General: Skin is warm and dry.      Coloration: Skin is not pale.      Findings: No erythema or rash.   Neurological:      Comments: Somnolent but arousable   Psychiatric:         Thought Content: Thought content normal.         Judgment: Judgment normal.      Comments: Unable to adequately evaluate       Procedures           ED Course  ED Course as of 09/21/23 2208   Thu Sep 21, 2023   1927 87-year-old male with a history of dementia presents from his nursing home to be evaluated for generalized weakness, lethargy, fever, and \"possible UTI.\"  He was noted by staff today to be more somnolent than normal.  There was concern for UTI and he was sent to the ED to be evaluated.  On arrival, the patient is febrile and tachycardic.  He is chronically ill-appearing.  He is somnolent but " arousable.  Nonsurgical abdomen.  Rectal Tylenol given for fever.  Broad differential diagnosis.  We will obtain labs and imaging, and we will reassess following initial interventions. [DD]   2032 Labs remarkable for white blood cell count of 13,000 and lactate of 2.3. [DD]   2052 I personally and independently viewed the patient's x-ray images myself, and I am in agreement with the radiologist's reading for final interpretation--particularly, there is no obvious pneumonia noted. [DD]   2110 Respiratory viral panel is negative. [DD]   2119 I personally and independently reviewed the patient's CT images and findings, and I am in agreement with the radiologist regarding CT interpretation--particularly, there is no emergent intracranial process present. [DD]   2134 Broad-spectrum antibiotics initiated to cover for potential HCAP.  The patient clearly warrants admission to the hospital at this point.  I discussed the patient's case with our hospitalist, Dr. Oates, and the patient will be admitted under his care for further evaluation and treatment.  The patient is hemodynamically stable at this time.  Family is aware/agreeable with the plan. [DD]      ED Course User Index  [DD] Kaushal Browning MD                                      Recent Results (from the past 24 hour(s))   Respiratory Panel PCR w/COVID-19(SARS-CoV-2) DEEP/JOSEPH/ESTHER/PAD/COR/MAD/MARLINE In-House, NP Swab in [facility], 3-4 HR TAT - Swab, Nasopharynx    Collection Time: 09/21/23  8:00 PM    Specimen: Nasopharynx; Swab   Result Value Ref Range    ADENOVIRUS, PCR Not Detected Not Detected    Coronavirus 229E Not Detected Not Detected    Coronavirus HKU1 Not Detected Not Detected    Coronavirus NL63 Not Detected Not Detected    Coronavirus OC43 Not Detected Not Detected    COVID19 Not Detected Not Detected - Ref. Range    Human Metapneumovirus Not Detected Not Detected    Human Rhinovirus/Enterovirus Not Detected Not Detected    Influenza A PCR Not Detected  Not Detected    Influenza B PCR Not Detected Not Detected    Parainfluenza Virus 1 Not Detected Not Detected    Parainfluenza Virus 2 Not Detected Not Detected    Parainfluenza Virus 3 Not Detected Not Detected    Parainfluenza Virus 4 Not Detected Not Detected    RSV, PCR Not Detected Not Detected    Bordetella pertussis pcr Not Detected Not Detected    Bordetella parapertussis PCR Not Detected Not Detected    Chlamydophila pneumoniae PCR Not Detected Not Detected    Mycoplasma pneumo by PCR Not Detected Not Detected   Comprehensive Metabolic Panel    Collection Time: 09/21/23  8:00 PM    Specimen: Blood   Result Value Ref Range    Glucose 163 (H) 65 - 99 mg/dL    BUN 14 8 - 23 mg/dL    Creatinine 0.69 (L) 0.76 - 1.27 mg/dL    Sodium 138 136 - 145 mmol/L    Potassium 4.3 3.5 - 5.2 mmol/L    Chloride 103 98 - 107 mmol/L    CO2 25.0 22.0 - 29.0 mmol/L    Calcium 8.6 8.6 - 10.5 mg/dL    Total Protein 5.8 (L) 6.0 - 8.5 g/dL    Albumin 3.3 (L) 3.5 - 5.2 g/dL    ALT (SGPT) 12 1 - 41 U/L    AST (SGOT) 19 1 - 40 U/L    Alkaline Phosphatase 62 39 - 117 U/L    Total Bilirubin 0.4 0.0 - 1.2 mg/dL    Globulin 2.5 gm/dL    A/G Ratio 1.3 g/dL    BUN/Creatinine Ratio 20.3 7.0 - 25.0    Anion Gap 10.0 5.0 - 15.0 mmol/L    eGFR 89.6 >60.0 mL/min/1.73   Urinalysis With Culture If Indicated - Straight Cath    Collection Time: 09/21/23  8:00 PM    Specimen: Straight Cath; Urine   Result Value Ref Range    Color, UA Yellow Yellow, Straw    Appearance, UA Clear Clear    pH, UA 6.5 5.0 - 8.0    Specific Gravity, UA 1.017 1.001 - 1.030    Glucose, UA Negative Negative    Ketones, UA Negative Negative    Bilirubin, UA Negative Negative    Blood, UA Small (1+) (A) Negative    Protein, UA Negative Negative    Leuk Esterase, UA Negative Negative    Nitrite, UA Negative Negative    Urobilinogen, UA 1.0 E.U./dL 0.2 - 1.0 E.U./dL   Protime-INR    Collection Time: 09/21/23  8:00 PM    Specimen: Blood   Result Value Ref Range    Protime 15.6 (H)  12.2 - 14.5 Seconds    INR 1.23 (H) 0.89 - 1.12   Lactic Acid, Plasma    Collection Time: 09/21/23  8:00 PM    Specimen: Blood   Result Value Ref Range    Lactate 2.3 (C) 0.5 - 2.0 mmol/L   Procalcitonin    Collection Time: 09/21/23  8:00 PM    Specimen: Blood   Result Value Ref Range    Procalcitonin 0.41 (H) 0.00 - 0.25 ng/mL   Magnesium    Collection Time: 09/21/23  8:00 PM    Specimen: Blood   Result Value Ref Range    Magnesium 1.7 1.6 - 2.4 mg/dL   Valproic Acid Level, Total    Collection Time: 09/21/23  8:00 PM    Specimen: Blood   Result Value Ref Range    Valproic Acid 30.8 (L) 50.0 - 125.0 mcg/mL   CBC Auto Differential    Collection Time: 09/21/23  8:00 PM    Specimen: Blood   Result Value Ref Range    WBC 13.96 (H) 3.40 - 10.80 10*3/mm3    RBC 3.93 (L) 4.14 - 5.80 10*6/mm3    Hemoglobin 12.4 (L) 13.0 - 17.7 g/dL    Hematocrit 36.7 (L) 37.5 - 51.0 %    MCV 93.4 79.0 - 97.0 fL    MCH 31.6 26.6 - 33.0 pg    MCHC 33.8 31.5 - 35.7 g/dL    RDW 13.0 12.3 - 15.4 %    RDW-SD 44.5 37.0 - 54.0 fl    MPV 8.6 6.0 - 12.0 fL    Platelets 197 140 - 450 10*3/mm3    Neutrophil % 85.5 (H) 42.7 - 76.0 %    Lymphocyte % 8.0 (L) 19.6 - 45.3 %    Monocyte % 5.9 5.0 - 12.0 %    Eosinophil % 0.1 (L) 0.3 - 6.2 %    Basophil % 0.1 0.0 - 1.5 %    Immature Grans % 0.4 0.0 - 0.5 %    Neutrophils, Absolute 11.94 (H) 1.70 - 7.00 10*3/mm3    Lymphocytes, Absolute 1.11 0.70 - 3.10 10*3/mm3    Monocytes, Absolute 0.82 0.10 - 0.90 10*3/mm3    Eosinophils, Absolute 0.02 0.00 - 0.40 10*3/mm3    Basophils, Absolute 0.02 0.00 - 0.20 10*3/mm3    Immature Grans, Absolute 0.05 0.00 - 0.05 10*3/mm3    nRBC 0.0 0.0 - 0.2 /100 WBC   Urinalysis, Microscopic Only - Straight Cath    Collection Time: 09/21/23  8:00 PM    Specimen: Straight Cath; Urine   Result Value Ref Range    RBC, UA 31-50 (A) None Seen, 0-2 /HPF    WBC, UA 0-2 None Seen, 0-2 /HPF    Bacteria, UA None Seen None Seen, Trace /HPF    Squamous Epithelial Cells, UA 0-2 None Seen, 0-2 /HPF  "   Hyaline Casts, UA None Seen 0 - 6 /LPF    Methodology Automated Microscopy      Note: In addition to lab results from this visit, the labs listed above may include labs taken at another facility or during a different encounter within the last 24 hours. Please correlate lab times with ED admission and discharge times for further clarification of the services performed during this visit.    CT Abdomen Pelvis With Contrast   Final Result   1.Bibasilar opacities may represent atelectasis or infiltrates.   2.Gastric wall thickening may represent gastritis or other etiologies. Distal esophageal wall thickening may represent esophagitis or other etiologies. Small hiatal hernia. Follow-up recommended.   3.Urinary bladder wall thickening may represent under distention, cystitis or other etiologies. Please correlate with urinalysis. Follow-up recommended.   4.Severe prostamegaly.                     Electronically Signed: Emmett Rosa MD     9/21/2023 9:28 PM EDT     Workstation ID: [id number]      CT Head Without Contrast   Final Result   Impression:      1. Moderate generalized parenchymal volume loss. No acute intracranial abnormality.   2. Partial opacification of the mastoid air cells bilaterally, similar prior exam.         Electronically Signed: Jun Gramajo MD     9/21/2023 9:13 PM EDT     Workstation ID: [id number]      XR Chest 1 View   Final Result   Impression:      1. Prominent basilar interstitial markings unchanged from prior exam. No new airspace consolidation or other acute process.         Electronically Signed: uJn Gramajo MD     9/21/2023 8:02 PM EDT     Workstation ID: [id number]        Vitals:    09/21/23 1925 09/21/23 1938 09/21/23 1951 09/21/23 2201   BP: 99/59      Pulse: 101      Resp: 16      Temp: (!) 101.9 °F (38.8 °C) (!) 102.5 °F (39.2 °C)  99.7 °F (37.6 °C)   TempSrc: Axillary Rectal  Rectal   SpO2: 91%      Weight:   72 kg (158 lb 11.7 oz)    Height:   175.3 cm (69\")      Medications "   vancomycin IVPB 1500 mg in 0.9% NaCl (Premix) 500 mL (has no administration in time range)   acetaminophen (TYLENOL) suppository 650 mg (650 mg Rectal Given 9/21/23 1945)   iopamidol (ISOVUE-300) 61 % injection 100 mL (80 mL Intravenous Given 9/21/23 2039)   piperacillin-tazobactam (ZOSYN) 3.375 g in iso-osmotic dextrose 50 ml (premix) (3.375 g Intravenous New Bag 9/21/23 2146)     ECG/EMG Results (last 24 hours)       ** No results found for the last 24 hours. **          No orders to display              Medical Decision Making  Amount and/or Complexity of Data Reviewed  Labs: ordered.  Radiology: ordered.    Risk  OTC drugs.  Prescription drug management.  Decision regarding hospitalization.        Final diagnoses:   HCAP (healthcare-associated pneumonia)   Leukocytosis, unspecified type   Sepsis due to pneumonia   History of dementia       ED Disposition  ED Disposition       ED Disposition   Decision to Admit    Condition   --    Comment   --               No follow-up provider specified.       Medication List      No changes were made to your prescriptions during this visit.            Kaushal Browning MD  09/21/23 2849

## 2023-09-22 NOTE — PLAN OF CARE
Goal Outcome Evaluation:      Patient while on room air O2 > 90%, VSS, No complain of pain, pt calm, Will keep monitoring

## 2023-09-22 NOTE — PROGRESS NOTES
"Pharmacy Consult-Vancomycin Dosing  Ramin Zaragoza is a  87 y.o. male receiving vancomycin therapy.     Indication: Bilateral basal pneumonia; Sepsis  Consulting Provider:  Dr. Oates  ID Consult:     Goal AUC: 400 - 600 mg/L*hr    Current Antimicrobial Therapy  Anti-Infectives (From admission, onward)      Ordered     Dose/Rate Route Frequency Start Stop    09/22/23 0138  vancomycin in dextrose 5% 150 mL (VANCOCIN) IVPB 750 mg        Ordering Provider: Mckinley Mancilla RPH    750 mg  over 45 Minutes Intravenous Every 12 Hours 09/22/23 1200 09/29/23 1159    09/21/23 2238  piperacillin-tazobactam (ZOSYN) 3.375 g in iso-osmotic dextrose 50 ml (premix)        Ordering Provider: Ramin Oates III, DO    3.375 g  over 4 Hours Intravenous Every 8 Hours 09/22/23 0400 09/27/23 0359    09/21/23 2238  Pharmacy to dose vancomycin        Ordering Provider: Ramin Oates III, DO     Does not apply Continuous PRN 09/21/23 2237 09/26/23 2236    09/21/23 2133  vancomycin IVPB 1500 mg in 0.9% NaCl (Premix) 500 mL        Ordering Provider: Kaushal Browning MD    20 mg/kg × 72 kg  333.3 mL/hr over 90 Minutes Intravenous Once 09/21/23 2230 09/21/23 2351    09/21/23 2133  piperacillin-tazobactam (ZOSYN) 3.375 g in iso-osmotic dextrose 50 ml (premix)        Ordering Provider: Kaushal Browning MD    3.375 g Intravenous Once 09/21/23 2149 09/21/23 2222            Allergies  Allergies as of 09/21/2023 - Reviewed 09/21/2023   Allergen Reaction Noted    Flomax [tamsulosin] Other (See Comments) 01/11/2022    Lactose intolerance (gi)  05/02/2017       Labs    Results from last 7 days   Lab Units 09/21/23 2000   BUN mg/dL 14   CREATININE mg/dL 0.69*       Results from last 7 days   Lab Units 09/21/23 2000   WBC 10*3/mm3 13.96*       Evaluation of Dosing     Last Dose Received in the ED/Outside Facility:  Vancomycin 1500 mg IV x 1 (9/21 22:21)  Is Patient on Dialysis or Renal Replacement:     Ht - 175.3 cm (69\")  Wt - 72 " kg (158 lb 11.7 oz)    Estimated Creatinine Clearance: 76.8 mL/min (A) (by C-G formula based on SCr of 0.69 mg/dL (L)).    Intake & Output (last 3 days)         09/19 0701 09/20 0700 09/20 0701 09/21 0700 09/21 0701 09/22 0700    IV Piggyback   50    Total Intake(mL/kg)   50 (0.7)    Net   +50                   Microbiology and Radiology  Microbiology Results (last 10 days)       Procedure Component Value - Date/Time    Respiratory Panel PCR w/COVID-19(SARS-CoV-2) DEEP/JOSEPH/ESTHER/PAD/COR/MAD/MARLINE In-House, NP Swab in UTM/VTM, 3-4 HR TAT - Swab, Nasopharynx [[id number]]  (Normal) Collected: 09/21/23 2000    Lab Status: Final result Specimen: Swab from Nasopharynx Updated: 09/21/23 2107     ADENOVIRUS, PCR Not Detected     Coronavirus 229E Not Detected     Coronavirus HKU1 Not Detected     Coronavirus NL63 Not Detected     Coronavirus OC43 Not Detected     COVID19 Not Detected     Human Metapneumovirus Not Detected     Human Rhinovirus/Enterovirus Not Detected     Influenza A PCR Not Detected     Influenza B PCR Not Detected     Parainfluenza Virus 1 Not Detected     Parainfluenza Virus 2 Not Detected     Parainfluenza Virus 3 Not Detected     Parainfluenza Virus 4 Not Detected     RSV, PCR Not Detected     Bordetella pertussis pcr Not Detected     Bordetella parapertussis PCR Not Detected     Chlamydophila pneumoniae PCR Not Detected     Mycoplasma pneumo by PCR Not Detected    Narrative:      In the setting of a positive respiratory panel with a viral infection PLUS a negative procalcitonin without other underlying concern for bacterial infection, consider observing off antibiotics or discontinuation of antibiotics and continue supportive care. If the respiratory panel is positive for atypical bacterial infection (Bordetella pertussis, Chlamydophila pneumoniae, or Mycoplasma pneumoniae), consider antibiotic de-escalation to target atypical bacterial infection.            Reported Vancomycin Levels                          InsightRX AUC Calculation:    Current AUC:   0 mg/L*hr    Predicted Steady State AUC on Current Dose: 473 mg/L*hr (750 mg Q12H)  _________________________________    Predicted Steady State AUC on New Dose:    mg/L*hr    Assessment/Plan:   Vancomycin 1500 mg IV x 1 (given), then Vancomycin 750 mg IV Q12H (10 mg/kg/dose)  Vancomycin level before 4th dose (12:00 Noon dose on Saturday 9/23/23)  MRSA Screen PCR ordered  BMP x 2 days  Pharmacy to follow    Mckinley Mancilla [facility]  9/22/23 01:45

## 2023-09-22 NOTE — PLAN OF CARE
Goal Outcome Evaluation:  Plan of Care Reviewed With: patient                   SLP evaluation completed. Will address dysphagia, MBS planned for 9/22/23. Please see note for further details and recommendations.

## 2023-09-22 NOTE — H&P
"    [facility] Medicine Services  HISTORY AND PHYSICAL    Patient Name: Ramin Zaragoza  : 1935  MRN: [id number]  Primary Care Physician: Patricio Crawford MD  Date of admission: 2023      Subjective   Subjective     Chief Complaint:  Decreased interaction, fever    HPI:  Ramin Zaragoza is a 87 y.o. male who is accompanied by his daughter who assist in full with history, as the patient has dementia and is also somnolent and cannot provide any information.  The patient was transferred from the nursing home where he resides earlier today (Thursday) due to staff there noticing he was occasionally coughing, so they checked his temperature and at around 4 PM he reportedly had a fever, though the daughter does not know what the reading was at that time; the patient did have fever of 102F on arrival to the ED.  He evidently had some chills at the nursing home as well.  The daughter was present at the nursing home with the patient had a fever earlier today and she also felt like he was not as interactive as usual, despite his history of dementia.  She also adds that he was \"just sort of moaning.\"  She reports he has not been coughing and does not appear to be in any respiratory distress.  Work-up in the ED included CT abdomen/pelvis which mentions that there may be bilateral lower lobe pneumonia.  The daughter also denies any mention of chest pain, abdominal pain, bowel habit change, slurred speech/facial droop, focal neurologic deficit beyond baseline, or syncope.  His medical history is significant for dementia, hypertension, hyperlipidemia, coronary artery disease s/p stent placement, hypothyroidism.    ROS: 12 point review of systems was reviewed with this patient and is negative other than was noted in the HPI.      Personal History     Past Medical History:   Diagnosis Date    CAD (coronary artery disease)     Hyperlipidemia     Hypertension     Hypothyroidism     on chronic " replacement therapy    Lactose intolerance     Lower extremity edema     PVC's (premature ventricular contractions)     History of    Thyroid disorder     Venous insufficiency              Past Surgical History:   Procedure Laterality Date    CARDIAC CATHETERIZATION      CORONARY STENT PLACEMENT      EYE SURGERY      Bilateral cataract extraction    HERNIA REPAIR      Inguinal hernia repair    OTHER SURGICAL HISTORY      Melanoma removed from back    OTHER SURGICAL HISTORY      History of left elbow fracture with sunsequent fixation       Family History: Mother had heart disease.  Father had COPD.    Social History:  reports that he has never smoked. He has never used smokeless tobacco. He reports that he does not drink alcohol and does not use drugs.  Social History     Social History Narrative    Caffeine decaf coffee and half reg       Medications:  Available home medication information reviewed.  Medications Prior to Admission   Medication Sig Dispense Refill Last Dose    acetaminophen (TYLENOL) 325 MG tablet Take 2 tablets by mouth Every 4 (Four) Hours As Needed for Mild Pain .   9/20/2023    aspirin 81 MG EC tablet Take 1 tablet by mouth Daily.   9/21/2023    bumetanide (BUMEX) 0.5 MG tablet TAKE ONE TABLET BY MOUTH DAILY 90 tablet 0 9/21/2023    Diclofenac Sodium (VOLTAREN) 1 % gel gel Apply 2 g topically to the appropriate area as directed 3 (Three) Times a Day. (Patient taking differently: Apply 2 g topically to the appropriate area as directed 3 (Three) Times a Day. Apply to neck for pain) 100 g 0 9/21/2023    fluticasone (FLONASE) 50 MCG/ACT nasal spray 2 sprays into the nostril(s) as directed by provider Daily. Administer 2 sprays in each nostril for each dose.   9/21/2023    levothyroxine (SYNTHROID, LEVOTHROID) 50 MCG tablet Take 1 tablet by mouth Every Morning. (Patient taking differently: Take 1.5 tablets by mouth Every Morning.) 30 tablet 0 9/21/2023    PARoxetine (PAXIL) 10 MG tablet Take 1 tablet  by mouth Every Morning.   9/21/2023    Valproic Acid (DEPAKENE) 250 MG/5ML solution syrup Take 5 mL by mouth Every 12 (Twelve) Hours. 300 mL 0 9/21/2023    magnesium hydroxide (MILK OF MAGNESIA) 400 MG/5ML suspension Take 30 mL by mouth As Needed for Constipation.   Unknown    midodrine (PROAMATINE) 5 MG tablet Take 1 tablet by mouth As Needed. For SBP less than 110   Unknown    nitroglycerin (NITROSTAT) 0.4 MG SL tablet Place 1 tablet under the tongue every 5 (five) minutes as needed for chest pain. Take no more than 3 doses in 15 minutes. 25 tablet 11 Unknown       Allergies   Allergen Reactions    Flomax [Tamsulosin] Other (See Comments)     Unknown      Lactose Intolerance (Gi)        Objective   Objective     Vital Signs:   Temp:  [99.7 °F (37.6 °C)-102.5 °F (39.2 °C)] 99.7 °F (37.6 °C)  Heart Rate:  [101] 101  Resp:  [16] 16  BP: (99)/(59) 99/59  Flow (L/min):  [3] 3       Physical Exam   Constitutional: Somnolent.  Awakens to verbal stimulus but goes back to sleep quickly.  Eyes: PERRLA, sclerae anicteric, dry eyes but no conjunctival injection  HENT: NCAT, mucous membranes dry.  Neck: Supple, no thyromegaly, no lymphadenopathy, trachea midline  Respiratory: Coarse breath sounds at the bases to auscultation bilaterally, nonlabored respirations   Cardiovascular: RRR, no murmurs, rubs, or gallops, palpable pedal pulses bilaterally  Gastrointestinal: Positive but hypoactive bowel sounds, soft, nontender, nondistended  Musculoskeletal: No bilateral ankle edema, no clubbing or cyanosis to extremities  Psychiatric: Cannot fully assess due to dementia and somnolence.  Neurologic: ESPERANZA.  Bilateral patellar DTRs 2+.  Does not answer questions due to somnolence, normal pupils.  Skin: No rashes, poor turgor.    Result Review:  I have personally reviewed the results from the time of this admission to 9/22/2023 00:33 EDT and agree with these findings:  [x]  Laboratory list / accordion  []  Microbiology  [x]   Radiology  [x]  EKG/Telemetry   []  Cardiology/Vascular   []  Pathology  []  Old records  []  Other:  Most notable findings include: I reviewed chest x-ray which is a single AP view and by my read shows no acute infiltrate, increased interstitial markings, read by radiology as no change from prior exam.  Telemetry on ED monitor shows sinus rhythm with ventricular rate 80 bpm during my visit.        LAB RESULTS:      Lab 09/21/23 2000   WBC 13.96*   HEMOGLOBIN 12.4*   HEMATOCRIT 36.7*   PLATELETS 197   NEUTROS ABS 11.94*   IMMATURE GRANS (ABS) 0.05   LYMPHS ABS 1.11   MONOS ABS 0.82   EOS ABS 0.02   MCV 93.4   PROCALCITONIN 0.41*   LACTATE 2.3*   PROTIME 15.6*   INR 1.23*         Lab 09/21/23 2000   SODIUM 138   POTASSIUM 4.3   CHLORIDE 103   CO2 25.0   ANION GAP 10.0   BUN 14   CREATININE 0.69*   EGFR 89.6   GLUCOSE 163*   CALCIUM 8.6   MAGNESIUM 1.7         Lab 09/21/23 2000   TOTAL PROTEIN 5.8*   ALBUMIN 3.3*   GLOBULIN 2.5   ALT (SGPT) 12   AST (SGOT) 19   BILIRUBIN 0.4   ALK PHOS 62                     UA          11/30/2022    12:18 1/13/2023    18:58 9/21/2023    20:00   Urinalysis   Squamous Epithelial Cells, UA  0-2  0-2    Specific Gravity, UA 1.011  1.024  1.017    Ketones, UA Negative  15 mg/dL (1+)  Negative    Blood, UA Negative  Moderate (2+)  Small (1+)    Leukocytes, UA Negative  Negative  Negative    Nitrite, UA Negative  Negative  Negative    RBC, UA  Too Numerous to Count  31-50    WBC, UA  0-2  0-2    Bacteria, UA  None Seen  None Seen        Microbiology Results (last 10 days)       Procedure Component Value - Date/Time    Respiratory Panel PCR w/COVID-19(SARS-CoV-2) [facility] In-House, NP Swab in UTM/VTM, 3-4 HR TAT - Swab, Nasopharynx [[id number]]  (Normal) Collected: 09/21/23 2000    Lab Status: Final result Specimen: Swab from Nasopharynx Updated: 09/21/23 2107     ADENOVIRUS, PCR Not Detected     Coronavirus 229E Not Detected     Coronavirus HKU1 Not Detected      Coronavirus NL63 Not Detected     Coronavirus OC43 Not Detected     COVID19 Not Detected     Human Metapneumovirus Not Detected     Human Rhinovirus/Enterovirus Not Detected     Influenza A PCR Not Detected     Influenza B PCR Not Detected     Parainfluenza Virus 1 Not Detected     Parainfluenza Virus 2 Not Detected     Parainfluenza Virus 3 Not Detected     Parainfluenza Virus 4 Not Detected     RSV, PCR Not Detected     Bordetella pertussis pcr Not Detected     Bordetella parapertussis PCR Not Detected     Chlamydophila pneumoniae PCR Not Detected     Mycoplasma pneumo by PCR Not Detected    Narrative:      In the setting of a positive respiratory panel with a viral infection PLUS a negative procalcitonin without other underlying concern for bacterial infection, consider observing off antibiotics or discontinuation of antibiotics and continue supportive care. If the respiratory panel is positive for atypical bacterial infection (Bordetella pertussis, Chlamydophila pneumoniae, or Mycoplasma pneumoniae), consider antibiotic de-escalation to target atypical bacterial infection.            CT Head Without Contrast    Result Date: 9/21/2023  CT HEAD WO CONTRAST Date of Exam: 9/21/2023 8:24 PM EDT Indication: ams. Comparison: 1/13/2023 Technique: Axial CT images were obtained of the head without contrast administration.  Automated exposure control and iterative construction methods were used. Findings:   There is moderate generalized parenchymal volume loss with ex vacuo dilatation of the lateral and third ventricles. There is no evidence of acute infarct or hemorrhage. No abnormal extra-axial fluid collections identified. No mass effect or hydrocephalus. There is partial opacification of the mastoid air cells bilaterally. Visualized paranasal sinuses are clear. Globes and orbits are within normal limits. No acute skull fracture.      Impression: Impression: 1. Moderate generalized parenchymal volume loss. No acute  intracranial abnormality. 2. Partial opacification of the mastoid air cells bilaterally, similar prior exam. Electronically Signed: Jun Gramajo MD  9/21/2023 9:13 PM EDT  Workstation ID: [id number]    CT Abdomen Pelvis With Contrast    Result Date: 9/21/2023  CT ABDOMEN PELVIS W CONTRAST Date of Exam: 9/21/2023 8:35 PM EDT Indication: fever. Comparison: 1/13/2023 Technique: Axial CT images were obtained of the abdomen and pelvis following the uneventful intravenous administration of 80 mL Isovue 300 . Reconstructed coronal and sagittal images were also obtained. Automated exposure control and iterative construction methods were used. Findings: *Lower Thorax: Bibasilar opacities. *Liver: Unremarkable. *Gallbladder: Cholecystectomy. *Pancreas: Normal. *Spleen: Normal. *Adrenal Glands: Normal. *Kidneys: No renal stones or hydronephrosis bilaterally. Bilateral renal cysts. *Stomach: Gastric wall thickening. *Bowel: Nonobstructive bowel gas pattern. No bowel wall inflammation. *Appendix: Normal appendix. *Peritoneal Cavity: No pneumoperitoneum.  No lymphadenopathy. *Urinary Bladder: Urinary bladder wall thickening. *Pelvis: No free pelvic fluid. Prostamegaly measuring 6.5 x 7.4 cm. *Bones: No acute fractures or dislocations. No osseous destructive lesions. Multilevel spondylosis. Generalized osteopenia. *     Impression: 1.Bibasilar opacities may represent atelectasis or infiltrates. 2.Gastric wall thickening may represent gastritis or other etiologies. Distal esophageal wall thickening may represent esophagitis or other etiologies. Small hiatal hernia. Follow-up recommended. 3.Urinary bladder wall thickening may represent under distention, cystitis or other etiologies. Please correlate with urinalysis. Follow-up recommended. 4.Severe prostamegaly. Electronically Signed: Emmett Rosa MD  9/21/2023 9:28 PM EDT  Workstation ID: [id number]    XR Chest 1 View    Result Date: 9/21/2023  XR CHEST 1 VW Date of Exam: 9/21/2023  7:29 PM EDT Indication: fever Comparison: 1/13/2023 Findings: Heart size and pulmonary vessels are within normal limits. There are coarsened bibasilar interstitial markings unchanged from prior study. No new focal airspace consolidation. No pleural effusion or pneumothorax.     Impression: Impression: 1. Prominent basilar interstitial markings unchanged from prior exam. No new airspace consolidation or other acute process. Electronically Signed: Jun Gramajo MD  9/21/2023 8:02 PM EDT  Workstation ID: [id number]     Results for orders placed during the hospital encounter of 02/23/22    Adult Transthoracic Echo Complete W/ Cont if Necessary Per Protocol    Interpretation Summary  · Normal left ventricular systolic function, estimated EF 60%.  · Mild mitral regurgitation.  · Mild tricuspid regurgitation with normal RVSP.  · Mild pulmonic insufficiency.      Assessment & Plan   Assessment & Plan     Active Hospital Problems    Diagnosis  POA    **Sepsis due to pneumonia [J18.9, A41.9]  Yes       87M with sepsis from bilateral basal pneumonia    Bilateral basal pneumonia  Sepsis  - IV antibiotic coverage with vancomycin and Zosyn.  - DuoNeb treatments every 6 hours scheduled, can have every 4 hours as needed.  - Urine for strep pneumo and Legionella antigens.  - Await blood culture results.  - Sputum for culture.  - O2 by nasal cannula as needed to maintain saturations >92%.  - IVF with 0.9 NS as he is clinically dry; will hold Bumex from home regimen while receiving IV fluids.    Hypertension  - Hold Bumex from home regimen as above.  - The patient takes midodrine as needed at the nursing facility.  - No other antihypertensives listed on his home medication list.    Hyperlipidemia  - No statin listed on his home medications.    History of coronary artery disease  - Continue aspirin from home regimen.  - Continue telemetry.    Hypothyroidism  - Continue Synthroid from home regimen.    Dementia  - No medications listed  for this on his home reviewed medication list.  - Daughter states the patient has difficulty swallowing on occasion, uses soft diet at the nursing facility.  -We will request SLP evaluation here.        Total time spent: 84 minutes  Time spent includes time reviewing chart, face-to-face time, counseling patient/family/caregiver, ordering medications/tests/procedures, communicating with other health care professionals, documenting clinical information in the electronic health record, and coordination of care.     DVT prophylaxis:  scds      CODE STATUS:  DNR/DNI  Code Status and Medical Interventions:   Ordered at: 09/22/23 0033     Code Status (Patient has no pulse and is not breathing):    No CPR (Do Not Attempt to Resuscitate)     Medical Interventions (Patient has pulse or is breathing):    Full Support     Comments:    DNR/DNI, d/w daughter       Expected Discharge leida Oates III, DO  09/22/23

## 2023-09-23 LAB
ANION GAP SERPL CALCULATED.3IONS-SCNC: 5 MMOL/L (ref 5–15)
BASOPHILS # BLD AUTO: 0.02 10*3/MM3 (ref 0–0.2)
BASOPHILS NFR BLD AUTO: 0.3 % (ref 0–1.5)
BUN SERPL-MCNC: 9 MG/DL (ref 8–23)
BUN/CREAT SERPL: 12.5 (ref 7–25)
CALCIUM SPEC-SCNC: 8.3 MG/DL (ref 8.6–10.5)
CHLORIDE SERPL-SCNC: 108 MMOL/L (ref 98–107)
CO2 SERPL-SCNC: 27 MMOL/L (ref 22–29)
CREAT SERPL-MCNC: 0.72 MG/DL (ref 0.76–1.27)
DEPRECATED RDW RBC AUTO: 45.5 FL (ref 37–54)
EGFRCR SERPLBLD CKD-EPI 2021: 88.4 ML/MIN/1.73
EOSINOPHIL # BLD AUTO: 0.26 10*3/MM3 (ref 0–0.4)
EOSINOPHIL NFR BLD AUTO: 4.1 % (ref 0.3–6.2)
ERYTHROCYTE [DISTWIDTH] IN BLOOD BY AUTOMATED COUNT: 13.2 % (ref 12.3–15.4)
GLUCOSE SERPL-MCNC: 93 MG/DL (ref 65–99)
HCT VFR BLD AUTO: 35 % (ref 37.5–51)
HGB BLD-MCNC: 11.7 G/DL (ref 13–17.7)
IMM GRANULOCYTES # BLD AUTO: 0.02 10*3/MM3 (ref 0–0.05)
IMM GRANULOCYTES NFR BLD AUTO: 0.3 % (ref 0–0.5)
LYMPHOCYTES # BLD AUTO: 2.3 10*3/MM3 (ref 0.7–3.1)
LYMPHOCYTES NFR BLD AUTO: 36.2 % (ref 19.6–45.3)
MCH RBC QN AUTO: 31.6 PG (ref 26.6–33)
MCHC RBC AUTO-ENTMCNC: 33.4 G/DL (ref 31.5–35.7)
MCV RBC AUTO: 94.6 FL (ref 79–97)
MONOCYTES # BLD AUTO: 0.58 10*3/MM3 (ref 0.1–0.9)
MONOCYTES NFR BLD AUTO: 9.1 % (ref 5–12)
NEUTROPHILS NFR BLD AUTO: 3.18 10*3/MM3 (ref 1.7–7)
NEUTROPHILS NFR BLD AUTO: 50 % (ref 42.7–76)
NRBC BLD AUTO-RTO: 0 /100 WBC (ref 0–0.2)
PLATELET # BLD AUTO: 162 10*3/MM3 (ref 140–450)
PMV BLD AUTO: 8.7 FL (ref 6–12)
POTASSIUM SERPL-SCNC: 4.1 MMOL/L (ref 3.5–5.2)
RBC # BLD AUTO: 3.7 10*6/MM3 (ref 4.14–5.8)
SODIUM SERPL-SCNC: 140 MMOL/L (ref 136–145)
VANCOMYCIN SERPL-MCNC: 12.6 MCG/ML (ref 5–40)
WBC NRBC COR # BLD: 6.36 10*3/MM3 (ref 3.4–10.8)

## 2023-09-23 PROCEDURE — 99232 SBSQ HOSP IP/OBS MODERATE 35: CPT | Performed by: INTERNAL MEDICINE

## 2023-09-23 PROCEDURE — 25010000002 VANCOMYCIN PER 500 MG

## 2023-09-23 PROCEDURE — 25010000002 PIPERACILLIN SOD-TAZOBACTAM PER 1 G: Performed by: INTERNAL MEDICINE

## 2023-09-23 PROCEDURE — 80048 BASIC METABOLIC PNL TOTAL CA: CPT

## 2023-09-23 PROCEDURE — 85025 COMPLETE CBC W/AUTO DIFF WBC: CPT | Performed by: INTERNAL MEDICINE

## 2023-09-23 PROCEDURE — 94799 UNLISTED PULMONARY SVC/PX: CPT

## 2023-09-23 PROCEDURE — 80202 ASSAY OF VANCOMYCIN: CPT

## 2023-09-23 PROCEDURE — G0378 HOSPITAL OBSERVATION PER HR: HCPCS

## 2023-09-23 RX ADMIN — PIPERACILLIN SODIUM AND TAZOBACTAM SODIUM 3.38 G: 3; .375 INJECTION, SOLUTION INTRAVENOUS at 03:00

## 2023-09-23 RX ADMIN — IPRATROPIUM BROMIDE AND ALBUTEROL SULFATE 3 ML: 2.5; .5 SOLUTION RESPIRATORY (INHALATION) at 07:51

## 2023-09-23 RX ADMIN — VALPROIC ACID 250 MG: 250 SOLUTION ORAL at 20:25

## 2023-09-23 RX ADMIN — LEVOTHYROXINE SODIUM 75 MCG: 0.07 TABLET ORAL at 06:31

## 2023-09-23 RX ADMIN — IPRATROPIUM BROMIDE AND ALBUTEROL SULFATE 3 ML: 2.5; .5 SOLUTION RESPIRATORY (INHALATION) at 18:59

## 2023-09-23 RX ADMIN — Medication 5 MG: at 00:18

## 2023-09-23 RX ADMIN — Medication 5 MG: at 20:25

## 2023-09-23 RX ADMIN — SODIUM CHLORIDE 100 ML/HR: 9 INJECTION, SOLUTION INTRAVENOUS at 03:00

## 2023-09-23 RX ADMIN — Medication 10 ML: at 20:26

## 2023-09-23 RX ADMIN — IPRATROPIUM BROMIDE AND ALBUTEROL SULFATE 3 ML: 2.5; .5 SOLUTION RESPIRATORY (INHALATION) at 13:51

## 2023-09-23 RX ADMIN — PAROXETINE HYDROCHLORIDE 10 MG: 10 TABLET, FILM COATED ORAL at 08:32

## 2023-09-23 RX ADMIN — SODIUM CHLORIDE 100 ML/HR: 9 INJECTION, SOLUTION INTRAVENOUS at 14:31

## 2023-09-23 RX ADMIN — ASPIRIN 81 MG: 81 TABLET, COATED ORAL at 08:32

## 2023-09-23 RX ADMIN — VALPROIC ACID 250 MG: 250 SOLUTION ORAL at 08:32

## 2023-09-23 RX ADMIN — VANCOMYCIN HYDROCHLORIDE 750 MG: 750 INJECTION, SOLUTION INTRAVENOUS at 00:12

## 2023-09-23 RX ADMIN — Medication 10 ML: at 08:32

## 2023-09-23 RX ADMIN — PIPERACILLIN SODIUM AND TAZOBACTAM SODIUM 3.38 G: 3; .375 INJECTION, SOLUTION INTRAVENOUS at 19:35

## 2023-09-23 RX ADMIN — PIPERACILLIN SODIUM AND TAZOBACTAM SODIUM 3.38 G: 3; .375 INJECTION, SOLUTION INTRAVENOUS at 12:38

## 2023-09-23 NOTE — PROGRESS NOTES
[facility] Medicine Services  PROGRESS NOTE    Patient Name: Ramin Zaragoza  : 1935  MRN: [id number]    Date of Admission: 2023  Primary Care Physician: Patricio Crawford MD    Subjective   Subjective     CC:  pneumonia    HPI:  Non communicative at baseline per family. Per nursing, has been eating well.       Objective   Objective     Vital Signs:   Temp:  [97.7 °F (36.5 °C)-98.2 °F (36.8 °C)] 97.8 °F (36.6 °C)  Heart Rate:  [50-64] 58  Resp:  [16-20] 20  BP: (113-137)/(46-60) 113/58     Physical Exam:  Constitutional: No acute distress, awake, sleeping  Respiratory: Clear to auscultation bilaterally  Cardiovascular: RRR, no murmurs, rubs, or gallops  Gastrointestinal: Positive bowel sounds, soft, nontender, nondistended  Musculoskeletal: No bilateral ankle edema  Psychiatric: sleeping  Neurologic: unable to assess       Results Reviewed:  LAB RESULTS:      Lab 23  0615 23  0030 23   WBC 11.63*  --  13.96*   HEMOGLOBIN 12.8*  --  12.4*   HEMATOCRIT 40.1  --  36.7*   PLATELETS 183  --  197   NEUTROS ABS 7.62*  --  11.94*   IMMATURE GRANS (ABS) 0.04  --  0.05   LYMPHS ABS 2.81  --  1.11   MONOS ABS 1.01*  --  0.82   EOS ABS 0.12  --  0.02   MCV 99.5*  --  93.4   PROCALCITONIN  --   --  0.41*   LACTATE  --  1.6 2.3*   PROTIME  --   --  15.6*         Lab 23  0803 23   SODIUM 141 138   POTASSIUM 4.0 4.3   CHLORIDE 107 103   CO2 28.0 25.0   ANION GAP 6.0 10.0   BUN 11 14   CREATININE 0.70* 0.69*   EGFR 89.2 89.6   GLUCOSE 99 163*   CALCIUM 8.3* 8.6   MAGNESIUM 1.8 1.7         Lab 23  0823   TOTAL PROTEIN 5.1* 5.8*   ALBUMIN 2.9* 3.3*   GLOBULIN 2.2 2.5   ALT (SGPT) 11 12   AST (SGOT) 20 19   BILIRUBIN 0.5 0.4   ALK PHOS 53 62         Lab 23   PROTIME 15.6*   INR 1.23*                 Brief Urine Lab Results  (Last result in the past 365 days)        Color   Clarity   Blood   Leuk Est   Nitrite   Protein    CREAT   Urine HCG        09/21/23 2000 Yellow   Clear   Small (1+)   Negative   Negative   Negative                   Microbiology Results Abnormal       Procedure Component Value - Date/Time    Blood Culture - Blood, Arm, Right [[id number]]  (Normal) Collected: 09/21/23 2008    Lab Status: Preliminary result Specimen: Blood from Arm, Right Updated: 09/22/23 2131     Blood Culture No growth at 24 hours    Blood Culture - Blood, Arm, Left [[id number]]  (Normal) Collected: 09/21/23 2002    Lab Status: Preliminary result Specimen: Blood from Arm, Left Updated: 09/22/23 2030     Blood Culture No growth at 24 hours    S. Pneumo Ag Urine or CSF - Urine, Urine, Clean Catch [[id number]]  (Normal) Collected: 09/22/23 0621    Lab Status: Final result Specimen: Urine, Clean Catch Updated: 09/22/23 1226     Strep Pneumo Ag Negative    Legionella Antigen, Urine - Urine, Urine, Clean Catch [[id number]]  (Normal) Collected: 09/22/23 0621    Lab Status: Final result Specimen: Urine, Clean Catch Updated: 09/22/23 1226     LEGIONELLA ANTIGEN, URINE Negative    MRSA Screen, PCR (Inpatient) - Swab, Nares [[id number]]  (Normal) Collected: 09/22/23 0014    Lab Status: Final result Specimen: Swab from Nares Updated: 09/22/23 0902     MRSA PCR Negative    Narrative:      The negative predictive value of this diagnostic test is high and should only be used to consider de-escalating anti-MRSA therapy. A positive result may indicate colonization with MRSA and must be correlated clinically.  MRSA Negative    Respiratory Panel PCR w/COVID-19(SARS-CoV-2) DEEP/JOSEPH/ESTHER/PAD/COR/MAD/MARLINE In-House, NP Swab in UTM/[facility], 3-4 HR TAT - Swab, Nasopharynx [[id number]]  (Normal) Collected: 09/21/23 2000    Lab Status: Final result Specimen: Swab from Nasopharynx Updated: 09/21/23 2107     ADENOVIRUS, PCR Not Detected     Coronavirus 229E Not Detected     Coronavirus HKU1 Not Detected     Coronavirus NL63 Not Detected     Coronavirus OC43 Not Detected     COVID19 Not  Detected     Human Metapneumovirus Not Detected     Human Rhinovirus/Enterovirus Not Detected     Influenza A PCR Not Detected     Influenza B PCR Not Detected     Parainfluenza Virus 1 Not Detected     Parainfluenza Virus 2 Not Detected     Parainfluenza Virus 3 Not Detected     Parainfluenza Virus 4 Not Detected     RSV, PCR Not Detected     Bordetella pertussis pcr Not Detected     Bordetella parapertussis PCR Not Detected     Chlamydophila pneumoniae PCR Not Detected     Mycoplasma pneumo by PCR Not Detected    Narrative:      In the setting of a positive respiratory panel with a viral infection PLUS a negative procalcitonin without other underlying concern for bacterial infection, consider observing off antibiotics or discontinuation of antibiotics and continue supportive care. If the respiratory panel is positive for atypical bacterial infection (Bordetella pertussis, Chlamydophila pneumoniae, or Mycoplasma pneumoniae), consider antibiotic de-escalation to target atypical bacterial infection.            CT Head Without Contrast    Result Date: 9/21/2023  CT HEAD WO CONTRAST Date of Exam: 9/21/2023 8:24 PM EDT Indication: ams. Comparison: 1/13/2023 Technique: Axial CT images were obtained of the head without contrast administration.  Automated exposure control and iterative construction methods were used. Findings:   There is moderate generalized parenchymal volume loss with ex vacuo dilatation of the lateral and third ventricles. There is no evidence of acute infarct or hemorrhage. No abnormal extra-axial fluid collections identified. No mass effect or hydrocephalus. There is partial opacification of the mastoid air cells bilaterally. Visualized paranasal sinuses are clear. Globes and orbits are within normal limits. No acute skull fracture.      Impression: Impression: 1. Moderate generalized parenchymal volume loss. No acute intracranial abnormality. 2. Partial opacification of the mastoid air cells  bilaterally, similar prior exam. Electronically Signed: Jun Gramajo MD  9/21/2023 9:13 PM EDT  Workstation ID: [id number]    CT Abdomen Pelvis With Contrast    Result Date: 9/21/2023  CT ABDOMEN PELVIS W CONTRAST Date of Exam: 9/21/2023 8:35 PM EDT Indication: fever. Comparison: 1/13/2023 Technique: Axial CT images were obtained of the abdomen and pelvis following the uneventful intravenous administration of 80 mL Isovue 300 . Reconstructed coronal and sagittal images were also obtained. Automated exposure control and iterative construction methods were used. Findings: *Lower Thorax: Bibasilar opacities. *Liver: Unremarkable. *Gallbladder: Cholecystectomy. *Pancreas: Normal. *Spleen: Normal. *Adrenal Glands: Normal. *Kidneys: No renal stones or hydronephrosis bilaterally. Bilateral renal cysts. *Stomach: Gastric wall thickening. *Bowel: Nonobstructive bowel gas pattern. No bowel wall inflammation. *Appendix: Normal appendix. *Peritoneal Cavity: No pneumoperitoneum.  No lymphadenopathy. *Urinary Bladder: Urinary bladder wall thickening. *Pelvis: No free pelvic fluid. Prostamegaly measuring 6.5 x 7.4 cm. *Bones: No acute fractures or dislocations. No osseous destructive lesions. Multilevel spondylosis. Generalized osteopenia. *     Impression: 1.Bibasilar opacities may represent atelectasis or infiltrates. 2.Gastric wall thickening may represent gastritis or other etiologies. Distal esophageal wall thickening may represent esophagitis or other etiologies. Small hiatal hernia. Follow-up recommended. 3.Urinary bladder wall thickening may represent under distention, cystitis or other etiologies. Please correlate with urinalysis. Follow-up recommended. 4.Severe prostamegaly. Electronically Signed: Emmett Rosa MD  9/21/2023 9:28 PM EDT  Workstation ID: [id number]    FL Video Swallow With Speech Single Contrast    Result Date: 9/22/2023  FL VIDEO SWALLOW W SPEECH SINGLE-CONTRAST Date of Exam: 9/22/2023 11:38 AM EDT  Indication: aspiration, significant belching Comparison: None available. Technique:   The speech pathologist administered food and/or liquid mixed with barium to the patient with cine/video imaging.  Imaging assistance was provided to the speech pathologist and an image was saved. Fluoroscopic Time: 1 minute and 30 seconds Number of Images: 10 associated fluoroscopic loops were saved Findings: Please see speech therapy report for full details and recommendations.     Impression: Impression: Fluoroscopy provided for a modified barium swallow. Please see speech therapy report for full details and recommendations. Electronically Signed: Maykel Wong MD  9/22/2023 3:58 PM EDT  Workstation ID: [id number]    XR Chest 1 View    Result Date: 9/21/2023  XR CHEST 1 VW Date of Exam: 9/21/2023 7:29 PM EDT Indication: fever Comparison: 1/13/2023 Findings: Heart size and pulmonary vessels are within normal limits. There are coarsened bibasilar interstitial markings unchanged from prior study. No new focal airspace consolidation. No pleural effusion or pneumothorax.     Impression: Impression: 1. Prominent basilar interstitial markings unchanged from prior exam. No new airspace consolidation or other acute process. Electronically Signed: Jun Gramajo MD  9/21/2023 8:02 PM EDT  Workstation ID: [id number]     Results for orders placed during the hospital encounter of 02/23/22    Adult Transthoracic Echo Complete W/ Cont if Necessary Per Protocol    Interpretation Summary  · Normal left ventricular systolic function, estimated EF 60%.  · Mild mitral regurgitation.  · Mild tricuspid regurgitation with normal RVSP.  · Mild pulmonic insufficiency.      Current medications:  Scheduled Meds:Pharmacy Consult, , Does not apply, Once  aspirin, 81 mg, Oral, Daily  ipratropium-albuterol, 3 mL, Nebulization, Q6H While Awake - RT  levothyroxine, 75 mcg, Oral, Q AM  PARoxetine, 10 mg, Oral, QAM  piperacillin-tazobactam, 3.375 g, Intravenous,  Q8H  sodium chloride, 10 mL, Intravenous, Q12H  Valproic Acid, 250 mg, Oral, Q12H  vancomycin, 750 mg, Intravenous, Q12H      Continuous Infusions:Pharmacy to dose vancomycin,   sodium chloride, 100 mL/hr, Last Rate: 100 mL/hr (09/23/23 0300)      PRN Meds:.  acetaminophen    Calcium Replacement - Follow Nurse / BPA Driven Protocol    dexAMETHasone    HYDROcodone-acetaminophen    ipratropium-albuterol    Magnesium Standard Dose Replacement - Follow Nurse / BPA Driven Protocol    melatonin    nitroglycerin    Pharmacy to dose vancomycin    Phosphorus Replacement - Follow Nurse / BPA Driven Protocol    Potassium Replacement - Follow Nurse / BPA Driven Protocol    sodium chloride    sodium chloride    Assessment & Plan   Assessment & Plan     Active Hospital Problems    Diagnosis  POA    **Sepsis due to pneumonia [J18.9, A41.9]  Yes    Pneumonia [J18.9]  Yes      Resolved Hospital Problems   No resolved problems to display.        Brief Hospital Course to date:  87M with history of coronary artery disease, hypertension, hyperlipidemia, hypothyroidism and dementia who presented from nursing home with somnolence, coughing and fevers.  On arrival the patient had a fever of 102 with tachycardia  CMP was unremarkable.  Procalcitonin was mildly elevated 0.41.  Lactate was mildly elevated at 2.3.  INR elevated 1.23.  CBC showed a mild leukocytosis of 13.9.  CT of the abdomen pelvis showed by basilar opacities.     Sepsis secondary to suspected pneumonia  -Continue IV zosyn, discontinue vancomycin  -Urine strep pneumo and Legionella antigens negative.  -Blood cultures  remain negative  -Continue IV fluids.  -Continue home midodrine     Lactic acidosis, resolved  -not clinically significant, resolved with fluids     Hyperlipidemia  - No statin listed on his home medications.     History of coronary artery disease  - Continue aspirin from home regimen.  - Continue telemetry.     Hypothyroidism  - Continue Synthroid from home  regimen.     Dementia  Dysphagia  - No medications listed for this on his home reviewed medication list.  - on Modified diet doing well      Expected Discharge Location and Transportation: [facility]  Expected Discharge   Expected Discharge Date: 9/27/2023; Expected Discharge Time:      DVT prophylaxis:  Mechanical DVT prophylaxis orders are present.     AM-PAC 6 Clicks Score (PT): 14 (09/22/23 0830)    CODE STATUS:   Code Status and Medical Interventions:   Ordered at: 09/22/23 0034     Code Status (Patient has no pulse and is not breathing):    No CPR (Do Not Attempt to Resuscitate)     Medical Interventions (Patient has pulse or is breathing):    Full Support     Comments:    DNR/DNI, d/w daughter     I have prepared this progress note with copied portions of the prior day's progress note of my own authorship to preserve accuracy and maintain consistency of documentation. I have reviewed these portions and edited them for correctness. I verify that the above documentation accurately and truly represents the evaluation and management performed on today's date.       Carolyn Baez MD  09/23/23

## 2023-09-23 NOTE — PROGRESS NOTES
Pharmacy Consult-Vancomycin Dosing  Ramin Zaragoza is a  87 y.o. male receiving vancomycin therapy.     Indication: Nursing home patient with Bilateral pneumonia; Sepsis  Consulting Provider:  Dr. Oates  ID Consult:   Goal AUC: 400 - 600 mg/L*hr    Current Antimicrobial Therapy  Anti-Infectives (From admission, onward)      Ordered     Dose/Rate Route Frequency Start Stop    09/22/23 0138  vancomycin in dextrose 5% 150 mL (VANCOCIN) IVPB 750 mg        Ordering Provider: Mckinley Mancilla, [facility]    750 mg  over 45 Minutes Intravenous Every 12 Hours 09/22/23 1200 09/29/23 1159    09/21/23 2238  piperacillin-tazobactam (ZOSYN) 3.375 g in iso-osmotic dextrose 50 ml (premix)        Ordering Provider: Ramin Oates III, DO    3.375 g  over 4 Hours Intravenous Every 8 Hours 09/22/23 0400 09/27/23 0359    09/21/23 2238  Pharmacy to dose vancomycin        Ordering Provider: Ramin Oates III, DO     Does not apply Continuous PRN 09/21/23 2237 09/26/23 2236    09/21/23 2133  vancomycin IVPB 1500 mg in 0.9% NaCl (Premix) 500 mL        Ordering Provider: Kaushal Browning MD    20 mg/kg × 72 kg  333.3 mL/hr over 90 Minutes Intravenous Once 09/21/23 2230 09/21/23 2351    09/21/23 2133  piperacillin-tazobactam (ZOSYN) 3.375 g in iso-osmotic dextrose 50 ml (premix)        Ordering Provider: Kauhsal Browning MD    3.375 g Intravenous Once 09/21/23 2149 09/21/23 2222          Allergies  Allergies as of 09/21/2023 - Reviewed 09/21/2023   Allergen Reaction Noted    Flomax [tamsulosin] Other (See Comments) 01/11/2022    Lactose intolerance (gi)  05/02/2017     Labs  Results from last 7 days   Lab Units 09/23/23  0804 09/22/23  0803 09/21/23 2000   BUN mg/dL 9 11 14   CREATININE mg/dL 0.72* 0.70* 0.69*     Results from last 7 days   Lab Units 09/23/23  0804 09/22/23  0615 09/21/23 2000   WBC 10*3/mm3 6.36 11.63* 13.96*     Evaluation of Dosing     Last Dose Received in the ED/Outside Facility:  Vancomycin 1500 mg  "IV x 1 (9/21 22:21)  Is Patient on Dialysis or Renal Replacement:     Ht - 175.3 cm (69\")  Wt - 72 kg (158 lb 11.7 oz)    Estimated Creatinine Clearance: 73.6 mL/min (A) (by C-G formula based on SCr of 0.72 mg/dL (L)).    Intake & Output (last 3 days)         09/20 0701 09/21 0700 09/21 0701 09/22 0700 09/22 0701  09/23 0700 09/23 0701  09/24 0700    P.O.    120    I.V. (mL/kg)   2600 (36.1)     IV Piggyback  50      Total Intake(mL/kg)  50 (0.7) 2600 (36.1) 120 (1.7)    Urine (mL/kg/hr)  500 800 (0.5)     Stool  0      Total Output  500 800     Net  -450 +1800 +120            Urine Unmeasured Occurrence   1 x     Stool Unmeasured Occurrence  1 x            Microbiology and Radiology  Microbiology Results (last 10 days)       Procedure Component Value - Date/Time    Legionella Antigen, Urine - Urine, Urine, Clean Catch [[id number]]  (Normal) Collected: 09/22/23 0621    Lab Status: Final result Specimen: Urine, Clean Catch Updated: 09/22/23 1226     LEGIONELLA ANTIGEN, URINE Negative    S. Pneumo Ag Urine or CSF - Urine, Urine, Clean Catch [[id number]]  (Normal) Collected: 09/22/23 0621    Lab Status: Final result Specimen: Urine, Clean Catch Updated: 09/22/23 1226     Strep Pneumo Ag Negative    MRSA Screen, PCR (Inpatient) - Swab, Nares [[id number]]  (Normal) Collected: 09/22/23 0014    Lab Status: Final result Specimen: Swab from Nares Updated: 09/22/23 0902     MRSA PCR Negative    Narrative:      The negative predictive value of this diagnostic test is high and should only be used to consider de-escalating anti-MRSA therapy. A positive result may indicate colonization with MRSA and must be correlated clinically.  MRSA Negative    Blood Culture - Blood, Arm, Right [[id number]]  (Normal) Collected: 09/21/23 2008    Lab Status: Preliminary result Specimen: Blood from Arm, Right Updated: 09/22/23 2131     Blood Culture No growth at 24 hours    Blood Culture - Blood, Arm, Left [[id number]]  (Normal) Collected: 09/21/23 " 2002    Lab Status: Preliminary result Specimen: Blood from Arm, Left Updated: 09/22/23 2030     Blood Culture No growth at 24 hours    Respiratory Panel PCR w/COVID-19(SARS-CoV-2) DEEP/JOSEPH/ESTHER/PAD/COR/MAD/MARLINE In-House, NP Swab in UTM/VTM, 3-4 HR TAT - Swab, Nasopharynx [[id number]]  (Normal) Collected: 09/21/23 2000    Lab Status: Final result Specimen: Swab from Nasopharynx Updated: 09/21/23 2107     ADENOVIRUS, PCR Not Detected     Coronavirus 229E Not Detected     Coronavirus HKU1 Not Detected     Coronavirus NL63 Not Detected     Coronavirus OC43 Not Detected     COVID19 Not Detected     Human Metapneumovirus Not Detected     Human Rhinovirus/Enterovirus Not Detected     Influenza A PCR Not Detected     Influenza B PCR Not Detected     Parainfluenza Virus 1 Not Detected     Parainfluenza Virus 2 Not Detected     Parainfluenza Virus 3 Not Detected     Parainfluenza Virus 4 Not Detected     RSV, PCR Not Detected     Bordetella pertussis pcr Not Detected     Bordetella parapertussis PCR Not Detected     Chlamydophila pneumoniae PCR Not Detected     Mycoplasma pneumo by PCR Not Detected    Narrative:      In the setting of a positive respiratory panel with a viral infection PLUS a negative procalcitonin without other underlying concern for bacterial infection, consider observing off antibiotics or discontinuation of antibiotics and continue supportive care. If the respiratory panel is positive for atypical bacterial infection (Bordetella pertussis, Chlamydophila pneumoniae, or Mycoplasma pneumoniae), consider antibiotic de-escalation to target atypical bacterial infection.          Reported Vancomycin Levels    Results from last 7 days   Lab Units 09/23/23  0804   VANCOMYCIN RM mcg/mL 12.60      [employer] AUC Calculation:    Current AUC:   359 mg/L*hr    Predicted Steady State AUC on Current Dose: 447 mg/L*hr (750 mg Q12H)  _________________________________    Predicted Steady State AUC on New Dose:     mg/L*hr    Assessment/Plan:     1. Pharmacy to dose vancomycin for sepsis/ bilateral pneumonia. Goal  to 600 mg/L*hr  2. Patient received a loading dose of Vancomycin 1500 mg IV x 1 on 9/21 @ 22:21.  3. Currently receiving maintenance dose of vancomycin 750 mg IV q12h (~ 10.4 mg/kg).  4. Vancomycin random level was drawn early, ~ 7 hrs post dose, at 12.6 mcg/mL today - Prior to fourth total dose      No change to current dose; MRSA PCR negative  5.  Patient is afebrile, WBC 6.36; UOP with unmeasured occurrences.  6. Monitor renal function, cultures and sensitivities, and clinical status, and adjust regimen as necessary.  Pharmacy will continue to follow.    Farzana Wynn, PharmD  9/23/2023  11:01 EDT

## 2023-09-23 NOTE — PLAN OF CARE
Goal Outcome Evaluation:           Progress: improving          VVS, on room air O2> 92%, More alert,  able to eat all meals without difficulty and assist. No complain of pain. Family at bedside. Call light within range.

## 2023-09-23 NOTE — CONSULTS
"                  Clinical Nutrition   Nutrition Support Assessment  Reason for Visit: Identified at risk by screening criteria, MST score 2+, Physician consult      Patient Name: Ramin Zaragoza  YOB: 1935  MRN: [id number]  Date of Encounter: 09/22/23 20:23 EDT  Admission date: 9/21/2023    Comments: Comfort diet ordered per family rpt of food accepted at home: mech soft no mixed consistency no lactose honey thick liquid.   Follow for acceptance/appropriateness.    Nutrition Assessment   Admission Diagnosis:  Sepsis due to pneumonia [J18.9, A41.9]  Pneumonia [J18.9]      Problem List:    Sepsis due to pneumonia    Pneumonia        PMH:   He  has a past medical history of CAD (coronary artery disease), Hyperlipidemia, Hypertension, Hypothyroidism, Lactose intolerance, Lower extremity edema, PVC's (premature ventricular contractions), Thyroid disorder, and Venous insufficiency.    PSH:  He  has a past surgical history that includes Coronary stent placement; Cardiac catheterization; Hernia repair; Eye surgery; Other surgical history; and Other surgical history.    Applicable Nutrition Concerns:   Skin:  Oral:  GI:    Applicable Interval History:   9/22 FEES SLP rec comfort diet mech soft thin liquid or  per MD.      Reported/Observed/Food/Nutrition Related History:     9/22  Family rpt pt has been able to eat at home but needed thickened liquid and soft/pureed foods. Uses thickened Boost at home.      Anthropometrics     Flowsheet Rows      Flowsheet Row First Filed Value   Admission Height 175.3 cm (69\") Documented at 09/21/2023 1951   Admission Weight 72 kg (158 lb 11.7 oz) Documented at 09/21/2023 1951     Height: Height: 175.3 cm (69\")  Last Filed Weight: Weight: 72 kg (158 lb 11.7 oz) (09/21/23 1951)  Method: Weight Method: Standing scale  BMI: BMI (Calculated): 23.4  BMI classification: Normal: 18.5-24.9kg/m2    UBW: Per EMR rot 180 lbs on 2/24/23  Weight change: Loss of 22 lbs 12% body wt over 7 mo.  "     Nutrition Focused Physical Exam     Date:   9/22      Unable to perform exam due to: Pt unable to participate at time of visit      Current Nutrition Prescription   PO: Diet: Cardiac Diets; Healthy Heart (2-3 Na+); No Mixed Consistencies; Texture: Mechanical Ground (NDD 2); Fluid Consistency: Honey Thick  Oral Nutrition Supplement: Boost Plus 3x/da added per RD  Intake: Insufficient data      Nutrition Diagnosis   Date:  9/22            Updated:    Problem Potential suboptimal intake   Etiology Limited food tolerance reported   Signs/Symptoms Intake data pending   Status:     Goal:   General: Nutrition to support treatment  PO: Establish PO  EN/PN: N/A    Nutrition Intervention      Follow treatment progress, Care plan reviewed, Advise alternate selection, Menu adjusted, Supplement provided        Monitoring/Evaluation:   Per protocol, I&O, PO intake, Supplement intake, Pertinent labs, Weight, Symptoms, POC/GOC, Swallow function      Shahla Bal RD  Time Spent: 25 min

## 2023-09-24 PROCEDURE — 94799 UNLISTED PULMONARY SVC/PX: CPT

## 2023-09-24 PROCEDURE — 93005 ELECTROCARDIOGRAM TRACING: CPT | Performed by: NURSE PRACTITIONER

## 2023-09-24 PROCEDURE — 25010000002 PIPERACILLIN SOD-TAZOBACTAM PER 1 G: Performed by: INTERNAL MEDICINE

## 2023-09-24 PROCEDURE — 97166 OT EVAL MOD COMPLEX 45 MIN: CPT

## 2023-09-24 PROCEDURE — G0378 HOSPITAL OBSERVATION PER HR: HCPCS

## 2023-09-24 PROCEDURE — 94664 DEMO&/EVAL PT USE INHALER: CPT

## 2023-09-24 PROCEDURE — 97530 THERAPEUTIC ACTIVITIES: CPT

## 2023-09-24 PROCEDURE — 99232 SBSQ HOSP IP/OBS MODERATE 35: CPT | Performed by: INTERNAL MEDICINE

## 2023-09-24 PROCEDURE — 25010000002 CEFTRIAXONE PER 250 MG: Performed by: INTERNAL MEDICINE

## 2023-09-24 RX ORDER — QUETIAPINE FUMARATE 25 MG/1
25 TABLET, FILM COATED ORAL NIGHTLY
Status: DISCONTINUED | OUTPATIENT
Start: 2023-09-24 | End: 2023-09-26 | Stop reason: HOSPADM

## 2023-09-24 RX ORDER — HALOPERIDOL 5 MG/ML
0.5 INJECTION INTRAMUSCULAR ONCE
Status: COMPLETED | OUTPATIENT
Start: 2023-09-25 | End: 2023-09-25

## 2023-09-24 RX ADMIN — IPRATROPIUM BROMIDE AND ALBUTEROL SULFATE 3 ML: 2.5; .5 SOLUTION RESPIRATORY (INHALATION) at 18:54

## 2023-09-24 RX ADMIN — IPRATROPIUM BROMIDE AND ALBUTEROL SULFATE 3 ML: 2.5; .5 SOLUTION RESPIRATORY (INHALATION) at 06:43

## 2023-09-24 RX ADMIN — Medication 10 ML: at 20:00

## 2023-09-24 RX ADMIN — IPRATROPIUM BROMIDE AND ALBUTEROL SULFATE 3 ML: 2.5; .5 SOLUTION RESPIRATORY (INHALATION) at 12:10

## 2023-09-24 RX ADMIN — SODIUM CHLORIDE 1000 MG: 900 INJECTION INTRAVENOUS at 08:58

## 2023-09-24 RX ADMIN — VALPROIC ACID 250 MG: 250 SOLUTION ORAL at 20:00

## 2023-09-24 RX ADMIN — VALPROIC ACID 250 MG: 250 SOLUTION ORAL at 08:58

## 2023-09-24 RX ADMIN — ASPIRIN 81 MG: 81 TABLET, COATED ORAL at 08:58

## 2023-09-24 RX ADMIN — PIPERACILLIN SODIUM AND TAZOBACTAM SODIUM 3.38 G: 3; .375 INJECTION, SOLUTION INTRAVENOUS at 04:14

## 2023-09-24 RX ADMIN — PAROXETINE HYDROCHLORIDE 10 MG: 10 TABLET, FILM COATED ORAL at 08:58

## 2023-09-24 RX ADMIN — QUETIAPINE FUMARATE 25 MG: 25 TABLET ORAL at 20:00

## 2023-09-24 NOTE — PLAN OF CARE
Goal Outcome Evaluation:  Plan of Care Reviewed With: patient        Progress: no change       Pt has not slept most of the night, he continues to pull out multiple IV's, remove telemetry stickers, external catheter, pulse ox. Education attempted, pt remains confused, and unable to redirect. When staff attempts to place these back on he becomes agitated and attempts to hit. Provider notified and order for mittens placed. Safety provided.

## 2023-09-24 NOTE — THERAPY EVALUATION
Patient Name: Ramin Zaragoza  : 1935    MRN: [id number]                              Today's Date: 2023       Admit Date: 2023    Visit Dx:     ICD-10-CM ICD-9-CM   1. HCAP (healthcare-associated pneumonia)  J18.9 486   2. Leukocytosis, unspecified type  D72.829 288.60   3. Sepsis due to pneumonia  J18.9 486    A41.9 995.91   4. History of dementia  Z86.59 V11.8   5. Oropharyngeal dysphagia  R13.12 787.22     Patient Active Problem List   Diagnosis    Coronary artery disease involving native coronary artery of native heart without angina pectoris    Premature ventricular contraction    Essential hypertension    Mixed hyperlipidemia    Venous insufficiency    Hypothyroidism    Lactose intolerance    Weakness    Accident due to mechanical fall without injury    Dementia    Sepsis due to pneumonia    Pneumonia     Past Medical History:   Diagnosis Date    CAD (coronary artery disease)     Hyperlipidemia     Hypertension     Hypothyroidism     on chronic replacement therapy    Lactose intolerance     Lower extremity edema     PVC's (premature ventricular contractions)     History of    Thyroid disorder     Venous insufficiency      Past Surgical History:   Procedure Laterality Date    CARDIAC CATHETERIZATION      CORONARY STENT PLACEMENT      EYE SURGERY      Bilateral cataract extraction    HERNIA REPAIR      Inguinal hernia repair    OTHER SURGICAL HISTORY      Melanoma removed from back    OTHER SURGICAL HISTORY      History of left elbow fracture with sunsequent fixation      General Information       Row Name 23 1500          OT Time and Intention    Document Type evaluation  -AC     Mode of Treatment occupational therapy  -AC       Row Name 23 1500          General Information    Patient Profile Reviewed yes  -AC     Prior Level of Function --  difficult to detemine, CM reports pt is non ambulatory, but shuffles to w/c  -AC     Existing Precautions/Restrictions fall  dementia, mainly  "non-verbal, able to nod head and say \"stop\", agitated with attempt to stand,  non ambualtory, but shuffled to w/c prior per CM note  -AC     Barriers to Rehab previous functional deficit;cognitive status  -       Row Name 09/24/23 1500          Occupational Profile    Environmental Supports and Barriers (Occupational Profile) w/c and RW per CM note  -       Row Name 09/24/23 1500          Living Environment    People in Home facility resident  [facility]  -       Row Name 09/24/23 1500          Home Main Entrance    Number of Stairs, Main Entrance none  -AC     Stair Railings, Main Entrance none  -AC       Row Name 09/24/23 1500          Stairs Within Home, Primary    Number of Stairs, Within Home, Primary none  -AC     Stair Railings, Within Home, Primary none  -AC       Row Name 09/24/23 1500          Cognition    Orientation Status (Cognition) other (see comments);unable/difficult to assess  Pt responded to name being called, but unable to state name, mainly non verbal, but able to say \"stop\"  -       Row Name 09/24/23 1500          Safety Issues, Functional Mobility    Safety Issues Affecting Function (Mobility) ability to follow commands;at risk behavior observed;friction/shear risk;insight into deficits/self-awareness;judgment;problem-solving;safety precaution awareness;safety precautions follow-through/compliance;sequencing abilities  -     Impairments Affecting Function (Mobility) balance;cognition;endurance/activity tolerance;motor control;postural/trunk control;range of motion (ROM)  -     Cognitive Impairments, Mobility Safety/Performance attention;awareness, need for assistance;insight into deficits/self-awareness;judgment;problem-solving/reasoning;safety precaution awareness;safety precaution follow-through;sequencing abilities  -               User Key  (r) = Recorded By, (t) = Taken By, (c) = Cosigned By      Initials Name Provider Type    AC Kiah Abbott OT Occupational " "Therapist                     Mobility/ADL's       Row Name 09/24/23 1508          Bed Mobility    Bed Mobility rolling left;rolling right;scooting/bridging;supine-sit;sit-supine  -AC     Rolling Left [place] (Bed Mobility) verbal cues;nonverbal cues (demo/gesture);moderate assist (50% patient effort)  -AC     Rolling Right [place] (Bed Mobility) verbal cues;nonverbal cues (demo/gesture);moderate assist (50% patient effort)  -AC     Scooting/Bridging [place] (Bed Mobility) verbal cues;nonverbal cues (demo/gesture);dependent (less than 25% patient effort);2 person assist  -AC     Supine-Sit [place] (Bed Mobility) verbal cues;moderate assist (50% patient effort)  -AC     Sit-Supine [place] (Bed Mobility) verbal cues;maximum assist (25% patient effort);2 person assist  -AC     Bed Mobility, Safety Issues cognitive deficits limit understanding;impaired trunk control for bed mobility  -     Assistive Device (Bed Mobility) bed rails;draw sheet;head of bed elevated  -     Comment, (Bed Mobility) Pt required verbal/tactile cues to sequence,  pt with strong posterior lean once EOB, and requested therapist to \"stop\".  pt slightly agitated and attempted to swat at therapist and aide when requesting pt lean fwd and attempt to stand, returned to supine  -       Row Name 09/24/23 1508          Transfers    Comment, (Transfers) Pt pushing post with attempt to stand and unable to clear bottom from bed  -       Row Name 09/24/23 1508          Activities of Daily Living    BADL Assessment/Intervention lower body dressing;grooming  -       Row Name 09/24/23 1508          Lower Body Dressing Assessment/Training    [place] Level (Lower Body Dressing) don;socks;dependent (less than 25% patient effort)  -     Position (Lower Body Dressing) supine  -       Row Name 09/24/23 1508          Grooming Assessment/Training    [place] Level (Grooming) hair care, combing/brushing;wash face, " hands;verbal cues;minimum assist (75% patient effort)  -     Position (Grooming) sitting up in bed  -AC     Comment, (Grooming) [facility] [employer] [place] [facility]  -               User Key  (r) = Recorded By, (t) = Taken By, (c) = Cosigned By      Initials Name Provider Type    Kiah Lim, OT Occupational Therapist                   Obj/Interventions       Row Name 09/24/23 1513          Sensory Assessment (Somatosensory)    Sensory Assessment (Somatosensory) unable/difficult to assess  -       Row Name 09/24/23 1513          Vision Assessment/Intervention    Visual Impairment/Limitations unable/difficult to assess  -       Row Name 09/24/23 1513          Range of Motion Comprehensive    Comment, General Range of Motion L shld limited 25%  -       Row Name 09/24/23 1513          Strength Comprehensive (MMT)    Comment, General Manual Muscle Testing (MMT) Assessment BUE grossly 4/5  -       Row Name 09/24/23 1513          Balance    Balance Assessment sitting static balance  -AC     Static Sitting Balance maximum assist;moderate assist  pushing post  -AC     Position, Sitting Balance sitting edge of bed  -               User Key  (r) = Recorded By, (t) = Taken By, (c) = Cosigned By      Initials Name Provider Type    Kiah Lim, OT Occupational Therapist                   Goals/Plan       Row Name 09/24/23 1528          Bed Mobility Goal 1 (OT)    Activity/Assistive Device (Bed Mobility Goal 1, OT) rolling to left;rolling to right;sit to supine;supine to sit  -     [place] Level/Cues Needed (Bed Mobility Goal 1, OT) verbal cues required;nonverbal cues (demo/gesture) required;minimum assist (75% or more patient effort)  -     Time Frame (Bed Mobility Goal 1, OT) by discharge  -     Progress/Outcomes (Bed Mobility Goal 1, OT) new goal;goal ongoing  -       Row Name 09/24/23 1528          Transfer Goal 1 (OT)    Activity/Assistive Device (Transfer Goal 1, OT)  bed-to-chair/chair-to-bed;sit-to-stand/stand-to-sit  -AC     [place] Level/Cues Needed (Transfer Goal 1, OT) verbal cues required;nonverbal cues (demo/gesture) required;maximum assist (25-49% patient effort)  -AC     Time Frame (Transfer Goal 1, OT) by discharge  -AC     Progress/Outcome (Transfer Goal 1, OT) new goal;goal ongoing  -       Row Name 09/24/23 1528          Grooming Goal 1 (OT)    Activity/Device (Grooming Goal 1, OT) hair care;wash face, hands  -AC     [place] (Grooming Goal 1, OT) set-up required;verbal cues required;nonverbal cues (demo/gesture) required  -AC     Time Frame (Grooming Goal 1, OT) by discharge  -AC     Strategies/Barriers (Grooming Goal 1, OT) seated EOB given mod A for balance  -AC     Progress/Outcome (Grooming Goal 1, OT) new goal;goal ongoing  -       Row Name 09/24/23 1528          Self-Feeding Goal 1 (OT)    Activity/Device (Self-Feeding Goal 1, OT) scoop food and bring to mouth  AE prn  -AC     [place] Level/Cues Needed (Self-Feeding Goal 1, OT) verbal cues required;nonverbal cues (demo/gesture) required;minimum assist (75% or more patient effort)  -AC     Time Frame (Self-Feeding Goal 1, OT) by discharge  -AC     Progress/Outcomes (Self-Feeding Goal 1, OT) new goal;goal ongoing  -       Row Name 09/24/23 1528          Therapy Assessment/Plan (OT)    Planned Therapy Interventions (OT) activity tolerance training;BADL retraining;functional balance retraining;occupation/activity based interventions;strengthening exercise;transfer/mobility retraining  -               User Key  (r) = Recorded By, (t) = Taken By, (c) = Cosigned By      Initials Name Provider Type     Kiah Abbott OT Occupational Therapist                   Clinical Impression       Row Name 09/24/23 6552          Pain Assessment    Additional Documentation Pain Scale: FACES Pre/Post-Treatment (Group)  -       Row Name 09/24/23 2046          Pain Scale: FACES Pre/Post-Treatment    Pain:  "FACES Scale, Pretreatment 0-->no hurt  -AC     Posttreatment Pain Rating 0-->no hurt  -AC     Pre/Posttreatment Pain Comment grimaced slightly with L shld range  -       Row Name 09/24/23 1515          Plan of Care Review    Plan of Care Reviewed With patient  -AC     Outcome Evaluation Pt presents with confusion, decreased balance and motor planning  limiting independence with self care/mobility. Pt min A grooming, dep LBD,  mod A supine to sit,mod/ max sitting balance with strong post lean.  Pt requesting therapist to \"stop\"  when attempting to stand and became slightly agitated; pt returned to supine.  OT will follow to increase ind with self care/mobility.  If we can determine if pt is currently functioning below baseline with self care/mobility, he may benefit from SNF upon d/c.  -       Row Name 09/24/23 1515          Therapy Assessment/Plan (OT)    Rehab Potential (OT) fair, will monitor progress closely  -     Criteria for Skilled Therapeutic Interventions Met (OT) yes;skilled treatment is necessary  -     Therapy Frequency (OT) daily  -       Row Name 09/24/23 1515          Therapy Plan Review/Discharge Plan (OT)    Anticipated Discharge Disposition (OT) skilled nursing facility  -       Row Name 09/24/23 1515          Vital Signs    Pre Systolic BP Rehab 113  -AC     Pre Treatment Diastolic BP 65  -AC     Pre SpO2 (%) 97  -AC     O2 Delivery Pre Treatment room air  -AC     O2 Delivery Intra Treatment room air  -AC     Post SpO2 (%) 98  -AC     O2 Delivery Post Treatment room air  -AC     Pre Patient Position Supine  -AC     Post Patient Position Supine  -       Row Name 09/24/23 1515          Positioning and Restraints    Pre-Treatment Position in bed  -AC     Post Treatment Position bed  -AC     In Bed notified nsg;supine;call light within reach;encouraged to call for assist;exit alarm on  -AC               User Key  (r) = Recorded By, (t) = Taken By, (c) = Cosigned By      Initials Name " Provider Type    Kiah Lim, OT Occupational Therapist                   Outcome Measures       Row Name 09/24/23 1530          How much help from another is currently needed...    Putting on and taking off regular lower body clothing? 1  -AC     Bathing (including washing, rinsing, and drying) 2  -AC     Toileting (which includes using toilet bed pan or urinal) 1  -AC     Putting on and taking off regular upper body clothing 2  -AC     Taking care of personal grooming (such as brushing teeth) 3  -AC     Eating meals 2  -AC     AM-PAC 6 Clicks Score (OT) 11  -AC       Row Name 09/24/23 0800          How much help from another person do you currently need...    Turning from your back to your side while in flat bed without using bedrails? 2  -JW     Moving from lying on back to sitting on the side of a flat bed without bedrails? 2  -JW     Moving to and from a bed to a chair (including a wheelchair)? 2  -JW     Standing up from a chair using your arms (e.g., wheelchair, bedside chair)? 2  -JW     Climbing 3-5 steps with a railing? 2  -JW     To walk in hospital room? 2  -JW     AM-PAC 6 Clicks Score (PT) 12  -JW     Highest level of mobility 4 --> Transferred to chair/commode  -JW       Row Name 09/24/23 1530          Functional Assessment    Outcome Measure Options AM-PAC 6 Clicks Daily Activity (OT)  -               User Key  (r) = Recorded By, (t) = Taken By, (c) = Cosigned By      Initials Name Provider Type    Kiah Lim, OT Occupational Therapist    Manuela Chowdhury RN Registered Nurse                    Occupational Therapy Education       Title: PT OT SLP Therapies (In Progress)       Topic: Occupational Therapy (In Progress)       Point: ADL training (In Progress)       Description:   Instruct learner(s) on proper safety adaptation and remediation techniques during self care or transfers.   Instruct in proper use of assistive devices.                  Learning Progress Summary            "  Patient Acceptance, E, NR by  at 9/24/2023 1531                         Point: Home exercise program (Not Started)       Description:   Instruct learner(s) on appropriate technique for monitoring, assisting and/or progressing therapeutic exercises/activities.                  Learner Progress:  Not documented in this visit.              Point: Precautions (Not Started)       Description:   Instruct learner(s) on prescribed precautions during self-care and functional transfers.                  Learner Progress:  Not documented in this visit.              Point: Body mechanics (Not Started)       Description:   Instruct learner(s) on proper positioning and spine alignment during self-care, functional mobility activities and/or exercises.                  Learner Progress:  Not documented in this visit.                              User Key       Initials Effective Dates Name Provider Type Discipline     02/03/23 -  Kiah Abbott, OT Occupational Therapist OT                  OT Recommendation and Plan  Planned Therapy Interventions (OT): activity tolerance training, BADL retraining, functional balance retraining, occupation/activity based interventions, strengthening exercise, transfer/mobility retraining  Therapy Frequency (OT): daily  Plan of Care Review  Plan of Care Reviewed With: patient  Outcome Evaluation: Pt presents with confusion, decreased balance and motor planning  limiting independence with self care/mobility. Pt min A grooming, dep LBD,  mod A supine to sit,mod/ max sitting balance with strong post lean.  Pt requesting therapist to \"stop\"  when attempting to stand and became slightly agitated; pt returned to supine.  OT will follow to increase ind with self care/mobility.  If we can determine if pt is currently functioning below baseline with self care/mobility, he may benefit from SNF upon d/c.     Time Calculation:   Evaluation Complexity (OT)  Review Occupational Profile/Medical/Therapy " History Complexity: expanded/moderate complexity  Assessment, Occupational Performance/Identification of Deficit Complexity: 3-5 performance deficits  Clinical Decision Making Complexity (OT): detailed assessment/moderate complexity  Overall Complexity of Evaluation (OT): moderate complexity     Time Calculation- OT       Row Name 09/24/23 1410             Time Calculation- OT    OT Start Time 1410  -AC      OT Received On 09/24/23  -AC      OT Goal Re-Cert Due Date 10/04/23  -AC         Timed Charges    19800 - OT Therapeutic Activity Minutes 10  -AC         Untimed Charges    OT Eval/Re-eval Minutes 55  -AC         Total Minutes    Timed Charges Total Minutes 10  -AC      Untimed Charges Total Minutes 55  -AC       Total Minutes 65  -AC                User Key  (r) = Recorded By, (t) = Taken By, (c) = Cosigned By      Initials Name Provider Type    AC Kiah Abbott, OT Occupational Therapist                  Therapy Charges for Today       Code Description Service Date Service Provider Modifiers Qty    [id number]  OT THERAPEUTIC ACT EA 15 MIN 9/24/2023 Kiah Abbott, OT GO 1    [id number] HC OT EVAL MOD COMPLEXITY 4 9/24/2023 Kiah Abbott OT GO 1                 Kiah Abbott OT  9/24/2023

## 2023-09-24 NOTE — PLAN OF CARE
"Goal Outcome Evaluation:  Plan of Care Reviewed With: patient           Outcome Evaluation: Pt presents with confusion, decreased balance and motor planning limiting independence with self care/mobility. Pt min A grooming, dep LBD, mod A supine to sit,mod/ max sitting balance with strong post lean. Pt requesting therapist to \"stop\" when atrempting to stand and became slightly agitated; pt returned to supine. OT will follow to increase ind with self care/mobility. If we can determine if pt is currently functioning below baseline with self care/mobility, he may benefit from SNF upon d/c.    Anticipated Discharge Disposition (OT): skilled nursing facility  "

## 2023-09-24 NOTE — PLAN OF CARE
Goal Outcome Evaluation:              Outcome Evaluation: VSS, denies any acute distress or discomfort.  M[place] discontinued early today, pt attempting to feed safe. Will continue with plan of care.

## 2023-09-24 NOTE — PROGRESS NOTES
[facility]  PROGRESS NOTE    Patient Name: Ramin Zaragoza  : 1935  MRN: [id number]    Date of Admission: 2023  Primary Care Physician: Patricio Crawford MD    Subjective   Subjective     CC:  pneumonia    HPI:  Patient has been more alert and awake.  Daughter at the bedside reports he has been doing well with his diet.  He does refuse thickened water but will drink thickened tea.      Objective   Objective     Vital Signs:   Temp:  [97.9 °F (36.6 °C)-98.3 °F (36.8 °C)] 98.3 °F (36.8 °C)  Heart Rate:  [53-99] 70  Resp:  [16-18] 18  BP: (101-166)/(38-60) 101/38     Physical Exam:  Constitutional: No acute distress, awake, alert  Respiratory: Clear to auscultation bilaterally, respiratory effort normal on room air  Cardiovascular: RRR  Gastrointestinal: Positive bowel sounds, soft, nontender, nondistended  Musculoskeletal: No bilateral ankle edema  Psychiatric: Appropriate affect, cooperative  Neurologic: Oriented x 3, no gross focal neurological deficits    Results Reviewed:  LAB RESULTS:      Lab 23  0804 23  0615 23  0030 23   WBC 6.36 11.63*  --  13.96*   HEMOGLOBIN 11.7* 12.8*  --  12.4*   HEMATOCRIT 35.0* 40.1  --  36.7*   PLATELETS 162 183  --  197   NEUTROS ABS 3.18 7.62*  --  11.94*   IMMATURE GRANS (ABS) 0.02 0.04  --  0.05   LYMPHS ABS 2.30 2.81  --  1.11   MONOS ABS 0.58 1.01*  --  0.82   EOS ABS 0.26 0.12  --  0.02   MCV 94.6 99.5*  --  93.4   PROCALCITONIN  --   --   --  0.41*   LACTATE  --   --  1.6 2.3*   PROTIME  --   --   --  15.6*         Lab 23  0804 23  0803 23   SODIUM 140 141 138   POTASSIUM 4.1 4.0 4.3   CHLORIDE 108* 107 103   CO2 27.0 28.0 25.0   ANION GAP 5.0 6.0 10.0   BUN 9 11 14   CREATININE 0.72* 0.70* 0.69*   EGFR 88.4 89.2 89.6   GLUCOSE 93 99 163*   CALCIUM 8.3* 8.3* 8.6   MAGNESIUM  --  1.8 1.7         Lab 23  0803 23   TOTAL PROTEIN 5.1* 5.8*   ALBUMIN  2.9* 3.3*   GLOBULIN 2.2 2.5   ALT (SGPT) 11 12   AST (SGOT) 20 19   BILIRUBIN 0.5 0.4   ALK PHOS 53 62         Lab 09/21/23 2000   PROTIME 15.6*   INR 1.23*                 Brief Urine Lab Results  (Last result in the past 365 days)        Color   Clarity   Blood   Leuk Est   Nitrite   Protein   CREAT   Urine HCG        09/21/23 2000 Yellow   Clear   Small (1+)   Negative   Negative   Negative                   Microbiology Results Abnormal       Procedure Component Value - Date/Time    Blood Culture - Blood, Arm, Right [[id number]]  (Normal) Collected: 09/21/23 2008    Lab Status: Preliminary result Specimen: Blood from Arm, Right Updated: 09/23/23 2131     Blood Culture No growth at 2 days    Blood Culture - Blood, Arm, Left [[id number]]  (Normal) Collected: 09/21/23 2002    Lab Status: Preliminary result Specimen: Blood from Arm, Left Updated: 09/23/23 2031     Blood Culture No growth at 2 days    S. Pneumo Ag Urine or CSF - Urine, Urine, Clean Catch [[id number]]  (Normal) Collected: 09/22/23 0621    Lab Status: Final result Specimen: Urine, Clean Catch Updated: 09/22/23 1226     Strep Pneumo Ag Negative    Legionella Antigen, Urine - Urine, Urine, Clean Catch [[id number]]  (Normal) Collected: 09/22/23 0621    Lab Status: Final result Specimen: Urine, Clean Catch Updated: 09/22/23 1226     LEGIONELLA ANTIGEN, URINE Negative    MRSA Screen, PCR (Inpatient) - Swab, Nares [[id number]]  (Normal) Collected: 09/22/23 0014    Lab Status: Final result Specimen: Swab from Nares Updated: 09/22/23 0902     MRSA PCR Negative    Narrative:      The negative predictive value of this diagnostic test is high and should only be used to consider de-escalating anti-MRSA therapy. A positive result may indicate colonization with MRSA and must be correlated clinically.  MRSA Negative    Respiratory Panel PCR w/COVID-19(SARS-CoV-2) DEEP/JOSEPH/ESTHER/PAD/COR/MAD/MARLINE In-House, NP Swab in UTM/VTM, 3-4 HR TAT - Swab, Nasopharynx [[id number]]   (Normal) Collected: 09/21/23 2000    Lab Status: Final result Specimen: Swab from Nasopharynx Updated: 09/21/23 2107     ADENOVIRUS, PCR Not Detected     Coronavirus 229E Not Detected     Coronavirus HKU1 Not Detected     Coronavirus NL63 Not Detected     Coronavirus OC43 Not Detected     COVID19 Not Detected     Human Metapneumovirus Not Detected     Human Rhinovirus/Enterovirus Not Detected     Influenza A PCR Not Detected     Influenza B PCR Not Detected     Parainfluenza Virus 1 Not Detected     Parainfluenza Virus 2 Not Detected     Parainfluenza Virus 3 Not Detected     Parainfluenza Virus 4 Not Detected     RSV, PCR Not Detected     Bordetella pertussis pcr Not Detected     Bordetella parapertussis PCR Not Detected     Chlamydophila pneumoniae PCR Not Detected     Mycoplasma pneumo by PCR Not Detected    Narrative:      In the setting of a positive respiratory panel with a viral infection PLUS a negative procalcitonin without other underlying concern for bacterial infection, consider observing off antibiotics or discontinuation of antibiotics and continue supportive care. If the respiratory panel is positive for atypical bacterial infection (Bordetella pertussis, Chlamydophila pneumoniae, or Mycoplasma pneumoniae), consider antibiotic de-escalation to target atypical bacterial infection.            FL Video Swallow With Speech Single Contrast    Result Date: 9/22/2023  FL VIDEO SWALLOW W SPEECH SINGLE-CONTRAST Date of Exam: 9/22/2023 11:38 AM EDT Indication: aspiration, significant belching Comparison: None available. Technique:   The speech pathologist administered food and/or liquid mixed with barium to the patient with cine/video imaging.  Imaging assistance was provided to the speech pathologist and an image was saved. Fluoroscopic Time: 1 minute and 30 seconds Number of Images: 10 associated fluoroscopic loops were saved Findings: Please see speech therapy report for full details and recommendations.      Impression: Impression: Fluoroscopy provided for a modified barium swallow. Please see speech therapy report for full details and recommendations. Electronically Signed: Maykel Wong MD  9/22/2023 3:58 PM EDT  Workstation ID: [id number]     Results for orders placed during the hospital encounter of 02/23/22    Adult Transthoracic Echo Complete W/ Cont if Necessary Per Protocol    Interpretation Summary  · Normal left ventricular systolic function, estimated EF 60%.  · Mild mitral regurgitation.  · Mild tricuspid regurgitation with normal RVSP.  · Mild pulmonic insufficiency.      Current medications:  Scheduled Meds:aspirin, 81 mg, Oral, Daily  ipratropium-albuterol, 3 mL, Nebulization, Q6H While Awake - RT  levothyroxine, 75 mcg, Oral, Q AM  PARoxetine, 10 mg, Oral, QAM  piperacillin-tazobactam, 3.375 g, Intravenous, Q8H  sodium chloride, 10 mL, Intravenous, Q12H  Valproic Acid, 250 mg, Oral, Q12H      Continuous Infusions:sodium chloride, 100 mL/hr, Last Rate: 100 mL/hr (09/23/23 1431)      PRN Meds:.  acetaminophen    Calcium Replacement - Follow Nurse / BPA Driven Protocol    dexAMETHasone    HYDROcodone-acetaminophen    ipratropium-albuterol    Magnesium Standard Dose Replacement - Follow Nurse / BPA Driven Protocol    melatonin    nitroglycerin    Phosphorus Replacement - Follow Nurse / BPA Driven Protocol    Potassium Replacement - Follow Nurse / BPA Driven Protocol    sodium chloride    sodium chloride    Assessment & Plan   Assessment & Plan     Active Hospital Problems    Diagnosis  POA    **Sepsis due to pneumonia [J18.9, A41.9]  Yes    Pneumonia [J18.9]  Yes      Resolved Hospital Problems   No resolved problems to display.        Brief Hospital Course to date:  87M with history of coronary artery disease, hypertension, hyperlipidemia, hypothyroidism and dementia who presented from nursing home with somnolence, coughing and fevers.  On arrival the patient had a fever of 102 with tachycardia  CMP was  unremarkable.  Procalcitonin was mildly elevated 0.41.  Lactate was mildly elevated at 2.3.  INR elevated 1.23.  CBC showed a mild leukocytosis of 13.9.  CT of the abdomen pelvis showed by basilar opacities.     Sepsis secondary to suspected pneumonia  -Descalate to IV rocephin. DC zosyn  -Urine strep pneumo and Legionella antigens negative.  -Blood cultures remain negative  -dc IV fluids, encourage PO  -Continue home midodrine    Delirium  - Start seroquel 25mg qhs  - dc telemetry and change to spot pulse ox as the connections are contributing to his agiation   - overall goal will be to get him back to his home environment as soon as feasible.     Lactic acidosis, resolved  -not clinically significant, resolved with fluids     Hyperlipidemia  - No statin listed on his home medications.     History of coronary artery disease  - Continue aspirin from home regimen.  - Continue telemetry.     Hypothyroidism  - Continue Synthroid from home regimen.     Dementia  Dysphagia  - No medications listed for this on his home reviewed medication list.  - on Modified diet doing well        Expected Discharge Location and Transportation: [facility]  Expected Discharge   Expected Discharge Date: 9/27/2023; Expected Discharge Time:      DVT prophylaxis:  Mechanical DVT prophylaxis orders are present.     AM-PAC 6 Clicks Score (PT): 14 (09/23/23 0800)    CODE STATUS:   Code Status and Medical Interventions:   Ordered at: 09/22/23 0034     Code Status (Patient has no pulse and is not breathing):    No CPR (Do Not Attempt to Resuscitate)     Medical Interventions (Patient has pulse or is breathing):    Full Support     Comments:    DNR/DNI, d/w daughter     I have prepared this progress note with copied portions of the prior day's progress note of my own authorship to preserve accuracy and maintain consistency of documentation. I have reviewed these portions and edited them for correctness. I verify that the above  documentation accurately and truly represents the evaluation and management performed on today's date.       Carolyn Baez MD  09/24/23

## 2023-09-25 PROBLEM — J18.9 PNEUMONIA, UNSPECIFIED ORGANISM: Status: ACTIVE | Noted: 2023-09-25

## 2023-09-25 LAB
QT INTERVAL: 396 MS
QTC INTERVAL: 405 MS

## 2023-09-25 PROCEDURE — 92526 ORAL FUNCTION THERAPY: CPT

## 2023-09-25 PROCEDURE — 99232 SBSQ HOSP IP/OBS MODERATE 35: CPT | Performed by: INTERNAL MEDICINE

## 2023-09-25 PROCEDURE — 93010 ELECTROCARDIOGRAM REPORT: CPT | Performed by: INTERNAL MEDICINE

## 2023-09-25 PROCEDURE — 94664 DEMO&/EVAL PT USE INHALER: CPT

## 2023-09-25 PROCEDURE — 25010000002 HALOPERIDOL LACTATE PER 5 MG: Performed by: NURSE PRACTITIONER

## 2023-09-25 PROCEDURE — 97161 PT EVAL LOW COMPLEX 20 MIN: CPT

## 2023-09-25 PROCEDURE — 25010000002 CEFTRIAXONE PER 250 MG: Performed by: INTERNAL MEDICINE

## 2023-09-25 PROCEDURE — 94799 UNLISTED PULMONARY SVC/PX: CPT

## 2023-09-25 RX ADMIN — PAROXETINE HYDROCHLORIDE 10 MG: 10 TABLET, FILM COATED ORAL at 09:45

## 2023-09-25 RX ADMIN — IPRATROPIUM BROMIDE AND ALBUTEROL SULFATE 3 ML: 2.5; .5 SOLUTION RESPIRATORY (INHALATION) at 12:38

## 2023-09-25 RX ADMIN — Medication 10 ML: at 20:51

## 2023-09-25 RX ADMIN — IPRATROPIUM BROMIDE AND ALBUTEROL SULFATE 3 ML: 2.5; .5 SOLUTION RESPIRATORY (INHALATION) at 07:20

## 2023-09-25 RX ADMIN — Medication 5 MG: at 20:50

## 2023-09-25 RX ADMIN — QUETIAPINE FUMARATE 25 MG: 25 TABLET ORAL at 20:50

## 2023-09-25 RX ADMIN — LEVOTHYROXINE SODIUM 75 MCG: 0.07 TABLET ORAL at 05:34

## 2023-09-25 RX ADMIN — VALPROIC ACID 250 MG: 250 SOLUTION ORAL at 09:45

## 2023-09-25 RX ADMIN — Medication 10 ML: at 09:45

## 2023-09-25 RX ADMIN — IPRATROPIUM BROMIDE AND ALBUTEROL SULFATE 3 ML: 2.5; .5 SOLUTION RESPIRATORY (INHALATION) at 19:01

## 2023-09-25 RX ADMIN — VALPROIC ACID 250 MG: 250 SOLUTION ORAL at 20:50

## 2023-09-25 RX ADMIN — HALOPERIDOL LACTATE 0.5 MG: 5 INJECTION, SOLUTION INTRAMUSCULAR at 00:09

## 2023-09-25 RX ADMIN — ASPIRIN 81 MG: 81 TABLET, COATED ORAL at 09:45

## 2023-09-25 RX ADMIN — SODIUM CHLORIDE 1000 MG: 900 INJECTION INTRAVENOUS at 09:45

## 2023-09-25 NOTE — THERAPY TREATMENT NOTE
Acute Care - Speech Language Pathology   Swallow Treatment Note [facility]     Patient Name: Ramin Zaragoza  : 1935  MRN: [id number]  Today's Date: 2023               Admit Date: 2023    Visit Dx:     ICD-10-CM ICD-9-CM   1. HCAP (healthcare-associated pneumonia)  J18.9 486   2. Leukocytosis, unspecified type  D72.829 288.60   3. Sepsis due to pneumonia  J18.9 486    A41.9 995.91   4. History of dementia  Z86.59 V11.8   5. Oropharyngeal dysphagia  R13.12 787.22     Patient Active Problem List   Diagnosis    Coronary artery disease involving native coronary artery of native heart without angina pectoris    Premature ventricular contraction    Essential hypertension    Mixed hyperlipidemia    Venous insufficiency    Hypothyroidism    Lactose intolerance    Weakness    Accident due to mechanical fall without injury    Dementia    Sepsis due to pneumonia    Pneumonia    Pneumonia, unspecified organism     Past Medical History:   Diagnosis Date    CAD (coronary artery disease)     Hyperlipidemia     Hypertension     Hypothyroidism     on chronic replacement therapy    Lactose intolerance     Lower extremity edema     PVC's (premature ventricular contractions)     History of    Thyroid disorder     Venous insufficiency      Past Surgical History:   Procedure Laterality Date    CARDIAC CATHETERIZATION      CORONARY STENT PLACEMENT      EYE SURGERY      Bilateral cataract extraction    HERNIA REPAIR      Inguinal hernia repair    OTHER SURGICAL HISTORY      Melanoma removed from back    OTHER SURGICAL HISTORY      History of left elbow fracture with sunsequent fixation       SLP Recommendation and Plan                                               Daily Summary of Progress (SLP): progress toward functional goals as expected (23 1400)               Treatment Assessment (SLP): continued, suspected, pharyngeal dysphagia (23 1400)  Treatment Assessment Comments (SLP): Per discussion with RN, MD met  with pt's dtr after MBS and they opted for ground solids and honey-thick liquids. RN reports no glaring concerns or aversions, only intermittent and mild coughing with intake. (09/25/23 1400)  Plan for Continued Treatment (SLP): continue treatment per plan of care, other (see comments) (f/u for GOC/POC discussion with pt's dtr.) (09/25/23 1400)            Plan of Care Reviewed With: patient      SWALLOW EVALUATION (last 72 hours)       SLP Adult Swallow Evaluation       Row Name 09/25/23 1400                   Rehab Evaluation    Document Type therapy note (daily note)  -SM        Subjective Information no complaints  -SM        Patient Observations alert;cooperative  -SM        Patient Effort adequate  -SM           Pain    Additional Documentation Pain Scale: FACES Pre/Post-Treatment (Group)  -SM           Pain Scale: FACES Pre/Post-Treatment    Pain: FACES Scale, Pretreatment 0-->no hurt  -SM        Posttreatment Pain Rating 0-->no hurt  -SM           SLP Treatment Clinical Impressions    Treatment Assessment (SLP) continued;suspected;pharyngeal dysphagia  -SM        Treatment Assessment Comments (SLP) Per discussion with RN, MD met with pt's dtr after MBS and they opted for ground solids and honey-thick liquids. RN reports no glaring concerns or aversions, only intermittent and mild coughing with intake.  -SM        Daily Summary of Progress (SLP) progress toward functional goals as expected  -SM        Barriers to Overall Progress (SLP) Cognitive status;Baseline deficits  -SM        Plan for Continued Treatment (SLP) continue treatment per plan of care;other (see comments)  f/u for GOC/POC discussion with pt's dtr.  -SM        Care Plan Review care plan/treatment goals reviewed;patient/other agree to care plan  -                  User Key  (r) = Recorded By, (t) = Taken By, (c) = Cosigned By      Initials Name Effective Dates    Cathie Heard MS CCC-SLP 02/03/23 -                     EDUCATION  The  patient has been educated in the following areas:   Dysphagia (Swallowing Impairment).        SLP GOALS       Row Name 09/25/23 1400             (LTG) Patient will demonstrate functional swallow for    Diet Texture (Demonstrate functional swallow) desired comfort diet  -SM      Liquid viscosity (Demonstrate functional swallow) desired comfort liquid viscosity  -SM      Elliott (Demonstrate functional swallow) with 1:1 assist/ supervision  -SM      Time Frame (Demonstrate functional swallow) by discharge  -SM      Progress/Outcomes (Demonstrate functional swallow) continuing progress toward goal  -SM         (STG) Patient will tolerate trials of    Consistencies Trialed (Tolerate trials) mechanical ground textures;thin liquids;honey/ moderately thick liquids  -SM      Desired Outcome (Tolerate trials) without signs of distress;for pleasure/comfort  -SM      Elliott (Tolerate trials) with 1:1 assist/ supervision  -SM      Time Frame (Tolerate trials) by discharge  -SM      Progress/Outcomes (Tolerate trials) continuing progress toward goal  -SM      Comment (Tolerate trials) Accepted all trials of thin and honey-thick liquids. No aversion or discomfort with either. Intermittent yet mild throat clearing. Not able to cue pt to take small single sip with straw sip of thin H2O. Will further discuss comfort diet preference when dtr present  -SM         (STG) Swallow Management Recall Goal 1 (SLP)    Activity (Swallow Management Recall Goal 1, SLP) recall of;comfort diet textures and liquid viscosities;oral care recommendations;compensatory swallow strategies/techniques;other (see comments)  family/caregiver recall  -SM      Elliott/Accuracy (Swallow Management Recall Goal 1, SLP) with minimal cues (75-90% accuracy)  -SM      Time Frame (Swallow Management Recall Goal 1, SLP) short term goal (STG)  -SM      Progress/Outcomes (Swallow Management Recall Goal 1, SLP) goal ongoing  -SM      Comment (Swallow  Management Recall Goal 1, SLP) No family present  -SM                User Key  (r) = Recorded By, (t) = Taken By, (c) = Cosigned By      Initials Name Provider Type    Cathie Heard MS CCC-SLP Speech and Language Pathologist                       Time Calculation:    Time Calculation- SLP       Row Name 09/25/23 1419             Time Calculation- SLP    SLP Start Time 1400  -SM      SLP Received On 09/25/23  -SM         Untimed Charges    53560-OV Treatment Swallow Minutes 25  -SM         Total Minutes    Untimed Charges Total Minutes 25  -SM       Total Minutes 25  -SM                User Key  (r) = Recorded By, (t) = Taken By, (c) = Cosigned By      Initials Name Provider Type    Cathie Heard MS CCC-SLP Speech and Language Pathologist                    Therapy Charges for Today       Code Description Service Date Service Provider Modifiers Qty    [id number]  ST TREATMENT SWALLOW 2 9/25/2023 Cathie Yeung MS CCC-SLP GN 1                 Cathie Yeung MS CCC-SLP  9/25/2023

## 2023-09-25 NOTE — CONSULTS
"                  Clinical Nutrition   Nutrition Support Assessment  Reason for Visit: Follow-up protocol, Malnutrition Severity Assessment      Patient Name: Ramin Zaragoza  YOB: 1935  MRN: [id number]  Date of Encounter: 09/25/23 19:35 EDT  Admission date: 9/21/2023    Comments: Pt meets criteria for non severe chronic malnutrition based on wasting. See MSA note.    Nutrition Assessment   Admission Diagnosis:  Sepsis due to pneumonia [J18.9, A41.9]  Pneumonia [J18.9]      Problem List:    Sepsis due to pneumonia    Pneumonia    Pneumonia, unspecified organism   Dementia   Dysphagia      PMH:   He  has a past medical history of CAD (coronary artery disease), Hyperlipidemia, Hypertension, Hypothyroidism, Lactose intolerance, Lower extremity edema, PVC's (premature ventricular contractions), Thyroid disorder, and Venous insufficiency.    PSH:  He  has a past surgical history that includes Coronary stent placement; Cardiac catheterization; Hernia repair; Eye surgery; Other surgical history; and Other surgical history.    Applicable Nutrition Concerns:   Skin:  Oral:  GI:    Applicable Interval History:   9/22 FEES SLP rec comfort diet [facility] soft thin liquid or per MD.  9/25 SLP notes good acceptance of comfort diet w only intermittent/mild coughing    Reported/Observed/Food/Nutrition Related History:     9/25  Pt req restraints last night. Able to participate in exam today.    9/22  Family rpt pt has been able to eat at home but needed thickened liquid and soft/pureed foods. Uses thickened Boost at home.      Anthropometrics     Flowsheet Rows      Flowsheet Row First Filed Value   Admission Height 175.3 cm (69\") Documented at 09/21/2023 1951   Admission Weight 72 kg (158 lb 11.7 oz) Documented at 09/21/2023 1951     Height: Height: 175.3 cm (69\")  Last Filed Weight: Weight: 72 kg (158 lb 11.7 oz) (09/21/23 1951)  Method: Weight Method: Standing scale  BMI: BMI (Calculated): 23.4  BMI classification: Normal: " 18.5-24.9kg/m2    UBW: Per EMR wt 180 lbs on 2/24/23  Weight change: Loss of 22 lbs 12% body wt over 7 mo.      Nutrition Focused Physical Exam     Date:   9/25      Pt meets criteria for non severe chronic malnutrition based on wasting. See MSA note.    Current Nutrition Prescription   PO: Diet: Cardiac Diets; Healthy Heart (2-3 Na+); No Mixed Consistencies; Texture: Mechanical Ground (NDD 2); Fluid Consistency: Honey Thick  Oral Nutrition Supplement: Boost Plus 3x/da added per RD  Intake: 3 Days: 71% x 7 meals recorded      Nutrition Diagnosis   Date:  9/22            Updated:  9/25  Problem Potential suboptimal intake   Etiology Limited food tolerance reported   Signs/Symptoms Intake data pending   Status: Apparent resolution: good acceptance of comfort diet w 71% intake x 7 meals.    Date: 9/25  Updated:  Problem Malnutrition non severe chronic   Etiology Likely periods of inconsistent intake   Signs/Symptoms Mild wasting   Status: ongoing    Goal:   General: Nutrition to support treatment  PO: Maintain intake  EN/PN: N/A    Nutrition Intervention      Follow treatment progress, Care plan reviewed        Monitoring/Evaluation:   Per protocol, I&O, PO intake, Supplement intake, Pertinent labs, Weight, Symptoms, POC/GOC, Swallow function      Shahla Bal RD  Time Spent: 25 min

## 2023-09-25 NOTE — PROGRESS NOTES
Malnutrition Severity Assessment    Patient Name:  Ramin Zaragoza  YOB: 1935  MRN: [id number]  Admit Date:  9/21/2023    Patient meets criteria for : Moderate (non-severe) Malnutrition (Pt meets criteria for non severe chronic malnutrition based on wasting.)    Comments:      Malnutrition Severity Assessment  Malnutrition Type: Chronic Disease - Related Malnutrition  Malnutrition Type (last 8 hours)       Malnutrition Severity Assessment       Row Name 09/25/23 1941       Malnutrition Severity Assessment    Malnutrition Type Chronic Disease - Related Malnutrition      Row Name 09/25/23 1941       Insufficient Energy Intake     Insufficient Energy Intake Findings --  unable to quantify PTA      Row Name 09/25/23 1941       Unintentional Weight Loss     Unintentional Weight Loss Findings --  Apparent loss of 22 lbs 12% body wt over 7 mo      Row Name 09/25/23 1941       Muscle Loss    Loss of Muscle Mass Findings Mild    [place] Region Moderate - slight depression    Clavicle Bone Region Moderate - some protrusion in females, visible in males    Acromion Bone Region --  mild    Scapular Bone Region --  mild    Dorsal Hand Region --  mild    Patellar Region Moderate - patella more prominent, less muscle definition around patella    Anterior Thigh Region --  mild    Posterior Calf Region --  mild      Row Name 09/25/23 1941       Fat Loss    Subcutaneous Fat Loss Findings Mild    Orbital Region  --  mild    Upper Arm Region None    Thoracic & Lumbar Region --  mild      Row Name 09/25/23 1941       Criteria Met (Must meet criteria for severity in at least 2 of these categories: M Wasting, Fat Loss, Fluid, Secondary Signs, Wt. Status, Intake)    Patient meets criteria for  Moderate (non-severe) Malnutrition  Pt meets criteria for non severe chronic malnutrition based on wasting.                    Electronically signed by:  Shahla Bla RD  09/25/23 19:51 EDT

## 2023-09-25 NOTE — CASE MANAGEMENT/SOCIAL WORK
Case Management Discharge Note      Final Note: ON TUESDAY. Pt will go back to LTC @ [facility]. RN to call report to [phone] and send d/c packet w/pt. CM to fax d/c summary to [phone]. I have set up [facility] stretcher transport for Tuesday 09/26/23 @ 1230pm. PCS on chartlet, pt has an EMS DNR form already on chartlet from facility. No other d/c needs expressed. Daughter Isatu was updated today. This is all pending that he doesn't need to be put in restraints this evening.         Selected Continued Care - Admitted Since 9/21/2023       Destination    No services have been selected for the patient.                Durable Medical Equipment    No services have been selected for the patient.                Dialysis/Infusion    No services have been selected for the patient.                Home Medical Care    No services have been selected for the patient.                Therapy    No services have been selected for the patient.                Community Resources    No services have been selected for the patient.                Community & DME    No services have been selected for the patient.                         Final Discharge Disposition Code: 04 - intermediate care facility

## 2023-09-25 NOTE — PROGRESS NOTES
[facility] Medicine Services  PROGRESS NOTE    Patient Name: Ramin Zaragoza  : 1935  MRN: [id number]    Date of Admission: 2023  Primary Care Physician: Patricio Crawford MD    Subjective   Subjective     CC:  Altered mental status pneumonia    HPI:  Agitated overnight requiring restraints, better this morning.  Nonverbal at baseline per family      Objective   Objective     Vital Signs:   Temp:  [97.8 °F (36.6 °C)-98.1 °F (36.7 °C)] 97.8 °F (36.6 °C)  Heart Rate:  [60-72] 72  Resp:  [18] 18  BP: (118-138)/() 136/69     Physical Exam:  Constitutional: No acute distress, awake, alert  Respiratory: Clear to auscultation bilaterally, respiratory effort normal on room air  Cardiovascular: RRR,  Gastrointestinal: Positive bowel sounds, soft, nontender, nondistended  Musculoskeletal: No bilateral ankle edema  Psychiatric: non verbal  Neurologic: Unable to evaluate        Results Reviewed:  LAB RESULTS:      Lab 23  0804 23  0615 23  0030 23   WBC 6.36 11.63*  --  13.96*   HEMOGLOBIN 11.7* 12.8*  --  12.4*   HEMATOCRIT 35.0* 40.1  --  36.7*   PLATELETS 162 183  --  197   NEUTROS ABS 3.18 7.62*  --  11.94*   IMMATURE GRANS (ABS) 0.02 0.04  --  0.05   LYMPHS ABS 2.30 2.81  --  1.11   MONOS ABS 0.58 1.01*  --  0.82   EOS ABS 0.26 0.12  --  0.02   MCV 94.6 99.5*  --  93.4   PROCALCITONIN  --   --   --  0.41*   LACTATE  --   --  1.6 2.3*   PROTIME  --   --   --  15.6*         Lab 23  0804 23  0803 23   SODIUM 140 141 138   POTASSIUM 4.1 4.0 4.3   CHLORIDE 108* 107 103   CO2 27.0 28.0 25.0   ANION GAP 5.0 6.0 10.0   BUN 9 11 14   CREATININE 0.72* 0.70* 0.69*   EGFR 88.4 89.2 89.6   GLUCOSE 93 99 163*   CALCIUM 8.3* 8.3* 8.6   MAGNESIUM  --  1.8 1.7         Lab 23  0803 23   TOTAL PROTEIN 5.1* 5.8*   ALBUMIN 2.9* 3.3*   GLOBULIN 2.2 2.5   ALT (SGPT) 11 12   AST (SGOT) 20 19   BILIRUBIN 0.5 0.4   ALK PHOS 53 62          Lab 09/21/23 2000   PROTIME 15.6*   INR 1.23*                 Brief Urine Lab Results  (Last result in the past 365 days)        Color   Clarity   Blood   Leuk Est   Nitrite   Protein   CREAT   Urine HCG        09/21/23 2000 Yellow   Clear   Small (1+)   Negative   Negative   Negative                   Microbiology Results Abnormal       Procedure Component Value - Date/Time    Blood Culture - Blood, Arm, Right [[id number]]  (Normal) Collected: 09/21/23 2008    Lab Status: Preliminary result Specimen: Blood from Arm, Right Updated: 09/24/23 2130     Blood Culture No growth at 3 days    Blood Culture - Blood, Arm, Left [[id number]]  (Normal) Collected: 09/21/23 2002    Lab Status: Preliminary result Specimen: Blood from Arm, Left Updated: 09/24/23 2030     Blood Culture No growth at 3 days    S. Pneumo Ag Urine or CSF - Urine, Urine, Clean Catch [[id number]]  (Normal) Collected: 09/22/23 0621    Lab Status: Final result Specimen: Urine, Clean Catch Updated: 09/22/23 1226     Strep Pneumo Ag Negative    Legionella Antigen, Urine - Urine, Urine, Clean Catch [[id number]]  (Normal) Collected: 09/22/23 0621    Lab Status: Final result Specimen: Urine, Clean Catch Updated: 09/22/23 1226     LEGIONELLA ANTIGEN, URINE Negative    MRSA Screen, PCR (Inpatient) - Swab, Nares [[id number]]  (Normal) Collected: 09/22/23 0014    Lab Status: Final result Specimen: Swab from Nares Updated: 09/22/23 0902     MRSA PCR Negative    Narrative:      The negative predictive value of this diagnostic test is high and should only be used to consider de-escalating anti-MRSA therapy. A positive result may indicate colonization with MRSA and must be correlated clinically.  MRSA Negative    Respiratory Panel PCR w/COVID-19(SARS-CoV-2) DEEP/JOSEPH/ESTHER/PAD/COR/MAD/MARLINE In-House, NP Swab in UTM/VTM, 3-4 HR TAT - Swab, Nasopharynx [[id number]]  (Normal) Collected: 09/21/23 2000    Lab Status: Final result Specimen: Swab from Nasopharynx Updated:  09/21/23 2107     ADENOVIRUS, PCR Not Detected     Coronavirus 229E Not Detected     Coronavirus HKU1 Not Detected     Coronavirus NL63 Not Detected     Coronavirus OC43 Not Detected     COVID19 Not Detected     Human Metapneumovirus Not Detected     Human Rhinovirus/Enterovirus Not Detected     Influenza A PCR Not Detected     Influenza B PCR Not Detected     Parainfluenza Virus 1 Not Detected     Parainfluenza Virus 2 Not Detected     Parainfluenza Virus 3 Not Detected     Parainfluenza Virus 4 Not Detected     RSV, PCR Not Detected     Bordetella pertussis pcr Not Detected     Bordetella parapertussis PCR Not Detected     Chlamydophila pneumoniae PCR Not Detected     Mycoplasma pneumo by PCR Not Detected    Narrative:      In the setting of a positive respiratory panel with a viral infection PLUS a negative procalcitonin without other underlying concern for bacterial infection, consider observing off antibiotics or discontinuation of antibiotics and continue supportive care. If the respiratory panel is positive for atypical bacterial infection (Bordetella pertussis, Chlamydophila pneumoniae, or Mycoplasma pneumoniae), consider antibiotic de-escalation to target atypical bacterial infection.            No radiology results from the last 24 hrs    Results for orders placed during the hospital encounter of 02/23/22    Adult Transthoracic Echo Complete W/ Cont if Necessary Per Protocol    Interpretation Summary  · Normal left ventricular systolic function, estimated EF 60%.  · Mild mitral regurgitation.  · Mild tricuspid regurgitation with normal RVSP.  · Mild pulmonic insufficiency.      Current medications:  Scheduled Meds:aspirin, 81 mg, Oral, Daily  cefTRIAXone, 1,000 mg, Intravenous, Q24H  ipratropium-albuterol, 3 mL, Nebulization, Q6H While Awake - RT  levothyroxine, 75 mcg, Oral, Q AM  PARoxetine, 10 mg, Oral, QAM  QUEtiapine, 25 mg, Oral, Nightly  sodium chloride, 10 mL, Intravenous, Q12H  Valproic Acid, 250  mg, Oral, Q12H      Continuous Infusions:   PRN Meds:.  acetaminophen    Calcium Replacement - Follow Nurse / BPA Driven Protocol    dexAMETHasone    HYDROcodone-acetaminophen    ipratropium-albuterol    Magnesium Standard Dose Replacement - Follow Nurse / BPA Driven Protocol    melatonin    nitroglycerin    Phosphorus Replacement - Follow Nurse / BPA Driven Protocol    Potassium Replacement - Follow Nurse / BPA Driven Protocol    sodium chloride    sodium chloride    Assessment & Plan   Assessment & Plan     Active Hospital Problems    Diagnosis  POA    **Sepsis due to pneumonia [J18.9, A41.9]  Yes    Pneumonia, unspecified organism [J18.9]  Yes    Pneumonia [J18.9]  Yes      Resolved Hospital Problems   No resolved problems to display.        Brief Hospital Course to date:  87M with history of coronary artery disease, hypertension, hyperlipidemia, hypothyroidism and dementia who presented from nursing home with somnolence, coughing and fevers.  On arrival the patient had a fever of 102 with tachycardia  CMP was unremarkable.  Procalcitonin was mildly elevated 0.41.  Lactate was mildly elevated at 2.3.  INR elevated 1.23.  CBC showed a mild leukocytosis of 13.9.  CT of the abdomen pelvis showed by basilar opacities.     Sepsis secondary to suspected pneumonia, improved  -Continue Rocephin, today is day 4 of anitbiotics, planning for 5 day course  -Urine strep pneumo and Legionella antigens negative.  -Blood cultures  remain negative  -Continue IV fluids.  -Continue home midodrine    Dementia  - required restraints overnight, continue scheduled nighttime seroquel.   - dc tele and reduce BP checks as these may contribute to his agitation.   - avoid disturbing patient at night to prevent need for restraints.     Lactic acidosis, resolved  -not clinically significant, resolved with fluids     Hyperlipidemia  - No statin listed on his home medications.     History of coronary artery disease  - Continue aspirin from home  regimen.  - Continue telemetry.     Hypothyroidism  - Continue Synthroid from home regimen.     Dementia  Dysphagia  - No medications listed for this on his home reviewed medication list.  - on Modified diet doing well    Expected Discharge Location and Transportation: [facility], Disucssed with case management   Expected Discharge   Expected Discharge Date: 9/27/2023; Expected Discharge Time:      DVT prophylaxis:  Mechanical DVT prophylaxis orders are present.     AM-PAC 6 Clicks Score (PT): 12 (09/24/23 0800)    CODE STATUS:   Code Status and Medical Interventions:   Ordered at: 09/22/23 0034     Code Status (Patient has no pulse and is not breathing):    No CPR (Do Not Attempt to Resuscitate)     Medical Interventions (Patient has pulse or is breathing):    Full Support     Comments:    DNR/DNI, d/w daughter     I have prepared this progress note with copied portions of the prior day's progress note of my own authorship to preserve accuracy and maintain consistency of documentation. I have reviewed these portions and edited them for correctness. I verify that the above documentation accurately and truly represents the evaluation and management performed on today's date.       Carolyn Baez MD  09/25/23

## 2023-09-25 NOTE — PLAN OF CARE
Goal Outcome Evaluation:  Plan of Care Reviewed With: patient        Progress: no change  Outcome Evaluation: PT eval completed at bed level in order to minimize stimulation. Pt. presents w/ generalized weakness and decreased activity tolerance. He required dep A to reposition in bed and tolerated BUE/BLE AAROM TherEx. Skilled IPPT warranted to maximize pt's safety and independence w/ functional mobility. Recommend SNF at D/C.      Anticipated Discharge Disposition (PT): skilled nursing facility

## 2023-09-25 NOTE — CASE MANAGEMENT/SOCIAL WORK
Continued Stay Note  [facility]     Patient Name: Ramin Zaragoza  MRN: [id number]  Today's Date: 9/25/2023    Admit Date: 9/21/2023    Plan: return to [facility]   Discharge Plan       Row Name 09/25/23 1329       Plan    Plan return to [facility]    Patient/Family in Agreement with Plan yes    Plan Comments I spoke w/Carlee @ [facility], she is going to send to insurance for skilled but he can return on Tuesday regardless bc of his LTC bed. Did need for restraints last night, has been out since [id number] today. Discussed in MDR, Dr. Baez ordered minimize activities during pm so pt can sleep.    Final Discharge Disposition Code 04 - intermediate care facility                   Discharge Codes    No documentation.                 Expected Discharge Date and Time       Expected Discharge Date Expected Discharge Time    Sep 26, 2023               Fernando Rivera RN

## 2023-09-25 NOTE — PLAN OF CARE
Goal Outcome Evaluation:  Plan of Care Reviewed With: patient              SLP treatment completed. Will continue to address dysphagia. Please see note for further details and recommendations.

## 2023-09-25 NOTE — PLAN OF CARE
Goal Outcome Evaluation:           Progress: improving       Wrist restraint discontinued @10:50 am, Patient tolerate well, no episodes of discomfort, restlessness, or agitation. easy to arouse while sleeping and was able to eat his meals with minimum cough episodes. Patient stimulant minimized. Call light within reach. Will keep monitoring.

## 2023-09-25 NOTE — THERAPY EVALUATION
Patient Name: Ramin Zaragoza  : 1935    MRN: [id number]                              Today's Date: 2023       Admit Date: 2023    Visit Dx:     ICD-10-CM ICD-9-CM   1. HCAP (healthcare-associated pneumonia)  J18.9 486   2. Leukocytosis, unspecified type  D72.829 288.60   3. Sepsis due to pneumonia  J18.9 486    A41.9 995.91   4. History of dementia  Z86.59 V11.8   5. Oropharyngeal dysphagia  R13.12 787.22     Patient Active Problem List   Diagnosis    Coronary artery disease involving native coronary artery of native heart without angina pectoris    Premature ventricular contraction    Essential hypertension    Mixed hyperlipidemia    Venous insufficiency    Hypothyroidism    Lactose intolerance    Weakness    Accident due to mechanical fall without injury    Dementia    Sepsis due to pneumonia    Pneumonia    Pneumonia, unspecified organism     Past Medical History:   Diagnosis Date    CAD (coronary artery disease)     Hyperlipidemia     Hypertension     Hypothyroidism     on chronic replacement therapy    Lactose intolerance     Lower extremity edema     PVC's (premature ventricular contractions)     History of    Thyroid disorder     Venous insufficiency      Past Surgical History:   Procedure Laterality Date    CARDIAC CATHETERIZATION      CORONARY STENT PLACEMENT      EYE SURGERY      Bilateral cataract extraction    HERNIA REPAIR      Inguinal hernia repair    OTHER SURGICAL HISTORY      Melanoma removed from back    OTHER SURGICAL HISTORY      History of left elbow fracture with sunsequent fixation      General Information       Row Name 23 1500          Physical Therapy Time and Intention    Document Type evaluation  -MB     Mode of Treatment physical therapy  -MB       Row Name 23 1500          General Information    Patient Profile Reviewed yes  -MB     Prior Level of Function --  Pt. poor historian, unable to provide history. Per chart, pt. non-ambulatory at baseline.  -MB      "Existing Precautions/Restrictions fall  dementia, mainly non-verbal  -MB     Barriers to Rehab previous functional deficit;cognitive status  -MB       Row Name 09/25/23 1500          Living Environment    People in Home facility resident  -MB       Row Name 09/25/23 1500          Home Main Entrance    Number of Stairs, Main Entrance none  -MB       Row Name 09/25/23 1500          Cognition    Orientation Status (Cognition) unable/difficult to assess  Pt. responded to name, stated \"okay\".  -MB       Row Name 09/25/23 1500          Safety Issues, Functional Mobility    Safety Issues Affecting Function (Mobility) ability to follow commands;awareness of need for assistance;insight into deficits/self-awareness;judgment;safety precaution awareness;problem-solving;safety precautions follow-through/compliance;sequencing abilities  -MB     Impairments Affecting Function (Mobility) balance;cognition;endurance/activity tolerance;motor control;postural/trunk control;range of motion (ROM);coordination  -MB     Cognitive Impairments, Mobility Safety/Performance attention;awareness, need for assistance;insight into deficits/self-awareness;judgment;problem-solving/reasoning;safety precaution awareness;safety precaution follow-through;sequencing abilities  -MB               User Key  (r) = Recorded By, (t) = Taken By, (c) = Cosigned By      Initials Name Provider Type    Deann Cochran, PT Physical Therapist                   Mobility       Row Name 09/25/23 8266          Bed Mobility    Scooting/Bridging [place] (Bed Mobility) dependent (less than 25% patient effort);2 person assist  -MB     Comment, (Bed Mobility) Dep A x 2 for scooting/repositioning in bed; deferred sitting EOB per RN request to minimize stimulation.  -MB       Row Name 09/25/23 0653          Transfers    Comment, (Transfers) Deferred d/t safety.  -MB       Row Name 09/25/23 1556          Bed-Chair Transfer    Bed-Chair [place] (Transfers) " unable to assess  -MB       Row Name 09/25/23 1555          Sit-Stand Transfer    Sit-Stand [place] (Transfers) unable to assess  -MB       Row Name 09/25/23 1555          Gait/Stairs (Locomotion)    [place] Level (Gait) unable to assess  -MB     Comment, (Gait/Stairs) Pt non-ambulatory at baseline.  -MB               User Key  (r) = Recorded By, (t) = Taken By, (c) = Cosigned By      Initials Name Provider Type    MB Deann Cary, PT Physical Therapist                   Obj/Interventions       Row Name 09/25/23 1557          Range of Motion Comprehensive    General Range of Motion lower extremity range of motion deficits identified  -MB     Comment, General Range of Motion BLE stiffness, limited ~25%.  -MB       Row Name 09/25/23 1557          Strength Comprehensive (MMT)    General Manual Muscle Testing (MMT) Assessment lower extremity strength deficits identified  -MB     Comment, General Manual Muscle Testing (MMT) Assessment Difficult to formally assess d/t cognitive status.  -MB       Row Name 09/25/23 1557          Motor Skills    Motor Skills coordination  -MB     Coordination --  BUEs tremulous throughout.  -MB     Therapeutic Exercise hip;knee;ankle  -MB       Row Name 09/25/23 1557          Hip (Therapeutic Exercise)    Hip (Therapeutic Exercise) AAROM (active assistive range of motion)  -MB     Hip AAROM (Therapeutic Exercise) bilateral;flexion;extension;aBduction;aDduction;5 repetitions  -[facility] Name 09/25/23 1557          Knee (Therapeutic Exercise)    Knee (Therapeutic Exercise) AAROM (active assistive range of motion)  -MB     Knee AAROM (Therapeutic Exercise) bilateral;flexion;extension;5 repetitions  -[facility] Name 09/25/23 1557          Ankle (Therapeutic Exercise)    Ankle (Therapeutic Exercise) AAROM (active assistive range of motion)  -MB     Ankle AAROM (Therapeutic Exercise) bilateral;dorsiflexion;plantarflexion;5 repetitions  -[facility] Name 09/25/23 1557           Balance    Balance Assessment sitting static balance  -MB     Static Sitting Balance unable to assess  -[facility] Name 09/25/23 1866          Sensory Assessment (Somatosensory)    Sensory Assessment (Somatosensory) unable/difficult to assess  -MB               User Key  (r) = Recorded By, (t) = Taken By, (c) = Cosigned By      Initials Name Provider Type    Deann Cochran, PT Physical Therapist                   Goals/Plan       Row Name 09/25/23 1602          Bed Mobility Goal 1 (PT)    Activity/Assistive Device (Bed Mobility Goal 1, PT) sit to supine/supine to sit  -MB     [place] Level/Cues Needed (Bed Mobility Goal 1, PT) moderate assist (50-74% patient effort)  -MB     Time Frame (Bed Mobility Goal 1, PT) 10 days  -[facility] Name 09/25/23 1602          Transfer Goal 1 (PT)    Activity/Assistive Device (Transfer Goal 1, PT) sit-to-stand/stand-to-sit;bed-to-chair/chair-to-bed  -MB     [place] Level/Cues Needed (Transfer Goal 1, PT) moderate assist (50-74% patient effort)  -MB     Time Frame (Transfer Goal 1, PT) 2 weeks  -MB       Row Name 09/25/23 160          Therapy Assessment/Plan (PT)    Planned Therapy Interventions (PT) balance training;bed mobility training;home exercise program;postural re-education;ROM (range of motion);strengthening;transfer training  -MB               User Key  (r) = Recorded By, (t) = Taken By, (c) = Cosigned By      Initials Name Provider Type    Deann Cochran, PT Physical Therapist                   Clinical Impression       Row Name 09/25/23 9666          Pain Scale: FACES Pre/Post-Treatment    Pain: FACES Scale, Pretreatment 0-->no hurt  -MB     Posttreatment Pain Rating 0-->no hurt  -[facility] Name 09/25/23 6423          Plan of Care Review    Plan of Care Reviewed With patient  -MB     Progress no change  -MB     Outcome Evaluation PT eval completed at bed level in order to minimize stimulation. Pt. presents w/ generalized weakness and  decreased activity tolerance. He required dep A to reposition in bed and tolerated BUE/BLE AAROM TherEx. Skilled IPPT warranted to maximize pt's safety and independence w/ functional mobility. Recommend SNF at D/C.  -MB       Row Name 09/25/23 1559          Therapy Assessment/Plan (PT)    Rehab Potential (PT) fair, will monitor progress closely  -MB     Criteria for Skilled Interventions Met (PT) yes;meets criteria;skilled treatment is necessary  -MB     Therapy Frequency (PT) daily  -MB       Row Name 09/25/23 1559          Vital Signs    Pre Patient Position Supine  -MB     Intra Patient Position Supine  -MB       Row Name 09/25/23 1559          Positioning and Restraints    Pre-Treatment Position in bed  -MB     Post Treatment Position bed  -MB     In Bed notified nsg;supine;call light within reach;encouraged to call for assist;exit alarm on;heels elevated;side rails up x3  -MB               User Key  (r) = Recorded By, (t) = Taken By, (c) = Cosigned By      Initials Name Provider Type    MB Deann Cary, PT Physical Therapist                   Outcome Measures       Row Name 09/25/23 1603 09/25/23 0745       How much help from another person do you currently need...    Turning from your back to your side while in flat bed without using bedrails? 2  -MB 2  -SA    Moving from lying on back to sitting on the side of a flat bed without bedrails? 2  -MB 2  -SA    Moving to and from a bed to a chair (including a wheelchair)? 2  -MB 2  -SA    Standing up from a chair using your arms (e.g., wheelchair, bedside chair)? 2  -MB 2  -SA    Climbing 3-5 steps with a railing? 1  -MB 2  -SA    To walk in hospital room? 1  -MB 2  -SA    AM-PAC 6 Clicks Score (PT) 10  -MB 12  -SA    Highest level of mobility 4 --> Transferred to chair/commode  -MB 4 --> Transferred to chair/commode  -SA      Row Name 09/25/23 1609          Functional Assessment    Outcome Measure Options AM-PAC 6 Clicks Basic Mobility (PT)  -MB                User Key  (r) = Recorded By, (t) = Taken By, (c) = Cosigned By      Initials Name Provider Type    Deann Cochran, PT Physical Therapist    Arabella Lizarraga RN Registered Nurse                                 Physical Therapy Education       Title: PT OT SLP Therapies (In Progress)       Topic: Physical Therapy (Not Started)       Point: Mobility training (Not Started)       Learner Progress:  Not documented in this visit.              Point: Home exercise program (Not Started)       Learner Progress:  Not documented in this visit.              Point: Body mechanics (Not Started)       Learner Progress:  Not documented in this visit.              Point: Precautions (Not Started)       Learner Progress:  Not documented in this visit.                                  PT Recommendation and Plan  Planned Therapy Interventions (PT): balance training, bed mobility training, home exercise program, postural re-education, ROM (range of motion), strengthening, transfer training  Plan of Care Reviewed With: patient  Progress: no change  Outcome Evaluation: PT eval completed at bed level in order to minimize stimulation. Pt. presents w/ generalized weakness and decreased activity tolerance. He required dep A to reposition in bed and tolerated BUE/BLE AAROM TherEx. Skilled IPPT warranted to maximize pt's safety and independence w/ functional mobility. Recommend SNF at D/C.     Time Calculation:   PT Evaluation Complexity  History, PT Evaluation Complexity: 1-2 personal factors and/or comorbidities  Examination of Body Systems (PT Eval Complexity): total of 3 or more elements  Clinical Presentation (PT Evaluation Complexity): evolving  Clinical Decision Making (PT Evaluation Complexity): low complexity  Overall Complexity (PT Evaluation Complexity): low complexity     PT Charges       Row Name 09/25/23 0871             Time Calculation    Start Time 1500  -MB      PT Received On 09/25/23  -MB      PT Goal Re-Cert Due  Date 10/05/23  -MB         Untimed Charges    PT Eval/Re-eval Minutes 35  -MB         Total Minutes    Untimed Charges Total Minutes 35  -MB       Total Minutes 35  -MB                User Key  (r) = Recorded By, (t) = Taken By, (c) = Cosigned By      Initials Name Provider Type    Deann Cochran, PT Physical Therapist                  Therapy Charges for Today       Code Description Service Date Service Provider Modifiers Qty    [id number] HC PT EVAL LOW COMPLEXITY 3 9/25/2023 Deann Cary, PT GP 1            PT G-Codes  Outcome Measure Options: AM-PAC 6 Clicks Basic Mobility (PT)  AM-PAC 6 Clicks Score (PT): 10  AM-PAC 6 Clicks Score (OT): 11  PT Discharge Summary  Anticipated Discharge Disposition (PT): skilled nursing facility    Deann Cary, PT  9/25/2023

## 2023-09-25 NOTE — PLAN OF CARE
Goal Outcome Evaluation:   Pt is AxO to self only. Pt can be combative at times. Restraints were reordered due to patient continuing to pull at lines and remove equipment even with redirection and distractions (TV). Mitts were ordered. Pt realized he could remove them with his teeth. Pt became combative at after removing mitts, so wrist restraints were ordered and mitts were discontinued. Pt continues to tolerate honey thick liquids and swallow pills whole. Pt is resting comfortably with no concerns at this time. Call light is within reach.

## 2023-09-26 VITALS
BODY MASS INDEX: 23.51 KG/M2 | WEIGHT: 158.73 LBS | HEIGHT: 69 IN | HEART RATE: 72 BPM | OXYGEN SATURATION: 96 % | SYSTOLIC BLOOD PRESSURE: 135 MMHG | TEMPERATURE: 96.2 F | DIASTOLIC BLOOD PRESSURE: 77 MMHG | RESPIRATION RATE: 15 BRPM

## 2023-09-26 PROBLEM — J18.9 SEPSIS DUE TO PNEUMONIA: Status: RESOLVED | Noted: 2023-09-21 | Resolved: 2023-09-26

## 2023-09-26 PROBLEM — J18.9 PNEUMONIA, UNSPECIFIED ORGANISM: Status: RESOLVED | Noted: 2023-09-25 | Resolved: 2023-09-26

## 2023-09-26 PROBLEM — A41.9 SEPSIS DUE TO PNEUMONIA: Status: RESOLVED | Noted: 2023-09-21 | Resolved: 2023-09-26

## 2023-09-26 PROBLEM — E44.0 MODERATE MALNUTRITION: Status: ACTIVE | Noted: 2023-09-26

## 2023-09-26 PROBLEM — J18.9 PNEUMONIA: Status: RESOLVED | Noted: 2023-09-22 | Resolved: 2023-09-26

## 2023-09-26 LAB
BACTERIA SPEC AEROBE CULT: NORMAL
BACTERIA SPEC AEROBE CULT: NORMAL

## 2023-09-26 PROCEDURE — 99239 HOSP IP/OBS DSCHRG MGMT >30: CPT | Performed by: NURSE PRACTITIONER

## 2023-09-26 PROCEDURE — 25010000002 CEFTRIAXONE PER 250 MG: Performed by: INTERNAL MEDICINE

## 2023-09-26 RX ORDER — QUETIAPINE FUMARATE 25 MG/1
25 TABLET, FILM COATED ORAL NIGHTLY
Start: 2023-09-26

## 2023-09-26 RX ORDER — BUMETANIDE 0.5 MG/1
0.5 TABLET ORAL DAILY PRN
Qty: 90 TABLET | Refills: 0 | Status: SHIPPED | OUTPATIENT
Start: 2023-09-26

## 2023-09-26 RX ORDER — LEVOTHYROXINE SODIUM 0.05 MG/1
75 TABLET ORAL
Start: 2023-09-26

## 2023-09-26 RX ORDER — IPRATROPIUM BROMIDE AND ALBUTEROL SULFATE 2.5; .5 MG/3ML; MG/3ML
3 SOLUTION RESPIRATORY (INHALATION) EVERY 4 HOURS PRN
Qty: 360 ML
Start: 2023-09-26

## 2023-09-26 RX ORDER — CHOLECALCIFEROL (VITAMIN D3) 125 MCG
5 CAPSULE ORAL NIGHTLY PRN
Start: 2023-09-26

## 2023-09-26 RX ADMIN — SODIUM CHLORIDE 1000 MG: 900 INJECTION INTRAVENOUS at 09:08

## 2023-09-26 RX ADMIN — Medication 10 ML: at 09:12

## 2023-09-26 RX ADMIN — VALPROIC ACID 250 MG: 250 SOLUTION ORAL at 09:07

## 2023-09-26 RX ADMIN — ASPIRIN 81 MG: 81 TABLET, COATED ORAL at 09:07

## 2023-09-26 RX ADMIN — LEVOTHYROXINE SODIUM 75 MCG: 0.07 TABLET ORAL at 09:46

## 2023-09-26 RX ADMIN — PAROXETINE HYDROCHLORIDE 10 MG: 10 TABLET, FILM COATED ORAL at 09:07

## 2023-09-26 NOTE — DISCHARGE PLACEMENT REQUEST
"Raymond Zaragoza (87 y.o. Male)       Date of Birth   1935    Social Security Number       Address   [address] [address]    Home Phone   [phone]    MRN   [id number]       [facility]    Marital Status                               Admission Date   23    Admission Type   Emergency    Admitting Provider   Marlene Obrien DO    Attending Provider   Marlene Obrien DO    Department, Room/Bed   [address], S476/1       Discharge Date       Discharge Disposition   Skilled Nursing Facility (DC - External)    Discharge Destination                                 Attending Provider: Marlene Obrien DO    Allergies: Flomax [Tamsulosin], Lactose Intolerance (Gi)    Isolation: None   Infection: None   Code Status: No CPR    Ht: 175.3 cm (69\")   Wt: 72 kg (158 lb 11.7 oz)    Admission Cmt: None   Principal Problem: None                  Active Insurance as of 2023       Primary Coverage       Payor Plan Insurance Group Employer/Plan Group    [facility] MEDICARE REPLACEMENT [facility] MEDICARE REPLACEMENT [id number]       Payor Plan Address Payor Plan Phone Number Payor Plan Fax Number Effective Dates    [address] [address]   2023 - None Entered    [facility] [address]         Subscriber Name Subscriber Birth Date Member ID       RAYMOND ZARAGOZA 1935 [id number]                     Emergency Contacts        (Rel.) Home Phone Work Phone Mobile Phone    ISABELLA MEYER (Daughter) [phone] -- [phone]                 Discharge Summary        Geovanna Price, APRN at [address] [facility]              [facility]  DISCHARGE SUMMARY    Patient Name: Raymond Zaragoza  : 1935  MRN: [id number]    Date of Admission: 2023  7:22 PM  Date of Discharge:  2023  Primary Care Physician: Patricio Crawford MD    Consults       No orders found from 2023 to 2023. "            Hospital Course     Presenting Problem: fever    Active Hospital Problems    Diagnosis  POA    Moderate malnutrition [E44.0]  Yes    Dementia [F03.90]  Yes    Essential hypertension [I10]  Yes      Resolved Hospital Problems    Diagnosis Date Resolved POA    Pneumonia, unspecified organism [J18.9] 09/26/2023 Yes    Pneumonia [J18.9] 09/26/2023 Yes    Sepsis due to pneumonia [J18.9, A41.9] 09/26/2023 Yes          Hospital Course:  Ramin Zaragoza is a 87 y.o. male  with history of coronary artery disease, hypertension, hyperlipidemia, hypothyroidism and dementia who presented from nursing home with somnolence, coughing and fevers.  On arrival the patient had a fever of 102 with tachycardia  CMP was unremarkable.  Procalcitonin was mildly elevated 0.41.  Lactate was mildly elevated at 2.3.  INR elevated 1.23.  CBC showed a mild leukocytosis of 13.9.  CT of the abdomen pelvis showed by basilar opacities.     Sepsis secondary to suspected pneumonia, resolved  -Completed 5 days of abx. Started with zosyn/ vanc and deescalated to Rocephin  -Urine strep pneumo and Legionella antigens negative.  -Blood cultures  remain negative  -s/p IV fluids.  -Continue home midodrine prn   -bumex held on admit with sepsis. Resume at DC prn edema/ soa     Dementia  - required restraints but has been out and calm in last 24 hr. continue scheduled nighttime seroquel.   - dc tele and reduce BP checks as these may contribute to his agitation.   - avoid disturbing patient at night to prevent sleep     Lactic acidosis, resolved  -not clinically significant, resolved with fluids     Hyperlipidemia  - No statin listed on his home medications.     History of coronary artery disease  - Continue aspirin from home regimen.     Hypothyroidism  - Continue Synthroid from home regimen.     Dysphagia  - on Modified diet doing well      Discharge Follow Up Recommendations for outpatient labs/diagnostics:   PCP/ facility attending follow up with in  the week    Day of Discharge     HPI:   Patient sitting up in bed with RN at bedside. Requesting eggs and assisted with feeding and sips. Some mild cough intermittently with swallowing, encouraged to chin tuck but dementia limits understanding to follow command. Appears comfortable and smiling.    Review of Systems  No complaints    Vital Signs:   Temp:  [96.2 °F (35.7 °C)-98.7 °F (37.1 °C)] 96.2 °F (35.7 °C)  Resp:  [15-18] 15  BP: (135-147)/(77-79) 135/77      Physical Exam:  Constitutional: No acute distress, awake, alert, sitting upright in bed smiling and asking for eggs  HENT: NCAT, mucous membranes moist  Respiratory: Clear to auscultation bilaterally, respiratory effort normal   Cardiovascular: RRR  Gastrointestinal: Positive bowel sounds, soft, nontender, nondistended  Musculoskeletal: No bilateral ankle edema  Psychiatric: Appropriate affect, cooperative  Neurologic: confused and minimal speech, smiling and asking for eggs, MC  Skin: No rashes      Pertinent  and/or Most Recent Results     LAB RESULTS:      Lab 09/23/23  0804 09/22/23  0615 09/22/23  0030 09/21/23 2000   WBC 6.36 11.63*  --  13.96*   HEMOGLOBIN 11.7* 12.8*  --  12.4*   HEMATOCRIT 35.0* 40.1  --  36.7*   PLATELETS 162 183  --  197   NEUTROS ABS 3.18 7.62*  --  11.94*   IMMATURE GRANS (ABS) 0.02 0.04  --  0.05   LYMPHS ABS 2.30 2.81  --  1.11   MONOS ABS 0.58 1.01*  --  0.82   EOS ABS 0.26 0.12  --  0.02   MCV 94.6 99.5*  --  93.4   PROCALCITONIN  --   --   --  0.41*   LACTATE  --   --  1.6 2.3*   PROTIME  --   --   --  15.6*         Lab 09/23/23  0804 09/22/23  0803 09/21/23 2000   SODIUM 140 141 138   POTASSIUM 4.1 4.0 4.3   CHLORIDE 108* 107 103   CO2 27.0 28.0 25.0   ANION GAP 5.0 6.0 10.0   BUN 9 11 14   CREATININE 0.72* 0.70* 0.69*   EGFR 88.4 89.2 89.6   GLUCOSE 93 99 163*   CALCIUM 8.3* 8.3* 8.6   MAGNESIUM  --  1.8 1.7         Lab 09/22/23  0803 09/21/23 2000   TOTAL PROTEIN 5.1* 5.8*   ALBUMIN 2.9* 3.3*   GLOBULIN 2.2 2.5   ALT  (SGPT) 11 12   AST (SGOT) 20 19   BILIRUBIN 0.5 0.4   ALK PHOS 53 62         Lab 09/21/23 2000   PROTIME 15.6*   INR 1.23*                 Brief Urine Lab Results  (Last result in the past 365 days)        Color   Clarity   Blood   Leuk Est   Nitrite   Protein   CREAT   Urine HCG        09/21/23 2000 Yellow   Clear   Small (1+)   Negative   Negative   Negative                 Microbiology Results (last 10 days)       Procedure Component Value - Date/Time    Legionella Antigen, Urine - Urine, Urine, Clean Catch [[id number]]  (Normal) Collected: 09/22/23 0621    Lab Status: Final result Specimen: Urine, Clean Catch Updated: 09/22/23 1226     LEGIONELLA ANTIGEN, URINE Negative    S. Pneumo Ag Urine or CSF - Urine, Urine, Clean Catch [[id number]]  (Normal) Collected: 09/22/23 0621    Lab Status: Final result Specimen: Urine, Clean Catch Updated: 09/22/23 1226     Strep Pneumo Ag Negative    MRSA Screen, PCR (Inpatient) - Swab, Nares [[id number]]  (Normal) Collected: 09/22/23 0014    Lab Status: Final result Specimen: Swab from Nares Updated: 09/22/23 0902     MRSA PCR Negative    Narrative:      The negative predictive value of this diagnostic test is high and should only be used to consider de-escalating anti-MRSA therapy. A positive result may indicate colonization with MRSA and must be correlated clinically.  MRSA Negative    Blood Culture - Blood, Arm, Right [[id number]]  (Normal) Collected: 09/21/23 2008    Lab Status: Preliminary result Specimen: Blood from Arm, Right Updated: 09/25/23 2131     Blood Culture No growth at 4 days    Blood Culture - Blood, Arm, Left [[id number]]  (Normal) Collected: 09/21/23 2002    Lab Status: Preliminary result Specimen: Blood from Arm, Left Updated: 09/25/23 2030     Blood Culture No growth at 4 days    Respiratory Panel PCR w/COVID-19(SARS-CoV-2) DEEP/[facility]/ESTHER/PAD/COR/MAD/MARLINE In-House, NP Swab in UTM/VTM, 3-4 HR TAT - Swab, Nasopharynx [[id number]]  (Normal) Collected: 09/21/23 2000     Lab Status: Final result Specimen: Swab from Nasopharynx Updated: 09/21/23 2107     ADENOVIRUS, PCR Not Detected     Coronavirus 229E Not Detected     Coronavirus HKU1 Not Detected     Coronavirus NL63 Not Detected     Coronavirus OC43 Not Detected     COVID19 Not Detected     Human Metapneumovirus Not Detected     Human Rhinovirus/Enterovirus Not Detected     Influenza A PCR Not Detected     Influenza B PCR Not Detected     Parainfluenza Virus 1 Not Detected     Parainfluenza Virus 2 Not Detected     Parainfluenza Virus 3 Not Detected     Parainfluenza Virus 4 Not Detected     RSV, PCR Not Detected     Bordetella pertussis pcr Not Detected     Bordetella parapertussis PCR Not Detected     Chlamydophila pneumoniae PCR Not Detected     Mycoplasma pneumo by PCR Not Detected    Narrative:      In the setting of a positive respiratory panel with a viral infection PLUS a negative procalcitonin without other underlying concern for bacterial infection, consider observing off antibiotics or discontinuation of antibiotics and continue supportive care. If the respiratory panel is positive for atypical bacterial infection (Bordetella pertussis, Chlamydophila pneumoniae, or Mycoplasma pneumoniae), consider antibiotic de-escalation to target atypical bacterial infection.            CT Head Without Contrast    Result Date: 9/21/2023  CT HEAD WO CONTRAST Date of Exam: 9/21/2023 8:24 PM EDT Indication: ams. Comparison: 1/13/2023 Technique: Axial CT images were obtained of the head without contrast administration.  Automated exposure control and iterative construction methods were used. Findings:   There is moderate generalized parenchymal volume loss with ex vacuo dilatation of the lateral and third ventricles. There is no evidence of acute infarct or hemorrhage. No abnormal extra-axial fluid collections identified. No mass effect or hydrocephalus. There is partial opacification of the mastoid air cells bilaterally. Visualized  paranasal sinuses are clear. Globes and orbits are within normal limits. No acute skull fracture.      Impression: 1. Moderate generalized parenchymal volume loss. No acute intracranial abnormality. 2. Partial opacification of the mastoid air cells bilaterally, similar prior exam. Electronically Signed: Jun Gramajo MD  9/21/2023 9:13 PM EDT  Workstation ID: [id number]    CT Abdomen Pelvis With Contrast    Result Date: 9/21/2023  CT ABDOMEN PELVIS W CONTRAST Date of Exam: 9/21/2023 8:35 PM EDT Indication: fever. Comparison: 1/13/2023 Technique: Axial CT images were obtained of the abdomen and pelvis following the uneventful intravenous administration of 80 mL Isovue 300 . Reconstructed coronal and sagittal images were also obtained. Automated exposure control and iterative construction methods were used. Findings: *Lower Thorax: Bibasilar opacities. *Liver: Unremarkable. *Gallbladder: Cholecystectomy. *Pancreas: Normal. *Spleen: Normal. *Adrenal Glands: Normal. *Kidneys: No renal stones or hydronephrosis bilaterally. Bilateral renal cysts. *Stomach: Gastric wall thickening. *Bowel: Nonobstructive bowel gas pattern. No bowel wall inflammation. *Appendix: Normal appendix. *Peritoneal Cavity: No pneumoperitoneum.  No lymphadenopathy. *Urinary Bladder: Urinary bladder wall thickening. *Pelvis: No free pelvic fluid. Prostamegaly measuring 6.5 x 7.4 cm. *Bones: No acute fractures or dislocations. No osseous destructive lesions. Multilevel spondylosis. Generalized osteopenia. *     1.Bibasilar opacities may represent atelectasis or infiltrates. 2.Gastric wall thickening may represent gastritis or other etiologies. Distal esophageal wall thickening may represent esophagitis or other etiologies. Small hiatal hernia. Follow-up recommended. 3.Urinary bladder wall thickening may represent under distention, cystitis or other etiologies. Please correlate with urinalysis. Follow-up recommended. 4.Severe prostamegaly.  Electronically Signed: Emmett Rosa MD  9/21/2023 9:28 PM EDT  Workstation ID: [id number]    FL Video Swallow With Speech Single Contrast    Result Date: 9/22/2023  FL VIDEO SWALLOW W SPEECH SINGLE-CONTRAST Date of Exam: 9/22/2023 11:38 AM EDT Indication: aspiration, significant belching Comparison: None available. Technique:   The speech pathologist administered food and/or liquid mixed with barium to the patient with cine/video imaging.  Imaging assistance was provided to the speech pathologist and an image was saved. Fluoroscopic Time: 1 minute and 30 seconds Number of Images: 10 associated fluoroscopic loops were saved Findings: Please see speech therapy report for full details and recommendations.     Impression: Fluoroscopy provided for a modified barium swallow. Please see speech therapy report for full details and recommendations. Electronically Signed: Maykel Wong MD  9/22/2023 3:58 PM EDT  Workstation ID: [id number]    XR Chest 1 View    Result Date: 9/21/2023  XR CHEST 1 VW Date of Exam: 9/21/2023 7:29 PM EDT Indication: fever Comparison: 1/13/2023 Findings: Heart size and pulmonary vessels are within normal limits. There are coarsened bibasilar interstitial markings unchanged from prior study. No new focal airspace consolidation. No pleural effusion or pneumothorax.     Impression: 1. Prominent basilar interstitial markings unchanged from prior exam. No new airspace consolidation or other acute process. Electronically Signed: Jun Gramajo MD  9/21/2023 8:02 PM EDT  Workstation ID: [id number]             Results for orders placed during the hospital encounter of 02/23/22    Adult Transthoracic Echo Complete W/ Cont if Necessary Per Protocol    Interpretation Summary  · Normal left ventricular systolic function, estimated EF 60%.  · Mild mitral regurgitation.  · Mild tricuspid regurgitation with normal RVSP.  · Mild pulmonic insufficiency.      Plan for Follow-up of Pending Labs/Results:   Pending Labs        Order Current Status    Blood Culture - Blood, Arm, Left Preliminary result    Blood Culture - Blood, Arm, Right Preliminary result          Discharge Details        Discharge Medications        New Medications        Instructions Start Date   ipratropium-albuterol 0.5-2.5 mg/3 ml nebulizer  Commonly known as: DUO-NEB   3 mL, Nebulization, Every 4 Hours PRN      melatonin 5 MG tablet tablet   5 mg, Oral, Nightly PRN      QUEtiapine 25 MG tablet  Commonly known as: SEROquel   25 mg, Oral, Nightly             Changes to Medications        Instructions Start Date   bumetanide 0.5 MG tablet  Commonly known as: BUMEX  What changed:   when to take this  reasons to take this   0.5 mg, Oral, Daily PRN      Diclofenac Sodium 1 % gel gel  Commonly known as: VOLTAREN  What changed:   when to take this  reasons to take this  additional instructions   2 g, Topical, 3 Times Daily PRN, Apply to neck for pain             Continue These Medications        Instructions Start Date   acetaminophen 325 MG tablet  Commonly known as: TYLENOL   650 mg, Oral, Every 4 Hours PRN      aspirin 81 MG EC tablet   81 mg, Oral, Daily      fluticasone 50 MCG/ACT nasal spray  Commonly known as: FLONASE   2 sprays, Nasal, Daily, Administer 2 sprays in each nostril for each dose.       levothyroxine 50 MCG tablet  Commonly known as: SYNTHROID, LEVOTHROID   75 mcg, Oral, Every Early Morning      magnesium hydroxide 400 MG/5ML suspension  Commonly known as: MILK OF MAGNESIA   30 mL, Oral, As Needed      midodrine 5 MG tablet  Commonly known as: PROAMATINE   5 mg, Oral, As Needed, For SBP less than 110       nitroglycerin 0.4 MG SL tablet  Commonly known as: NITROSTAT   0.4 mg, Sublingual, Every 5 Minutes PRN, Take no more than 3 doses in 15 minutes.       PARoxetine 10 MG tablet  Commonly known as: PAXIL   10 mg, Oral, Every Morning      Valproic Acid 250 MG/5ML solution syrup  Commonly known as: DEPAKENE   250 mg, Oral, Every 12 Hours Scheduled                Allergies   Allergen Reactions    Flomax [Tamsulosin] Other (See Comments)     Unknown      Lactose Intolerance (Gi) GI Intolerance         Discharge Disposition:  Skilled Nursing Facility (DC - External)    Diet:  Hospital:  Diet Order   Procedures    Diet: Cardiac Diets; Healthy Heart (2-3 Na+); No Mixed Consistencies; Texture: Mechanical Ground (NDD 2); Fluid Consistency: Honey Thick       Diet Instructions       Diet: Cardiac Diets; Healthy Heart (2-3 Na+); Mechanical Ground (NDD 2); Honey Thick      Discharge Diet: Cardiac Diets    Cardiac Diet: Healthy Heart (2-3 Na+)    Texture: Mechanical Ground (NDD 2)    Fluid Consistency: Honey Thick             Activity:  Activity Instructions       Activity as Tolerated              Restrictions or Other Recommendations:         CODE STATUS:    Code Status and Medical Interventions:   Ordered at: 09/22/23 0034     Code Status (Patient has no pulse and is not breathing):    No CPR (Do Not Attempt to Resuscitate)     Medical Interventions (Patient has pulse or is breathing):    Full Support     Comments:    DNR/DNI, d/w daughter       Future Appointments   Date Time Provider Department Center   9/26/2023 12:30 PM EMS 1 [facility]       Additional Instructions for the Follow-ups that You Need to Schedule       Discharge Follow-up with PCP   As directed       Currently Documented PCP:    Patricio Crawford MD    PCP Phone Number:    [phone]     Follow Up Details: or facility attending 1 week                      DARA Khanna  09/26/23      Time Spent on Discharge:  I spent  50  minutes on this discharge activity which included: face-to-face encounter with the patient, reviewing the data in the system, coordination of the care with the nursing staff as well as consultants, documentation, and entering orders.        Electronically signed by DARA Khanna, 09/26/23, 10:19 AM EDT.      Electronically signed by Geovanna Price  APRN at 09/26/23 1025

## 2023-09-26 NOTE — PLAN OF CARE
Goal Outcome Evaluation:               Pt disoriented except to name/self.  Pt was uninterrupted from [id number] per MD order.  Pt resting quietly in bed.

## 2023-09-26 NOTE — DISCHARGE SUMMARY
[facility]  DISCHARGE SUMMARY    Patient Name: Ramin Zaragoza  : 1935  MRN: [id number]    Date of Admission: 2023  7:22 PM  Date of Discharge:  2023  Primary Care Physician: Patricio Crawford MD    Consults       No orders found from 2023 to 2023.            Hospital Course     Presenting Problem: fever    Active Hospital Problems    Diagnosis  POA    Moderate malnutrition [E44.0]  Yes    Dementia [F03.90]  Yes    Essential hypertension [I10]  Yes      Resolved Hospital Problems    Diagnosis Date Resolved POA    Pneumonia, unspecified organism [J18.9] 2023 Yes    Pneumonia [J18.9] 2023 Yes    Sepsis due to pneumonia [J18.9, A41.9] 2023 Yes          Hospital Course:  Ramin Zaragoza is a 87 y.o. male  with history of coronary artery disease, hypertension, hyperlipidemia, hypothyroidism and dementia who presented from nursing home with somnolence, coughing and fevers.  On arrival the patient had a fever of 102 with tachycardia  CMP was unremarkable.  Procalcitonin was mildly elevated 0.41.  Lactate was mildly elevated at 2.3.  INR elevated 1.23.  CBC showed a mild leukocytosis of 13.9.  CT of the abdomen pelvis showed by basilar opacities.     Sepsis secondary to suspected pneumonia, resolved  -Completed 5 days of abx. Started with zosyn/ vanc and deescalated to Rocephin  -Urine strep pneumo and Legionella antigens negative.  -Blood cultures  remain negative  -s/p IV fluids.  -Continue home midodrine prn   -bumex held on admit with sepsis. Resume at DC prn edema/ soa     Dementia  - required restraints but has been out and calm in last 24 hr. continue scheduled nighttime seroquel.   - dc tele and reduce BP checks as these may contribute to his agitation.   - avoid disturbing patient at night to prevent sleep     Lactic acidosis, resolved  -not clinically significant, resolved with fluids     Hyperlipidemia  - No statin listed on  his home medications.     History of coronary artery disease  - Continue aspirin from home regimen.     Hypothyroidism  - Continue Synthroid from home regimen.     Dysphagia  - on Modified diet doing well      Discharge Follow Up Recommendations for outpatient labs/diagnostics:   PCP/ facility attending follow up with in the week    Day of Discharge     HPI:   Patient sitting up in bed with RN at bedside. Requesting eggs and assisted with feeding and sips. Some mild cough intermittently with swallowing, encouraged to chin tuck but dementia limits understanding to follow command. Appears comfortable and smiling.    Review of Systems  No complaints    Vital Signs:   Temp:  [96.2 °F (35.7 °C)-98.7 °F (37.1 °C)] 96.2 °F (35.7 °C)  Resp:  [15-18] 15  BP: (135-147)/(77-79) 135/77      Physical Exam:  Constitutional: No acute distress, awake, alert, sitting upright in bed smiling and asking for eggs  HENT: NCAT, mucous membranes moist  Respiratory: Clear to auscultation bilaterally, respiratory effort normal   Cardiovascular: RRR  Gastrointestinal: Positive bowel sounds, soft, nontender, nondistended  Musculoskeletal: No bilateral ankle edema  Psychiatric: Appropriate affect, cooperative  Neurologic: confused and minimal speech, smiling and asking for eggs, MC  Skin: No rashes      Pertinent  and/or Most Recent Results     LAB RESULTS:      Lab 09/23/23  0804 09/22/23  0615 09/22/23  0030 09/21/23 2000   WBC 6.36 11.63*  --  13.96*   HEMOGLOBIN 11.7* 12.8*  --  12.4*   HEMATOCRIT 35.0* 40.1  --  36.7*   PLATELETS 162 183  --  197   NEUTROS ABS 3.18 7.62*  --  11.94*   IMMATURE GRANS (ABS) 0.02 0.04  --  0.05   LYMPHS ABS 2.30 2.81  --  1.11   MONOS ABS 0.58 1.01*  --  0.82   EOS ABS 0.26 0.12  --  0.02   MCV 94.6 99.5*  --  93.4   PROCALCITONIN  --   --   --  0.41*   LACTATE  --   --  1.6 2.3*   PROTIME  --   --   --  15.6*         Lab 09/23/23  0804 09/22/23  0803 09/21/23 2000   SODIUM 140 141 138   POTASSIUM 4.1 4.0  4.3   CHLORIDE 108* 107 103   CO2 27.0 28.0 25.0   ANION GAP 5.0 6.0 10.0   BUN 9 11 14   CREATININE 0.72* 0.70* 0.69*   EGFR 88.4 89.2 89.6   GLUCOSE 93 99 163*   CALCIUM 8.3* 8.3* 8.6   MAGNESIUM  --  1.8 1.7         Lab 09/22/23  0803 09/21/23 2000   TOTAL PROTEIN 5.1* 5.8*   ALBUMIN 2.9* 3.3*   GLOBULIN 2.2 2.5   ALT (SGPT) 11 12   AST (SGOT) 20 19   BILIRUBIN 0.5 0.4   ALK PHOS 53 62         Lab 09/21/23 2000   PROTIME 15.6*   INR 1.23*                 Brief Urine Lab Results  (Last result in the past 365 days)        Color   Clarity   Blood   Leuk Est   Nitrite   Protein   CREAT   Urine HCG        09/21/23 2000 Yellow   Clear   Small (1+)   Negative   Negative   Negative                 Microbiology Results (last 10 days)       Procedure Component Value - Date/Time    Legionella Antigen, Urine - Urine, Urine, Clean Catch [[id number]]  (Normal) Collected: 09/22/23 0621    Lab Status: Final result Specimen: Urine, Clean Catch Updated: 09/22/23 1226     LEGIONELLA ANTIGEN, URINE Negative    S. Pneumo Ag Urine or CSF - Urine, Urine, Clean Catch [[id number]]  (Normal) Collected: 09/22/23 0621    Lab Status: Final result Specimen: Urine, Clean Catch Updated: 09/22/23 1226     Strep Pneumo Ag Negative    MRSA Screen, PCR (Inpatient) - Swab, Nares [[id number]]  (Normal) Collected: 09/22/23 0014    Lab Status: Final result Specimen: Swab from Nares Updated: 09/22/23 0902     MRSA PCR Negative    Narrative:      The negative predictive value of this diagnostic test is high and should only be used to consider de-escalating anti-MRSA therapy. A positive result may indicate colonization with MRSA and must be correlated clinically.  MRSA Negative    Blood Culture - Blood, Arm, Right [[id number]]  (Normal) Collected: 09/21/23 2008    Lab Status: Preliminary result Specimen: Blood from Arm, Right Updated: 09/25/23 2131     Blood Culture No growth at 4 days    Blood Culture - Blood, Arm, Left [[id number]]  (Normal) Collected:  09/21/23 2002    Lab Status: Preliminary result Specimen: Blood from Arm, Left Updated: 09/25/23 2030     Blood Culture No growth at 4 days    Respiratory Panel PCR w/COVID-19(SARS-CoV-2) [facility] In-House, NP Swab in UTM/VTM, 3-4 HR TAT - Swab, Nasopharynx [[id number]]  (Normal) Collected: 09/21/23 2000    Lab Status: Final result Specimen: Swab from Nasopharynx Updated: 09/21/23 2107     ADENOVIRUS, PCR Not Detected     Coronavirus 229E Not Detected     Coronavirus HKU1 Not Detected     Coronavirus NL63 Not Detected     Coronavirus OC43 Not Detected     COVID19 Not Detected     Human Metapneumovirus Not Detected     Human Rhinovirus/Enterovirus Not Detected     Influenza A PCR Not Detected     Influenza B PCR Not Detected     Parainfluenza Virus 1 Not Detected     Parainfluenza Virus 2 Not Detected     Parainfluenza Virus 3 Not Detected     Parainfluenza Virus 4 Not Detected     RSV, PCR Not Detected     Bordetella pertussis pcr Not Detected     Bordetella parapertussis PCR Not Detected     Chlamydophila pneumoniae PCR Not Detected     Mycoplasma pneumo by PCR Not Detected    Narrative:      In the setting of a positive respiratory panel with a viral infection PLUS a negative procalcitonin without other underlying concern for bacterial infection, consider observing off antibiotics or discontinuation of antibiotics and continue supportive care. If the respiratory panel is positive for atypical bacterial infection (Bordetella pertussis, Chlamydophila pneumoniae, or Mycoplasma pneumoniae), consider antibiotic de-escalation to target atypical bacterial infection.            CT Head Without Contrast    Result Date: 9/21/2023  CT HEAD WO CONTRAST Date of Exam: 9/21/2023 8:24 PM EDT Indication: ams. Comparison: 1/13/2023 Technique: Axial CT images were obtained of the head without contrast administration.  Automated exposure control and iterative construction methods were used. Findings:   There is  moderate generalized parenchymal volume loss with ex vacuo dilatation of the lateral and third ventricles. There is no evidence of acute infarct or hemorrhage. No abnormal extra-axial fluid collections identified. No mass effect or hydrocephalus. There is partial opacification of the mastoid air cells bilaterally. Visualized paranasal sinuses are clear. Globes and orbits are within normal limits. No acute skull fracture.      Impression: 1. Moderate generalized parenchymal volume loss. No acute intracranial abnormality. 2. Partial opacification of the mastoid air cells bilaterally, similar prior exam. Electronically Signed: Jun Gramajo MD  9/21/2023 9:13 PM EDT  Workstation ID: [id number]    CT Abdomen Pelvis With Contrast    Result Date: 9/21/2023  CT ABDOMEN PELVIS W CONTRAST Date of Exam: 9/21/2023 8:35 PM EDT Indication: fever. Comparison: 1/13/2023 Technique: Axial CT images were obtained of the abdomen and pelvis following the uneventful intravenous administration of 80 mL Isovue 300 . Reconstructed coronal and sagittal images were also obtained. Automated exposure control and iterative construction methods were used. Findings: *Lower Thorax: Bibasilar opacities. *Liver: Unremarkable. *Gallbladder: Cholecystectomy. *Pancreas: Normal. *Spleen: Normal. *Adrenal Glands: Normal. *Kidneys: No renal stones or hydronephrosis bilaterally. Bilateral renal cysts. *Stomach: Gastric wall thickening. *Bowel: Nonobstructive bowel gas pattern. No bowel wall inflammation. *Appendix: Normal appendix. *Peritoneal Cavity: No pneumoperitoneum.  No lymphadenopathy. *Urinary Bladder: Urinary bladder wall thickening. *Pelvis: No free pelvic fluid. Prostamegaly measuring 6.5 x 7.4 cm. *Bones: No acute fractures or dislocations. No osseous destructive lesions. Multilevel spondylosis. Generalized osteopenia. *     1.Bibasilar opacities may represent atelectasis or infiltrates. 2.Gastric wall thickening may represent gastritis or other  etiologies. Distal esophageal wall thickening may represent esophagitis or other etiologies. Small hiatal hernia. Follow-up recommended. 3.Urinary bladder wall thickening may represent under distention, cystitis or other etiologies. Please correlate with urinalysis. Follow-up recommended. 4.Severe prostamegaly. Electronically Signed: Emmett Rosa MD  9/21/2023 9:28 PM EDT  Workstation ID: [id number]    FL Video Swallow With Speech Single Contrast    Result Date: 9/22/2023  FL VIDEO SWALLOW W SPEECH SINGLE-CONTRAST Date of Exam: 9/22/2023 11:38 AM EDT Indication: aspiration, significant belching Comparison: None available. Technique:   The speech pathologist administered food and/or liquid mixed with barium to the patient with cine/video imaging.  Imaging assistance was provided to the speech pathologist and an image was saved. Fluoroscopic Time: 1 minute and 30 seconds Number of Images: 10 associated fluoroscopic loops were saved Findings: Please see speech therapy report for full details and recommendations.     Impression: Fluoroscopy provided for a modified barium swallow. Please see speech therapy report for full details and recommendations. Electronically Signed: Maykel Wong MD  9/22/2023 3:58 PM EDT  Workstation ID: [id number]    XR Chest 1 View    Result Date: 9/21/2023  XR CHEST 1 VW Date of Exam: 9/21/2023 7:29 PM EDT Indication: fever Comparison: 1/13/2023 Findings: Heart size and pulmonary vessels are within normal limits. There are coarsened bibasilar interstitial markings unchanged from prior study. No new focal airspace consolidation. No pleural effusion or pneumothorax.     Impression: 1. Prominent basilar interstitial markings unchanged from prior exam. No new airspace consolidation or other acute process. Electronically Signed: Jun Gramajo MD  9/21/2023 8:02 PM EDT  Workstation ID: [id number]             Results for orders placed during the hospital encounter of 02/23/22    Adult Transthoracic Echo  Complete W/ Cont if Necessary Per Protocol    Interpretation Summary  · Normal left ventricular systolic function, estimated EF 60%.  · Mild mitral regurgitation.  · Mild tricuspid regurgitation with normal RVSP.  · Mild pulmonic insufficiency.      Plan for Follow-up of Pending Labs/Results:   Pending Labs       Order Current Status    Blood Culture - Blood, Arm, Left Preliminary result    Blood Culture - Blood, Arm, Right Preliminary result          Discharge Details        Discharge Medications        New Medications        Instructions Start Date   ipratropium-albuterol 0.5-2.5 mg/3 ml nebulizer  Commonly known as: DUO-NEB   3 mL, Nebulization, Every 4 Hours PRN      melatonin 5 MG tablet tablet   5 mg, Oral, Nightly PRN      QUEtiapine 25 MG tablet  Commonly known as: SEROquel   25 mg, Oral, Nightly             Changes to Medications        Instructions Start Date   bumetanide 0.5 MG tablet  Commonly known as: BUMEX  What changed:   when to take this  reasons to take this   0.5 mg, Oral, Daily PRN      Diclofenac Sodium 1 % gel gel  Commonly known as: VOLTAREN  What changed:   when to take this  reasons to take this  additional instructions   2 g, Topical, 3 Times Daily PRN, Apply to neck for pain             Continue These Medications        Instructions Start Date   acetaminophen 325 MG tablet  Commonly known as: TYLENOL   650 mg, Oral, Every 4 Hours PRN      aspirin 81 MG EC tablet   81 mg, Oral, Daily      fluticasone 50 MCG/ACT nasal spray  Commonly known as: FLONASE   2 sprays, Nasal, Daily, Administer 2 sprays in each nostril for each dose.       levothyroxine 50 MCG tablet  Commonly known as: SYNTHROID, LEVOTHROID   75 mcg, Oral, Every Early Morning      magnesium hydroxide 400 MG/5ML suspension  Commonly known as: MILK OF MAGNESIA   30 mL, Oral, As Needed      midodrine 5 MG tablet  Commonly known as: PROAMATINE   5 mg, Oral, As Needed, For SBP less than 110       nitroglycerin 0.4 MG SL  tablet  Commonly known as: NITROSTAT   0.4 mg, Sublingual, Every 5 Minutes PRN, Take no more than 3 doses in 15 minutes.       PARoxetine 10 MG tablet  Commonly known as: PAXIL   10 mg, Oral, Every Morning      Valproic Acid 250 MG/5ML solution syrup  Commonly known as: DEPAKENE   250 mg, Oral, Every 12 Hours Scheduled               Allergies   Allergen Reactions    Flomax [Tamsulosin] Other (See Comments)     Unknown      Lactose Intolerance (Gi) GI Intolerance         Discharge Disposition:  Skilled Nursing Facility (DC - External)    Diet:  Hospital:  Diet Order   Procedures    Diet: Cardiac Diets; Healthy Heart (2-3 Na+); No Mixed Consistencies; Texture: Mechanical Ground (NDD 2); Fluid Consistency: Honey Thick       Diet Instructions       Diet: Cardiac Diets; Healthy Heart (2-3 Na+); Mechanical Ground (NDD 2); Honey Thick      Discharge Diet: Cardiac Diets    Cardiac Diet: Healthy Heart (2-3 Na+)    Texture: Mechanical Ground (NDD 2)    Fluid Consistency: Honey Thick             Activity:  Activity Instructions       Activity as Tolerated              Restrictions or Other Recommendations:         CODE STATUS:    Code Status and Medical Interventions:   Ordered at: 09/22/23 0034     Code Status (Patient has no pulse and is not breathing):    No CPR (Do Not Attempt to Resuscitate)     Medical Interventions (Patient has pulse or is breathing):    Full Support     Comments:    DNR/DNI, d/w daughter       Future Appointments   Date Time Provider Department Center   9/26/2023 12:30 PM EMS [facility]  [facility]       Additional Instructions for the Follow-ups that You Need to Schedule       Discharge Follow-up with PCP   As directed       Currently Documented PCP:    Patricio Crawford MD    PCP Phone Number:    [phone]     Follow Up Details: or facility attending 1 week                      Geovanna Price, DARA  09/26/23      Time Spent on Discharge:  I spent  50  minutes on this discharge activity which  included: face-to-face encounter with the patient, reviewing the data in the system, coordination of the care with the nursing staff as well as consultants, documentation, and entering orders.        Electronically signed by DARA Khanna, 09/26/23, 10:19 AM EDT.

## 2023-10-03 ENCOUNTER — APPOINTMENT (OUTPATIENT)
Dept: GENERAL RADIOLOGY | Facility: HOSPITAL | Age: 88
End: 2023-10-03
Payer: MEDICARE

## 2023-10-03 ENCOUNTER — APPOINTMENT (OUTPATIENT)
Dept: CT IMAGING | Facility: HOSPITAL | Age: 88
End: 2023-10-03
Payer: MEDICARE

## 2023-10-03 ENCOUNTER — HOSPITAL ENCOUNTER (EMERGENCY)
Facility: HOSPITAL | Age: 88
Discharge: NURSING, REHAB OR LONG-TERM CARE | End: 2023-10-03
Attending: EMERGENCY MEDICINE | Admitting: EMERGENCY MEDICINE
Payer: MEDICARE

## 2023-10-03 VITALS
HEIGHT: 70 IN | RESPIRATION RATE: 18 BRPM | HEART RATE: 72 BPM | SYSTOLIC BLOOD PRESSURE: 136 MMHG | DIASTOLIC BLOOD PRESSURE: 67 MMHG | TEMPERATURE: 98.2 F | WEIGHT: 155 LBS | OXYGEN SATURATION: 99 % | BODY MASS INDEX: 22.19 KG/M2

## 2023-10-03 DIAGNOSIS — M19.90 OSTEOARTHRITIS, UNSPECIFIED OSTEOARTHRITIS TYPE, UNSPECIFIED SITE: Primary | ICD-10-CM

## 2023-10-03 DIAGNOSIS — M92.12: ICD-10-CM

## 2023-10-03 DIAGNOSIS — M25.531 RIGHT WRIST PAIN: ICD-10-CM

## 2023-10-03 DIAGNOSIS — M25.522 ARTHRALGIA OF BOTH ELBOWS: ICD-10-CM

## 2023-10-03 DIAGNOSIS — M25.521 ARTHRALGIA OF BOTH ELBOWS: ICD-10-CM

## 2023-10-03 PROCEDURE — 73200 CT UPPER EXTREMITY W/O DYE: CPT

## 2023-10-03 PROCEDURE — 73110 X-RAY EXAM OF WRIST: CPT

## 2023-10-03 PROCEDURE — 99284 EMERGENCY DEPT VISIT MOD MDM: CPT

## 2023-10-03 PROCEDURE — 73080 X-RAY EXAM OF ELBOW: CPT

## 2023-10-03 RX ORDER — ACETAMINOPHEN 500 MG
1000 TABLET ORAL ONCE
Status: DISCONTINUED | OUTPATIENT
Start: 2023-10-03 | End: 2023-10-03 | Stop reason: HOSPADM

## 2023-10-03 NOTE — ED PROVIDER NOTES
[facility]    EMERGENCY DEPARTMENT ENCOUNTER      Pt Name: Ramin Zaragoza  MRN: [id number]  YOB: 1935  Date of evaluation: 10/3/2023  Provider: Deon Danielle DO    CHIEF COMPLAINT       Chief Complaint   Patient presents with    Elbow Pain         HISTORY OF PRESENT ILLNESS  (Location/Symptom, Timing/Onset, Context/Setting, Quality, Duration, Modifying Factors, Severity.)   Ramin Zaragoza is a 87 y.o. male who presents to the emergency department via EMS from his nursing facility secondary to a concern for upper extremity discomfort.  Patient has a significant and severe dementia and is nonverbal, staff and told EMS that thought he was having pain in his upper extremities has had to the hospital for further assessment.  The patient himself does not provide any additional history.  With range of motion during my exam today he appears to have discomfort with motion of the left elbow, right elbow and right wrist.  He is not verbally give me any cues during my initial assessment.  I try to call the patient and his daughter, Isatu on the telephone but unable to have a conversation with her.  Recent hospitalization and discharge from a week ago was reviewed by myself the patient has severe underlying dementia, recently treated for pneumonia and sent back to the nursing facility last week.        From patient's recent hospitalization, discharged on September 26:  CODE STATUS:        Code Status and Medical Interventions:   Ordered at: 09/22/23 0034     Code Status (Patient has no pulse and is not breathing):     No CPR (Do Not Attempt to Resuscitate)     Medical Interventions (Patient has pulse or is breathing):     Full Support     Comments:     DNR/DNI, d/w daughter       Nursing notes were reviewed.      PAST MEDICAL HISTORY     Past Medical History:   Diagnosis Date    CAD (coronary artery disease)     Hyperlipidemia     Hypertension     Hypothyroidism     on chronic replacement therapy    Lactose  intolerance     Lower extremity edema     PVC's (premature ventricular contractions)     History of    Thyroid disorder     Venous insufficiency          SURGICAL HISTORY       Past Surgical History:   Procedure Laterality Date    CARDIAC CATHETERIZATION      CORONARY STENT PLACEMENT      EYE SURGERY      Bilateral cataract extraction    HERNIA REPAIR      Inguinal hernia repair    OTHER SURGICAL HISTORY      Melanoma removed from back    OTHER SURGICAL HISTORY      History of left elbow fracture with sunsequent fixation         CURRENT MEDICATIONS       Current Facility-Administered Medications:     acetaminophen (TYLENOL) tablet 1,000 mg, 1,000 mg, Oral, Once, Deon Danielle,     Current Outpatient Medications:     acetaminophen (TYLENOL) 325 MG tablet, Take 2 tablets by mouth Every 4 (Four) Hours As Needed for Mild Pain ., Disp: , Rfl:     aspirin 81 MG EC tablet, Take 1 tablet by mouth Daily., Disp: , Rfl:     bumetanide (BUMEX) 0.5 MG tablet, Take 1 tablet by mouth Daily As Needed (edema/ soa)., Disp: 90 tablet, Rfl: 0    Diclofenac Sodium (VOLTAREN) 1 % gel gel, Apply 2 g topically to the appropriate area as directed 3 (Three) Times a Day As Needed (pain). Apply to neck for pain, Disp: , Rfl:     fluticasone (FLONASE) 50 MCG/ACT nasal spray, 2 sprays into the nostril(s) as directed by provider Daily. Administer 2 sprays in each nostril for each dose., Disp: , Rfl:     ipratropium-albuterol (DUO-NEB) 0.5-2.5 mg/3 ml nebulizer, Take 3 mL by nebulization Every 4 (Four) Hours As Needed for Wheezing or Shortness of Air., Disp: 360 mL, Rfl:     levothyroxine (SYNTHROID, LEVOTHROID) 50 MCG tablet, Take 1.5 tablets by mouth Every Morning., Disp: , Rfl:     magnesium hydroxide (MILK OF MAGNESIA) 400 MG/5ML suspension, Take 30 mL by mouth As Needed for Constipation., Disp: , Rfl:     melatonin 5 MG tablet tablet, Take 1 tablet by mouth At Night As Needed (sleep)., Disp: , Rfl:     midodrine (PROAMATINE) 5 MG  tablet, Take 1 tablet by mouth As Needed. For SBP less than 110, Disp: , Rfl:     nitroglycerin (NITROSTAT) 0.4 MG SL tablet, Place 1 tablet under the tongue every 5 (five) minutes as needed for chest pain. Take no more than 3 doses in 15 minutes., Disp: 25 tablet, Rfl: 11    PARoxetine (PAXIL) 10 MG tablet, Take 1 tablet by mouth Every Morning., Disp: , Rfl:     QUEtiapine (SEROquel) 25 MG tablet, Take 1 tablet by mouth Every Night., Disp: , Rfl:     Valproic Acid (DEPAKENE) 250 MG/5ML solution syrup, Take 5 mL by mouth Every 12 (Twelve) Hours., Disp: 300 mL, Rfl: 0    ALLERGIES     Flomax [tamsulosin] and Lactose intolerance (gi)    FAMILY HISTORY       Family History   Problem Relation Age of Onset    No Known Problems Mother     No Known Problems Father           SOCIAL HISTORY       Social History     Socioeconomic History    Marital status:    Tobacco Use    Smoking status: Never    Smokeless tobacco: Never   Vaping Use    Vaping Use: Never used   Substance and Sexual Activity    Alcohol use: No    Drug use: No    Sexual activity: Defer         PHYSICAL EXAM    (up to 7 for level 4, 8 or more for level 5)     Vitals:    10/03/23 1530 10/03/23 1600 10/03/23 1630 10/03/23 1730   BP: 130/69 128/67 135/73 130/71   Pulse: 65 68 68 69   Resp:       Temp:       TempSrc:       SpO2: 98% 98% 98% 98%   Weight:       Height:           Physical Exam  General : Patient is awake, appears chronically ill, weak, frail, dementia appreciated, patient is very limited during my assessment  HEENT: Pupils are equally round, EOMI  Cardiac: Heart regular rate, rhythm  Lungs: Lungs decreased breath sounds bilaterally, no acute respiratory distress  Abdomen: Abdomen is soft, nontender, nondistended.   Musculoskeletal: Patient is holding both upper extremities in flexion and with range of motion of the bilateral elbows able to extend to about 40 degrees and the patient does moan intermittently with range of motion at the elbows  right side more so than the left.  Also in the right wrist there is some redness and mild soft tissue swelling with discomfort with palpation and range of motion of the wrist.  His distal pulses intact, patient is able to squeeze my fingers on both his upper extremities.  He does not have any tenderness with pelvic rocking, flexion extension at the hip, knees and ankle joints.  Generalized muscle atrophy is appreciated diffusely throughout.  Neuro: Motor intact, sensory intact, level of consciousness is normal, cerebellar function is normal, reflexes are grossly normal. No evidence of incontinence or loss of bowel or bladder function, no saddle anesthesia noted   Dermatology: Skin is warm and dry      DIAGNOSTIC RESULTS     EKG:  All EKGs are interpreted by the Emergency Department Physician who either signs or Co-signs this chart in the absence of a cardiologist.    No orders to display       RADIOLOGY:     [x] Radiologist's Report Reviewed:  CT Upper Extremity Left Without Contrast   Final Result   Impression:   1.Marked irregularity of the proximal radius with associated loss of bone volume. The findings may relate to chronic erosive changes such as in the setting of inflammatory thyropathy or rheumatoid arthritis. Findings secondary to remote trauma or    possibly postoperative resection could have this appearance as well.   2.Superimposed productive type arthropathy or mild to moderate osteoarthritis is also noted.   3.There is a joint effusion.   4.Limited evaluation for ligament/tendon derangement.   5.No evidence for abnormal fluid collection. No large hemorrhage or hematoma is seen.            Electronically Signed: James White MD     10/3/2023 5:28 PM EDT     Workstation ID: [id number]      XR Wrist 3+ View Right   Final Result   1.No acute fractures or dislocations.      2.Soft tissue swelling            Electronically Signed: Keyshawn Marie MD     10/3/2023 3:01 PM EDT     Workstation ID: [id number]       XR Elbow 3+ View Bilateral   Final Result   Impression:   Left elbow effusion in the setting of trauma is most compatible with an occult fracture, typically of the radial head in an adult. However, the deformed left radial head in this patient appears most likely to relate to a chronic abnormality. Follow-up CT    imaging may be useful if clinically indicated.         Electronically Signed: Mary Brumfield MD     10/3/2023 3:02 PM EDT     Workstation ID: [id number]          I ordered and independently reviewed the above noted radiographic studies.      I viewed images of elbow, right wrist x-ray which showed no acute fracture, elbow proximal radius bony breakdown arthritis is appreciated.  Per my independent interpretation.    See radiologist's dictation for official interpretation.      ED BEDSIDE ULTRASOUND:   Performed by ED Physician - none    LABS:    I have reviewed and interpreted all of the currently available lab results from this visit (if applicable):  Results for orders placed or performed during the hospital encounter of 09/21/23   Respiratory Panel PCR w/COVID-19(SARS-CoV-2) [facility] In-House, NP Swab in UTM/VTM, 3-4 HR TAT - Swab, Nasopharynx    Specimen: Nasopharynx; Swab   Result Value Ref Range    ADENOVIRUS, PCR Not Detected Not Detected    Coronavirus 229E Not Detected Not Detected    Coronavirus HKU1 Not Detected Not Detected    Coronavirus NL63 Not Detected Not Detected    Coronavirus OC43 Not Detected Not Detected    COVID19 Not Detected Not Detected - Ref. Range    Human Metapneumovirus Not Detected Not Detected    Human Rhinovirus/Enterovirus Not Detected Not Detected    Influenza A PCR Not Detected Not Detected    Influenza B PCR Not Detected Not Detected    Parainfluenza Virus 1 Not Detected Not Detected    Parainfluenza Virus 2 Not Detected Not Detected    Parainfluenza Virus 3 Not Detected Not Detected    Parainfluenza Virus 4 Not Detected Not Detected    RSV, PCR Not  Detected Not Detected    Bordetella pertussis pcr Not Detected Not Detected    Bordetella parapertussis PCR Not Detected Not Detected    Chlamydophila pneumoniae PCR Not Detected Not Detected    Mycoplasma pneumo by PCR Not Detected Not Detected   Blood Culture - Blood, Arm, Left    Specimen: Arm, Left; Blood   Result Value Ref Range    Blood Culture No growth at 5 days    Blood Culture - Blood, Arm, Right    Specimen: Arm, Right; Blood   Result Value Ref Range    Blood Culture No growth at 5 days    MRSA Screen, PCR (Inpatient) - Swab, Nares    Specimen: Nares; Swab   Result Value Ref Range    MRSA PCR Negative Negative   Legionella Antigen, Urine - Urine, Urine, Clean Catch    Specimen: Urine, Clean Catch   Result Value Ref Range    LEGIONELLA ANTIGEN, URINE Negative Negative   S. Pneumo Ag Urine or CSF - Urine, Urine, Clean Catch    Specimen: Urine, Clean Catch   Result Value Ref Range    Strep Pneumo Ag Negative Negative   Comprehensive Metabolic Panel    Specimen: Blood   Result Value Ref Range    Glucose 163 (H) 65 - 99 mg/dL    BUN 14 8 - 23 mg/dL    Creatinine 0.69 (L) 0.76 - 1.27 mg/dL    Sodium 138 136 - 145 mmol/L    Potassium 4.3 3.5 - 5.2 mmol/L    Chloride 103 98 - 107 mmol/L    CO2 25.0 22.0 - 29.0 mmol/L    Calcium 8.6 8.6 - 10.5 mg/dL    Total Protein 5.8 (L) 6.0 - 8.5 g/dL    Albumin 3.3 (L) 3.5 - 5.2 g/dL    ALT (SGPT) 12 1 - 41 U/L    AST (SGOT) 19 1 - 40 U/L    Alkaline Phosphatase 62 39 - 117 U/L    Total Bilirubin 0.4 0.0 - 1.2 mg/dL    Globulin 2.5 gm/dL    A/G Ratio 1.3 g/dL    BUN/Creatinine Ratio 20.3 7.0 - 25.0    Anion Gap 10.0 5.0 - 15.0 mmol/L    eGFR 89.6 >60.0 mL/min/1.73   Urinalysis With Culture If Indicated - Straight Cath    Specimen: Straight Cath; Urine   Result Value Ref Range    Color, UA Yellow Yellow, Straw    Appearance, UA Clear Clear    pH, UA 6.5 5.0 - 8.0    Specific Gravity, UA 1.017 1.001 - 1.030    Glucose, UA Negative Negative    Ketones, UA Negative Negative     Bilirubin, UA Negative Negative    Blood, UA Small (1+) (A) Negative    Protein, UA Negative Negative    Leuk Esterase, UA Negative Negative    Nitrite, UA Negative Negative    Urobilinogen, UA 1.0 E.U./dL 0.2 - 1.0 E.U./dL   Protime-INR    Specimen: Blood   Result Value Ref Range    Protime 15.6 (H) 12.2 - 14.5 Seconds    INR 1.23 (H) 0.89 - 1.12   Lactic Acid, Plasma    Specimen: Blood   Result Value Ref Range    Lactate 2.3 (C) 0.5 - 2.0 mmol/L   Procalcitonin    Specimen: Blood   Result Value Ref Range    Procalcitonin 0.41 (H) 0.00 - 0.25 ng/mL   Magnesium    Specimen: Blood   Result Value Ref Range    Magnesium 1.7 1.6 - 2.4 mg/dL   Valproic Acid Level, Total    Specimen: Blood   Result Value Ref Range    Valproic Acid 30.8 (L) 50.0 - 125.0 mcg/mL   CBC Auto Differential    Specimen: Blood   Result Value Ref Range    WBC 13.96 (H) 3.40 - 10.80 10*3/mm3    RBC 3.93 (L) 4.14 - 5.80 10*6/mm3    Hemoglobin 12.4 (L) 13.0 - 17.7 g/dL    Hematocrit 36.7 (L) 37.5 - 51.0 %    MCV 93.4 79.0 - 97.0 fL    MCH 31.6 26.6 - 33.0 pg    MCHC 33.8 31.5 - 35.7 g/dL    RDW 13.0 12.3 - 15.4 %    RDW-SD 44.5 37.0 - 54.0 fl    MPV 8.6 6.0 - 12.0 fL    Platelets 197 140 - 450 10*3/mm3    Neutrophil % 85.5 (H) 42.7 - 76.0 %    Lymphocyte % 8.0 (L) 19.6 - 45.3 %    Monocyte % 5.9 5.0 - 12.0 %    Eosinophil % 0.1 (L) 0.3 - 6.2 %    Basophil % 0.1 0.0 - 1.5 %    Immature Grans % 0.4 0.0 - 0.5 %    Neutrophils, Absolute 11.94 (H) 1.70 - 7.00 10*3/mm3    Lymphocytes, Absolute 1.11 0.70 - 3.10 10*3/mm3    Monocytes, Absolute 0.82 0.10 - 0.90 10*3/mm3    Eosinophils, Absolute 0.02 0.00 - 0.40 10*3/mm3    Basophils, Absolute 0.02 0.00 - 0.20 10*3/mm3    Immature Grans, Absolute 0.05 0.00 - 0.05 10*3/mm3    nRBC 0.0 0.0 - 0.2 /100 WBC   Urinalysis, Microscopic Only - Straight Cath    Specimen: Straight Cath; Urine   Result Value Ref Range    RBC, UA 31-50 (A) None Seen, 0-2 /HPF    WBC, UA 0-2 None Seen, 0-2 /HPF    Bacteria, UA None Seen None  Seen, Trace /HPF    Squamous Epithelial Cells, UA 0-2 None Seen, 0-2 /HPF    Hyaline Casts, UA None Seen 0 - 6 /LPF    Methodology Automated Microscopy    STAT Lactic Acid, Reflex    Specimen: Blood   Result Value Ref Range    Lactate 1.6 0.5 - 2.0 mmol/L   CBC Auto Differential    Specimen: Blood   Result Value Ref Range    WBC 11.63 (H) 3.40 - 10.80 10*3/mm3    RBC 4.03 (L) 4.14 - 5.80 10*6/mm3    Hemoglobin 12.8 (L) 13.0 - 17.7 g/dL    Hematocrit 40.1 37.5 - 51.0 %    MCV 99.5 (H) 79.0 - 97.0 fL    MCH 31.8 26.6 - 33.0 pg    MCHC 31.9 31.5 - 35.7 g/dL    RDW 13.3 12.3 - 15.4 %    RDW-SD 48.7 37.0 - 54.0 fl    MPV 9.0 6.0 - 12.0 fL    Platelets 183 140 - 450 10*3/mm3    Neutrophil % 65.5 42.7 - 76.0 %    Lymphocyte % 24.2 19.6 - 45.3 %    Monocyte % 8.7 5.0 - 12.0 %    Eosinophil % 1.0 0.3 - 6.2 %    Basophil % 0.3 0.0 - 1.5 %    Immature Grans % 0.3 0.0 - 0.5 %    Neutrophils, Absolute 7.62 (H) 1.70 - 7.00 10*3/mm3    Lymphocytes, Absolute 2.81 0.70 - 3.10 10*3/mm3    Monocytes, Absolute 1.01 (H) 0.10 - 0.90 10*3/mm3    Eosinophils, Absolute 0.12 0.00 - 0.40 10*3/mm3    Basophils, Absolute 0.03 0.00 - 0.20 10*3/mm3    Immature Grans, Absolute 0.04 0.00 - 0.05 10*3/mm3    nRBC 0.0 0.0 - 0.2 /100 WBC   Comprehensive Metabolic Panel    Specimen: Blood   Result Value Ref Range    Glucose 99 65 - 99 mg/dL    BUN 11 8 - 23 mg/dL    Creatinine 0.70 (L) 0.76 - 1.27 mg/dL    Sodium 141 136 - 145 mmol/L    Potassium 4.0 3.5 - 5.2 mmol/L    Chloride 107 98 - 107 mmol/L    CO2 28.0 22.0 - 29.0 mmol/L    Calcium 8.3 (L) 8.6 - 10.5 mg/dL    Total Protein 5.1 (L) 6.0 - 8.5 g/dL    Albumin 2.9 (L) 3.5 - 5.2 g/dL    ALT (SGPT) 11 1 - 41 U/L    AST (SGOT) 20 1 - 40 U/L    Alkaline Phosphatase 53 39 - 117 U/L    Total Bilirubin 0.5 0.0 - 1.2 mg/dL    Globulin 2.2 gm/dL    A/G Ratio 1.3 g/dL    BUN/Creatinine Ratio 15.7 7.0 - 25.0    Anion Gap 6.0 5.0 - 15.0 mmol/L    eGFR 89.2 >60.0 mL/min/1.73   Magnesium    Specimen: Blood    Result Value Ref Range    Magnesium 1.8 1.6 - 2.4 mg/dL   CBC Auto Differential    Specimen: Blood   Result Value Ref Range    WBC 6.36 3.40 - 10.80 10*3/mm3    RBC 3.70 (L) 4.14 - 5.80 10*6/mm3    Hemoglobin 11.7 (L) 13.0 - 17.7 g/dL    Hematocrit 35.0 (L) 37.5 - 51.0 %    MCV 94.6 79.0 - 97.0 fL    MCH 31.6 26.6 - 33.0 pg    MCHC 33.4 31.5 - 35.7 g/dL    RDW 13.2 12.3 - 15.4 %    RDW-SD 45.5 37.0 - 54.0 fl    MPV 8.7 6.0 - 12.0 fL    Platelets 162 140 - 450 10*3/mm3    Neutrophil % 50.0 42.7 - 76.0 %    Lymphocyte % 36.2 19.6 - 45.3 %    Monocyte % 9.1 5.0 - 12.0 %    Eosinophil % 4.1 0.3 - 6.2 %    Basophil % 0.3 0.0 - 1.5 %    Immature Grans % 0.3 0.0 - 0.5 %    Neutrophils, Absolute 3.18 1.70 - 7.00 10*3/mm3    Lymphocytes, Absolute 2.30 0.70 - 3.10 10*3/mm3    Monocytes, Absolute 0.58 0.10 - 0.90 10*3/mm3    Eosinophils, Absolute 0.26 0.00 - 0.40 10*3/mm3    Basophils, Absolute 0.02 0.00 - 0.20 10*3/mm3    Immature Grans, Absolute 0.02 0.00 - 0.05 10*3/mm3    nRBC 0.0 0.0 - 0.2 /100 WBC   Vancomycin, Random    Specimen: Blood   Result Value Ref Range    Vancomycin Random 12.60 5.00 - 40.00 mcg/mL   Basic Metabolic Panel    Specimen: Blood   Result Value Ref Range    Glucose 93 65 - 99 mg/dL    BUN 9 8 - 23 mg/dL    Creatinine 0.72 (L) 0.76 - 1.27 mg/dL    Sodium 140 136 - 145 mmol/L    Potassium 4.1 3.5 - 5.2 mmol/L    Chloride 108 (H) 98 - 107 mmol/L    CO2 27.0 22.0 - 29.0 mmol/L    Calcium 8.3 (L) 8.6 - 10.5 mg/dL    BUN/Creatinine Ratio 12.5 7.0 - 25.0    Anion Gap 5.0 5.0 - 15.0 mmol/L    eGFR 88.4 >60.0 mL/min/1.73   ECG 12 Lead QT Measurement   Result Value Ref Range    QT Interval 396 ms    QTC Interval 405 ms        If labs were ordered, I independently reviewed the results and considered them in treating the patient.      EMERGENCY DEPARTMENT COURSE and DIFFERENTIAL DIAGNOSIS/MDM:   Vitals:  AS OF 18:20 EDT    BP - 130/71  HR - 69  TEMP - 98.2 °F (36.8 °C) (Oral)  O2 SATS - 98%      Orders placed  during this visit:  Orders Placed This Encounter   Procedures    XR Elbow 3+ View Bilateral    XR Wrist 3+ View Right    CT Upper Extremity Left Without Contrast       All labs have been independently reviewed by me.  All radiology studies have been reviewed by me and the radiologist dictating the report.  All EKG's have been independently viewed and interpreted by me.      Discussion below represents my analysis of pertinent findings related to patient's condition, differential diagnosis, treatment plan and final disposition.    Differential diagnosis:  The differential diagnosis associated with the patient's presentation includes: Soft tissue injury, fracture, dislocation, arthritis    Additional sources  Discussed/ obtained information from independent historians:   [] Spouse  [] Parent  [] Family member  [] Friend  [x] EMS   [] Other:    External (non-ED) record review:   [x] Inpatient record: Patient record from recent hospitalization and discharged on September 26 was reviewed by myself treated for underlying pneumonia   [] Office record:   [] Outpatient record:   [] Prior Outpatient labs:   [] Prior Outpatient radiology:   [] Primary Care record:   [] Outside ED record:   [] Other:     Patient's care impacted by:   [] Diabetes  [] Hypertension  [] CHF  [] Hyperlipidemia  [] Coronary Artery Disease   [] COPD   [] Cancer   [] Tobacco Abuse   [] Substance Abuse    [] Other:     Care significantly affected by Social Determinants of Health (housing and economic circumstances, unemployment)    [] Yes     [x] No   If yes, Patient's care significantly limited by Social Determinants of Health including:   [] Inadequate housing   [] Low income   [] Alcoholism and drug addiction in family   [] Problems related to primary support group   [] Unemployment   [] Problems related to employment   [] Other Social Determinants of Health:       MEDICATIONS ADMINISTERED IN ED:  Medications   acetaminophen (TYLENOL) tablet 1,000 mg  (1,000 mg Oral Not Given 10/3/23 0930)              This is a 87-year-old male with multiple chronic comorbidities, baseline dementia who presents for concern for upper extremity discomfort at nursing facility without any known injury, fall or trauma.  Patient is nonverbal and on my initial examination, range of motion of the bilateral elbows and right wrist does elicit some generalized pain, no signs of acute dislocation, there is a soft tissue injury potentially to the dorsal aspect of the distal right wrist/radius.  We will obtain x-ray images for further assessment.  Results as above.  I will attempt to call the patient's daughter, Isatu to give her an update on the current findings.  Unfortunately she is unavailable for discussion on multiple phone calls.    I did discuss the case briefly with on-call orthopedic surgery Dr. Benítez, appreciate his input.  He did review the CT read.  Notes had some in his age is really no acute or surgical intervention which would be warranted somewhat in younger age would benefit from a total elbow replacement.  This point it would likely be symptomatic treatment therapies and arthritic pain control.  We will plan for discharge back to the patient's facility for continued treatment and therapies.  More advanced pain control as needed, on reevaluation the patient is resting comfortably not acutely toxic.    I had a discussion with the patient/family regarding diagnosis, diagnostic results, treatment plan, and medications.  The patient/family indicated understanding of these instructions.  I spent adequate time at the bedside preceding discharge necessary to personally discuss the aftercare instructions, giving patient education, providing explanations of the results of our evaluations/findings, and my decision making to assure that the patient/family understand the plan of care.  Time was allotted to answer questions at that time and throughout the ED course.  Emphasis was placed  on timely follow-up after discharge.  I also discussed the potential for the development of an acute emergent condition requiring further evaluation, admission, or even surgical intervention. I discussed that we found nothing during the visit today indicating the need for further workup, admission, or the presence of an unstable medical condition.  I encouraged the patient to return to the emergency department immediately for ANY concerns, worsening, new complaints, or if symptoms persist and unable to seek follow-up in a timely fashion.  The patient/family expressed understanding and agreement with this plan.  The patient will follow-up with their PCP in 1-2 days for reevaluation.     PROCEDURES:  Procedures    CRITICAL CARE TIME    Total Critical Care time was 0 minutes, excluding separately reportable procedures.   There was a high probability of clinically significant/life threatening deterioration in the patient's condition which required my urgent intervention.      FINAL IMPRESSION      1. 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 Arthralgia of both elbows    4. Right wrist pain          DISPOSITION/PLAN     ED Disposition       ED Disposition   Discharge    Condition   Stable    Comment   --               PATIENT REFERRED TO:  Patricio Crawford MD  [address] [id number]  [phone]    In 2 days      [facility] EMERGENCY DEPARTMENT  [address] [facility] [address] [phone]    If symptoms worsen      DISCHARGE MEDICATIONS:     Medication List        CONTINUE taking these medications      acetaminophen 325 MG tablet  Commonly known as: TYLENOL  Take 2 tablets by mouth Every 4 (Four) Hours As Needed for Mild Pain .     aspirin 81 MG EC tablet     bumetanide 0.5 MG tablet  Commonly known as: BUMEX  Take 1 tablet by mouth Daily As Needed (edema/ soa).      Diclofenac Sodium 1 % gel gel  Commonly known as: VOLTAREN  Apply 2 g topically to the appropriate area as directed 3 (Three) Times a Day As Needed (pain). Apply to neck for pain     fluticasone 50 MCG/ACT nasal spray  Commonly known as: FLONASE     ipratropium-albuterol 0.5-2.5 mg/3 ml nebulizer  Commonly known as: DUO-NEB  Take 3 mL by nebulization Every 4 (Four) Hours As Needed for Wheezing or Shortness of Air.     levothyroxine 50 MCG tablet  Commonly known as: SYNTHROID, LEVOTHROID  Take 1.5 tablets by mouth Every Morning.     magnesium hydroxide 400 MG/5ML suspension  Commonly known as: MILK OF MAGNESIA     melatonin 5 MG tablet tablet  Take 1 tablet by mouth At Night As Needed (sleep).     midodrine 5 MG tablet  Commonly known as: PROAMATINE     nitroglycerin 0.4 MG SL tablet  Commonly known as: NITROSTAT  Place 1 tablet under the tongue every 5 (five) minutes as needed for chest pain. Take no more than 3 doses in 15 minutes.     PARoxetine 10 MG tablet  Commonly known as: PAXIL     QUEtiapine 25 MG tablet  Commonly known as: SEROquel  Take 1 tablet by mouth Every Night.     Valproic Acid 250 MG/5ML solution syrup  Commonly known as: DEPAKENE  Take 5 mL by mouth Every 12 (Twelve) Hours.                  Comment: Please note this report has been produced using speech recognition software.      Deon Danielle DO  Attending Emergency Physician         Deon Danielle DO  10/03/23 [id number]

## 2023-10-03 NOTE — CASE MANAGEMENT/SOCIAL WORK
Continued Stay Note  [facility]     Patient Name: Ramin Zaragoza  MRN: [id number]  Today's Date: 10/3/2023    Admit Date: 10/3/2023    Plan: [facility]   Discharge Plan       Row Name 10/03/23 1905       Plan    Plan [facility]    Patient/Family in Agreement with Plan yes    Plan Comments Patient in need of transportation back to [facility]. [facility] will be able to provide transportation back to facility after 1930. PCS form completed. ED RN aware.    Final Discharge Disposition Code 04 - intermediate care facility                   Discharge Codes    No documentation.                       Nadine Fonseca RN